# Patient Record
Sex: FEMALE | Race: WHITE | URBAN - NONMETROPOLITAN AREA
[De-identification: names, ages, dates, MRNs, and addresses within clinical notes are randomized per-mention and may not be internally consistent; named-entity substitution may affect disease eponyms.]

---

## 2018-02-10 ENCOUNTER — OFFICE VISIT (OUTPATIENT)
Dept: URGENT CARE | Age: 63
End: 2018-02-10
Payer: COMMERCIAL

## 2018-02-10 VITALS
WEIGHT: 266 LBS | DIASTOLIC BLOOD PRESSURE: 83 MMHG | RESPIRATION RATE: 18 BRPM | SYSTOLIC BLOOD PRESSURE: 136 MMHG | HEIGHT: 68 IN | TEMPERATURE: 98.6 F | BODY MASS INDEX: 40.32 KG/M2 | OXYGEN SATURATION: 95 % | HEART RATE: 106 BPM

## 2018-02-10 DIAGNOSIS — R50.9 FEVER AND CHILLS: ICD-10-CM

## 2018-02-10 DIAGNOSIS — J11.1 INFLUENZA: Primary | ICD-10-CM

## 2018-02-10 LAB
INFLUENZA A ANTIBODY: NEGATIVE
INFLUENZA B ANTIBODY: NEGATIVE

## 2018-02-10 PROCEDURE — 99202 OFFICE O/P NEW SF 15 MIN: CPT | Performed by: FAMILY MEDICINE

## 2018-02-10 PROCEDURE — 87804 INFLUENZA ASSAY W/OPTIC: CPT | Performed by: FAMILY MEDICINE

## 2018-02-10 RX ORDER — MONTELUKAST SODIUM 10 MG/1
TABLET ORAL
COMMUNITY
Start: 2016-10-27

## 2018-02-10 RX ORDER — LOVASTATIN 20 MG/1
TABLET ORAL
COMMUNITY
Start: 2017-12-28

## 2018-02-10 RX ORDER — OSELTAMIVIR PHOSPHATE 75 MG/1
75 CAPSULE ORAL 2 TIMES DAILY
Qty: 10 CAPSULE | Refills: 0 | Status: SHIPPED | OUTPATIENT
Start: 2018-02-10 | End: 2018-02-15

## 2018-02-10 RX ORDER — LISINOPRIL 20 MG/1
TABLET ORAL
COMMUNITY
Start: 2018-01-30

## 2018-02-10 RX ORDER — HYDROCODONE BITARTRATE AND ACETAMINOPHEN 7.5; 325 MG/1; MG/1
TABLET ORAL
COMMUNITY
Start: 2018-01-19

## 2018-02-10 RX ORDER — METOPROLOL SUCCINATE 100 MG/1
TABLET, EXTENDED RELEASE ORAL
COMMUNITY
Start: 2018-01-30

## 2018-02-10 ASSESSMENT — ENCOUNTER SYMPTOMS
RHINORRHEA: 1
NAUSEA: 0
COUGH: 1
SORE THROAT: 1

## 2018-02-10 NOTE — PROGRESS NOTES
No current facility-administered medications for this visit. Allergies   Allergen Reactions    Amoxicillin        Health Maintenance   Topic Date Due    Hepatitis C screen  1955    HIV screen  07/09/1972    DTaP/Tdap/Td vaccine (1 - Tdap) 07/09/1976    Cervical cancer screen  07/09/1978    Lipid screen  07/09/1997    Diabetes screen  07/09/1997    Breast cancer screen  07/09/2007    Colon cancer screen colonoscopy  07/09/2007    Zostavax vaccine  07/09/2017    Flu vaccine (1) 09/01/2017       Subjective:      Review of Systems   Constitutional: Positive for chills and fever. HENT: Positive for congestion, rhinorrhea and sore throat. Respiratory: Positive for cough. Gastrointestinal: Positive for anorexia. Negative for nausea. Musculoskeletal: Positive for arthralgias (she also has psoriatic arthritis and this flares up when she is sick) and myalgias. Neurological: Positive for headaches. Objective:     Physical Exam   Constitutional: She is oriented to person, place, and time. She appears well-developed and well-nourished. No distress. HENT:   Head: Normocephalic and atraumatic. Right Ear: External ear normal.   Left Ear: External ear normal.   Mouth/Throat: No oropharyngeal exudate. She has a red throat w/o exudate and her nose has a mucoid drainage. Cardiovascular: Normal rate. Pulmonary/Chest: Effort normal and breath sounds normal. No respiratory distress. She has no wheezes. She has no rales. Lymphadenopathy:     She has no cervical adenopathy. Neurological: She is alert and oriented to person, place, and time. Skin: She is not diaphoretic. Nursing note and vitals reviewed. /83   Pulse 106   Temp 98.6 °F (37 °C) (Oral)   Resp 18   Ht 5' 8\" (1.727 m)   Wt 266 lb (120.7 kg)   SpO2 95%   BMI 40.45 kg/m²     Assessment:      1. Influenza  oseltamivir (TAMIFLU) 75 MG capsule   2.  Fever and chills  POCT Influenza A/B       Plan: you care for yourself at home? · Get plenty of rest.  · Drink plenty of fluids, enough so that your urine is light yellow or clear like water. If you have kidney, heart, or liver disease and have to limit fluids, talk with your doctor before you increase the amount of fluids you drink. · Take an over-the-counter pain medicine if needed, such as acetaminophen (Tylenol), ibuprofen (Advil, Motrin), or naproxen (Aleve), to relieve fever, headache, and muscle aches. Read and follow all instructions on the label. No one younger than 20 should take aspirin. It has been linked to Reye syndrome, a serious illness. · Do not smoke. Smoking can make the flu worse. If you need help quitting, talk to your doctor about stop-smoking programs and medicines. These can increase your chances of quitting for good. · Breathe moist air from a hot shower or from a sink filled with hot water to help clear a stuffy nose. · Before you use cough and cold medicines, check the label. These medicines may not be safe for young children or for people with certain health problems. · If the skin around your nose and lips becomes sore, put some petroleum jelly on the area. · To ease coughing:  ¨ Drink fluids to soothe a scratchy throat. ¨ Suck on cough drops or plain hard candy. ¨ Take an over-the-counter cough medicine that contains dextromethorphan to help you get some sleep. Read and follow all instructions on the label. ¨ Raise your head at night with an extra pillow. This may help you rest if coughing keeps you awake. · Take any prescribed medicine exactly as directed. Call your doctor if you think you are having a problem with your medicine. To avoid spreading the flu  · Wash your hands regularly, and keep your hands away from your face. · Stay home from school, work, and other public places until you are feeling better and your fever has been gone for at least 24 hours.  The fever needs to have gone away on its own without the help

## 2018-09-06 ENCOUNTER — OFFICE VISIT (OUTPATIENT)
Dept: GASTROENTEROLOGY | Facility: CLINIC | Age: 63
End: 2018-09-06

## 2018-09-06 VITALS
OXYGEN SATURATION: 98 % | HEART RATE: 88 BPM | DIASTOLIC BLOOD PRESSURE: 80 MMHG | WEIGHT: 268 LBS | HEIGHT: 68 IN | BODY MASS INDEX: 40.62 KG/M2 | SYSTOLIC BLOOD PRESSURE: 130 MMHG

## 2018-09-06 DIAGNOSIS — Z78.9 NONSMOKER: ICD-10-CM

## 2018-09-06 DIAGNOSIS — E66.9 OBESITY, UNSPECIFIED OBESITY SEVERITY, UNSPECIFIED OBESITY TYPE: ICD-10-CM

## 2018-09-06 DIAGNOSIS — I10 HTN (HYPERTENSION), BENIGN: ICD-10-CM

## 2018-09-06 DIAGNOSIS — Z86.010 HX OF COLONIC POLYPS: Primary | ICD-10-CM

## 2018-09-06 PROBLEM — Z86.0100 HX OF COLONIC POLYPS: Status: ACTIVE | Noted: 2018-09-06

## 2018-09-06 PROCEDURE — S0285 CNSLT BEFORE SCREEN COLONOSC: HCPCS | Performed by: CLINICAL NURSE SPECIALIST

## 2018-09-06 RX ORDER — SODIUM, POTASSIUM,MAG SULFATES 17.5-3.13G
SOLUTION, RECONSTITUTED, ORAL ORAL
Qty: 2 BOTTLE | Refills: 0 | Status: SHIPPED | OUTPATIENT
Start: 2018-09-06 | End: 2019-06-25

## 2018-09-06 RX ORDER — METOPROLOL TARTRATE 100 MG/1
100 TABLET ORAL 2 TIMES DAILY
COMMUNITY
End: 2019-06-25

## 2018-09-06 RX ORDER — LISINOPRIL 20 MG/1
20 TABLET ORAL DAILY
COMMUNITY

## 2018-09-06 NOTE — PROGRESS NOTES
Christel Leung  1955 9/6/2018  Chief Complaint   Patient presents with   • Colonoscopy     Subjective   HPI  Christel Leung is a 63 y.o. female who presents as a referral for preventative maintenance. She has no complaints of nausea or vomiting. No change in bowels. No wt loss. No BRBPR. No melena. There is NO family hx for colon cancer. No abdominal pain.  Past Medical History:   Diagnosis Date   • Arthritis    • Hx of colonic polyp    • Hyperlipemia    • Hypertension    • Psoriatic arthritis (CMS/HCC)      Past Surgical History:   Procedure Laterality Date   • COLONOSCOPY  08/14/2013    Diverticulosis repeat exam in 5 years   • COLONOSCOPY W/ POLYPECTOMY  06/15/2008    Hyperplastic polyp at 30 cm repeat exam in 5 years   • TOTAL KNEE ARTHROPLASTY Right      Outpatient Prescriptions Marked as Taking for the 9/6/18 encounter (Office Visit) with Deborah Becker APRN   Medication Sig Dispense Refill   • Adalimumab (HUMIRA PEN SC) Inject 50 mg under the skin Every 7 (Seven) Days.     • AFLURIA injection      • diclofenac (VOLTAREN) 1 % gel gel      • HYDROcodone-acetaminophen (NORCO) 7.5-325 MG per tablet      • lisinopril (PRINIVIL,ZESTRIL) 20 MG tablet Take 20 mg by mouth Daily.     • lovastatin (MEVACOR) 20 MG tablet      • metoprolol tartrate (LOPRESSOR) 100 MG tablet Take 100 mg by mouth 2 (Two) Times a Day.     • montelukast (SINGULAIR) 10 MG tablet        Allergies   Allergen Reactions   • Amoxicillin      Social History     Social History   • Marital status:      Spouse name: N/A   • Number of children: N/A   • Years of education: N/A     Occupational History   • Not on file.     Social History Main Topics   • Smoking status: Current Every Day Smoker     Packs/day: 0.50     Types: Cigarettes   • Smokeless tobacco: Never Used   • Alcohol use No   • Drug use: No   • Sexual activity: Not on file     Other Topics Concern   • Not on file     Social History Narrative   • No narrative on file  "    Family History   Problem Relation Age of Onset   • Heart disease Mother    • Colon polyps Mother    • No Known Problems Father    • No Known Problems Sister    • No Known Problems Brother    • No Known Problems Maternal Grandmother    • No Known Problems Maternal Grandfather    • No Known Problems Paternal Grandmother    • No Known Problems Paternal Grandfather    • Colon cancer Neg Hx      Health Maintenance   Topic Date Due   • ANNUAL PHYSICAL  07/09/1960   • PNEUMOCOCCAL VACCINE (19-64 MEDIUM RISK) (1 of 1 - PPSV23) 07/09/1976   • TDAP/TD VACCINES (1 - Tdap) 07/09/1976   • ZOSTER VACCINE (1 of 2) 07/09/2007   • HEPATITIS C SCREENING  11/23/2016   • LIPID PANEL  05/21/2018   • INFLUENZA VACCINE  08/01/2018   • MAMMOGRAM  04/18/2019   • PAP SMEAR  11/23/2019   • COLONOSCOPY  08/14/2023       REVIEW OF SYSTEMS  General: well appearing, no fever chills or sweats, no unexplained wt loss  HEENT: no acute visual or hearing disturbances  Cardiovascular: No chest pain or palpitations  Pulmonary: No shortness of breath, coughing, wheezing or hemoptysis  : No burning, urgency, hematuria, or dysuria  Musculoskeletal: No joint pain or stiffness  Peripheral: no edema  Skin: No lesions or rashes  Neuro: No dizziness, headaches, stroke, syncope  Endocrine: No hot or cold intolerances  Hematological: No blood dyscrasias    Objective   Vitals:    09/06/18 0849   BP: 130/80   Pulse: 88   SpO2: 98%   Weight: 122 kg (268 lb)   Height: 172.7 cm (68\")     Body mass index is 40.75 kg/m².  Patient's Body mass index is 40.75 kg/m². BMI is above normal parameters. Recommendations include: nutrition counseling.      PHYSICAL EXAM  General: age appropriate well nourished well appearing, no acute distress  Head: normocephalic and atraumatic  Global assessment-supple  Neck-No JVD noted, no lymphadenopathy  Pulmonary-clear to auscultation bilaterally, normal respiratory effort  Cardiovascular-normal rate and rhythm, normal heart sounds, " S1 and S2 noted  Abdomen-soft, non tender, non distended, normal bowel sounds all 4 quadrants, no hepatosplenomegaly noted  Extremities-No clubbing cyanosis or edema  Neuro-Non focal, converses appropriately, awake, alert, oriented    Assessment/Plan     Christel was seen today for colonoscopy.    Diagnoses and all orders for this visit:    Hx of colonic polyps  -     SUPREP BOWEL PREP KIT 17.5-3.13-1.6 GM/180ML solution oral solution; Take as directed by office instructions provided  -     Case Request; Standing  -     Follow Anesthesia Guidelines / Standing Orders; Future  -     Implement Anesthesia Orders Day of Procedure; Standing  -     Obtain Informed Consent; Standing  -     Verify bowel prep was successful; Standing  -     Case Request    HTN (hypertension), benign    Nonsmoker    Obesity, unspecified obesity severity, unspecified obesity type      Patient is to continue all blood pressure and cardiac medications prior to procedure.     COLONOSCOPY WITH ANESTHESIA (N/A)  Body mass index is 40.75 kg/m².    Patient instructions on prep prior to procedure provided to the patient.    All risks, benefits, alternatives, and indications of colonoscopy procedure have been discussed with the patient. Risks to include perforation of the colon requiring possible surgery or colostomy, risk of bleeding from biopsies or removal of colon tissue, possibility of missing a colon polyp or cancer, or adverse drug reaction.  Benefits to include the diagnosis and management of disease of the colon and rectum. Alternatives to include barium enema, radiographic evaluation, lab testing or no intervention. Pt verbalizes understanding and agrees.     Deborah Becker, REUBEN  2018  9:05 AM      IF YOU SMOKE OR USE TOBACCO PLEASE READ THE FOLLOWIN minutes reading provided    Why is smoking bad for me?  Smoking increases the risk of heart disease, lung disease, vascular disease, stroke, and cancer.     If you smoke, STOP!    If  you would like more information on quitting smoking, please visit the [employer] website: [email or web address]   This link will provide additional resources including the QUIT line and the Beat the Pack support groups.     For more information:    Quit Now [place]  1-800-QUIT-NOW  https://[place].[employer].org/en-[place]/    Obesity, Adult  Obesity is the condition of having too much total body fat. Being overweight or obese means that your weight is greater than what is considered healthy for your body size. Obesity is determined by a measurement called BMI. BMI is an estimate of body fat and is calculated from height and weight. For adults, a BMI of 30 or higher is considered obese.  Obesity can eventually lead to other health concerns and major illnesses, including:  · Stroke.  · Coronary artery disease (CAD).  · Type 2 diabetes.  · Some types of cancer, including cancers of the colon, breast, uterus, and gallbladder.  · Osteoarthritis.  · High blood pressure (hypertension).  · High cholesterol.  · Sleep apnea.  · Gallbladder stones.  · Infertility problems.  What are the causes?  The main cause of obesity is taking in (consuming) more calories than your body uses for energy. Other factors that contribute to this condition may include:  · Being born with genes that make you more likely to become obese.  · Having a medical condition that causes obesity. These conditions include:  ¨ Hypothyroidism.  ¨ Polycystic ovarian syndrome (PCOS).  ¨ Binge-eating disorder.  ¨ Cushing syndrome.  · Taking certain medicines, such as steroids, antidepressants, and seizure medicines.  · Not being physically active (sedentary lifestyle).  · Living where there are limited places to exercise safely or buy healthy foods.  · Not getting enough sleep.  What increases the risk?  The following factors may increase your risk of this condition:  · Having a family history of obesity.  · Being a  woman of -American descent.  · Being a man of  descent.  What are the signs or symptoms?  Having excessive body fat is the main symptom of this condition.  How is this diagnosed?  This condition may be diagnosed based on:  · Your symptoms.  · Your medical history.  · A physical exam. Your health care provider may measure:  ¨ Your BMI. If you are an adult with a BMI between 25 and less than 30, you are considered overweight. If you are an adult with a BMI of 30 or higher, you are considered obese.  ¨ The distances around your hips and your waist (circumferences). These may be compared to each other to help diagnose your condition.  ¨ Your skinfold thickness. Your health care provider may gently pinch a fold of your skin and measure it.  How is this treated?  Treatment for this condition often includes changing your lifestyle. Treatment may include some or all of the following:  · Dietary changes. Work with your health care provider and a dietitian to set a weight-loss goal that is healthy and reasonable for you. Dietary changes may include eating:  ¨ Smaller portions. A portion size is the amount of a particular food that is healthy for you to eat at one time. This varies from person to person.  ¨ Low-calorie or low-fat options.  ¨ More whole grains, fruits, and vegetables.  · Regular physical activity. This may include aerobic activity (cardio) and strength training.  · Medicine to help you lose weight. Your health care provider may prescribe medicine if you are unable to lose 1 pound a week after 6 weeks of eating more healthily and doing more physical activity.  · Surgery. Surgical options may include gastric banding and gastric bypass. Surgery may be done if:  ¨ Other treatments have not helped to improve your condition.  ¨ You have a BMI of 40 or higher.  ¨ You have life-threatening health problems related to obesity.  Follow these instructions at home:     Eating and drinking     · Follow  recommendations from your health care provider about what you eat and drink. Your health care provider may advise you to:  ¨ Limit fast foods, sweets, and processed snack foods.  ¨ Choose low-fat options, such as low-fat milk instead of whole milk.  ¨ Eat 5 or more servings of fruits or vegetables every day.  ¨ Eat at home more often. This gives you more control over what you eat.  ¨ Choose healthy foods when you eat out.  ¨ Learn what a healthy portion size is.  ¨ Keep low-fat snacks on hand.  ¨ Avoid sugary drinks, such as soda, fruit juice, iced tea sweetened with sugar, and flavored milk.  ¨ Eat a healthy breakfast.  · Drink enough water to keep your urine clear or pale yellow.  · Do not go without eating for long periods of time (do not fast) or follow a fad diet. Fasting and fad diets can be unhealthy and even dangerous.  Physical Activity   · Exercise regularly, as told by your health care provider. Ask your health care provider what types of exercise are safe for you and how often you should exercise.  · Warm up and stretch before being active.  · Cool down and stretch after being active.  · Rest between periods of activity.  Lifestyle   · Limit the time that you spend in front of your TV, computer, or video game system.  · Find ways to reward yourself that do not involve food.  · Limit alcohol intake to no more than 1 drink a day for nonpregnant women and 2 drinks a day for men. One drink equals 12 oz of beer, 5 oz of wine, or 1½ oz of hard liquor.  General instructions   · Keep a weight loss journal to keep track of the food you eat and how much you exercise you get.  · Take over-the-counter and prescription medicines only as told by your health care provider.  · Take vitamins and supplements only as told by your health care provider.  · Consider joining a support group. Your health care provider may be able to recommend a support group.  · Keep all follow-up visits as told by your health care provider.  This is important.  Contact a health care provider if:  · You are unable to meet your weight loss goal after 6 weeks of dietary and lifestyle changes.  This information is not intended to replace advice given to you by your health care provider. Make sure you discuss any questions you have with your health care provider.  Document Released: 01/25/2006 Document Revised: 05/22/2017 Document Reviewed: 10/05/2016  Elsevier Interactive Patient Education © 2017 [employer]

## 2018-12-10 ENCOUNTER — ANESTHESIA (OUTPATIENT)
Dept: GASTROENTEROLOGY | Facility: HOSPITAL | Age: 63
End: 2018-12-10

## 2018-12-10 ENCOUNTER — HOSPITAL ENCOUNTER (OUTPATIENT)
Facility: HOSPITAL | Age: 63
Setting detail: HOSPITAL OUTPATIENT SURGERY
Discharge: HOME | End: 2018-12-10
Attending: INTERNAL MEDICINE | Admitting: INTERNAL MEDICINE

## 2018-12-10 ENCOUNTER — ANESTHESIA EVENT (OUTPATIENT)
Dept: GASTROENTEROLOGY | Facility: HOSPITAL | Age: 63
End: 2018-12-10

## 2018-12-10 VITALS
DIASTOLIC BLOOD PRESSURE: 79 MMHG | BODY MASS INDEX: 42.75 KG/M2 | OXYGEN SATURATION: 98 % | RESPIRATION RATE: 17 BRPM | SYSTOLIC BLOOD PRESSURE: 124 MMHG | HEART RATE: 76 BPM | WEIGHT: 266 LBS | HEIGHT: 66 IN

## 2018-12-10 DIAGNOSIS — Z86.010 HX OF COLONIC POLYPS: ICD-10-CM

## 2018-12-10 PROCEDURE — 88305 TISSUE EXAM BY PATHOLOGIST: CPT | Performed by: INTERNAL MEDICINE

## 2018-12-10 PROCEDURE — 45385 COLONOSCOPY W/LESION REMOVAL: CPT | Performed by: INTERNAL MEDICINE

## 2018-12-10 PROCEDURE — 25010000002 PROPOFOL 10 MG/ML EMULSION: Performed by: NURSE ANESTHETIST, CERTIFIED REGISTERED

## 2018-12-10 RX ORDER — SODIUM CHLORIDE 9 MG/ML
500 INJECTION, SOLUTION INTRAVENOUS CONTINUOUS PRN
Status: DISCONTINUED | OUTPATIENT
Start: 2018-12-10 | End: 2018-12-10 | Stop reason: HOSPADM

## 2018-12-10 RX ORDER — PROPOFOL 10 MG/ML
VIAL (ML) INTRAVENOUS AS NEEDED
Status: DISCONTINUED | OUTPATIENT
Start: 2018-12-10 | End: 2018-12-10 | Stop reason: SURG

## 2018-12-10 RX ORDER — LIDOCAINE HYDROCHLORIDE 20 MG/ML
INJECTION, SOLUTION INFILTRATION; PERINEURAL AS NEEDED
Status: DISCONTINUED | OUTPATIENT
Start: 2018-12-10 | End: 2018-12-10 | Stop reason: SURG

## 2018-12-10 RX ORDER — SODIUM CHLORIDE 0.9 % (FLUSH) 0.9 %
3 SYRINGE (ML) INJECTION AS NEEDED
Status: DISCONTINUED | OUTPATIENT
Start: 2018-12-10 | End: 2018-12-10 | Stop reason: HOSPADM

## 2018-12-10 RX ADMIN — LIDOCAINE HYDROCHLORIDE 0.5 ML: 10 INJECTION, SOLUTION EPIDURAL; INFILTRATION; INTRACAUDAL; PERINEURAL at 08:47

## 2018-12-10 RX ADMIN — LIDOCAINE HYDROCHLORIDE 50 MG: 20 INJECTION, SOLUTION INFILTRATION; PERINEURAL at 09:05

## 2018-12-10 RX ADMIN — PROPOFOL 350 MG: 10 INJECTION, EMULSION INTRAVENOUS at 09:08

## 2018-12-10 RX ADMIN — SODIUM CHLORIDE 500 ML: 9 INJECTION, SOLUTION INTRAVENOUS at 08:47

## 2018-12-10 NOTE — ANESTHESIA POSTPROCEDURE EVALUATION
Patient: Christel Leung    Procedure Summary     Date:  12/10/18 Room / Location:  [facility] ENDOSCOPY 2 / [facility]    Anesthesia Start:  0906 Anesthesia Stop:  0929    Procedure:  COLONOSCOPY WITH ANESTHESIA (N/A ) Diagnosis:       Hx of colonic polyps      (Hx of colonic polyps [Z86.010])    Surgeon:  Emile Clifton MD Provider:  Ankit Purcell CRNA    Anesthesia Type:  general ASA Status:  3          Anesthesia Type: general  Last vitals  BP   (!) 169/112 (12/10/18 0818)   Temp       Pulse   96 (12/10/18 0818)   Resp   20 (12/10/18 0818)     SpO2   97 % (12/10/18 0818)     Post Anesthesia Care and Evaluation    Patient location during evaluation: PACU  Patient participation: complete - patient participated  Level of consciousness: awake and awake and alert  Pain score: 0  Pain management: adequate  Airway patency: patent  Anesthetic complications: No anesthetic complications    Cardiovascular status: acceptable and stable  Respiratory status: acceptable and unassisted  Hydration status: acceptable

## 2018-12-10 NOTE — H&P
[facility]  Pre Procedure History & Physical    Chief Complaint:   History of polyps    Subjective     HPI:   Here for colonoscopy.  History of polyps    Past Medical History:   Past Medical History:   Diagnosis Date   • Arthritis    • Hx of colonic polyp    • Hyperlipemia    • Hypertension    • Psoriatic arthritis (CMS/HCC)        Past Surgical History:  Past Surgical History:   Procedure Laterality Date   • COLONOSCOPY  08/14/2013    Diverticulosis repeat exam in 5 years   • COLONOSCOPY W/ POLYPECTOMY  06/15/2008    Hyperplastic polyp at 30 cm repeat exam in 5 years   • TOTAL KNEE ARTHROPLASTY Right        Family History:  Family History   Problem Relation Age of Onset   • Heart disease Mother    • Colon polyps Mother    • No Known Problems Father    • No Known Problems Sister    • No Known Problems Brother    • No Known Problems Maternal Grandmother    • No Known Problems Maternal Grandfather    • No Known Problems Paternal Grandmother    • No Known Problems Paternal Grandfather    • Colon cancer Neg Hx        Social History:   reports that she has been smoking cigarettes.  She has been smoking about 0.50 packs per day. she has never used smokeless tobacco. She reports that she does not drink alcohol or use drugs.    Medications:   Prior to Admission medications    Medication Sig Start Date End Date Taking? Authorizing Provider   Adalimumab (HUMIRA PEN SC) Inject 50 mg under the skin Every 7 (Seven) Days.   Yes ProviderAdalgisa MD   AFLURIA injection  10/28/16  Yes Provider, MD Adalgisa   diclofenac (VOLTAREN) 1 % gel gel  10/31/16  Yes Provider, MD Adalgisa   lisinopril (PRINIVIL,ZESTRIL) 20 MG tablet Take 20 mg by mouth Daily.   Yes ProviderAdalgisa MD   lovastatin (MEVACOR) 20 MG tablet  10/31/16  Yes ProviderAdalgisa MD   metoprolol tartrate (LOPRESSOR) 100 MG tablet Take 100 mg by mouth 2 (Two) Times a Day.   Yes Provider, MD Adalgisa   montelukast (SINGULAIR) 10  "MG tablet  10/27/16  Yes Provider, MD Adalgisa   HYDROcodone-acetaminophen (NORCO) 7.5-325 MG per tablet  9/27/16   Provider, MD Adalgisa   SUPREP BOWEL PREP KIT 17.5-3.13-1.6 GM/180ML solution oral solution Take as directed by office instructions provided 9/6/18   Deborah Becker APRN       Allergies:  Amoxicillin    Objective     Blood pressure (!) 169/112, pulse 96, resp. rate 20, height 167.6 cm (66\"), weight 121 kg (266 lb), SpO2 97 %.    Physical Exam   Constitutional: Pt is oriented to person, place, and in no distress.   HENT: Mouth/Throat: Oropharynx is clear.   Cardiovascular: Normal rate, regular rhythm.    Pulmonary/Chest: Effort normal. No respiratory distress. No  wheezes.   Abdominal: Soft. Non-distended.  Skin: Skin is warm and dry.   Psychiatric: Mood, memory, affect and judgment appear normal.     Assessment/Plan     Diagnosis:  History of polyps    Anticipated Surgical Procedure:    Proceed with colonoscopy as scheduled    The following major R/B/A were discussed with the patient, however the list is not all inclusive . Risk:  Bleeding (immediate and delayed), perforation (rupture or tear), reaction to medication, missed lesion/cancer, pain during the procedure, infection, need for surgery, need for ostomy, need for mechanical ventilation (breathing machine), death.  Benefits: removal of polyp/tissue, burn/clip/or inject to stop bleeding, removal of foreign body, dilate any stricture.  Alternatives: Xray or CT, surgery, do nothing with associated risk   The patient was given time to ask question and received explanation, and agrees to proceed as per History and Physical.   No guarantee given or expressed.    EMR Dragon/transcription disclaimer: Much of this encounter note is an electronic transcription/translation of spoken language to printed text.  The electronic translation of spoken language may permit erroneous, or at times, nonsensical words or phrases to be inadvertently " transcribed.  Although I have reviewed the note for such errors, some may still exist.    Emile Clifton MD  9:08 AM  12/10/2018

## 2018-12-10 NOTE — ANESTHESIA PREPROCEDURE EVALUATION
Anesthesia Evaluation     Patient summary reviewed   no history of anesthetic complications:  NPO Solid Status: > 8 hours             Airway   Mallampati: II  TM distance: >3 FB  Neck ROM: full  Dental      Pulmonary    (+) a smoker,   Cardiovascular   Exercise tolerance: good (4-7 METS)    Patient on routine beta blocker and Beta blocker given within 24 hours of surgery    (+) hypertension, hyperlipidemia,       Neuro/Psych- negative ROS  GI/Hepatic/Renal/Endo    (+) morbid obesity,      Musculoskeletal     Abdominal    Substance History      OB/GYN          Other                        Anesthesia Plan    ASA 3     general     intravenous induction   Anesthetic plan, all risks, benefits, and alternatives have been provided, discussed and informed consent has been obtained with: patient.

## 2018-12-13 LAB
LAB AP CASE REPORT: NORMAL
LAB AP CLINICAL INFORMATION: NORMAL
PATH REPORT.FINAL DX SPEC: NORMAL
PATH REPORT.GROSS SPEC: NORMAL

## 2019-03-18 PROCEDURE — 88304 TISSUE EXAM BY PATHOLOGIST: CPT | Performed by: SPECIALIST

## 2019-03-19 ENCOUNTER — LAB REQUISITION (OUTPATIENT)
Dept: LAB | Facility: HOSPITAL | Age: 64
End: 2019-03-19

## 2019-03-19 DIAGNOSIS — Z00.00 ENCOUNTER FOR GENERAL ADULT MEDICAL EXAMINATION WITHOUT ABNORMAL FINDINGS: ICD-10-CM

## 2019-03-20 LAB
CYTO UR: NORMAL
LAB AP CASE REPORT: NORMAL
LAB AP CLINICAL INFORMATION: NORMAL
LAB AP DIAGNOSIS COMMENT: NORMAL
LAB AP INTRADEPARTMENTAL CONSULT: NORMAL
PATH REPORT.FINAL DX SPEC: NORMAL
PATH REPORT.GROSS SPEC: NORMAL

## 2019-06-24 ENCOUNTER — TRANSCRIBE ORDERS (OUTPATIENT)
Dept: ADMINISTRATIVE | Facility: HOSPITAL | Age: 64
End: 2019-06-24

## 2019-06-24 ENCOUNTER — APPOINTMENT (OUTPATIENT)
Dept: LAB | Facility: HOSPITAL | Age: 64
End: 2019-06-24

## 2019-06-24 DIAGNOSIS — M54.5 LOW BACK PAIN, UNSPECIFIED BACK PAIN LATERALITY, UNSPECIFIED CHRONICITY, WITH SCIATICA PRESENCE UNSPECIFIED: ICD-10-CM

## 2019-06-24 DIAGNOSIS — R11.2 NAUSEA AND VOMITING, INTRACTABILITY OF VOMITING NOT SPECIFIED, UNSPECIFIED VOMITING TYPE: Primary | ICD-10-CM

## 2019-06-24 LAB
ALBUMIN SERPL-MCNC: 4.4 G/DL (ref 3.5–5)
ALBUMIN/GLOB SERPL: 1.1 G/DL (ref 1.1–2.5)
ALP SERPL-CCNC: 93 U/L (ref 24–120)
ALT SERPL W P-5'-P-CCNC: 28 U/L (ref 0–54)
AMYLASE SERPL-CCNC: 58 U/L (ref 30–110)
ANION GAP SERPL CALCULATED.3IONS-SCNC: 13 MMOL/L (ref 4–13)
AST SERPL-CCNC: 34 U/L (ref 7–45)
BILIRUB SERPL-MCNC: 0.8 MG/DL (ref 0.1–1)
BUN BLD-MCNC: 10 MG/DL (ref 5–21)
BUN/CREAT SERPL: 12 (ref 7–25)
CALCIUM SPEC-SCNC: 9.6 MG/DL (ref 8.4–10.4)
CHLORIDE SERPL-SCNC: 104 MMOL/L (ref 98–110)
CO2 SERPL-SCNC: 23 MMOL/L (ref 24–31)
CREAT BLD-MCNC: 0.83 MG/DL (ref 0.5–1.4)
CRP SERPL-MCNC: 2.32 MG/DL (ref 0–0.99)
DEPRECATED RDW RBC AUTO: 43.1 FL (ref 40–54)
ERYTHROCYTE [DISTWIDTH] IN BLOOD BY AUTOMATED COUNT: 13.2 % (ref 12–15)
ERYTHROCYTE [SEDIMENTATION RATE] IN BLOOD: 46 MM/HR (ref 0–20)
GFR SERPL CREATININE-BSD FRML MDRD: 70 ML/MIN/1.73
GLOBULIN UR ELPH-MCNC: 4 GM/DL
GLUCOSE BLD-MCNC: 114 MG/DL (ref 70–100)
HCT VFR BLD AUTO: 45.5 % (ref 37–47)
HGB BLD-MCNC: 15 G/DL (ref 12–16)
LIPASE SERPL-CCNC: 60 U/L (ref 23–203)
MCH RBC QN AUTO: 29.4 PG (ref 28–32)
MCHC RBC AUTO-ENTMCNC: 33 G/DL (ref 33–36)
MCV RBC AUTO: 89 FL (ref 82–98)
PLATELET # BLD AUTO: 302 10*3/MM3 (ref 130–400)
PMV BLD AUTO: 11.4 FL (ref 6–12)
POTASSIUM BLD-SCNC: 4.1 MMOL/L (ref 3.5–5.3)
PROT SERPL-MCNC: 8.4 G/DL (ref 6.3–8.7)
RBC # BLD AUTO: 5.11 10*6/MM3 (ref 4.2–5.4)
SODIUM BLD-SCNC: 140 MMOL/L (ref 135–145)
WBC NRBC COR # BLD: 14 10*3/MM3 (ref 4.8–10.8)

## 2019-06-24 PROCEDURE — 85027 COMPLETE CBC AUTOMATED: CPT | Performed by: INTERNAL MEDICINE

## 2019-06-24 PROCEDURE — 85651 RBC SED RATE NONAUTOMATED: CPT | Performed by: INTERNAL MEDICINE

## 2019-06-24 PROCEDURE — 86140 C-REACTIVE PROTEIN: CPT | Performed by: INTERNAL MEDICINE

## 2019-06-24 PROCEDURE — 80053 COMPREHEN METABOLIC PANEL: CPT | Performed by: INTERNAL MEDICINE

## 2019-06-24 PROCEDURE — 83690 ASSAY OF LIPASE: CPT | Performed by: INTERNAL MEDICINE

## 2019-06-24 PROCEDURE — 82150 ASSAY OF AMYLASE: CPT | Performed by: INTERNAL MEDICINE

## 2019-06-24 PROCEDURE — 36415 COLL VENOUS BLD VENIPUNCTURE: CPT | Performed by: INTERNAL MEDICINE

## 2019-06-25 ENCOUNTER — HOSPITAL ENCOUNTER (INPATIENT)
Facility: HOSPITAL | Age: 64
LOS: 3 days | Discharge: HOME | End: 2019-06-28
Attending: EMERGENCY MEDICINE | Admitting: INTERNAL MEDICINE

## 2019-06-25 ENCOUNTER — APPOINTMENT (OUTPATIENT)
Dept: CT IMAGING | Facility: HOSPITAL | Age: 64
End: 2019-06-25

## 2019-06-25 DIAGNOSIS — K22.9 ESOPHAGEAL ABNORMALITY: ICD-10-CM

## 2019-06-25 DIAGNOSIS — N39.0 ACUTE UTI: ICD-10-CM

## 2019-06-25 DIAGNOSIS — K52.9 COLITIS: Primary | ICD-10-CM

## 2019-06-25 PROBLEM — M54.2 ACUTE NECK PAIN: Status: ACTIVE | Noted: 2019-06-25

## 2019-06-25 PROBLEM — D72.829 LEUKOCYTOSIS: Status: ACTIVE | Noted: 2019-06-25

## 2019-06-25 PROBLEM — K92.2 GI BLEED: Status: ACTIVE | Noted: 2019-06-25

## 2019-06-25 PROBLEM — M54.50 ACUTE BILATERAL LOW BACK PAIN: Status: ACTIVE | Noted: 2019-06-25

## 2019-06-25 LAB
ALBUMIN SERPL-MCNC: 4.2 G/DL (ref 3.5–5)
ALBUMIN/GLOB SERPL: 1.1 G/DL (ref 1.1–2.5)
ALP SERPL-CCNC: 92 U/L (ref 24–120)
ALT SERPL W P-5'-P-CCNC: 31 U/L (ref 0–54)
ANION GAP SERPL CALCULATED.3IONS-SCNC: 14 MMOL/L (ref 4–13)
AST SERPL-CCNC: 36 U/L (ref 7–45)
BACTERIA UR QL AUTO: ABNORMAL /HPF
BASOPHILS # BLD AUTO: 0.05 10*3/MM3 (ref 0–0.2)
BASOPHILS NFR BLD AUTO: 0.3 % (ref 0–2)
BILIRUB SERPL-MCNC: 1 MG/DL (ref 0.1–1)
BILIRUB UR QL STRIP: NEGATIVE
BILIRUB UR QL STRIP: NEGATIVE
BUN BLD-MCNC: 12 MG/DL (ref 5–21)
BUN/CREAT SERPL: 16.2 (ref 7–25)
CALCIUM SPEC-SCNC: 9.3 MG/DL (ref 8.4–10.4)
CHLORIDE SERPL-SCNC: 104 MMOL/L (ref 98–110)
CLARITY UR: ABNORMAL
CLARITY UR: CLEAR
CO2 SERPL-SCNC: 20 MMOL/L (ref 24–31)
COLOR UR: YELLOW
COLOR UR: YELLOW
CREAT BLD-MCNC: 0.74 MG/DL (ref 0.5–1.4)
DEPRECATED RDW RBC AUTO: 41.3 FL (ref 40–54)
EOSINOPHIL # BLD AUTO: 0.14 10*3/MM3 (ref 0–0.7)
EOSINOPHIL NFR BLD AUTO: 0.7 % (ref 0–4)
ERYTHROCYTE [DISTWIDTH] IN BLOOD BY AUTOMATED COUNT: 13.2 % (ref 12–15)
GFR SERPL CREATININE-BSD FRML MDRD: 80 ML/MIN/1.73
GLOBULIN UR ELPH-MCNC: 3.9 GM/DL
GLUCOSE BLD-MCNC: 118 MG/DL (ref 70–100)
GLUCOSE UR STRIP-MCNC: NEGATIVE MG/DL
GLUCOSE UR STRIP-MCNC: NEGATIVE MG/DL
HCT VFR BLD AUTO: 43.6 % (ref 37–47)
HGB BLD-MCNC: 15.1 G/DL (ref 12–16)
HGB UR QL STRIP.AUTO: NEGATIVE
HGB UR QL STRIP.AUTO: NEGATIVE
HYALINE CASTS UR QL AUTO: ABNORMAL /LPF
IMM GRANULOCYTES # BLD AUTO: 0.08 10*3/MM3 (ref 0–0.05)
IMM GRANULOCYTES NFR BLD AUTO: 0.4 % (ref 0–5)
KETONES UR QL STRIP: ABNORMAL
KETONES UR QL STRIP: ABNORMAL
LEUKOCYTE ESTERASE UR QL STRIP.AUTO: ABNORMAL
LEUKOCYTE ESTERASE UR QL STRIP.AUTO: NEGATIVE
LIPASE SERPL-CCNC: 46 U/L (ref 23–203)
LYMPHOCYTES # BLD AUTO: 3.08 10*3/MM3 (ref 0.72–4.86)
LYMPHOCYTES NFR BLD AUTO: 15.7 % (ref 15–45)
MCH RBC QN AUTO: 29.8 PG (ref 28–32)
MCHC RBC AUTO-ENTMCNC: 34.6 G/DL (ref 33–36)
MCV RBC AUTO: 86.2 FL (ref 82–98)
MONOCYTES # BLD AUTO: 1.27 10*3/MM3 (ref 0.19–1.3)
MONOCYTES NFR BLD AUTO: 6.5 % (ref 4–12)
NEUTROPHILS # BLD AUTO: 15.04 10*3/MM3 (ref 1.87–8.4)
NEUTROPHILS NFR BLD AUTO: 76.4 % (ref 39–78)
NITRITE UR QL STRIP: NEGATIVE
NITRITE UR QL STRIP: NEGATIVE
NRBC BLD AUTO-RTO: 0 /100 WBC (ref 0–0.2)
PH UR STRIP.AUTO: 7 [PH] (ref 5–8)
PH UR STRIP.AUTO: 7 [PH] (ref 5–8)
PLATELET # BLD AUTO: 325 10*3/MM3 (ref 130–400)
PMV BLD AUTO: 11.5 FL (ref 6–12)
POTASSIUM BLD-SCNC: 4.1 MMOL/L (ref 3.5–5.3)
PROT SERPL-MCNC: 8.1 G/DL (ref 6.3–8.7)
PROT UR QL STRIP: NEGATIVE
PROT UR QL STRIP: NEGATIVE
RBC # BLD AUTO: 5.06 10*6/MM3 (ref 4.2–5.4)
RBC # UR: ABNORMAL /HPF
REF LAB TEST METHOD: ABNORMAL
SODIUM BLD-SCNC: 138 MMOL/L (ref 135–145)
SP GR UR STRIP: 1.01 (ref 1–1.03)
SP GR UR STRIP: >1.03 (ref 1–1.03)
SQUAMOUS #/AREA URNS HPF: ABNORMAL /HPF
UROBILINOGEN UR QL STRIP: ABNORMAL
UROBILINOGEN UR QL STRIP: ABNORMAL
WBC NRBC COR # BLD: 19.66 10*3/MM3 (ref 4.8–10.8)
WBC UR QL AUTO: ABNORMAL /HPF

## 2019-06-25 PROCEDURE — 83690 ASSAY OF LIPASE: CPT | Performed by: EMERGENCY MEDICINE

## 2019-06-25 PROCEDURE — 81001 URINALYSIS AUTO W/SCOPE: CPT | Performed by: EMERGENCY MEDICINE

## 2019-06-25 PROCEDURE — 81003 URINALYSIS AUTO W/O SCOPE: CPT | Performed by: INTERNAL MEDICINE

## 2019-06-25 PROCEDURE — 25010000002 LEVOFLOXACIN PER 250 MG: Performed by: EMERGENCY MEDICINE

## 2019-06-25 PROCEDURE — 25010000002 IOPAMIDOL 61 % SOLUTION: Performed by: EMERGENCY MEDICINE

## 2019-06-25 PROCEDURE — P9612 CATHETERIZE FOR URINE SPEC: HCPCS

## 2019-06-25 PROCEDURE — 80053 COMPREHEN METABOLIC PANEL: CPT | Performed by: EMERGENCY MEDICINE

## 2019-06-25 PROCEDURE — 87086 URINE CULTURE/COLONY COUNT: CPT | Performed by: EMERGENCY MEDICINE

## 2019-06-25 PROCEDURE — 25010000002 ONDANSETRON PER 1 MG: Performed by: EMERGENCY MEDICINE

## 2019-06-25 PROCEDURE — 74177 CT ABD & PELVIS W/CONTRAST: CPT

## 2019-06-25 PROCEDURE — 99284 EMERGENCY DEPT VISIT MOD MDM: CPT

## 2019-06-25 PROCEDURE — 85025 COMPLETE CBC W/AUTO DIFF WBC: CPT | Performed by: EMERGENCY MEDICINE

## 2019-06-25 RX ORDER — LIDOCAINE 50 MG/G
1 PATCH TOPICAL
Status: DISCONTINUED | OUTPATIENT
Start: 2019-06-26 | End: 2019-06-28 | Stop reason: HOSPADM

## 2019-06-25 RX ORDER — SODIUM CHLORIDE 0.9 % (FLUSH) 0.9 %
3-10 SYRINGE (ML) INJECTION AS NEEDED
Status: DISCONTINUED | OUTPATIENT
Start: 2019-06-25 | End: 2019-06-28 | Stop reason: HOSPADM

## 2019-06-25 RX ORDER — LABETALOL HYDROCHLORIDE 5 MG/ML
20 INJECTION, SOLUTION INTRAVENOUS EVERY 6 HOURS PRN
Status: DISCONTINUED | OUTPATIENT
Start: 2019-06-25 | End: 2019-06-28 | Stop reason: HOSPADM

## 2019-06-25 RX ORDER — SODIUM CHLORIDE 9 MG/ML
50 INJECTION, SOLUTION INTRAVENOUS CONTINUOUS
Status: DISCONTINUED | OUTPATIENT
Start: 2019-06-25 | End: 2019-06-27 | Stop reason: SDUPTHER

## 2019-06-25 RX ORDER — LEVOFLOXACIN 5 MG/ML
500 INJECTION, SOLUTION INTRAVENOUS EVERY 24 HOURS
Status: DISCONTINUED | OUTPATIENT
Start: 2019-06-26 | End: 2019-06-27

## 2019-06-25 RX ORDER — SODIUM CHLORIDE 0.9 % (FLUSH) 0.9 %
10 SYRINGE (ML) INJECTION AS NEEDED
Status: DISCONTINUED | OUTPATIENT
Start: 2019-06-25 | End: 2019-06-28 | Stop reason: HOSPADM

## 2019-06-25 RX ORDER — LEVOFLOXACIN 5 MG/ML
500 INJECTION, SOLUTION INTRAVENOUS ONCE
Status: COMPLETED | OUTPATIENT
Start: 2019-06-25 | End: 2019-06-25

## 2019-06-25 RX ORDER — NALOXONE HCL 0.4 MG/ML
0.4 VIAL (ML) INJECTION
Status: DISCONTINUED | OUTPATIENT
Start: 2019-06-25 | End: 2019-06-27

## 2019-06-25 RX ORDER — METOPROLOL SUCCINATE 100 MG
1 TABLET, EXTENDED RELEASE 24 HR ORAL DAILY
Status: ON HOLD | COMMUNITY
Start: 2019-06-13 | End: 2019-06-26

## 2019-06-25 RX ORDER — SODIUM CHLORIDE 0.9 % (FLUSH) 0.9 %
3 SYRINGE (ML) INJECTION EVERY 12 HOURS SCHEDULED
Status: DISCONTINUED | OUTPATIENT
Start: 2019-06-25 | End: 2019-06-28 | Stop reason: HOSPADM

## 2019-06-25 RX ORDER — ONDANSETRON 2 MG/ML
4 INJECTION INTRAMUSCULAR; INTRAVENOUS ONCE
Status: COMPLETED | OUTPATIENT
Start: 2019-06-25 | End: 2019-06-25

## 2019-06-25 RX ORDER — ONDANSETRON 4 MG/1
4 TABLET, FILM COATED ORAL EVERY 6 HOURS PRN
Status: DISCONTINUED | OUTPATIENT
Start: 2019-06-25 | End: 2019-06-26

## 2019-06-25 RX ORDER — ONDANSETRON 2 MG/ML
4 INJECTION INTRAMUSCULAR; INTRAVENOUS EVERY 6 HOURS PRN
Status: DISCONTINUED | OUTPATIENT
Start: 2019-06-25 | End: 2019-06-26

## 2019-06-25 RX ADMIN — SODIUM CHLORIDE, PRESERVATIVE FREE 3 ML: 5 INJECTION INTRAVENOUS at 20:35

## 2019-06-25 RX ADMIN — SODIUM CHLORIDE 100 ML/HR: 9 INJECTION, SOLUTION INTRAVENOUS at 20:35

## 2019-06-25 RX ADMIN — LEVOFLOXACIN 500 MG: 500 INJECTION, SOLUTION INTRAVENOUS at 17:36

## 2019-06-25 RX ADMIN — SODIUM CHLORIDE 1000 ML: 9 INJECTION, SOLUTION INTRAVENOUS at 15:42

## 2019-06-25 RX ADMIN — METRONIDAZOLE 500 MG: 500 INJECTION, SOLUTION INTRAVENOUS at 18:29

## 2019-06-25 RX ADMIN — HYDROMORPHONE HYDROCHLORIDE 1 MG: 1 INJECTION, SOLUTION INTRAMUSCULAR; INTRAVENOUS; SUBCUTANEOUS at 17:43

## 2019-06-25 RX ADMIN — ONDANSETRON HYDROCHLORIDE 4 MG: 2 SOLUTION INTRAMUSCULAR; INTRAVENOUS at 17:43

## 2019-06-25 RX ADMIN — IOPAMIDOL 150 ML: 612 INJECTION, SOLUTION INTRAVENOUS at 16:12

## 2019-06-26 PROBLEM — E86.9 VOLUME DEPLETION: Status: ACTIVE | Noted: 2019-06-26

## 2019-06-26 LAB
ANION GAP SERPL CALCULATED.3IONS-SCNC: 11 MMOL/L (ref 4–13)
ARTICHOKE IGE QN: 86 MG/DL (ref 0–99)
BUN BLD-MCNC: 10 MG/DL (ref 5–21)
BUN/CREAT SERPL: 15.9 (ref 7–25)
CALCIUM SPEC-SCNC: 8.9 MG/DL (ref 8.4–10.4)
CHLORIDE SERPL-SCNC: 106 MMOL/L (ref 98–110)
CHOLEST SERPL-MCNC: 144 MG/DL (ref 130–200)
CO2 SERPL-SCNC: 22 MMOL/L (ref 24–31)
CREAT BLD-MCNC: 0.63 MG/DL (ref 0.5–1.4)
DEPRECATED RDW RBC AUTO: 43.5 FL (ref 40–54)
ERYTHROCYTE [DISTWIDTH] IN BLOOD BY AUTOMATED COUNT: 13.3 % (ref 12–15)
GFR SERPL CREATININE-BSD FRML MDRD: 96 ML/MIN/1.73
GLUCOSE BLD-MCNC: 100 MG/DL (ref 70–100)
HBA1C MFR BLD: 5.7 %
HCT VFR BLD AUTO: 38.7 % (ref 37–47)
HCT VFR BLD AUTO: 39.5 % (ref 37–47)
HCT VFR BLD AUTO: 40.2 % (ref 37–47)
HDLC SERPL-MCNC: 37 MG/DL
HGB BLD-MCNC: 12.9 G/DL (ref 12–16)
HGB BLD-MCNC: 13.1 G/DL (ref 12–16)
HGB BLD-MCNC: 13.2 G/DL (ref 12–16)
LDLC/HDLC SERPL: 2.42 {RATIO}
MCH RBC QN AUTO: 29.4 PG (ref 28–32)
MCHC RBC AUTO-ENTMCNC: 33.2 G/DL (ref 33–36)
MCV RBC AUTO: 88.6 FL (ref 82–98)
PLATELET # BLD AUTO: 258 10*3/MM3 (ref 130–400)
PMV BLD AUTO: 11.1 FL (ref 6–12)
POTASSIUM BLD-SCNC: 4 MMOL/L (ref 3.5–5.3)
RBC # BLD AUTO: 4.46 10*6/MM3 (ref 4.2–5.4)
SODIUM BLD-SCNC: 139 MMOL/L (ref 135–145)
TRIGL SERPL-MCNC: 87 MG/DL (ref 0–149)
TSH SERPL DL<=0.05 MIU/L-ACNC: 3.09 MIU/ML (ref 0.47–4.68)
WBC NRBC COR # BLD: 16.4 10*3/MM3 (ref 4.8–10.8)

## 2019-06-26 PROCEDURE — 80061 LIPID PANEL: CPT | Performed by: NURSE PRACTITIONER

## 2019-06-26 PROCEDURE — 99222 1ST HOSP IP/OBS MODERATE 55: CPT | Performed by: INTERNAL MEDICINE

## 2019-06-26 PROCEDURE — 84443 ASSAY THYROID STIM HORMONE: CPT | Performed by: NURSE PRACTITIONER

## 2019-06-26 PROCEDURE — 25010000002 LEVOFLOXACIN PER 250 MG: Performed by: NURSE PRACTITIONER

## 2019-06-26 PROCEDURE — 94799 UNLISTED PULMONARY SVC/PX: CPT

## 2019-06-26 PROCEDURE — 85014 HEMATOCRIT: CPT | Performed by: NURSE PRACTITIONER

## 2019-06-26 PROCEDURE — 85018 HEMOGLOBIN: CPT | Performed by: NURSE PRACTITIONER

## 2019-06-26 PROCEDURE — 83036 HEMOGLOBIN GLYCOSYLATED A1C: CPT | Performed by: NURSE PRACTITIONER

## 2019-06-26 PROCEDURE — 25010000002 ONDANSETRON PER 1 MG: Performed by: NURSE PRACTITIONER

## 2019-06-26 PROCEDURE — 94760 N-INVAS EAR/PLS OXIMETRY 1: CPT

## 2019-06-26 PROCEDURE — 25010000002 ONDANSETRON PER 1 MG: Performed by: INTERNAL MEDICINE

## 2019-06-26 PROCEDURE — 85027 COMPLETE CBC AUTOMATED: CPT | Performed by: NURSE PRACTITIONER

## 2019-06-26 PROCEDURE — 80048 BASIC METABOLIC PNL TOTAL CA: CPT | Performed by: NURSE PRACTITIONER

## 2019-06-26 RX ORDER — ONDANSETRON 2 MG/ML
4 INJECTION INTRAMUSCULAR; INTRAVENOUS EVERY 4 HOURS PRN
Status: DISCONTINUED | OUTPATIENT
Start: 2019-06-26 | End: 2019-06-28 | Stop reason: HOSPADM

## 2019-06-26 RX ORDER — METOPROLOL SUCCINATE 100 MG/1
100 TABLET, EXTENDED RELEASE ORAL DAILY
COMMUNITY

## 2019-06-26 RX ORDER — HYDROCODONE BITARTRATE AND ACETAMINOPHEN 5; 325 MG/1; MG/1
1 TABLET ORAL 2 TIMES DAILY PRN
COMMUNITY

## 2019-06-26 RX ORDER — ONDANSETRON 4 MG/1
4 TABLET, FILM COATED ORAL EVERY 4 HOURS PRN
Status: DISCONTINUED | OUTPATIENT
Start: 2019-06-26 | End: 2019-06-28 | Stop reason: HOSPADM

## 2019-06-26 RX ADMIN — ONDANSETRON HYDROCHLORIDE 4 MG: 2 SOLUTION INTRAMUSCULAR; INTRAVENOUS at 23:23

## 2019-06-26 RX ADMIN — METRONIDAZOLE 500 MG: 500 INJECTION, SOLUTION INTRAVENOUS at 09:38

## 2019-06-26 RX ADMIN — LIDOCAINE 1 PATCH: 50 PATCH CUTANEOUS at 08:07

## 2019-06-26 RX ADMIN — METRONIDAZOLE 500 MG: 500 INJECTION, SOLUTION INTRAVENOUS at 03:51

## 2019-06-26 RX ADMIN — ONDANSETRON HYDROCHLORIDE 4 MG: 2 SOLUTION INTRAMUSCULAR; INTRAVENOUS at 11:14

## 2019-06-26 RX ADMIN — HYDROMORPHONE HYDROCHLORIDE 1 MG: 1 INJECTION, SOLUTION INTRAMUSCULAR; INTRAVENOUS; SUBCUTANEOUS at 17:19

## 2019-06-26 RX ADMIN — METRONIDAZOLE 500 MG: 500 INJECTION, SOLUTION INTRAVENOUS at 18:37

## 2019-06-26 RX ADMIN — ONDANSETRON HYDROCHLORIDE 4 MG: 2 SOLUTION INTRAMUSCULAR; INTRAVENOUS at 17:19

## 2019-06-26 RX ADMIN — LEVOFLOXACIN 500 MG: 500 INJECTION, SOLUTION INTRAVENOUS at 17:20

## 2019-06-26 RX ADMIN — SODIUM CHLORIDE 100 ML/HR: 9 INJECTION, SOLUTION INTRAVENOUS at 07:47

## 2019-06-26 RX ADMIN — SODIUM CHLORIDE 100 ML/HR: 9 INJECTION, SOLUTION INTRAVENOUS at 23:21

## 2019-06-26 RX ADMIN — HYDROMORPHONE HYDROCHLORIDE 1 MG: 1 INJECTION, SOLUTION INTRAMUSCULAR; INTRAVENOUS; SUBCUTANEOUS at 08:08

## 2019-06-27 ENCOUNTER — ANESTHESIA EVENT (OUTPATIENT)
Dept: GASTROENTEROLOGY | Facility: HOSPITAL | Age: 64
End: 2019-06-27

## 2019-06-27 ENCOUNTER — TELEPHONE (OUTPATIENT)
Dept: GASTROENTEROLOGY | Facility: CLINIC | Age: 64
End: 2019-06-27

## 2019-06-27 ENCOUNTER — ANESTHESIA (OUTPATIENT)
Dept: GASTROENTEROLOGY | Facility: HOSPITAL | Age: 64
End: 2019-06-27

## 2019-06-27 LAB
BACTERIA SPEC AEROBE CULT: ABNORMAL
HCT VFR BLD AUTO: 36.6 % (ref 37–47)
HCT VFR BLD AUTO: 38.9 % (ref 37–47)
HGB BLD-MCNC: 12.4 G/DL (ref 12–16)
HGB BLD-MCNC: 12.8 G/DL (ref 12–16)

## 2019-06-27 PROCEDURE — 25010000002 PROPOFOL 10 MG/ML EMULSION: Performed by: NURSE ANESTHETIST, CERTIFIED REGISTERED

## 2019-06-27 PROCEDURE — 43235 EGD DIAGNOSTIC BRUSH WASH: CPT | Performed by: INTERNAL MEDICINE

## 2019-06-27 PROCEDURE — 0DJ08ZZ INSPECTION OF UPPER INTESTINAL TRACT, VIA NATURAL OR ARTIFICIAL OPENING ENDOSCOPIC: ICD-10-PCS | Performed by: INTERNAL MEDICINE

## 2019-06-27 PROCEDURE — 85018 HEMOGLOBIN: CPT | Performed by: NURSE PRACTITIONER

## 2019-06-27 PROCEDURE — 94760 N-INVAS EAR/PLS OXIMETRY 1: CPT

## 2019-06-27 PROCEDURE — 85014 HEMATOCRIT: CPT | Performed by: NURSE PRACTITIONER

## 2019-06-27 PROCEDURE — 94799 UNLISTED PULMONARY SVC/PX: CPT

## 2019-06-27 PROCEDURE — 25010000002 ONDANSETRON PER 1 MG: Performed by: INTERNAL MEDICINE

## 2019-06-27 RX ORDER — LISINOPRIL 20 MG/1
20 TABLET ORAL DAILY
Status: DISCONTINUED | OUTPATIENT
Start: 2019-06-27 | End: 2019-06-28 | Stop reason: HOSPADM

## 2019-06-27 RX ORDER — LIDOCAINE HYDROCHLORIDE 20 MG/ML
INJECTION, SOLUTION INFILTRATION; PERINEURAL AS NEEDED
Status: DISCONTINUED | OUTPATIENT
Start: 2019-06-27 | End: 2019-06-27 | Stop reason: SURG

## 2019-06-27 RX ORDER — HYDROCODONE BITARTRATE AND ACETAMINOPHEN 5; 325 MG/1; MG/1
1 TABLET ORAL 2 TIMES DAILY PRN
Status: DISCONTINUED | OUTPATIENT
Start: 2019-06-27 | End: 2019-06-28 | Stop reason: HOSPADM

## 2019-06-27 RX ORDER — METOPROLOL SUCCINATE 100 MG/1
100 TABLET, EXTENDED RELEASE ORAL DAILY
Status: DISCONTINUED | OUTPATIENT
Start: 2019-06-27 | End: 2019-06-28 | Stop reason: HOSPADM

## 2019-06-27 RX ORDER — SODIUM CHLORIDE 0.9 % (FLUSH) 0.9 %
3 SYRINGE (ML) INJECTION EVERY 12 HOURS SCHEDULED
Status: DISCONTINUED | OUTPATIENT
Start: 2019-06-27 | End: 2019-06-27 | Stop reason: HOSPADM

## 2019-06-27 RX ORDER — SODIUM CHLORIDE 9 MG/ML
100 INJECTION, SOLUTION INTRAVENOUS CONTINUOUS
Status: DISCONTINUED | OUTPATIENT
Start: 2019-06-27 | End: 2019-06-28 | Stop reason: HOSPADM

## 2019-06-27 RX ORDER — PANTOPRAZOLE SODIUM 40 MG/1
40 TABLET, DELAYED RELEASE ORAL
Status: DISCONTINUED | OUTPATIENT
Start: 2019-06-28 | End: 2019-06-28 | Stop reason: HOSPADM

## 2019-06-27 RX ORDER — SUCRALFATE ORAL 1 G/10ML
1 SUSPENSION ORAL
Status: DISCONTINUED | OUTPATIENT
Start: 2019-06-27 | End: 2019-06-28 | Stop reason: HOSPADM

## 2019-06-27 RX ORDER — SODIUM CHLORIDE 0.9 % (FLUSH) 0.9 %
3-10 SYRINGE (ML) INJECTION AS NEEDED
Status: DISCONTINUED | OUTPATIENT
Start: 2019-06-27 | End: 2019-06-27 | Stop reason: HOSPADM

## 2019-06-27 RX ORDER — PROPOFOL 10 MG/ML
VIAL (ML) INTRAVENOUS AS NEEDED
Status: DISCONTINUED | OUTPATIENT
Start: 2019-06-27 | End: 2019-06-27 | Stop reason: SURG

## 2019-06-27 RX ADMIN — METRONIDAZOLE 500 MG: 500 INJECTION, SOLUTION INTRAVENOUS at 03:24

## 2019-06-27 RX ADMIN — PROPOFOL 150 MG: 10 INJECTION, EMULSION INTRAVENOUS at 13:02

## 2019-06-27 RX ADMIN — METRONIDAZOLE 500 MG: 500 INJECTION, SOLUTION INTRAVENOUS at 10:32

## 2019-06-27 RX ADMIN — SODIUM CHLORIDE 100 ML/HR: 9 INJECTION, SOLUTION INTRAVENOUS at 12:15

## 2019-06-27 RX ADMIN — LISINOPRIL 20 MG: 20 TABLET ORAL at 16:00

## 2019-06-27 RX ADMIN — HYDROCODONE BITARTRATE AND ACETAMINOPHEN 1 TABLET: 5; 325 TABLET ORAL at 16:00

## 2019-06-27 RX ADMIN — LIDOCAINE HYDROCHLORIDE 100 MG: 20 INJECTION, SOLUTION INFILTRATION; PERINEURAL at 13:02

## 2019-06-27 RX ADMIN — PROPOFOL 20 MG: 10 INJECTION, EMULSION INTRAVENOUS at 13:06

## 2019-06-27 RX ADMIN — METOPROLOL SUCCINATE 100 MG: 100 TABLET, FILM COATED, EXTENDED RELEASE ORAL at 16:00

## 2019-06-27 RX ADMIN — LIDOCAINE 1 PATCH: 50 PATCH CUTANEOUS at 08:57

## 2019-06-27 RX ADMIN — SODIUM CHLORIDE 100 ML/HR: 9 INJECTION, SOLUTION INTRAVENOUS at 15:55

## 2019-06-27 RX ADMIN — HYDROMORPHONE HYDROCHLORIDE 1 MG: 1 INJECTION, SOLUTION INTRAMUSCULAR; INTRAVENOUS; SUBCUTANEOUS at 05:39

## 2019-06-27 RX ADMIN — SUCRALFATE 1 G: 1 SUSPENSION ORAL at 21:25

## 2019-06-27 RX ADMIN — METRONIDAZOLE 500 MG: 500 INJECTION, SOLUTION INTRAVENOUS at 19:26

## 2019-06-27 RX ADMIN — ONDANSETRON HYDROCHLORIDE 4 MG: 2 SOLUTION INTRAMUSCULAR; INTRAVENOUS at 05:39

## 2019-06-27 RX ADMIN — LABETALOL 20 MG/4 ML (5 MG/ML) INTRAVENOUS SYRINGE 20 MG: at 08:57

## 2019-06-27 RX ADMIN — HYDROMORPHONE HYDROCHLORIDE 1 MG: 1 INJECTION, SOLUTION INTRAMUSCULAR; INTRAVENOUS; SUBCUTANEOUS at 00:02

## 2019-06-27 NOTE — TELEPHONE ENCOUNTER
She was seen in the hospital with complaints of dysphagia and abnormal CT of the chest showing esophageal thickening.  Endoscopy showed LA grade D esophagitis with Schatzki's ring.  I did not dilate due to inflammation.  She was not on a PPI.  I have started this as well as Carafate for 1 month.  She will need a repeat endoscopy with Dr. Clifton in 2 months.    Katerina Montalvo MD

## 2019-06-27 NOTE — ANESTHESIA POSTPROCEDURE EVALUATION
Patient: Christel Leung    Procedure Summary     Date:  06/27/19 Room / Location:  [facility] ENDOSCOPY 4 / [facility]    Anesthesia Start:  1258 Anesthesia Stop:  1314    Procedure:  ESOPHAGOGASTRODUODENOSCOPY WITH ANESTHESIA (N/A ) Diagnosis:       Esophageal abnormality      (Esophageal abnormality [K22.9])    Surgeon:  Katerina Montalvo MD Provider:  Dee Hernandez CRNA    Anesthesia Type:  MAC ASA Status:  3          Anesthesia Type: MAC  Last vitals  BP   151/92(nurse notified) (06/27/19 0932)   Temp   97.6 °F (36.4 °C) (06/27/19 0932)   Pulse   70 (06/27/19 0932)   Resp   16 (06/27/19 0932)     SpO2   97 % (06/27/19 0932)     Post Anesthesia Care and Evaluation    Patient location during evaluation: PHASE II  Patient participation: complete - patient participated  Level of consciousness: awake and alert  Pain score: 0  Pain management: adequate  Airway patency: patent  Anesthetic complications: No anesthetic complications  PONV Status: none  Cardiovascular status: acceptable and stable  Respiratory status: acceptable and unassisted  Hydration status: stable

## 2019-06-27 NOTE — ANESTHESIA PREPROCEDURE EVALUATION
Anesthesia Evaluation     Patient summary reviewed   no history of anesthetic complications:  NPO Solid Status: > 8 hours  NPO Liquid Status: > 8 hours           Airway   Mallampati: III  TM distance: >3 FB  Neck ROM: full  Dental - normal exam     Pulmonary - normal exam    breath sounds clear to auscultation  (+) a smoker (0.5 ppd) Current Abstained day of surgery,   (-) asthma, recent URI, sleep apnea  Cardiovascular   Exercise tolerance: good (4-7 METS)    ECG reviewed  Rhythm: regular  Rate: normal    (+) hypertension, murmur, hyperlipidemia,   (-) pacemaker, past MI, angina, cardiac stents, CABG      Neuro/Psych  (-) seizures, TIA, CVA  GI/Hepatic/Renal/Endo    (+) morbid obesity, GI bleeding,   (-) GERD, liver disease, no renal disease, diabetes, hypothyroidism, hyperthyroidism    Musculoskeletal     Abdominal    Substance History      OB/GYN          Other                        Anesthesia Plan    ASA 3     MAC   (Admitted two days ago with N/V)  intravenous induction   Anesthetic plan, all risks, benefits, and alternatives have been provided, discussed and informed consent has been obtained with: patient.

## 2019-06-28 VITALS
DIASTOLIC BLOOD PRESSURE: 72 MMHG | RESPIRATION RATE: 18 BRPM | TEMPERATURE: 98.4 F | HEART RATE: 69 BPM | BODY MASS INDEX: 38.42 KG/M2 | HEIGHT: 69 IN | WEIGHT: 259.4 LBS | OXYGEN SATURATION: 99 % | SYSTOLIC BLOOD PRESSURE: 137 MMHG

## 2019-06-28 PROBLEM — K44.9 HIATAL HERNIA: Status: ACTIVE | Noted: 2019-06-28

## 2019-06-28 PROBLEM — K20.90 ESOPHAGITIS: Status: ACTIVE | Noted: 2019-06-25

## 2019-06-28 LAB
ANION GAP SERPL CALCULATED.3IONS-SCNC: 12 MMOL/L (ref 4–13)
BASOPHILS # BLD AUTO: 0.04 10*3/MM3 (ref 0–0.2)
BASOPHILS NFR BLD AUTO: 0.3 % (ref 0–2)
BUN BLD-MCNC: 5 MG/DL (ref 5–21)
BUN/CREAT SERPL: 10 (ref 7–25)
CALCIUM SPEC-SCNC: 8.6 MG/DL (ref 8.4–10.4)
CHLORIDE SERPL-SCNC: 105 MMOL/L (ref 98–110)
CO2 SERPL-SCNC: 21 MMOL/L (ref 24–31)
CREAT BLD-MCNC: 0.5 MG/DL (ref 0.5–1.4)
DEPRECATED RDW RBC AUTO: 41.7 FL (ref 40–54)
EOSINOPHIL # BLD AUTO: 0.17 10*3/MM3 (ref 0–0.7)
EOSINOPHIL NFR BLD AUTO: 1.5 % (ref 0–4)
ERYTHROCYTE [DISTWIDTH] IN BLOOD BY AUTOMATED COUNT: 13.2 % (ref 12–15)
GFR SERPL CREATININE-BSD FRML MDRD: 125 ML/MIN/1.73
GLUCOSE BLD-MCNC: 95 MG/DL (ref 70–100)
HCT VFR BLD AUTO: 36.7 % (ref 37–47)
HGB BLD-MCNC: 12.4 G/DL (ref 12–16)
IMM GRANULOCYTES # BLD AUTO: 0.05 10*3/MM3 (ref 0–0.05)
IMM GRANULOCYTES NFR BLD AUTO: 0.4 % (ref 0–5)
LYMPHOCYTES # BLD AUTO: 2.44 10*3/MM3 (ref 0.72–4.86)
LYMPHOCYTES NFR BLD AUTO: 21 % (ref 15–45)
MCH RBC QN AUTO: 29.4 PG (ref 28–32)
MCHC RBC AUTO-ENTMCNC: 33.8 G/DL (ref 33–36)
MCV RBC AUTO: 87 FL (ref 82–98)
MONOCYTES # BLD AUTO: 0.66 10*3/MM3 (ref 0.19–1.3)
MONOCYTES NFR BLD AUTO: 5.7 % (ref 4–12)
NEUTROPHILS # BLD AUTO: 8.28 10*3/MM3 (ref 1.87–8.4)
NEUTROPHILS NFR BLD AUTO: 71.1 % (ref 39–78)
NRBC BLD AUTO-RTO: 0 /100 WBC (ref 0–0.2)
PLATELET # BLD AUTO: 239 10*3/MM3 (ref 130–400)
PMV BLD AUTO: 11.4 FL (ref 6–12)
POTASSIUM BLD-SCNC: 3.7 MMOL/L (ref 3.5–5.3)
RBC # BLD AUTO: 4.22 10*6/MM3 (ref 4.2–5.4)
SODIUM BLD-SCNC: 138 MMOL/L (ref 135–145)
WBC NRBC COR # BLD: 11.64 10*3/MM3 (ref 4.8–10.8)

## 2019-06-28 PROCEDURE — 85025 COMPLETE CBC W/AUTO DIFF WBC: CPT | Performed by: INTERNAL MEDICINE

## 2019-06-28 PROCEDURE — 94799 UNLISTED PULMONARY SVC/PX: CPT

## 2019-06-28 PROCEDURE — 80048 BASIC METABOLIC PNL TOTAL CA: CPT | Performed by: INTERNAL MEDICINE

## 2019-06-28 RX ORDER — METRONIDAZOLE 500 MG/1
500 TABLET ORAL 3 TIMES DAILY
Qty: 12 TABLET | Refills: 0 | Status: SHIPPED | OUTPATIENT
Start: 2019-06-28 | End: 2019-07-02

## 2019-06-28 RX ORDER — ONDANSETRON 4 MG/1
4 TABLET, FILM COATED ORAL EVERY 4 HOURS PRN
Qty: 15 TABLET | Refills: 0 | Status: SHIPPED | OUTPATIENT
Start: 2019-06-28

## 2019-06-28 RX ORDER — SUCRALFATE ORAL 1 G/10ML
1 SUSPENSION ORAL
Qty: 1200 ML | Refills: 0 | Status: SHIPPED | OUTPATIENT
Start: 2019-06-28 | End: 2019-07-28

## 2019-06-28 RX ORDER — PANTOPRAZOLE SODIUM 40 MG/1
40 TABLET, DELAYED RELEASE ORAL DAILY
Qty: 30 TABLET | Refills: 0 | Status: SHIPPED | OUTPATIENT
Start: 2019-06-28 | End: 2019-08-28 | Stop reason: SDUPTHER

## 2019-06-28 RX ADMIN — METOPROLOL SUCCINATE 100 MG: 100 TABLET, FILM COATED, EXTENDED RELEASE ORAL at 08:52

## 2019-06-28 RX ADMIN — LIDOCAINE 1 PATCH: 50 PATCH CUTANEOUS at 08:52

## 2019-06-28 RX ADMIN — METRONIDAZOLE 500 MG: 500 INJECTION, SOLUTION INTRAVENOUS at 11:14

## 2019-06-28 RX ADMIN — METRONIDAZOLE 500 MG: 500 INJECTION, SOLUTION INTRAVENOUS at 01:54

## 2019-06-28 RX ADMIN — SUCRALFATE 1 G: 1 SUSPENSION ORAL at 11:14

## 2019-06-28 RX ADMIN — SODIUM CHLORIDE 100 ML/HR: 9 INJECTION, SOLUTION INTRAVENOUS at 05:34

## 2019-06-28 RX ADMIN — SUCRALFATE 1 G: 1 SUSPENSION ORAL at 08:52

## 2019-06-28 RX ADMIN — PANTOPRAZOLE SODIUM 40 MG: 40 TABLET, DELAYED RELEASE ORAL at 05:35

## 2019-06-28 RX ADMIN — LISINOPRIL 20 MG: 20 TABLET ORAL at 08:52

## 2019-08-28 ENCOUNTER — OFFICE VISIT (OUTPATIENT)
Dept: GASTROENTEROLOGY | Facility: CLINIC | Age: 64
End: 2019-08-28

## 2019-08-28 VITALS
DIASTOLIC BLOOD PRESSURE: 62 MMHG | HEART RATE: 70 BPM | HEIGHT: 68 IN | BODY MASS INDEX: 40.32 KG/M2 | SYSTOLIC BLOOD PRESSURE: 128 MMHG | WEIGHT: 266 LBS | OXYGEN SATURATION: 98 %

## 2019-08-28 DIAGNOSIS — K21.00 GASTROESOPHAGEAL REFLUX DISEASE WITH ESOPHAGITIS: Primary | ICD-10-CM

## 2019-08-28 DIAGNOSIS — Z78.9 NONSMOKER: ICD-10-CM

## 2019-08-28 DIAGNOSIS — K52.9 COLITIS: ICD-10-CM

## 2019-08-28 DIAGNOSIS — E66.9 OBESITY, UNSPECIFIED OBESITY SEVERITY, UNSPECIFIED OBESITY TYPE: ICD-10-CM

## 2019-08-28 DIAGNOSIS — K22.2 SCHATZKI'S RING: ICD-10-CM

## 2019-08-28 PROCEDURE — 99214 OFFICE O/P EST MOD 30 MIN: CPT | Performed by: CLINICAL NURSE SPECIALIST

## 2019-08-28 RX ORDER — PANTOPRAZOLE SODIUM 40 MG/1
40 TABLET, DELAYED RELEASE ORAL DAILY
Qty: 30 TABLET | Refills: 10 | Status: ON HOLD | OUTPATIENT
Start: 2019-08-28 | End: 2019-09-19 | Stop reason: SDUPTHER

## 2019-08-28 RX ORDER — SUCRALFATE 1 G/1
1 TABLET ORAL 4 TIMES DAILY
Qty: 120 TABLET | Refills: 5 | Status: SHIPPED | OUTPATIENT
Start: 2019-08-28

## 2019-08-28 RX ORDER — SUCRALFATE 1 G/1
1 TABLET ORAL 4 TIMES DAILY
COMMUNITY
End: 2019-08-28 | Stop reason: SDUPTHER

## 2019-08-28 NOTE — PROGRESS NOTES
Christel Leung  1955 8/30/2019  Chief Complaint   Patient presents with   • GI Problem     Here to discuss problems swallowing      Subjective   HPI  Christel Leung is a 64 y.o. female who presents with a complaint of recent hospitalization dysphasia with recent hospitalization.  Date of admission 6/25/2019.  She had a complaint of intermittent dysphagia with breads and rice over the last couple of years.  No unintentional weight loss no fever chills or sweats no melena.  She denied any chronic use of NSAIDs no recent antibiotics.  Her last colonoscopy was performed by us on 12/10/2018 with diverticulosis found.      Workup inpatient: She had endoscopy while inpatient by Dr. Katerina Montalvo 6/27/2019 showing LA grade D esophagitis with no bleeding at the GE junction this was associated with what appeared to be a Schatzki's ring this was not dilated due to inflammation.  She was placed on PPI therapy as well as Carafate and was to follow-up with us for repeat endoscopy.  CT scan of the abdomen and pelvis with contrast 6/25/2019 showed eccentric thickening within the distal esophageal segment just above the GE junction she was also noted to have segmental colitis involving the mid and distal ascending colon transverse colon and splenic flexure.  Pseudomembranous colitis should be considered.  She has had a fairly recent colonoscopy evaluation 12/10/2018 as reviewed    Today she tells me that she has been doing better in regards to her reflux indigestion and upper symptoms after being placed on PPI as well as Carafate.  No further associated symptoms at this time  Past Medical History:   Diagnosis Date   • Arthritis    • Hx of colonic polyp    • Hyperlipemia    • Hypertension    • Psoriatic arthritis (CMS/HCC)      Past Surgical History:   Procedure Laterality Date   • COLONOSCOPY  08/14/2013    Diverticulosis repeat exam in 5 years   • COLONOSCOPY N/A 12/10/2018    7mm hyperplastic polyp 60cm proximal to anus,  5mm hyperplastic in rectum, diverticulosis sigmoid colon   • COLONOSCOPY W/ POLYPECTOMY  06/15/2008    Hyperplastic polyp at 30 cm repeat exam in 5 years   • ENDOSCOPY N/A 6/27/2019    Small HH, LA grade D reflux   • TOTAL KNEE ARTHROPLASTY Right        Outpatient Medications Marked as Taking for the 8/28/19 encounter (Office Visit) with Deborah Becker APRN   Medication Sig Dispense Refill   • Adalimumab (HUMIRA PEN SC) Inject 50 mg under the skin into the appropriate area as directed Every 14 (Fourteen) Days. Every other Saturday.     • diclofenac (VOLTAREN) 1 % gel gel Apply 2 g topically to the appropriate area as directed 2 (Two) Times a Day. Elbow     • HYDROcodone-acetaminophen (NORCO) 5-325 MG per tablet Take 1 tablet by mouth 2 (Two) Times a Day As Needed for Moderate Pain .     • lisinopril (PRINIVIL,ZESTRIL) 20 MG tablet Take 20 mg by mouth Daily.     • lovastatin (MEVACOR) 20 MG tablet Take 20 mg by mouth Every Night.     • metoprolol succinate XL (TOPROL-XL) 100 MG 24 hr tablet Take 100 mg by mouth Daily.     • montelukast (SINGULAIR) 10 MG tablet Take 10 mg by mouth Every Night.     • ondansetron (ZOFRAN) 4 MG tablet Take 1 tablet by mouth Every 4 (Four) Hours As Needed for Nausea or Vomiting. 15 tablet 0   • pantoprazole (PROTONIX) 40 MG EC tablet Take 1 tablet by mouth Daily. 30 tablet 10   • sucralfate (CARAFATE) 1 g tablet Take 1 tablet by mouth 4 (Four) Times a Day. 120 tablet 5   • [DISCONTINUED] pantoprazole (PROTONIX) 40 MG EC tablet Take 1 tablet by mouth Daily. 30 tablet 0   • [DISCONTINUED] sucralfate (CARAFATE) 1 g tablet Take 1 g by mouth 4 (Four) Times a Day.       Allergies   Allergen Reactions   • Amoxicillin Hives     Social History     Socioeconomic History   • Marital status:      Spouse name: Not on file   • Number of children: Not on file   • Years of education: Not on file   • Highest education level: Not on file   Tobacco Use   • Smoking status: Former Smoker      Types: Cigarettes   • Smokeless tobacco: Never Used   • Tobacco comment: Quit 2 months ago   Substance and Sexual Activity   • Alcohol use: No   • Drug use: No     Family History   Problem Relation Age of Onset   • Heart disease Mother    • Colon polyps Mother    • No Known Problems Father    • No Known Problems Sister    • No Known Problems Brother    • No Known Problems Maternal Grandmother    • No Known Problems Maternal Grandfather    • No Known Problems Paternal Grandmother    • No Known Problems Paternal Grandfather    • Colon cancer Neg Hx      Health Maintenance   Topic Date Due   • ANNUAL PHYSICAL  07/09/1960   • TDAP/TD VACCINES (1 - Tdap) 07/09/1976   • ZOSTER VACCINE (1 of 2) 07/09/2007   • HEPATITIS C SCREENING  11/23/2016   • MAMMOGRAM  04/18/2019   • INFLUENZA VACCINE  08/01/2019   • LIPID PANEL  06/26/2020   • PAP SMEAR  08/02/2021   • COLONOSCOPY  12/10/2023     Review of Systems   Constitutional: Negative for activity change, appetite change, chills, diaphoresis, fatigue, fever and unexpected weight change.   HENT: Negative for ear pain, hearing loss, mouth sores, sore throat, trouble swallowing and voice change.    Eyes: Negative.    Respiratory: Negative for cough, choking, shortness of breath and wheezing.    Cardiovascular: Negative for chest pain and palpitations.   Gastrointestinal: Negative for abdominal pain, blood in stool, constipation, diarrhea, nausea and vomiting.   Endocrine: Negative for cold intolerance and heat intolerance.   Genitourinary: Negative for decreased urine volume, dysuria, frequency, hematuria and urgency.   Musculoskeletal: Negative for back pain, gait problem and myalgias.   Skin: Negative for color change, pallor and rash.   Allergic/Immunologic: Negative for food allergies and immunocompromised state.   Neurological: Negative for dizziness, tremors, seizures, syncope, weakness, light-headedness, numbness and headaches.   Hematological: Negative for adenopathy. Does  "not bruise/bleed easily.   Psychiatric/Behavioral: Negative for agitation and confusion. The patient is not nervous/anxious.    All other systems reviewed and are negative.    Objective   Vitals:    08/28/19 0846   BP: 128/62   Pulse: 70   SpO2: 98%   Weight: 121 kg (266 lb)   Height: 172.7 cm (68\")     Body mass index is 40.45 kg/m².  Physical Exam   Constitutional: She is oriented to person, place, and time. She appears well-developed and well-nourished.   HENT:   Head: Normocephalic and atraumatic.   Eyes: Pupils are equal, round, and reactive to light.   Neck: Normal range of motion. Neck supple. No tracheal deviation present.   Cardiovascular: Normal rate, regular rhythm and normal heart sounds. Exam reveals no gallop and no friction rub.   No murmur heard.  Pulmonary/Chest: Effort normal and breath sounds normal. No respiratory distress. She has no wheezes. She has no rales. She exhibits no tenderness.   Abdominal: Soft. Bowel sounds are normal. She exhibits no distension. There is no hepatosplenomegaly. There is no tenderness. There is no rigidity, no rebound and no guarding.   Musculoskeletal: Normal range of motion. She exhibits no edema, tenderness or deformity.   Neurological: She is alert and oriented to person, place, and time. She has normal reflexes.   Skin: Skin is warm and dry. No rash noted. No pallor.   Psychiatric: She has a normal mood and affect. Her behavior is normal. Judgment and thought content normal.     Assessment/Plan   Christel was seen today for gi problem.    Diagnoses and all orders for this visit:    Gastroesophageal reflux disease with esophagitis  -     Case Request; Standing  -     Follow Anesthesia Guidelines / Standing Orders; Future  -     Obtain Informed Consent; Future  -     Implement Anesthesia Orders Day of Procedure; Standing  -     Obtain Informed Consent; Standing  -     Case Request  -     pantoprazole (PROTONIX) 40 MG EC tablet; Take 1 tablet by mouth Daily.  -     " sucralfate (CARAFATE) 1 g tablet; Take 1 tablet by mouth 4 (Four) Times a Day.    Schatzki's ring    Colitis    Nonsmoker    Obesity, unspecified obesity severity, unspecified obesity type      I discussed non pharmaceutical treatment of gerd.  This includes gradually losing weight to achieve ideal body wt., elevation of the head of bed by 4-6 inches, nothing to eat or drink 3 hours prior to lying down, avoiding tight clothing, stress reduction, tobacco cessation, reduction of alcohol intake, and dietary restrictions (avoiding caffeine, coffee, fatty foods, mints, chocolate, spicy foods and tomato based sauces as much as possible).    Cont PPI and Carafate. Will repeat endoscopy to verify healing and dilatation if needed.     ESOPHAGOGASTRODUODENOSCOPY WITH ANESTHESIA WITH DILATION (N/A)  EMR Dragon/transcription disclaimer: Much of this encounter note is electronic transcription/translation of spoken language to printed text. The electronic translation of spoken language may be erroneous, or at times, nonsensical words or phrases may be inadvertently transcribed. Although I have reviewed the note for such errors, some may still exist.  Body mass index is 40.45 kg/m².  Return in about 6 months (around 2/28/2020).    Patient's Body mass index is 40.45 kg/m². BMI is above normal parameters. Recommendations include: nutrition counseling.      All risks, benefits, alternatives, and indications of colonoscopy and/or Endoscopy procedure have been discussed with the patient. Risks to include perforation of the colon requiring possible surgery or colostomy, risk of bleeding from biopsies or removal of colon tissue, possibility of missing a colon polyp or cancer, or adverse drug reaction.  Benefits to include the diagnosis and management of disease of the colon and rectum. Alternatives to include barium enema, radiographic evaluation, lab testing or no intervention. Pt verbalizes understanding and agrees.     Deborah Carver  REUBEN Becker  8/30/2019  1:31 PM      Obesity, Adult  Obesity is the condition of having too much total body fat. Being overweight or obese means that your weight is greater than what is considered healthy for your body size. Obesity is determined by a measurement called BMI. BMI is an estimate of body fat and is calculated from height and weight. For adults, a BMI of 30 or higher is considered obese.  Obesity can eventually lead to other health concerns and major illnesses, including:  · Stroke.  · Coronary artery disease (CAD).  · Type 2 diabetes.  · Some types of cancer, including cancers of the colon, breast, uterus, and gallbladder.  · Osteoarthritis.  · High blood pressure (hypertension).  · High cholesterol.  · Sleep apnea.  · Gallbladder stones.  · Infertility problems.  What are the causes?  The main cause of obesity is taking in (consuming) more calories than your body uses for energy. Other factors that contribute to this condition may include:  · Being born with genes that make you more likely to become obese.  · Having a medical condition that causes obesity. These conditions include:  ¨ Hypothyroidism.  ¨ Polycystic ovarian syndrome (PCOS).  ¨ Binge-eating disorder.  ¨ Cushing syndrome.  · Taking certain medicines, such as steroids, antidepressants, and seizure medicines.  · Not being physically active (sedentary lifestyle).  · Living where there are limited places to exercise safely or buy healthy foods.  · Not getting enough sleep.  What increases the risk?  The following factors may increase your risk of this condition:  · Having a family history of obesity.  · Being a woman of -American descent.  · Being a man of  descent.  What are the signs or symptoms?  Having excessive body fat is the main symptom of this condition.  How is this diagnosed?  This condition may be diagnosed based on:  · Your symptoms.  · Your medical history.  · A physical exam. Your health care provider may  measure:  ¨ Your BMI. If you are an adult with a BMI between 25 and less than 30, you are considered overweight. If you are an adult with a BMI of 30 or higher, you are considered obese.  ¨ The distances around your hips and your waist (circumferences). These may be compared to each other to help diagnose your condition.  ¨ Your skinfold thickness. Your health care provider may gently pinch a fold of your skin and measure it.  How is this treated?  Treatment for this condition often includes changing your lifestyle. Treatment may include some or all of the following:  · Dietary changes. Work with your health care provider and a dietitian to set a weight-loss goal that is healthy and reasonable for you. Dietary changes may include eating:  ¨ Smaller portions. A portion size is the amount of a particular food that is healthy for you to eat at one time. This varies from person to person.  ¨ Low-calorie or low-fat options.  ¨ More whole grains, fruits, and vegetables.  · Regular physical activity. This may include aerobic activity (cardio) and strength training.  · Medicine to help you lose weight. Your health care provider may prescribe medicine if you are unable to lose 1 pound a week after 6 weeks of eating more healthily and doing more physical activity.  · Surgery. Surgical options may include gastric banding and gastric bypass. Surgery may be done if:  ¨ Other treatments have not helped to improve your condition.  ¨ You have a BMI of 40 or higher.  ¨ You have life-threatening health problems related to obesity.  Follow these instructions at home:     Eating and drinking     · Follow recommendations from your health care provider about what you eat and drink. Your health care provider may advise you to:  ¨ Limit fast foods, sweets, and processed snack foods.  ¨ Choose low-fat options, such as low-fat milk instead of whole milk.  ¨ Eat 5 or more servings of fruits or vegetables every day.  ¨ Eat at home more often.  This gives you more control over what you eat.  ¨ Choose healthy foods when you eat out.  ¨ Learn what a healthy portion size is.  ¨ Keep low-fat snacks on hand.  ¨ Avoid sugary drinks, such as soda, fruit juice, iced tea sweetened with sugar, and flavored milk.  ¨ Eat a healthy breakfast.  · Drink enough water to keep your urine clear or pale yellow.  · Do not go without eating for long periods of time (do not fast) or follow a fad diet. Fasting and fad diets can be unhealthy and even dangerous.  Physical Activity   · Exercise regularly, as told by your health care provider. Ask your health care provider what types of exercise are safe for you and how often you should exercise.  · Warm up and stretch before being active.  · Cool down and stretch after being active.  · Rest between periods of activity.  Lifestyle   · Limit the time that you spend in front of your TV, computer, or video game system.  · Find ways to reward yourself that do not involve food.  · Limit alcohol intake to no more than 1 drink a day for nonpregnant women and 2 drinks a day for men. One drink equals 12 oz of beer, 5 oz of wine, or 1½ oz of hard liquor.  General instructions   · Keep a weight loss journal to keep track of the food you eat and how much you exercise you get.  · Take over-the-counter and prescription medicines only as told by your health care provider.  · Take vitamins and supplements only as told by your health care provider.  · Consider joining a support group. Your health care provider may be able to recommend a support group.  · Keep all follow-up visits as told by your health care provider. This is important.  Contact a health care provider if:  · You are unable to meet your weight loss goal after 6 weeks of dietary and lifestyle changes.  This information is not intended to replace advice given to you by your health care provider. Make sure you discuss any questions you have with your health care provider.  Document  Released: 01/25/2006 Document Revised: 05/22/2017 Document Reviewed: 10/05/2016  [employer] Interactive Patient Education © 2017 [employer]      If you smoke or use tobacco, 4 minutes reading provided  Steps to Quit Smoking  Smoking tobacco can be harmful to your health and can affect almost every organ in your body. Smoking puts you, and those around you, at risk for developing many serious chronic diseases. Quitting smoking is difficult, but it is one of the best things that you can do for your health. It is never too late to quit.  What are the benefits of quitting smoking?  When you quit smoking, you lower your risk of developing serious diseases and conditions, such as:  · Lung cancer or lung disease, such as COPD.  · Heart disease.  · Stroke.  · Heart attack.  · Infertility.  · Osteoporosis and bone fractures.  Additionally, symptoms such as coughing, wheezing, and shortness of breath may get better when you quit. You may also find that you get sick less often because your body is stronger at fighting off colds and infections. If you are pregnant, quitting smoking can help to reduce your chances of having a baby of low birth weight.  How do I get ready to quit?  When you decide to quit smoking, create a plan to make sure that you are successful. Before you quit:  · Pick a date to quit. Set a date within the next two weeks to give you time to prepare.  · Write down the reasons why you are quitting. Keep this list in places where you will see it often, such as on your bathroom mirror or in your car or wallet.  · Identify the people, places, things, and activities that make you want to smoke (triggers) and avoid them. Make sure to take these actions:  ¨ Throw away all cigarettes at home, at work, and in your car.  ¨ Throw away smoking accessories, such as ashtrays and lighters.  ¨ Clean your car and make sure to empty the ashtray.  ¨ Clean your home, including curtains and carpets.  · Tell your family, friends,  and coworkers that you are quitting. Support from your loved ones can make quitting easier.  · Talk with your health care provider about your options for quitting smoking.  · Find out what treatment options are covered by your health insurance.  What strategies can I use to quit smoking?  Talk with your healthcare provider about different strategies to quit smoking. Some strategies include:  · Quitting smoking altogether instead of gradually lessening how much you smoke over a period of time. Research shows that quitting “cold turkey” is more successful than gradually quitting.  · Attending in-person counseling to help you build problem-solving skills. You are more likely to have success in quitting if you attend several counseling sessions. Even short sessions of 10 minutes can be effective.  · Finding resources and support systems that can help you to quit smoking and remain smoke-free after you quit. These resources are most helpful when you use them often. They can include:  ¨ Online chats with a counselor.  ¨ Telephone quitlines.  ¨ Printed self-help materials.  ¨ Support groups or group counseling.  ¨ Text messaging programs.  ¨ Mobile phone applications.  · Taking medicines to help you quit smoking. (If you are pregnant or breastfeeding, talk with your health care provider first.) Some medicines contain nicotine and some do not. Both types of medicines help with cravings, but the medicines that include nicotine help to relieve withdrawal symptoms. Your health care provider may recommend:  ¨ Nicotine patches, gum, or lozenges.  ¨ Nicotine inhalers or sprays.  ¨ Non-nicotine medicine that is taken by mouth.  Talk with your health care provider about combining strategies, such as taking medicines while you are also receiving in-person counseling. Using these two strategies together makes you more likely to succeed in quitting than if you used either strategy on its own.  If you are pregnant or breastfeeding,  talk with your health care provider about finding counseling or other support strategies to quit smoking. Do not take medicine to help you quit smoking unless told to do so by your health care provider.  What things can I do to make it easier to quit?  Quitting smoking might feel overwhelming at first, but there is a lot that you can do to make it easier. Take these important actions:  · Reach out to your family and friends and ask that they support and encourage you during this time. Call telephone quitlines, reach out to support groups, or work with a counselor for support.  · Ask people who smoke to avoid smoking around you.  · Avoid places that trigger you to smoke, such as bars, parties, or smoke-break areas at work.  · Spend time around people who do not smoke.  · Lessen stress in your life, because stress can be a smoking trigger for some people. To lessen stress, try:  ¨ Exercising regularly.  ¨ Deep-breathing exercises.  ¨ Yoga.  ¨ Meditating.  ¨ Performing a body scan. This involves closing your eyes, scanning your body from head to toe, and noticing which parts of your body are particularly tense. Purposefully relax the muscles in those areas.  · Download or purchase mobile phone or tablet apps (applications) that can help you stick to your quit plan by providing reminders, tips, and encouragement. There are many free apps, such as QuitGuide from the CDC (Centers for Disease Control and Prevention). You can find other support for quitting smoking (smoking cessation) through smokefree.gov and other websites.  How will I feel when I quit smoking?  Within the first 24 hours of quitting smoking, you may start to feel some withdrawal symptoms. These symptoms are usually most noticeable 2-3 days after quitting, but they usually do not last beyond 2-3 weeks. Changes or symptoms that you might experience include:  · Mood swings.  · Restlessness, anxiety, or irritation.  · Difficulty  concentrating.  · Dizziness.  · Strong cravings for sugary foods in addition to nicotine.  · Mild weight gain.  · Constipation.  · Nausea.  · Coughing or a sore throat.  · Changes in how your medicines work in your body.  · A depressed mood.  · Difficulty sleeping (insomnia).  After the first 2-3 weeks of quitting, you may start to notice more positive results, such as:  · Improved sense of smell and taste.  · Decreased coughing and sore throat.  · Slower heart rate.  · Lower blood pressure.  · Clearer skin.  · The ability to breathe more easily.  · Fewer sick days.  Quitting smoking is very challenging for most people. Do not get discouraged if you are not successful the first time. Some people need to make many attempts to quit before they achieve long-term success. Do your best to stick to your quit plan, and talk with your health care provider if you have any questions or concerns.  This information is not intended to replace advice given to you by your health care provider. Make sure you discuss any questions you have with your health care provider.  Document Released: 12/12/2002 Document Revised: 08/15/2017 Document Reviewed: 05/03/2016  [employer] Interactive Patient Education © 2017 [employer]

## 2019-09-19 ENCOUNTER — ANESTHESIA (OUTPATIENT)
Dept: GASTROENTEROLOGY | Facility: HOSPITAL | Age: 64
End: 2019-09-19

## 2019-09-19 ENCOUNTER — ANESTHESIA EVENT (OUTPATIENT)
Dept: GASTROENTEROLOGY | Facility: HOSPITAL | Age: 64
End: 2019-09-19

## 2019-09-19 ENCOUNTER — HOSPITAL ENCOUNTER (OUTPATIENT)
Facility: HOSPITAL | Age: 64
Setting detail: HOSPITAL OUTPATIENT SURGERY
Discharge: HOME | End: 2019-09-19
Attending: INTERNAL MEDICINE | Admitting: INTERNAL MEDICINE

## 2019-09-19 VITALS
BODY MASS INDEX: 39.4 KG/M2 | DIASTOLIC BLOOD PRESSURE: 80 MMHG | RESPIRATION RATE: 18 BRPM | HEART RATE: 74 BPM | WEIGHT: 266 LBS | OXYGEN SATURATION: 99 % | SYSTOLIC BLOOD PRESSURE: 152 MMHG | HEIGHT: 69 IN | TEMPERATURE: 98.2 F

## 2019-09-19 DIAGNOSIS — K21.00 GASTROESOPHAGEAL REFLUX DISEASE WITH ESOPHAGITIS: ICD-10-CM

## 2019-09-19 PROCEDURE — 25010000002 PROPOFOL 10 MG/ML EMULSION: Performed by: NURSE ANESTHETIST, CERTIFIED REGISTERED

## 2019-09-19 PROCEDURE — 43235 EGD DIAGNOSTIC BRUSH WASH: CPT | Performed by: INTERNAL MEDICINE

## 2019-09-19 RX ORDER — SODIUM CHLORIDE 9 MG/ML
500 INJECTION, SOLUTION INTRAVENOUS CONTINUOUS PRN
Status: DISCONTINUED | OUTPATIENT
Start: 2019-09-19 | End: 2019-09-19 | Stop reason: HOSPADM

## 2019-09-19 RX ORDER — SODIUM CHLORIDE 9 MG/ML
500 INJECTION, SOLUTION INTRAVENOUS CONTINUOUS PRN
Status: CANCELLED | OUTPATIENT
Start: 2019-09-19

## 2019-09-19 RX ORDER — SODIUM CHLORIDE 0.9 % (FLUSH) 0.9 %
10 SYRINGE (ML) INJECTION AS NEEDED
Status: CANCELLED | OUTPATIENT
Start: 2019-09-19

## 2019-09-19 RX ORDER — PROPOFOL 10 MG/ML
VIAL (ML) INTRAVENOUS AS NEEDED
Status: DISCONTINUED | OUTPATIENT
Start: 2019-09-19 | End: 2019-09-19 | Stop reason: SURG

## 2019-09-19 RX ORDER — SODIUM CHLORIDE 0.9 % (FLUSH) 0.9 %
10 SYRINGE (ML) INJECTION AS NEEDED
Status: DISCONTINUED | OUTPATIENT
Start: 2019-09-19 | End: 2019-09-19 | Stop reason: HOSPADM

## 2019-09-19 RX ORDER — PANTOPRAZOLE SODIUM 40 MG/1
40 TABLET, DELAYED RELEASE ORAL DAILY
Qty: 90 TABLET | Refills: 0 | Status: SHIPPED | OUTPATIENT
Start: 2019-09-19 | End: 2019-12-18

## 2019-09-19 RX ADMIN — LIDOCAINE HYDROCHLORIDE 40 MG: 20 INJECTION, SOLUTION INTRAVENOUS at 12:52

## 2019-09-19 RX ADMIN — SODIUM CHLORIDE 500 ML: 9 INJECTION, SOLUTION INTRAVENOUS at 10:51

## 2019-09-19 RX ADMIN — PROPOFOL 50 MG: 10 INJECTION, EMULSION INTRAVENOUS at 12:52

## 2019-09-19 RX ADMIN — PROPOFOL 50 MG: 10 INJECTION, EMULSION INTRAVENOUS at 12:53

## 2019-09-19 NOTE — INTERVAL H&P NOTE
H&P reviewed. The patient was examined and there are no changes to the H&P.       Patient states that her symptoms have improved.  She is not having any dysphasia at this time.    The following major R/B/A were discussed with the patient, however the list is not all inclusive . Risk:  Bleeding (immediate and delayed), perforation (rupture or tear), reaction to medication, missed lesion/cancer, pain during the procedure, infection, need for surgery, need for ostomy, need for mechanical ventilation (breathing machine), death.  Benefits: removal of polyp/tissue, burn/clip/or inject to stop bleeding, removal of foreign body, dilate any stricture.  Alternatives: Xray or CT, surgery, do nothing with associated risk   The patient was given time to ask question and received explanation, and agrees to proceed as per History and Physical.   No guarantee given or expressed.

## 2019-09-19 NOTE — ANESTHESIA PREPROCEDURE EVALUATION
Anesthesia Evaluation     Patient summary reviewed   no history of anesthetic complications:  NPO Solid Status: > 8 hours  NPO Liquid Status: > 8 hours           Airway   Mallampati: III  TM distance: >3 FB  Neck ROM: full  Dental      Pulmonary    (+) a smoker,   (-) asthma, recent URI, sleep apnea  Cardiovascular   Exercise tolerance: good (4-7 METS)    ECG reviewed    (+) hypertension, hyperlipidemia,   (-) pacemaker, past MI, angina, cardiac stents, CABG      Neuro/Psych  (-) seizures, TIA, CVA  GI/Hepatic/Renal/Endo    (+) obesity,  GERD, GI bleeding,   (-) liver disease, no renal disease, diabetes, hypothyroidism, hyperthyroidism    Musculoskeletal     Abdominal    Substance History      OB/GYN          Other                          Anesthesia Plan    ASA 2     MAC     Anesthetic plan, all risks, benefits, and alternatives have been provided, discussed and informed consent has been obtained with: patient.

## 2019-09-19 NOTE — ANESTHESIA POSTPROCEDURE EVALUATION
Patient: Christel Leung    Procedure Summary     Date:  09/19/19 Room / Location:  [facility] ENDOSCOPY 4 / [facility] END[facility]    Anesthesia Start:  1249 Anesthesia Stop:  1258    Procedure:  ESOPHAGOGASTRODUODENOSCOPY WITH ANESTHESIA WITH DILATION (N/A ) Diagnosis:       Gastroesophageal reflux disease with esophagitis      (Gastroesophageal reflux disease with esophagitis [K21.0])    Surgeon:  Emile Clifton MD Provider:  Rene Mendes CRNA    Anesthesia Type:  MAC ASA Status:  2          Anesthesia Type: MAC  Last vitals  BP   (!) 161/105 (09/19/19 1019)   Temp   98.2 °F (36.8 °C) (09/19/19 1019)   Pulse   89 (09/19/19 1019)   Resp   20 (09/19/19 1019)     SpO2   96 % (09/19/19 1019)     Post Anesthesia Care and Evaluation    Patient location during evaluation: PHASE II  Patient participation: complete - patient participated  Level of consciousness: awake  Pain score: 0  Pain management: adequate  Airway patency: patent  Anesthetic complications: No anesthetic complications  PONV Status: none  Cardiovascular status: acceptable  Respiratory status: acceptable  Hydration status: acceptable  No anesthesia care post op

## 2020-09-15 DIAGNOSIS — K21.00 GASTROESOPHAGEAL REFLUX DISEASE WITH ESOPHAGITIS: ICD-10-CM

## 2020-09-17 RX ORDER — PANTOPRAZOLE SODIUM 40 MG/1
40 TABLET, DELAYED RELEASE ORAL DAILY
Qty: 30 TABLET | Refills: 0 | Status: SHIPPED | OUTPATIENT
Start: 2020-09-17

## 2023-02-08 ENCOUNTER — HOSPITAL ENCOUNTER (OUTPATIENT)
Dept: ULTRASOUND IMAGING | Facility: HOSPITAL | Age: 68
Discharge: HOME | End: 2023-02-08
Payer: MEDICARE

## 2023-02-08 ENCOUNTER — TRANSCRIBE ORDERS (OUTPATIENT)
Dept: ADMINISTRATIVE | Facility: HOSPITAL | Age: 68
End: 2023-02-08
Payer: MEDICARE

## 2023-02-08 ENCOUNTER — HOSPITAL ENCOUNTER (OUTPATIENT)
Dept: GENERAL RADIOLOGY | Facility: HOSPITAL | Age: 68
Discharge: HOME | End: 2023-02-08
Payer: MEDICARE

## 2023-02-08 DIAGNOSIS — I77.9: ICD-10-CM

## 2023-02-08 DIAGNOSIS — I77.9 DISEASE OF ARTERY: Primary | ICD-10-CM

## 2023-02-08 DIAGNOSIS — R20.0 NUMBNESS OF RIGHT HAND: ICD-10-CM

## 2023-02-08 PROCEDURE — 93931 UPPER EXTREMITY STUDY: CPT

## 2023-02-08 PROCEDURE — 73110 X-RAY EXAM OF WRIST: CPT

## 2023-02-10 ENCOUNTER — HOSPITAL ENCOUNTER (OUTPATIENT)
Dept: NEUROLOGY | Age: 68
Discharge: HOME | End: 2023-02-10
Payer: MEDICARE

## 2023-02-10 DIAGNOSIS — R20.0 NUMBNESS OF RIGHT HAND: ICD-10-CM

## 2023-02-10 PROCEDURE — 95886 MUSC TEST DONE W/N TEST COMP: CPT

## 2023-02-10 PROCEDURE — 95909 NRV CNDJ TST 5-6 STUDIES: CPT

## 2023-02-10 PROCEDURE — 95886 MUSC TEST DONE W/N TEST COMP: CPT | Performed by: PSYCHIATRY & NEUROLOGY

## 2023-02-10 PROCEDURE — 95909 NRV CNDJ TST 5-6 STUDIES: CPT | Performed by: PSYCHIATRY & NEUROLOGY

## 2023-02-10 NOTE — PROCEDURES
[facility]  Neurophysiology Department  [address] [place] [address]  Phone [phone]  Fax [phone] REPORT  Patient Data  Patient Name Kim Tracy Referring Provider Moon Hernandez MD   Account Number [de-identified] Interpreting physician KEN Meléndez 1955 Technologist Shadiamame Juliolissethviviane   Age 74 Test Nerve conduction studies/electromyogram   Indications for the test numbness and tingling Date of test 2/10/2023       HISTORY:     Kim Tracy is a 74 year old man who complains of numbness and tingling in the right hand. SUMMARY:     Nerve conduction studies of the right upper extremity showed low amplitude coon and ulnar motor responses, a slow ulnar conduction velocity, an absent ulnar sensory response, and a prolonged median sensory distal latency. Electromyogram of the right upper extremity showed large motor unit potentials with reduced recruitment in ulnar innervated muscles and to a lesser extent the abductor pollicis brevis muscle. INTERPRETATION:     The findings are those of a severe right ulnar neuropathy proximal to the takeoff for the flexor digitorum profundus 4&5, likely at the elbow (cubital tunnel syndrome). A moderate median neuropathy at the right wrist (carpal tunnel syndrome) is also present. Ismael Morales M.D. Sensory NCS      Nerve / Sites Rec. Site Onset Lat Peak Lat NP Amp PP Amp Segments Distance Peak Diff Velocity     ms ms µV µV  cm ms m/s   R Median, Ulnar - Transcarpal comparison      Median Palm Wrist 1.9 2.6 7.2 11.6 Median Palm - Wrist 8  43      Ref. ?2.2 ? 50.0  Ref. Ulnar Palm Wrist NR NR NR NR Ulnar Palm - Wrist 8  NR      Ref.   ?2.2 ? 15.0  Ref. Median Palm - Ulnar Palm  NR          Ref. ?0.3    R Radial - Anatomical snuff box (Forearm)      Forearm Wrist 1.8 2.4 15.5 8.8 Forearm - Wrist 10  56      Ref. ? 2.9 ?20.0  Ref. Motor NCS      Nerve / Sites Muscle Latency Ref. Amplitude Ref. Amp % Duration Segments Distance Lat Diff Velocity Ref. ms ms mV mV % ms  cm ms m/s m/s   R Median - APB      Wrist APB 4.1 ?4.4 2.2 ?4.0 100 8.0 Wrist - APB 7         Elbow APB 7.8  2.0  90.1 9.5 Elbow - Wrist 18 3.7 49 ?49   R Ulnar - ADM      Wrist ADM 3.5 ?3.5 1.4 ?6.0 100 7.1 Wrist - ADM 6.5         A. Elbow ADM 7.5  1.3  89.7 7.5 A. Elbow - Wrist 18 4.1 44 ?51                 EMG Summary Table     Spontaneous MUAP Recruitment   Muscle Nerve Roots IA Fib PSW Fasc H.F. Amp Dur. PPP Pattern   R. First dorsal interosseous Ulnar C8-T1 N None None None None 2+ 2+ 1+ Reduced   R. Abductor pollicis brevis Median L0-C5 N None None None None 1+ 1+ 1+ Reduced   R. Extensor indicis proprius Radial C7-C8 N None None None None N N N N   R. Flexor digitorum profundus, dig 4 & 5 Ulnar C8-T1 N None None None None 1+ 1+ N Reduced   R. Triceps brachii Radial C6-C8 N None None None None N N N N   R.  Deltoid Axillary C5-C6 N None None None None N N N N

## 2023-05-01 ENCOUNTER — HOSPITAL ENCOUNTER (OUTPATIENT)
Dept: WOMENS IMAGING | Age: 68
Discharge: HOME | End: 2023-05-01
Payer: MEDICARE

## 2023-05-01 ENCOUNTER — PROCEDURE VISIT (OUTPATIENT)
Dept: SURGERY | Age: 68
End: 2023-05-01

## 2023-05-01 DIAGNOSIS — R92.8 ABNORMAL MAMMOGRAM: ICD-10-CM

## 2023-05-01 DIAGNOSIS — R22.31 AXILLARY MASS, RIGHT: ICD-10-CM

## 2023-05-01 DIAGNOSIS — C50.919 MALIGNANT NEOPLASM OF FEMALE BREAST, UNSPECIFIED ESTROGEN RECEPTOR STATUS, UNSPECIFIED LATERALITY, UNSPECIFIED SITE OF BREAST (HCC): Primary | ICD-10-CM

## 2023-05-01 DIAGNOSIS — L53.9 ERYTHEMA OF BREAST: Primary | ICD-10-CM

## 2023-05-01 PROCEDURE — 77065 DX MAMMO INCL CAD UNI: CPT

## 2023-05-01 PROCEDURE — 88305 TISSUE EXAM BY PATHOLOGIST: CPT

## 2023-05-01 PROCEDURE — 10005 FNA BX W/US GDN 1ST LES: CPT

## 2023-05-01 PROCEDURE — 19083 BX BREAST 1ST LESION US IMAG: CPT

## 2023-05-01 PROCEDURE — 88173 CYTOPATH EVAL FNA REPORT: CPT

## 2023-05-01 PROCEDURE — 88172 CYTP DX EVAL FNA 1ST EA SITE: CPT

## 2023-05-01 PROCEDURE — 2720000010 US BREAST BIOPSY W LOC DEVICE EACH ADDL LESION RIGHT

## 2023-05-03 ENCOUNTER — TELEPHONE (OUTPATIENT)
Dept: SURGERY | Age: 68
End: 2023-05-03

## 2023-05-03 ENCOUNTER — TELEPHONE (OUTPATIENT)
Dept: OTHER | Age: 68
End: 2023-05-03

## 2023-05-03 DIAGNOSIS — C50.211 MALIGNANT NEOPLASM OF UPPER-INNER QUADRANT OF RIGHT FEMALE BREAST, UNSPECIFIED ESTROGEN RECEPTOR STATUS (HCC): ICD-10-CM

## 2023-05-03 DIAGNOSIS — C50.811 MALIGNANT NEOPLASM OF OVERLAPPING SITES OF RIGHT BREAST (HCC): Primary | ICD-10-CM

## 2023-05-03 DIAGNOSIS — C77.3 SECONDARY MALIGNANT NEOPLASM OF AXILLARY LYMPH NODES (HCC): ICD-10-CM

## 2023-05-03 RX ORDER — DIAZEPAM 5 MG/1
TABLET ORAL
Qty: 10 TABLET | Refills: 0 | Status: SHIPPED | OUTPATIENT
Start: 2023-05-03 | End: 2023-05-13

## 2023-05-03 NOTE — TELEPHONE ENCOUNTER
Reached out to patient via telephone call to offer Nurse Navigator services. We spoke at length about navigator services and I offered to take a Breast Cancer Treatment Handbook to MRI center for her and she agreed that would be good. I included a business card with contact information, [employer] flyer, [employer] information and a welcome letter. I encouraged her to reach out at anytime with questions or concerns. Appointment times, dates and location reviewed. Including metastatic workup appointments. Pt will  contrast tomorrow. Pt asking for Valium prior to MRI due to claustrophobia. Verified pharmacy. Will f/iu after breast talk. Pt states she was completely surprised by results.

## 2023-05-05 ENCOUNTER — HOSPITAL ENCOUNTER (OUTPATIENT)
Dept: NUCLEAR MEDICINE | Age: 68
Discharge: HOME | End: 2023-05-07
Payer: MEDICARE

## 2023-05-05 ENCOUNTER — HOSPITAL ENCOUNTER (OUTPATIENT)
Dept: MRI IMAGING | Age: 68
Discharge: HOME | End: 2023-05-05
Payer: MEDICARE

## 2023-05-05 ENCOUNTER — HOSPITAL ENCOUNTER (OUTPATIENT)
Dept: CT IMAGING | Age: 68
Discharge: HOME | End: 2023-05-05
Payer: MEDICARE

## 2023-05-05 DIAGNOSIS — C50.211 MALIGNANT NEOPLASM OF UPPER-INNER QUADRANT OF RIGHT FEMALE BREAST, UNSPECIFIED ESTROGEN RECEPTOR STATUS (HCC): ICD-10-CM

## 2023-05-05 DIAGNOSIS — C77.3 SECONDARY MALIGNANT NEOPLASM OF AXILLARY LYMPH NODES (HCC): ICD-10-CM

## 2023-05-05 DIAGNOSIS — C50.811 MALIGNANT NEOPLASM OF OVERLAPPING SITES OF RIGHT BREAST (HCC): ICD-10-CM

## 2023-05-05 LAB — CREAT SERPL-MCNC: 0.7 MG/DL (ref 0.3–1.3)

## 2023-05-05 PROCEDURE — 6360000004 HC RX CONTRAST MEDICATION: Performed by: PHYSICIAN ASSISTANT

## 2023-05-05 PROCEDURE — 3430000000 HC RX DIAGNOSTIC RADIOPHARMACEUTICAL: Performed by: PHYSICIAN ASSISTANT

## 2023-05-05 PROCEDURE — 74177 CT ABD & PELVIS W/CONTRAST: CPT | Performed by: RADIOLOGY

## 2023-05-05 PROCEDURE — A9577 INJ MULTIHANCE: HCPCS | Performed by: PHYSICIAN ASSISTANT

## 2023-05-05 PROCEDURE — A9503 TC99M MEDRONATE: HCPCS | Performed by: PHYSICIAN ASSISTANT

## 2023-05-05 PROCEDURE — 71260 CT THORAX DX C+: CPT | Performed by: RADIOLOGY

## 2023-05-05 PROCEDURE — 78306 BONE IMAGING WHOLE BODY: CPT | Performed by: RADIOLOGY

## 2023-05-05 PROCEDURE — 71260 CT THORAX DX C+: CPT

## 2023-05-05 PROCEDURE — 78306 BONE IMAGING WHOLE BODY: CPT | Performed by: PHYSICIAN ASSISTANT

## 2023-05-05 PROCEDURE — C8908 MRI W/O FOL W/CONT, BREAST,: HCPCS

## 2023-05-05 PROCEDURE — 74177 CT ABD & PELVIS W/CONTRAST: CPT

## 2023-05-05 RX ORDER — TC 99M MEDRONATE 20 MG/10ML
20 INJECTION, POWDER, LYOPHILIZED, FOR SOLUTION INTRAVENOUS
Status: COMPLETED | OUTPATIENT
Start: 2023-05-05 | End: 2023-05-05

## 2023-05-05 RX ADMIN — IOPAMIDOL 75 ML: 755 INJECTION, SOLUTION INTRAVENOUS at 11:19

## 2023-05-05 RX ADMIN — TC 99M MEDRONATE 20 MILLICURIE: 20 INJECTION, POWDER, LYOPHILIZED, FOR SOLUTION INTRAVENOUS at 14:18

## 2023-05-05 RX ADMIN — GADOBENATE DIMEGLUMINE 20 ML: 529 INJECTION, SOLUTION INTRAVENOUS at 12:33

## 2023-05-17 ENCOUNTER — INITIAL CONSULT (OUTPATIENT)
Dept: SURGERY | Age: 68
End: 2023-05-17

## 2023-05-17 ENCOUNTER — HOSPITAL ENCOUNTER (OUTPATIENT)
Dept: ULTRASOUND IMAGING | Age: 68
Discharge: HOME | End: 2023-05-17

## 2023-05-17 DIAGNOSIS — C77.3 SECONDARY MALIGNANT NEOPLASM OF AXILLARY LYMPH NODES (HCC): ICD-10-CM

## 2023-05-17 DIAGNOSIS — C50.211 MALIGNANT NEOPLASM OF UPPER-INNER QUADRANT OF RIGHT FEMALE BREAST, UNSPECIFIED ESTROGEN RECEPTOR STATUS (HCC): ICD-10-CM

## 2023-05-17 DIAGNOSIS — C50.811 MALIGNANT NEOPLASM OF OVERLAPPING SITES OF RIGHT BREAST (HCC): ICD-10-CM

## 2023-05-17 DIAGNOSIS — Z01.818 EXAMINATION PRIOR TO CHEMOTHERAPY: Primary | ICD-10-CM

## 2023-05-18 ENCOUNTER — HOSPITAL ENCOUNTER (OUTPATIENT)
Dept: PREADMISSION TESTING | Age: 68
End: 2023-05-18
Payer: MEDICARE

## 2023-05-18 LAB
ANION GAP SERPL CALCULATED.3IONS-SCNC: 13 MMOL/L (ref 7–19)
BASOPHILS # BLD: 0.1 K/UL (ref 0–0.2)
BASOPHILS NFR BLD: 0.9 % (ref 0–1)
BUN SERPL-MCNC: 10 MG/DL (ref 8–23)
CALCIUM SERPL-MCNC: 9.5 MG/DL (ref 8.8–10.2)
CHLORIDE SERPL-SCNC: 104 MMOL/L (ref 98–111)
CO2 SERPL-SCNC: 25 MMOL/L (ref 22–29)
CREAT SERPL-MCNC: 0.7 MG/DL (ref 0.5–0.9)
EKG P AXIS: -3 DEGREES
EKG P-R INTERVAL: 166 MS
EKG Q-T INTERVAL: 412 MS
EKG QRS DURATION: 90 MS
EKG QTC CALCULATION (BAZETT): 438 MS
EKG T AXIS: 0 DEGREES
EOSINOPHIL # BLD: 0.5 K/UL (ref 0–0.6)
EOSINOPHIL NFR BLD: 5.4 % (ref 0–5)
ERYTHROCYTE [DISTWIDTH] IN BLOOD BY AUTOMATED COUNT: 13.3 % (ref 11.5–14.5)
GLUCOSE SERPL-MCNC: 90 MG/DL (ref 74–109)
HCT VFR BLD AUTO: 43.7 % (ref 37–47)
HGB BLD-MCNC: 14.1 G/DL (ref 12–16)
IMM GRANULOCYTES # BLD: 0 K/UL
LYMPHOCYTES # BLD: 2.6 K/UL (ref 1.1–4.5)
LYMPHOCYTES NFR BLD: 27.8 % (ref 20–40)
MCH RBC QN AUTO: 30.3 PG (ref 27–31)
MCHC RBC AUTO-ENTMCNC: 32.3 G/DL (ref 33–37)
MCV RBC AUTO: 94 FL (ref 81–99)
MONOCYTES # BLD: 0.7 K/UL (ref 0–0.9)
MONOCYTES NFR BLD: 8 % (ref 0–10)
NEUTROPHILS # BLD: 5.4 K/UL (ref 1.5–7.5)
NEUTS SEG NFR BLD: 57.7 % (ref 50–65)
PLATELET # BLD AUTO: 280 K/UL (ref 130–400)
PMV BLD AUTO: 11.8 FL (ref 9.4–12.3)
POTASSIUM SERPL-SCNC: 4 MMOL/L (ref 3.5–5)
RBC # BLD AUTO: 4.65 M/UL (ref 4.2–5.4)
SODIUM SERPL-SCNC: 142 MMOL/L (ref 136–145)
WBC # BLD AUTO: 9.3 K/UL (ref 4.8–10.8)

## 2023-05-18 PROCEDURE — 93005 ELECTROCARDIOGRAM TRACING: CPT | Performed by: SURGERY

## 2023-05-18 PROCEDURE — 85025 COMPLETE CBC W/AUTO DIFF WBC: CPT

## 2023-05-18 PROCEDURE — 80048 BASIC METABOLIC PNL TOTAL CA: CPT

## 2023-05-18 PROCEDURE — 93010 ELECTROCARDIOGRAM REPORT: CPT | Performed by: INTERNAL MEDICINE

## 2023-05-18 RX ORDER — SECUKINUMAB 150 MG/ML
300 INJECTION SUBCUTANEOUS
COMMUNITY

## 2023-05-18 RX ORDER — ACETAMINOPHEN 325 MG/1
975 TABLET ORAL ONCE
Status: CANCELLED | OUTPATIENT
Start: 2023-05-19

## 2023-05-18 RX ORDER — GABAPENTIN 300 MG/1
300 CAPSULE ORAL ONCE
Status: CANCELLED | OUTPATIENT
Start: 2023-05-19

## 2023-05-18 RX ORDER — CELECOXIB 200 MG/1
200 CAPSULE ORAL ONCE
Status: CANCELLED | OUTPATIENT
Start: 2023-05-19

## 2023-05-18 NOTE — DISCHARGE INSTRUCTIONS
PREOPERATIVE GUIDELINES WHEN RECEIVING ANESTHESIA    Do not eat or drink anything after midnight, the night before your surgery. No gum or candy the morning of surgery. This is extremely important for your safety. Take a bath (or shower) the night before your surgery and you may brush your teeth the morning of your surgery. You will be scheduled to arrive at the hospital 2 hours before your surgery, or follow your surgeon's instructions. Dress comfortably. Wear loose clothing that will be easy to remove and comfortable for your trip home. You may wear eyeglasses or contacts but bring your cases with you as they must be remove before your surgery. Hearing aids and dentures will need to be removed before your surgery. Do not wear any jewelry, including body jewelry. All jewelry will need to be removed prior to your surgery. Do not wear fingernail polish or make-up. It is best not to bring any valuables with you. If you are to stay in the hospital overnight, bring your robe, slippers and personal toiletries that you may need. POSTOPERATIVE GUIDELINES AFTER RECEIVING ANESTHESIA    If you are to go home after your surgery, you will need a responsible adult to drive you home. You will not be able to take public transportation after your discharge from the Operative Care Unit unless you are accompanied by a        responsible adult. On returning home, be sure to follow your physician's orders regarding diet, activity and medications. Remember, surgery with general anesthesia or sedation may leave you sleepy, very tired and with a decreased appetite for 12 to 24 hours. If you develop any post-surgical complications or problems, call your surgeon or [facility] Emergency Department ([phone]). The day before surgery you will receive a phone call from the surgery nurse to let you know what time to arrive on the day of surgery.  This call will usually be between 2-4 PM. If

## 2023-05-19 ENCOUNTER — ANESTHESIA EVENT (OUTPATIENT)
Dept: OPERATING ROOM | Age: 68
End: 2023-05-19
Payer: MEDICARE

## 2023-05-19 ENCOUNTER — HOSPITAL ENCOUNTER (OUTPATIENT)
Age: 68
Setting detail: OUTPATIENT SURGERY
Discharge: HOME | End: 2023-05-19
Attending: SURGERY | Admitting: SURGERY
Payer: MEDICARE

## 2023-05-19 ENCOUNTER — APPOINTMENT (OUTPATIENT)
Dept: GENERAL RADIOLOGY | Age: 68
End: 2023-05-19
Attending: SURGERY
Payer: MEDICARE

## 2023-05-19 ENCOUNTER — ANESTHESIA (OUTPATIENT)
Dept: OPERATING ROOM | Age: 68
End: 2023-05-19
Payer: MEDICARE

## 2023-05-19 VITALS
DIASTOLIC BLOOD PRESSURE: 78 MMHG | SYSTOLIC BLOOD PRESSURE: 127 MMHG | HEIGHT: 68 IN | OXYGEN SATURATION: 98 % | HEART RATE: 68 BPM | RESPIRATION RATE: 16 BRPM | BODY MASS INDEX: 40.01 KG/M2 | WEIGHT: 264 LBS | TEMPERATURE: 97.8 F

## 2023-05-19 DIAGNOSIS — C50.211 MALIGNANT NEOPLASM OF UPPER-INNER QUADRANT OF RIGHT FEMALE BREAST, UNSPECIFIED ESTROGEN RECEPTOR STATUS (HCC): Primary | ICD-10-CM

## 2023-05-19 PROBLEM — C50.919 TRIPLE NEGATIVE BREAST CANCER (HCC): Status: ACTIVE | Noted: 2023-05-19

## 2023-05-19 PROBLEM — Z17.421 TRIPLE NEGATIVE BREAST CANCER (HCC): Status: ACTIVE | Noted: 2023-05-19

## 2023-05-19 PROCEDURE — 3600000003 HC SURGERY LEVEL 3 BASE: Performed by: SURGERY

## 2023-05-19 PROCEDURE — 71045 X-RAY EXAM CHEST 1 VIEW: CPT

## 2023-05-19 PROCEDURE — 2500000003 HC RX 250 WO HCPCS

## 2023-05-19 PROCEDURE — 77001 FLUOROGUIDE FOR VEIN DEVICE: CPT | Performed by: SURGERY

## 2023-05-19 PROCEDURE — 2500000003 HC RX 250 WO HCPCS: Performed by: SURGERY

## 2023-05-19 PROCEDURE — 7100000001 HC PACU RECOVERY - ADDTL 15 MIN: Performed by: SURGERY

## 2023-05-19 PROCEDURE — 7100000011 HC PHASE II RECOVERY - ADDTL 15 MIN: Performed by: SURGERY

## 2023-05-19 PROCEDURE — 3209999900 FLUORO FOR SURGICAL PROCEDURES

## 2023-05-19 PROCEDURE — 2580000003 HC RX 258: Performed by: ANESTHESIOLOGY

## 2023-05-19 PROCEDURE — 6360000004 HC RX CONTRAST MEDICATION: Performed by: SURGERY

## 2023-05-19 PROCEDURE — 6360000002 HC RX W HCPCS: Performed by: SURGERY

## 2023-05-19 PROCEDURE — 6360000002 HC RX W HCPCS

## 2023-05-19 PROCEDURE — 6370000000 HC RX 637 (ALT 250 FOR IP): Performed by: SURGERY

## 2023-05-19 PROCEDURE — 7100000010 HC PHASE II RECOVERY - FIRST 15 MIN: Performed by: SURGERY

## 2023-05-19 PROCEDURE — 2709999900 HC NON-CHARGEABLE SUPPLY: Performed by: SURGERY

## 2023-05-19 PROCEDURE — 3700000000 HC ANESTHESIA ATTENDED CARE: Performed by: SURGERY

## 2023-05-19 PROCEDURE — 3700000001 HC ADD 15 MINUTES (ANESTHESIA): Performed by: SURGERY

## 2023-05-19 PROCEDURE — C1788 PORT, INDWELLING, IMP: HCPCS | Performed by: SURGERY

## 2023-05-19 PROCEDURE — 36561 INSERT TUNNELED CV CATH: CPT | Performed by: SURGERY

## 2023-05-19 PROCEDURE — 7100000000 HC PACU RECOVERY - FIRST 15 MIN: Performed by: SURGERY

## 2023-05-19 PROCEDURE — 3600000013 HC SURGERY LEVEL 3 ADDTL 15MIN: Performed by: SURGERY

## 2023-05-19 PROCEDURE — 2580000003 HC RX 258: Performed by: SURGERY

## 2023-05-19 DEVICE — PORT INFUS PLAS SGL LUMN W/ 9.6FR SIL CATH AIRGUARD VLV: Type: IMPLANTABLE DEVICE | Site: CHEST | Status: FUNCTIONAL

## 2023-05-19 RX ORDER — MORPHINE SULFATE 4 MG/ML
4 INJECTION, SOLUTION INTRAMUSCULAR; INTRAVENOUS EVERY 5 MIN PRN
Status: DISCONTINUED | OUTPATIENT
Start: 2023-05-19 | End: 2023-05-19 | Stop reason: HOSPADM

## 2023-05-19 RX ORDER — DROPERIDOL 2.5 MG/ML
0.62 INJECTION, SOLUTION INTRAMUSCULAR; INTRAVENOUS
Status: DISCONTINUED | OUTPATIENT
Start: 2023-05-19 | End: 2023-05-19 | Stop reason: HOSPADM

## 2023-05-19 RX ORDER — POLYMYXIN B 500000 [USP'U]/1
INJECTION, POWDER, LYOPHILIZED, FOR SOLUTION INTRAMUSCULAR; INTRATHECAL; INTRAVENOUS; OPHTHALMIC PRN
Status: DISCONTINUED | OUTPATIENT
Start: 2023-05-19 | End: 2023-05-19 | Stop reason: ALTCHOICE

## 2023-05-19 RX ORDER — MIDAZOLAM HYDROCHLORIDE 2 MG/2ML
2 INJECTION, SOLUTION INTRAMUSCULAR; INTRAVENOUS
Status: DISCONTINUED | OUTPATIENT
Start: 2023-05-19 | End: 2023-05-19 | Stop reason: HOSPADM

## 2023-05-19 RX ORDER — LIDOCAINE HYDROCHLORIDE 10 MG/ML
1 INJECTION, SOLUTION EPIDURAL; INFILTRATION; INTRACAUDAL; PERINEURAL
Status: DISCONTINUED | OUTPATIENT
Start: 2023-05-19 | End: 2023-05-19 | Stop reason: HOSPADM

## 2023-05-19 RX ORDER — DIPHENHYDRAMINE HYDROCHLORIDE 50 MG/ML
12.5 INJECTION INTRAMUSCULAR; INTRAVENOUS
Status: DISCONTINUED | OUTPATIENT
Start: 2023-05-19 | End: 2023-05-19 | Stop reason: HOSPADM

## 2023-05-19 RX ORDER — CELECOXIB 200 MG/1
200 CAPSULE ORAL ONCE
Status: COMPLETED | OUTPATIENT
Start: 2023-05-19 | End: 2023-05-19

## 2023-05-19 RX ORDER — LABETALOL HYDROCHLORIDE 5 MG/ML
10 INJECTION, SOLUTION INTRAVENOUS
Status: DISCONTINUED | OUTPATIENT
Start: 2023-05-19 | End: 2023-05-19 | Stop reason: HOSPADM

## 2023-05-19 RX ORDER — EPHEDRINE SULFATE 50 MG/ML
INJECTION, SOLUTION INTRAVENOUS PRN
Status: DISCONTINUED | OUTPATIENT
Start: 2023-05-19 | End: 2023-05-19 | Stop reason: SDUPTHER

## 2023-05-19 RX ORDER — SODIUM CHLORIDE 0.9 % (FLUSH) 0.9 %
5-40 SYRINGE (ML) INJECTION EVERY 12 HOURS SCHEDULED
Status: DISCONTINUED | OUTPATIENT
Start: 2023-05-19 | End: 2023-05-19 | Stop reason: HOSPADM

## 2023-05-19 RX ORDER — ONDANSETRON 2 MG/ML
INJECTION INTRAMUSCULAR; INTRAVENOUS PRN
Status: DISCONTINUED | OUTPATIENT
Start: 2023-05-19 | End: 2023-05-19 | Stop reason: SDUPTHER

## 2023-05-19 RX ORDER — SCOLOPAMINE TRANSDERMAL SYSTEM 1 MG/1
1 PATCH, EXTENDED RELEASE TRANSDERMAL
Status: DISCONTINUED | OUTPATIENT
Start: 2023-05-19 | End: 2023-05-19 | Stop reason: HOSPADM

## 2023-05-19 RX ORDER — IPRATROPIUM BROMIDE AND ALBUTEROL SULFATE 2.5; .5 MG/3ML; MG/3ML
1 SOLUTION RESPIRATORY (INHALATION)
Status: DISCONTINUED | OUTPATIENT
Start: 2023-05-19 | End: 2023-05-19 | Stop reason: HOSPADM

## 2023-05-19 RX ORDER — ACETAMINOPHEN 325 MG/1
975 TABLET ORAL ONCE
Status: COMPLETED | OUTPATIENT
Start: 2023-05-19 | End: 2023-05-19

## 2023-05-19 RX ORDER — SODIUM CHLORIDE 9 MG/ML
INJECTION, SOLUTION INTRAVENOUS PRN
Status: DISCONTINUED | OUTPATIENT
Start: 2023-05-19 | End: 2023-05-19 | Stop reason: HOSPADM

## 2023-05-19 RX ORDER — MORPHINE SULFATE 2 MG/ML
2 INJECTION, SOLUTION INTRAMUSCULAR; INTRAVENOUS EVERY 5 MIN PRN
Status: DISCONTINUED | OUTPATIENT
Start: 2023-05-19 | End: 2023-05-19 | Stop reason: HOSPADM

## 2023-05-19 RX ORDER — FENTANYL CITRATE 50 UG/ML
INJECTION, SOLUTION INTRAMUSCULAR; INTRAVENOUS PRN
Status: DISCONTINUED | OUTPATIENT
Start: 2023-05-19 | End: 2023-05-19 | Stop reason: SDUPTHER

## 2023-05-19 RX ORDER — PROPOFOL 10 MG/ML
INJECTION, EMULSION INTRAVENOUS PRN
Status: DISCONTINUED | OUTPATIENT
Start: 2023-05-19 | End: 2023-05-19 | Stop reason: SDUPTHER

## 2023-05-19 RX ORDER — GABAPENTIN 300 MG/1
300 CAPSULE ORAL ONCE
Status: COMPLETED | OUTPATIENT
Start: 2023-05-19 | End: 2023-05-19

## 2023-05-19 RX ORDER — MEPERIDINE HYDROCHLORIDE 25 MG/ML
12.5 INJECTION INTRAMUSCULAR; INTRAVENOUS; SUBCUTANEOUS EVERY 5 MIN PRN
Status: DISCONTINUED | OUTPATIENT
Start: 2023-05-19 | End: 2023-05-19 | Stop reason: HOSPADM

## 2023-05-19 RX ORDER — HEPARIN SODIUM,PORCINE 10 UNIT/ML
VIAL (ML) INTRAVENOUS PRN
Status: DISCONTINUED | OUTPATIENT
Start: 2023-05-19 | End: 2023-05-19 | Stop reason: ALTCHOICE

## 2023-05-19 RX ORDER — SODIUM CHLORIDE 0.9 % (FLUSH) 0.9 %
5-40 SYRINGE (ML) INJECTION PRN
Status: DISCONTINUED | OUTPATIENT
Start: 2023-05-19 | End: 2023-05-19 | Stop reason: HOSPADM

## 2023-05-19 RX ORDER — LIDOCAINE HYDROCHLORIDE 10 MG/ML
INJECTION, SOLUTION EPIDURAL; INFILTRATION; INTRACAUDAL; PERINEURAL PRN
Status: DISCONTINUED | OUTPATIENT
Start: 2023-05-19 | End: 2023-05-19 | Stop reason: SDUPTHER

## 2023-05-19 RX ORDER — FAMOTIDINE 20 MG/1
20 TABLET, FILM COATED ORAL ONCE
Status: DISCONTINUED | OUTPATIENT
Start: 2023-05-19 | End: 2023-05-19 | Stop reason: HOSPADM

## 2023-05-19 RX ORDER — SODIUM CHLORIDE, SODIUM LACTATE, POTASSIUM CHLORIDE, CALCIUM CHLORIDE 600; 310; 30; 20 MG/100ML; MG/100ML; MG/100ML; MG/100ML
INJECTION, SOLUTION INTRAVENOUS CONTINUOUS
Status: DISCONTINUED | OUTPATIENT
Start: 2023-05-19 | End: 2023-05-19 | Stop reason: HOSPADM

## 2023-05-19 RX ORDER — CLINDAMYCIN PHOSPHATE 900 MG/50ML
900 INJECTION INTRAVENOUS
Status: COMPLETED | OUTPATIENT
Start: 2023-05-19 | End: 2023-05-19

## 2023-05-19 RX ORDER — METOCLOPRAMIDE HYDROCHLORIDE 5 MG/ML
10 INJECTION INTRAMUSCULAR; INTRAVENOUS
Status: DISCONTINUED | OUTPATIENT
Start: 2023-05-19 | End: 2023-05-19 | Stop reason: HOSPADM

## 2023-05-19 RX ORDER — LORAZEPAM 2 MG/ML
0.5 INJECTION INTRAMUSCULAR
Status: DISCONTINUED | OUTPATIENT
Start: 2023-05-19 | End: 2023-05-19 | Stop reason: HOSPADM

## 2023-05-19 RX ADMIN — PROPOFOL 30 MG: 10 INJECTION, EMULSION INTRAVENOUS at 11:34

## 2023-05-19 RX ADMIN — FENTANYL CITRATE 25 MCG: 0.05 INJECTION, SOLUTION INTRAMUSCULAR; INTRAVENOUS at 11:34

## 2023-05-19 RX ADMIN — LIDOCAINE HYDROCHLORIDE 50 MG: 10 INJECTION, SOLUTION EPIDURAL; INFILTRATION; INTRACAUDAL; PERINEURAL at 11:34

## 2023-05-19 RX ADMIN — ONDANSETRON 4 MG: 2 INJECTION INTRAMUSCULAR; INTRAVENOUS at 11:55

## 2023-05-19 RX ADMIN — ACETAMINOPHEN 975 MG: 325 TABLET ORAL at 09:59

## 2023-05-19 RX ADMIN — FENTANYL CITRATE 25 MCG: 0.05 INJECTION, SOLUTION INTRAMUSCULAR; INTRAVENOUS at 12:06

## 2023-05-19 RX ADMIN — SODIUM CHLORIDE, POTASSIUM CHLORIDE, SODIUM LACTATE AND CALCIUM CHLORIDE: 600; 310; 30; 20 INJECTION, SOLUTION INTRAVENOUS at 09:55

## 2023-05-19 RX ADMIN — CLINDAMYCIN PHOSPHATE 900 MG: 18 INJECTION, SOLUTION INTRAMUSCULAR; INTRAVENOUS at 11:47

## 2023-05-19 RX ADMIN — FENTANYL CITRATE 25 MCG: 0.05 INJECTION, SOLUTION INTRAMUSCULAR; INTRAVENOUS at 11:43

## 2023-05-19 RX ADMIN — FENTANYL CITRATE 25 MCG: 0.05 INJECTION, SOLUTION INTRAMUSCULAR; INTRAVENOUS at 12:10

## 2023-05-19 RX ADMIN — PROPOFOL 120 MCG/KG/MIN: 10 INJECTION, EMULSION INTRAVENOUS at 11:35

## 2023-05-19 RX ADMIN — CELECOXIB 200 MG: 200 CAPSULE ORAL at 09:59

## 2023-05-19 RX ADMIN — GABAPENTIN 300 MG: 300 CAPSULE ORAL at 10:00

## 2023-05-19 RX ADMIN — EPHEDRINE SULFATE 5 MG: 50 INJECTION INTRAMUSCULAR; INTRAVENOUS; SUBCUTANEOUS at 11:59

## 2023-05-19 ASSESSMENT — LIFESTYLE VARIABLES: SMOKING_STATUS: 0

## 2023-05-19 ASSESSMENT — PAIN SCALES - GENERAL
PAINLEVEL_OUTOF10: 0
PAINLEVEL_OUTOF10: 0

## 2023-05-19 ASSESSMENT — PAIN - FUNCTIONAL ASSESSMENT: PAIN_FUNCTIONAL_ASSESSMENT: 0-10

## 2023-05-19 ASSESSMENT — ENCOUNTER SYMPTOMS: SHORTNESS OF BREATH: 0

## 2023-05-19 NOTE — H&P
HISTORY OF PRESENT ILLNESS:     Ms. Anila Ny  is recently status post ultrasound guided breast biopsy  on the right which revealed a 2.5 cm at 9:00 and 1.8 cm at 2:00 high grade invasive ductal carcinoma. ER negative IN negative Her2 negative Ki67 84%     Mammaprint High Risk Basal Type     MRI-5/10/2023  In the right breast laterally at 9:00 corresponding to a biopsy-proven  malignancy, is a very heterogeneous predominantly rim-enhancing mass  measuring approximately 43 x 40 x 50 mm in length x width x height. Margins are obscured and there is internal nonenhancement suggesting  necrosis. There also may be some surrounding hematoma obscuring  margins which may overestimate size. This mass has a biopsy clip. In the right breast medially at 2:00 corresponding to a biopsy-proven  malignancy is an additional very heterogeneously  predominantly rim  enhancing mass measuring approximately 33 x 31 x 49 mm in length x  width x height. This mass has a biopsy clip. There also may be some  surrounding hematoma obscuring margins which may overestimate size. This mass is extremely lobulated and there are at least three  additional 5mm or smaller additional similar-appearing masses  surrounding the anterior aspect of this mass. There is an additional  dominant mass located approximately 16 mm anterior measuring 18 mm. This mass demonstrates MRI findings most suggestive of direct  secondary skin invasion. Additionally the entire medial right breast  skin is thickened with abnormal enhancement. There is marked level 1 right axillary lymphadenopathy, with at least  3 level 1 lymph nodes which demonstrate no residual hilar component  measuring 25mm, 26mm and 19 mm in long axis. The level 2  lymphadenopathy as demonstrated on chest CT is not as well  demonstrated by MRI. No suspicious MRI finding is seen in the left breast. No left axillary  abnormality is seen. No abnormal lymph nodes are seen in the internal  mammary chains.

## 2023-05-19 NOTE — BRIEF OP NOTE
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 SPECIMENS: * No specimens in log *    DRAINS: None    The patient tolerated the procedure well.     Electronically signed by Jessica Landis MD  on 5/19/2023 at 12:36 PM

## 2023-05-19 NOTE — ANESTHESIA PRE PROCEDURE
Department of Anesthesiology  Preprocedure Note       Name:  Donavan Lewis   Age:  74 y.o.  :  1955                                          MRN:  [id number]         Date:  2023      Surgeon: Gertrudis Ngo):  Hilton Marrero MD    Procedure: Procedure(s):  PORT INSERTION WITH FLUOROSCOPY    Medications prior to admission:   Prior to Admission medications    Medication Sig Start Date End Date Taking? Authorizing Provider   Secukinumab, 300 MG Dose, (COSENTYX, 300 MG DOSE,) 150 MG/ML SOSY Inject 300 mg into the skin every 28 days    Historical Provider, MD   diclofenac sodium 1 % GEL Apply 2 g topically 4 times daily as needed 10/31/16   Historical Provider, MD   HYDROcodone-acetaminophen (NORCO) 7.5-325 MG per tablet Take 1 tablet by mouth every 6 hours as needed. 18   Historical Provider, MD   lisinopril (PRINIVIL;ZESTRIL) 20 MG tablet Take 1 tablet by mouth daily 18   Historical Provider, MD   lovastatin (MEVACOR) 20 MG tablet Take 1 tablet by mouth nightly 17   Historical Provider, MD   metoprolol succinate (TOPROL XL) 100 MG extended release tablet Take 1 tablet by mouth daily 18   Historical Provider, MD   montelukast (SINGULAIR) 10 MG tablet Take 1 tablet by mouth nightly 10/27/16   Historical Provider, MD       Current medications:    Current Facility-Administered Medications   Medication Dose Route Frequency Provider Last Rate Last Admin    lactated ringers IV soln infusion   IntraVENous Continuous Geraldine Jaffe  mL/hr at 23 0955 New Bag at 23 0955    clindamycin (CLEOCIN) 900 mg in dextrose 5 % 50 mL IVPB  900 mg IntraVENous On Call to [id number] Beach Castor, MD           Allergies:     Allergies   Allergen Reactions    Amoxicillin Swelling     Facial swelling    Other Other (See Comments)     Patient allergic to absorbable sutures-\"infection from vicryl sutures\"       Problem List:    Patient Active Problem List   Diagnosis Code    Malignant neoplasm of

## 2023-05-19 NOTE — DISCHARGE INSTRUCTIONS
POST-OP INSTRUCTIONS FOR MEDIPORT  1. Surgical glue will dissolve do not soak the site, you may shower in 48 hours, pat the incision dry do not rub. 2. If a small are of nely wound separates or becomes red and inflamed, call the office to make an appointment. 3. Pain medication may cause constipation, you may take the laxative of your choice. 4. Call the office if you have any questions or concerns during nurse's  Hours Monday-Friday 9:00-4:00. [phone]. In case of emergency the answering service will take your call. of the wound separates or becomes red and inflamed, call the office to make an appointment. Implanted Port: What to Expect at [place] [id number]. had a procedure to implant a port. A port is a device placed, in most cases, under the skin of your chest below your collarbone. It is made of plastic, stainless steel, or titanium. It's about the size of a quarter, but thicker. It looks like a small bump under your skin. A thin, flexible tube called a catheter runs under the skin from the port into a large vein. With the port, you will be able to get medicines (such as chemotherapy) with more comfort. You also can get blood, nutrients, or other fluids. Blood can be taken through the port for tests. You will probably have some discomfort and bruising at the port site. This will go away in a few days. The port can be used right away. You may have the port for weeks, months, or longer. Your port will need to be flushed out regularly to keep it open. A nurse or other health professional will do this for you. This care sheet gives you a general idea about how long it will take for you to recover. But each person recovers at a different pace. Follow the steps below to feel better as quickly as possible. How can you care for yourself at home? Activity    Avoid arm and upper body movements that may pull on the catheter for the first few days.  These movements include heavy weight lifting and

## 2023-05-19 NOTE — ANESTHESIA POSTPROCEDURE EVALUATION
Department of Anesthesiology  Postprocedure Note    Patient: Armando Hogan  MRN: [id number]  YOB: 1955  Date of evaluation: 5/19/2023      Procedure Summary     Date: 05/19/23 Room / Location: [address]    Anesthesia Start: 1127 Anesthesia Stop:     Procedure: PORT INSERTION WITH FLUOROSCOPY Diagnosis:       Malignant neoplasm of upper-inner quadrant of right female breast, unspecified estrogen receptor status ([address]      Secondary malignant neoplasm of axillary lymph nodes ([address]      Malignant neoplasm of overlapping sites of right breast ([address]      (Malignant neoplasm of upper-inner quadrant of right female breast, unspecified estrogen receptor status ([address] [C50.211])      (Secondary malignant neoplasm of axillary lymph nodes ([address] [C77.3])      (Malignant neoplasm of overlapping sites of right breast ([address] Siena Sat)    Surgeons: Gregorio James MD Responsible Provider: RYAN Mae CRNA    Anesthesia Type: MAC ASA Status: 2          Anesthesia Type: No value filed.     Anamaria Phase I:      Anamaria Phase II:        Anesthesia Post Evaluation    Patient location during evaluation: PACU  Patient participation: complete - patient participated  Level of consciousness: awake  Airway patency: patent  Nausea & Vomiting: no vomiting  Complications: no  Cardiovascular status: hemodynamically stable  Respiratory status: spontaneous ventilation, nonlabored ventilation and room air  Hydration status: euvolemic  Comments: BP (!) 151/88   Pulse 78   Temp 97.9 °F (36.6 °C) (Temporal)   Resp 16   Ht 5' 8\" (1.727 m)   Wt 264 lb (119.7 kg)   SpO2 98%   BMI 40.14 kg/m²     Multimodal analgesia pain management approach

## 2023-05-20 NOTE — OP NOTE
[facility] [employer] [facility] [address] [facility]                                OPERATIVE REPORT    PATIENT NAME: Elio Dow                   :        1955  MED REC NO:   [id number]                              ROOM:  ACCOUNT NO:   [de-identified]                           ADMIT DATE: 2023  PROVIDER:     Imelda Do MD    DATE OF PROCEDURE:  2023    PREOPERATIVE DIAGNOSIS:  Triple-negative breast cancer for neoadjuvant  chemotherapy. POSTOPERATIVE DIAGNOSIS:  Triple-negative breast cancer for neoadjuvant  chemotherapy. PROCEDURE:  Left subclavian single-lumen LifePort catheter placement. SURGEON:  Imelda Do MD    ASSISTANT:  Maren Oliver PA-C    ANESTHESIA:  Local MAC. INDICATION:  The patient is a 62-year-old lady with newly diagnosed  high-grade triple-negative breast cancer. She is going to be receiving  neoadjuvant chemotherapy. We discussed risks and benefits of port  placement. She understands and is agreeable. OPERATIVE PROCEDURE:  Today, she was brought to the operating room,  adequately sedated, and prepped and draped in the sterile fashion. The  left infraclavicular area was anesthetized with 1/16% Xylocaine. The  subclavian vein cannulated without incident. A wire was passed into  the superior vena cava under fluoroscopic guidance. We then  anesthetized subcutaneous pocket, made a transverse incision and  elevated our pocket. We then threaded the dilator in the superior vena  cava, passed the catheter through the sheath, peeled away the sheath,  and positioned the catheter appropriately again under fluoroscopic  guidance. We did utilize intravenous contrast to facilitate  visualization of the catheter tip.   Once this was accomplished, we  attached the port in the usual fashion, sutured the port in place with  2-0 silk stitches, flushed and aspirated the port with heparinized  saline, closed with

## 2023-05-22 ENCOUNTER — TELEPHONE (OUTPATIENT)
Dept: OTHER | Age: 68
End: 2023-05-22

## 2023-05-22 ENCOUNTER — HOSPITAL ENCOUNTER (OUTPATIENT)
Dept: NON INVASIVE DIAGNOSTICS | Age: 68
Discharge: HOME | End: 2023-05-22
Payer: MEDICARE

## 2023-05-22 ENCOUNTER — OFFICE VISIT (OUTPATIENT)
Dept: SURGERY | Age: 68
End: 2023-05-22
Payer: MEDICARE

## 2023-05-22 VITALS — WEIGHT: 263.4 LBS | BODY MASS INDEX: 39.92 KG/M2 | HEIGHT: 68 IN

## 2023-05-22 DIAGNOSIS — Z17.1 MALIGNANT NEOPLASM OF UPPER-INNER QUADRANT OF RIGHT BREAST IN FEMALE, ESTROGEN RECEPTOR NEGATIVE (HCC): Primary | ICD-10-CM

## 2023-05-22 DIAGNOSIS — C50.211 MALIGNANT NEOPLASM OF UPPER-INNER QUADRANT OF RIGHT BREAST IN FEMALE, ESTROGEN RECEPTOR NEGATIVE (HCC): Primary | ICD-10-CM

## 2023-05-22 DIAGNOSIS — Z01.818 EXAMINATION PRIOR TO CHEMOTHERAPY: ICD-10-CM

## 2023-05-22 DIAGNOSIS — C50.811 MALIGNANT NEOPLASM OF OVERLAPPING SITES OF RIGHT BREAST (HCC): Primary | ICD-10-CM

## 2023-05-22 LAB
LV EF: 60 %
LVEF MODALITY: NORMAL

## 2023-05-22 PROCEDURE — G8400 PT W/DXA NO RESULTS DOC: HCPCS | Performed by: PHYSICIAN ASSISTANT

## 2023-05-22 PROCEDURE — 1123F ACP DISCUSS/DSCN MKR DOCD: CPT | Performed by: PHYSICIAN ASSISTANT

## 2023-05-22 PROCEDURE — 1090F PRES/ABSN URINE INCON ASSESS: CPT | Performed by: PHYSICIAN ASSISTANT

## 2023-05-22 PROCEDURE — 6360000004 HC RX CONTRAST MEDICATION: Performed by: SURGERY

## 2023-05-22 PROCEDURE — G8427 DOCREV CUR MEDS BY ELIG CLIN: HCPCS | Performed by: PHYSICIAN ASSISTANT

## 2023-05-22 PROCEDURE — G8417 CALC BMI ABV UP PARAM F/U: HCPCS | Performed by: PHYSICIAN ASSISTANT

## 2023-05-22 PROCEDURE — 99212 OFFICE O/P EST SF 10 MIN: CPT | Performed by: PHYSICIAN ASSISTANT

## 2023-05-22 PROCEDURE — 1036F TOBACCO NON-USER: CPT | Performed by: PHYSICIAN ASSISTANT

## 2023-05-22 PROCEDURE — 3017F COLORECTAL CA SCREEN DOC REV: CPT | Performed by: PHYSICIAN ASSISTANT

## 2023-05-22 PROCEDURE — C8929 TTE W OR WO FOL WCON,DOPPLER: HCPCS

## 2023-05-22 RX ADMIN — PERFLUTREN 1.5 ML: 6.52 INJECTION, SUSPENSION INTRAVENOUS at 13:24

## 2023-05-22 NOTE — TELEPHONE ENCOUNTER
Pt states she doesn't have any questions after her appointment. Reviewed upcoming appt with Dr. Padilla Maolne. Will f/u after that.

## 2023-05-22 NOTE — PROGRESS NOTES
General Zepeda presents for genetic counseling. She has recently been diagnosed with breast cancer. She meets NCCN and ASBS criteria for testing. We discussed the testing and what it means and how it can potentially impact her care and her family's health. She would like to proceed with testing.

## 2023-05-22 NOTE — PROGRESS NOTES
[facility]    Pt Name: Chelsea Love  MRN: [id number]  YOB: 1955  Date of evaluation: 5/24/2023    REASON FOR CONSULTATION:  Breast cancer  REQUESTING PHYSICIAN: Dr Leandra Hutchinson/[facility]    History Obtained From:  patient and old medical records    HISTORY OF PRESENT ILLNESS:    Diagnosis  Invasive ductal carcinoma, right breast 9:00 & 2:00, May 2023  Grade 3  ER 0.2%. DC 0, HER-2 0/negative, Ki67 84%  MammaPrint: Basal type  Right axillary/retropectoral lymphadenopathy  [id number], stage IIIC    Treatment Summary  Anticipate Carboplatin Taxol weekly followed by DD AC every 2 weeks X4 cycles with Keytruda  Anticipate surgery  Anticipate radiation therapy    Cancer History  Farhan Armenta was first seen by me on 5/24/2023. She was referred by Dr. Richelle Avila for a diagnosis of triple negative breast cancer. She felt a mass in the right breast.  Patient denies any family history of breast cancer or any other cancer. 4/3/23 Bilateral diagnostic mammogram ([facility]): 3 circumscribed masses are identified in the right breast. The palpable lump located at 2 o'clock in the right breast is the smallest mass measuring 1.1cm. The other mass located at 2 o'clock in the right breast measures 2.6cm. Third right breast mass is located at 9 o'clock and measures 2.2cm. 4/3/23 US right breast ([facility]): Demonstrates the palpable lump to represent a 1.1 c 0.9 cm hypoechoic mass at the 2 o'clock position, 8cm from the nipple no definite internal blood flow. A second mass located at the 2 o'clock position in the right breast. 8 cm from the nipple represents a hypoechoic 2.6 x 2.0 cm mass. This mass is lobulated and demonstrates acoustic shadowing. The third mass is located in the 9 o'clock position, 9 cm form the nipple and measures 2.2 x 1.9cm. No acoustic shadowing but internal blood flow is present to this mass.    5/1/23 Mass, lateral right breast at 9:00,

## 2023-05-24 ENCOUNTER — OFFICE VISIT (OUTPATIENT)
Dept: HEMATOLOGY | Age: 68
End: 2023-05-24
Payer: MEDICARE

## 2023-05-24 ENCOUNTER — HOSPITAL ENCOUNTER (OUTPATIENT)
Dept: INFUSION THERAPY | Age: 68
Discharge: HOME | End: 2023-05-24
Payer: MEDICARE

## 2023-05-24 VITALS
BODY MASS INDEX: 39.56 KG/M2 | OXYGEN SATURATION: 98 % | HEIGHT: 68 IN | WEIGHT: 261 LBS | SYSTOLIC BLOOD PRESSURE: 120 MMHG | DIASTOLIC BLOOD PRESSURE: 60 MMHG | HEART RATE: 104 BPM

## 2023-05-24 DIAGNOSIS — R11.0 CHEMOTHERAPY-INDUCED NAUSEA: ICD-10-CM

## 2023-05-24 DIAGNOSIS — Z71.89 COORDINATION OF COMPLEX CARE: ICD-10-CM

## 2023-05-24 DIAGNOSIS — T45.1X5A CHEMOTHERAPY-INDUCED NAUSEA: ICD-10-CM

## 2023-05-24 DIAGNOSIS — C50.919 CARCINOMA OF BREAST, ESTROGEN AND PROGESTERONE RECEPTOR NEGATIVE, HER2 PROTEIN NEGATIVE (HCC): ICD-10-CM

## 2023-05-24 DIAGNOSIS — Z17.1 MALIGNANT NEOPLASM OF UPPER-INNER QUADRANT OF RIGHT BREAST IN FEMALE, ESTROGEN RECEPTOR NEGATIVE (HCC): ICD-10-CM

## 2023-05-24 DIAGNOSIS — C50.911 INVASIVE DUCTAL CARCINOMA OF BREAST, FEMALE, RIGHT (HCC): ICD-10-CM

## 2023-05-24 DIAGNOSIS — Z17.1 CARCINOMA OF BREAST, ESTROGEN AND PROGESTERONE RECEPTOR NEGATIVE, HER2 PROTEIN NEGATIVE (HCC): ICD-10-CM

## 2023-05-24 DIAGNOSIS — Z71.89 CARE PLAN DISCUSSED WITH PATIENT: ICD-10-CM

## 2023-05-24 DIAGNOSIS — C50.211 MALIGNANT NEOPLASM OF UPPER-INNER QUADRANT OF RIGHT BREAST IN FEMALE, ESTROGEN RECEPTOR NEGATIVE (HCC): ICD-10-CM

## 2023-05-24 DIAGNOSIS — C77.3 SECONDARY MALIGNANT NEOPLASM OF AXILLARY LYMPH NODES (HCC): Primary | ICD-10-CM

## 2023-05-24 PROBLEM — Z17.421 TRIPLE NEGATIVE BREAST CANCER (HCC): Status: RESOLVED | Noted: 2023-05-19 | Resolved: 2023-05-24

## 2023-05-24 PROBLEM — C50.811 MALIGNANT NEOPLASM OF OVERLAPPING SITES OF RIGHT BREAST (HCC): Status: RESOLVED | Noted: 2023-05-03 | Resolved: 2023-05-24

## 2023-05-24 LAB
BASOPHILS # BLD: 0.08 K/UL (ref 0.01–0.08)
BASOPHILS NFR BLD: 0.6 % (ref 0.1–1.2)
EOSINOPHIL # BLD: 0.43 K/UL (ref 0.04–0.54)
EOSINOPHIL NFR BLD: 3.3 % (ref 0.7–7)
ERYTHROCYTE [DISTWIDTH] IN BLOOD BY AUTOMATED COUNT: 13.4 % (ref 11.7–14.4)
HCT VFR BLD AUTO: 47.9 % (ref 34.1–44.9)
HGB BLD-MCNC: 14.7 G/DL (ref 11.2–15.7)
LYMPHOCYTES # BLD: 2.26 K/UL (ref 1.18–3.74)
LYMPHOCYTES NFR BLD: 17.1 % (ref 19.3–53.1)
MCH RBC QN AUTO: 30.2 PG (ref 25.6–32.2)
MCHC RBC AUTO-ENTMCNC: 30.7 G/DL (ref 32.3–35.5)
MCV RBC AUTO: 98.6 FL (ref 79.4–94.8)
MONOCYTES # BLD: 0.9 K/UL (ref 0.24–0.82)
MONOCYTES NFR BLD: 6.8 % (ref 4.7–12.5)
NEUTROPHILS # BLD: 9.48 K/UL (ref 1.56–6.13)
NEUTS SEG NFR BLD: 72 % (ref 34–71.1)
PLATELET # BLD AUTO: 168 K/UL (ref 182–369)
PMV BLD AUTO: 11.7 FL (ref 7.4–10.4)
RBC # BLD AUTO: 4.86 M/UL (ref 3.93–5.22)
WBC # BLD AUTO: 13.18 K/UL (ref 3.98–10.04)

## 2023-05-24 PROCEDURE — 1123F ACP DISCUSS/DSCN MKR DOCD: CPT | Performed by: INTERNAL MEDICINE

## 2023-05-24 PROCEDURE — 3017F COLORECTAL CA SCREEN DOC REV: CPT | Performed by: INTERNAL MEDICINE

## 2023-05-24 PROCEDURE — G8400 PT W/DXA NO RESULTS DOC: HCPCS | Performed by: INTERNAL MEDICINE

## 2023-05-24 PROCEDURE — 1036F TOBACCO NON-USER: CPT | Performed by: INTERNAL MEDICINE

## 2023-05-24 PROCEDURE — 85025 COMPLETE CBC W/AUTO DIFF WBC: CPT

## 2023-05-24 PROCEDURE — G8417 CALC BMI ABV UP PARAM F/U: HCPCS | Performed by: INTERNAL MEDICINE

## 2023-05-24 PROCEDURE — 36415 COLL VENOUS BLD VENIPUNCTURE: CPT

## 2023-05-24 PROCEDURE — 99212 OFFICE O/P EST SF 10 MIN: CPT

## 2023-05-24 PROCEDURE — 1090F PRES/ABSN URINE INCON ASSESS: CPT | Performed by: INTERNAL MEDICINE

## 2023-05-24 PROCEDURE — G8427 DOCREV CUR MEDS BY ELIG CLIN: HCPCS | Performed by: INTERNAL MEDICINE

## 2023-05-24 PROCEDURE — 99205 OFFICE O/P NEW HI 60 MIN: CPT | Performed by: INTERNAL MEDICINE

## 2023-05-24 RX ORDER — PROMETHAZINE HYDROCHLORIDE 12.5 MG/1
12.5 TABLET ORAL EVERY 8 HOURS PRN
Qty: 120 TABLET | Refills: 5 | Status: SHIPPED | OUTPATIENT
Start: 2023-05-24 | End: 2023-06-23

## 2023-05-24 RX ORDER — ONDANSETRON 4 MG/1
4 TABLET, FILM COATED ORAL EVERY 8 HOURS PRN
Qty: 30 TABLET | Refills: 5 | Status: SHIPPED | OUTPATIENT
Start: 2023-05-24

## 2023-05-24 ASSESSMENT — PROMIS GLOBAL HEALTH SCALE
IN GENERAL, HOW WOULD YOU RATE YOUR SATISFACTION WITH YOUR SOCIAL ACTIVITIES AND RELATIONSHIPS [ON A SCALE OF 1 (POOR) TO 5 (EXCELLENT)]?: 5
SUM OF RESPONSES TO QUESTIONS 3, 6, 7, & 8: 15
SUM OF RESPONSES TO QUESTIONS 2, 4, 5, & 10: 20
IN GENERAL, WOULD YOU SAY YOUR QUALITY OF LIFE IS...[ON A SCALE OF 1 (POOR) TO 5 (EXCELLENT)]: 5
IN THE PAST 7 DAYS, HOW OFTEN HAVE YOU BEEN BOTHERED BY EMOTIONAL PROBLEMS, SUCH AS FEELING ANXIOUS, DEPRESSED, OR IRRITABLE [ON A SCALE FROM 1 (NEVER) TO 5 (ALWAYS)]?: 5
TO WHAT EXTENT ARE YOU ABLE TO CARRY OUT YOUR EVERYDAY PHYSICAL ACTIVITIES SUCH AS WALKING, CLIMBING STAIRS, CARRYING GROCERIES, OR MOVING A CHAIR [ON A SCALE OF 1 (NOT AT ALL) TO 5 (COMPLETELY)]?: 5
IN GENERAL, HOW WOULD YOU RATE YOUR MENTAL HEALTH, INCLUDING YOUR MOOD AND YOUR ABILITY TO THINK [ON A SCALE OF 1 (POOR) TO 5 (EXCELLENT)]?: 5
IN GENERAL, PLEASE RATE HOW WELL YOU CARRY OUT YOUR USUAL SOCIAL ACTIVITIES (INCLUDES ACTIVITIES AT HOME, AT WORK, AND IN YOUR COMMUNITY, AND RESPONSIBILITIES AS A PARENT, CHILD, SPOUSE, EMPLOYEE, FRIEND, ETC) [ON A SCALE OF 1 (POOR) TO 5 (EXCELLENT)]?: 5
IN GENERAL, HOW WOULD YOU RATE YOUR PHYSICAL HEALTH [ON A SCALE OF 1 (POOR) TO 5 (EXCELLENT)]?: 5
IN GENERAL, WOULD YOU SAY YOUR HEALTH IS...[ON A SCALE OF 1 (POOR) TO 5 (EXCELLENT)]: 5
IN THE PAST 7 DAYS, HOW WOULD YOU RATE YOUR PAIN ON AVERAGE [ON A SCALE FROM 0 (NO PAIN) TO 10 (WORST IMAGINABLE PAIN)]?: 0
IN THE PAST 7 DAYS, HOW WOULD YOU RATE YOUR FATIGUE ON AVERAGE [ON A SCALE FROM 1 (NONE) TO 5 (VERY SEVERE)]?: 5

## 2023-05-25 ENCOUNTER — TELEPHONE (OUTPATIENT)
Dept: OTHER | Age: 68
End: 2023-05-25

## 2023-05-25 NOTE — TELEPHONE ENCOUNTER
Left a detailed message on identified voicemail for patient to call back with any questions or needs.

## 2023-05-26 ENCOUNTER — CLINICAL DOCUMENTATION (OUTPATIENT)
Facility: HOSPITAL | Age: 68
End: 2023-05-26

## 2023-05-31 ENCOUNTER — CLINICAL DOCUMENTATION (OUTPATIENT)
Facility: HOSPITAL | Age: 68
End: 2023-05-31

## 2023-05-31 NOTE — PROGRESS NOTES
Patient Assistance    Met with: Patient               Additional notes: Called and informed Vivienne that we were able to get a helen from [facility] patient assistance for $15,000. She was very appreciative for the extra help.

## 2023-06-03 ENCOUNTER — HOSPITAL ENCOUNTER (EMERGENCY)
Age: 68
Discharge: HOME | End: 2023-06-03
Attending: EMERGENCY MEDICINE
Payer: MEDICARE

## 2023-06-03 ENCOUNTER — APPOINTMENT (OUTPATIENT)
Dept: GENERAL RADIOLOGY | Age: 68
End: 2023-06-03
Payer: MEDICARE

## 2023-06-03 VITALS
RESPIRATION RATE: 22 BRPM | HEART RATE: 95 BPM | DIASTOLIC BLOOD PRESSURE: 65 MMHG | OXYGEN SATURATION: 95 % | SYSTOLIC BLOOD PRESSURE: 125 MMHG | WEIGHT: 260 LBS | TEMPERATURE: 99.3 F | BODY MASS INDEX: 39.53 KG/M2

## 2023-06-03 DIAGNOSIS — M79.602 BILATERAL ARM PAIN: Primary | ICD-10-CM

## 2023-06-03 DIAGNOSIS — L40.50 PSORIATIC ARTHRITIS (HCC): ICD-10-CM

## 2023-06-03 DIAGNOSIS — M79.601 BILATERAL ARM PAIN: Primary | ICD-10-CM

## 2023-06-03 LAB
ALBUMIN SERPL-MCNC: 4.2 G/DL (ref 3.5–5.2)
ALP SERPL-CCNC: 87 U/L (ref 35–104)
ALT SERPL-CCNC: 17 U/L (ref 5–33)
ANION GAP SERPL CALCULATED.3IONS-SCNC: 16 MMOL/L (ref 7–19)
AST SERPL-CCNC: 23 U/L (ref 5–32)
B PARAP IS1001 DNA NPH QL NAA+NON-PROBE: NOT DETECTED
B PERT.PT PRMT NPH QL NAA+NON-PROBE: NOT DETECTED
BACTERIA URNS QL MICRO: NEGATIVE /HPF
BASOPHILS # BLD: 0.1 K/UL (ref 0–0.2)
BASOPHILS NFR BLD: 0.3 % (ref 0–1)
BILIRUB SERPL-MCNC: 0.7 MG/DL (ref 0.2–1.2)
BILIRUB UR QL STRIP: NEGATIVE
BUN SERPL-MCNC: 15 MG/DL (ref 8–23)
C PNEUM DNA NPH QL NAA+NON-PROBE: NOT DETECTED
CALCIUM SERPL-MCNC: 9.3 MG/DL (ref 8.8–10.2)
CHLORIDE SERPL-SCNC: 103 MMOL/L (ref 98–111)
CLARITY UR: CLEAR
CO2 SERPL-SCNC: 19 MMOL/L (ref 22–29)
COLOR UR: YELLOW
CREAT SERPL-MCNC: 0.7 MG/DL (ref 0.5–0.9)
CRP SERPL HS-MCNC: 1.43 MG/DL (ref 0–0.5)
CRYSTALS URNS MICRO: ABNORMAL /HPF
EOSINOPHIL # BLD: 0.1 K/UL (ref 0–0.6)
EOSINOPHIL NFR BLD: 0.4 % (ref 0–5)
EPI CELLS #/AREA URNS AUTO: 2 /HPF (ref 0–5)
ERYTHROCYTE [DISTWIDTH] IN BLOOD BY AUTOMATED COUNT: 13.2 % (ref 11.5–14.5)
ERYTHROCYTE [SEDIMENTATION RATE] IN BLOOD BY WESTERGREN METHOD: 22 MM/HR (ref 0–25)
FLUAV RNA NPH QL NAA+NON-PROBE: NOT DETECTED
FLUBV RNA NPH QL NAA+NON-PROBE: NOT DETECTED
GLUCOSE SERPL-MCNC: 130 MG/DL (ref 74–109)
GLUCOSE UR STRIP.AUTO-MCNC: NEGATIVE MG/DL
HADV DNA NPH QL NAA+NON-PROBE: NOT DETECTED
HCOV 229E RNA NPH QL NAA+NON-PROBE: NOT DETECTED
HCOV HKU1 RNA NPH QL NAA+NON-PROBE: NOT DETECTED
HCOV NL63 RNA NPH QL NAA+NON-PROBE: NOT DETECTED
HCOV OC43 RNA NPH QL NAA+NON-PROBE: NOT DETECTED
HCT VFR BLD AUTO: 42.7 % (ref 37–47)
HGB BLD-MCNC: 14.2 G/DL (ref 12–16)
HGB UR STRIP.AUTO-MCNC: NEGATIVE MG/L
HMPV RNA NPH QL NAA+NON-PROBE: NOT DETECTED
HPIV1 RNA NPH QL NAA+NON-PROBE: NOT DETECTED
HPIV2 RNA NPH QL NAA+NON-PROBE: NOT DETECTED
HPIV3 RNA NPH QL NAA+NON-PROBE: NOT DETECTED
HPIV4 RNA NPH QL NAA+NON-PROBE: NOT DETECTED
HYALINE CASTS #/AREA URNS AUTO: 2 /HPF (ref 0–8)
IMM GRANULOCYTES # BLD: 0.1 K/UL
KETONES UR STRIP.AUTO-MCNC: 15 MG/DL
LACTATE BLDV-SCNC: 1.7 MMOL/L (ref 0.5–1.9)
LEUKOCYTE ESTERASE UR QL STRIP.AUTO: ABNORMAL
LYMPHOCYTES # BLD: 1.7 K/UL (ref 1.1–4.5)
LYMPHOCYTES NFR BLD: 8.4 % (ref 20–40)
M PNEUMO DNA NPH QL NAA+NON-PROBE: NOT DETECTED
MCH RBC QN AUTO: 30.5 PG (ref 27–31)
MCHC RBC AUTO-ENTMCNC: 33.3 G/DL (ref 33–37)
MCV RBC AUTO: 91.8 FL (ref 81–99)
MONOCYTES # BLD: 1.1 K/UL (ref 0–0.9)
MONOCYTES NFR BLD: 5.4 % (ref 0–10)
NEUTROPHILS # BLD: 17 K/UL (ref 1.5–7.5)
NEUTS SEG NFR BLD: 85.1 % (ref 50–65)
NITRITE UR QL STRIP.AUTO: NEGATIVE
PH UR STRIP.AUTO: 8 [PH] (ref 5–8)
PLATELET # BLD AUTO: 290 K/UL (ref 130–400)
PMV BLD AUTO: 11.6 FL (ref 9.4–12.3)
POTASSIUM SERPL-SCNC: 4.2 MMOL/L (ref 3.5–5)
PROCALCITONIN: 0.1 NG/ML (ref 0–0.09)
PROT SERPL-MCNC: 7.2 G/DL (ref 6.6–8.7)
PROT UR STRIP.AUTO-MCNC: NEGATIVE MG/DL
RBC # BLD AUTO: 4.65 M/UL (ref 4.2–5.4)
RBC #/AREA URNS AUTO: 1 /HPF (ref 0–4)
RSV RNA NPH QL NAA+NON-PROBE: NOT DETECTED
RV+EV RNA NPH QL NAA+NON-PROBE: NOT DETECTED
SARS-COV-2 RNA NPH QL NAA+NON-PROBE: NOT DETECTED
SODIUM SERPL-SCNC: 138 MMOL/L (ref 136–145)
SP GR UR STRIP.AUTO: 1.02 (ref 1–1.03)
UROBILINOGEN UR STRIP.AUTO-MCNC: 0.2 E.U./DL
WBC # BLD AUTO: 20 K/UL (ref 4.8–10.8)
WBC #/AREA URNS AUTO: 3 /HPF (ref 0–5)

## 2023-06-03 PROCEDURE — 96374 THER/PROPH/DIAG INJ IV PUSH: CPT

## 2023-06-03 PROCEDURE — 80053 COMPREHEN METABOLIC PANEL: CPT

## 2023-06-03 PROCEDURE — 99284 EMERGENCY DEPT VISIT MOD MDM: CPT

## 2023-06-03 PROCEDURE — 0202U NFCT DS 22 TRGT SARS-COV-2: CPT

## 2023-06-03 PROCEDURE — 87040 BLOOD CULTURE FOR BACTERIA: CPT

## 2023-06-03 PROCEDURE — 85025 COMPLETE CBC W/AUTO DIFF WBC: CPT

## 2023-06-03 PROCEDURE — 85652 RBC SED RATE AUTOMATED: CPT

## 2023-06-03 PROCEDURE — 6360000002 HC RX W HCPCS: Performed by: EMERGENCY MEDICINE

## 2023-06-03 PROCEDURE — 36415 COLL VENOUS BLD VENIPUNCTURE: CPT

## 2023-06-03 PROCEDURE — 83605 ASSAY OF LACTIC ACID: CPT

## 2023-06-03 PROCEDURE — 71045 X-RAY EXAM CHEST 1 VIEW: CPT

## 2023-06-03 PROCEDURE — 84145 PROCALCITONIN (PCT): CPT

## 2023-06-03 PROCEDURE — 6370000000 HC RX 637 (ALT 250 FOR IP): Performed by: EMERGENCY MEDICINE

## 2023-06-03 PROCEDURE — 86140 C-REACTIVE PROTEIN: CPT

## 2023-06-03 PROCEDURE — 81001 URINALYSIS AUTO W/SCOPE: CPT

## 2023-06-03 PROCEDURE — 96375 TX/PRO/DX INJ NEW DRUG ADDON: CPT

## 2023-06-03 RX ORDER — MORPHINE SULFATE 2 MG/ML
2 INJECTION, SOLUTION INTRAMUSCULAR; INTRAVENOUS ONCE
Status: COMPLETED | OUTPATIENT
Start: 2023-06-03 | End: 2023-06-03

## 2023-06-03 RX ORDER — PREDNISONE 20 MG/1
20 TABLET ORAL DAILY
Qty: 5 TABLET | Refills: 0 | Status: SHIPPED | OUTPATIENT
Start: 2023-06-04 | End: 2023-06-09

## 2023-06-03 RX ORDER — ORPHENADRINE CITRATE 30 MG/ML
30 INJECTION INTRAMUSCULAR; INTRAVENOUS ONCE
Status: COMPLETED | OUTPATIENT
Start: 2023-06-03 | End: 2023-06-03

## 2023-06-03 RX ORDER — KETOROLAC TROMETHAMINE 30 MG/ML
15 INJECTION, SOLUTION INTRAMUSCULAR; INTRAVENOUS ONCE
Status: COMPLETED | OUTPATIENT
Start: 2023-06-03 | End: 2023-06-03

## 2023-06-03 RX ORDER — CEPHALEXIN 500 MG/1
500 CAPSULE ORAL 3 TIMES DAILY
COMMUNITY

## 2023-06-03 RX ORDER — TIZANIDINE 2 MG/1
2-4 TABLET ORAL EVERY 8 HOURS PRN
Qty: 30 TABLET | Refills: 0 | Status: SHIPPED | OUTPATIENT
Start: 2023-06-03

## 2023-06-03 RX ADMIN — PREDNISONE 50 MG: 20 TABLET ORAL at 07:53

## 2023-06-03 RX ADMIN — MORPHINE SULFATE 2 MG: 2 INJECTION, SOLUTION INTRAMUSCULAR; INTRAVENOUS at 04:56

## 2023-06-03 RX ADMIN — KETOROLAC TROMETHAMINE 15 MG: 30 INJECTION, SOLUTION INTRAMUSCULAR; INTRAVENOUS at 04:58

## 2023-06-03 RX ADMIN — ORPHENADRINE CITRATE 30 MG: 60 INJECTION INTRAMUSCULAR; INTRAVENOUS at 07:52

## 2023-06-03 ASSESSMENT — ENCOUNTER SYMPTOMS
GASTROINTESTINAL NEGATIVE: 1
EYES NEGATIVE: 1
RESPIRATORY NEGATIVE: 1

## 2023-06-03 ASSESSMENT — PAIN - FUNCTIONAL ASSESSMENT: PAIN_FUNCTIONAL_ASSESSMENT: 0-10

## 2023-06-03 ASSESSMENT — PAIN DESCRIPTION - LOCATION: LOCATION: ELBOW

## 2023-06-03 ASSESSMENT — PAIN SCALES - GENERAL
PAINLEVEL_OUTOF10: 9
PAINLEVEL_OUTOF10: 10
PAINLEVEL_OUTOF10: 9

## 2023-06-03 ASSESSMENT — PAIN DESCRIPTION - DESCRIPTORS: DESCRIPTORS: ACHING

## 2023-06-03 ASSESSMENT — PAIN DESCRIPTION - ORIENTATION: ORIENTATION: RIGHT;LEFT

## 2023-06-03 NOTE — ED PROVIDER NOTES
[facility] EMERGENCY DEPT  EMERGENCY DEPARTMENT ENCOUNTER      Pt Name: Alvin Gonzalez  MRN: [id number]  [place] 1955  Date of evaluation: 6/3/2023  Provider: Natalee Hardwick MD    CHIEF COMPLAINT       Chief Complaint   Patient presents with    Fever     Patient states she has had fever and chills since 2330 last night         HISTORY OF PRESENT ILLNESS   (Location/Symptom, Timing/Onset,Context/Setting, Quality, Duration, Modifying Factors, Severity)  Note limiting factors. Alvin Gonzalez is a 74 y.o. female who presents to the emergency department with complaint of bilateral upper extremity stiffness and pain. Says she was feeling fine before bed and woke up with the symptoms tonight. Has stiffness primarily in both of her elbows. Says she has a history of psoriatic arthritis and has had similar issues in the past that have been felt to be due to flareups of psoriatic arthritis. Has had cubital tunnel release on the right elbow within the last year. Says when she woke up tonight she also had chills but no documented fevers. Has not had any congestion, cough, runny nose, abdominal pain, vomiting, diarrhea, or other infectious symptoms. Patient has breast cancer and is set to start chemotherapy in about 10 days    HPI    NursingNotes were reviewed. REVIEW OF SYSTEMS    (2-9 systems for level 4, 10 or more for level 5)     Review of Systems   Constitutional: Negative. HENT: Negative. Eyes: Negative. Respiratory: Negative. Cardiovascular: Negative. Gastrointestinal: Negative. Genitourinary: Negative. Musculoskeletal:  Positive for arthralgias. Skin: Negative. Neurological: Negative. Hematological: Negative. Psychiatric/Behavioral: Negative. A complete review of systems was performed and is negative except as noted above in the HPI.        PAST MEDICAL HISTORY     Past Medical History:   Diagnosis Date    Hand numbness     right hand s/p arm surgery    Hyperlipidemia

## 2023-06-03 NOTE — ED NOTES
Patient placed on cardiac monitor, continuous pulse oximeter, and NIBP monitor. Monitor alarms on.         Candice Antonio RN  06/03/23 Ariella

## 2023-06-03 NOTE — ED NOTES
Pt able to ambulate to the restroom with SBA. Pt able to provide a urine sample.       Rajiv Green  06/03/23 [id number]

## 2023-06-04 LAB
BACTERIA BLD CULT ORG #2: NORMAL
BACTERIA BLD CULT: NORMAL

## 2023-06-08 LAB
BACTERIA BLD CULT ORG #2: NORMAL
BACTERIA BLD CULT: NORMAL
Lab: ABNORMAL
Lab: POSITIVE

## 2023-06-12 ENCOUNTER — HOSPITAL ENCOUNTER (OUTPATIENT)
Dept: INFUSION THERAPY | Age: 68
Setting detail: INFUSION SERIES
Discharge: HOME | End: 2023-06-12
Payer: MEDICARE

## 2023-06-12 VITALS
TEMPERATURE: 98.4 F | RESPIRATION RATE: 18 BRPM | SYSTOLIC BLOOD PRESSURE: 141 MMHG | HEART RATE: 71 BPM | WEIGHT: 257.7 LBS | OXYGEN SATURATION: 98 % | DIASTOLIC BLOOD PRESSURE: 93 MMHG | BODY MASS INDEX: 39.18 KG/M2

## 2023-06-12 DIAGNOSIS — C50.911 INVASIVE DUCTAL CARCINOMA OF BREAST, FEMALE, RIGHT (HCC): Primary | ICD-10-CM

## 2023-06-12 LAB
ALBUMIN SERPL-MCNC: 4 G/DL (ref 3.5–5.2)
ALP SERPL-CCNC: 76 U/L (ref 35–104)
ALT SERPL-CCNC: 22 U/L (ref 5–33)
ANION GAP SERPL CALCULATED.3IONS-SCNC: 12 MMOL/L (ref 7–19)
AST SERPL-CCNC: 15 U/L (ref 5–32)
BASOPHILS # BLD: 0.1 K/UL (ref 0–0.2)
BASOPHILS NFR BLD: 0.6 % (ref 0–1)
BILIRUB SERPL-MCNC: 0.5 MG/DL (ref 0.2–1.2)
BUN SERPL-MCNC: 9 MG/DL (ref 8–23)
CALCIUM SERPL-MCNC: 9.4 MG/DL (ref 8.8–10.2)
CHLORIDE SERPL-SCNC: 102 MMOL/L (ref 98–111)
CO2 SERPL-SCNC: 24 MMOL/L (ref 22–29)
CREAT SERPL-MCNC: 0.7 MG/DL (ref 0.5–0.9)
EOSINOPHIL # BLD: 0.7 K/UL (ref 0–0.6)
EOSINOPHIL NFR BLD: 5.9 % (ref 0–5)
ERYTHROCYTE [DISTWIDTH] IN BLOOD BY AUTOMATED COUNT: 13.3 % (ref 11.5–14.5)
GLUCOSE SERPL-MCNC: 101 MG/DL (ref 74–109)
HCT VFR BLD AUTO: 44.1 % (ref 37–47)
HGB BLD-MCNC: 14.1 G/DL (ref 12–16)
IMM GRANULOCYTES # BLD: 0.1 K/UL
LYMPHOCYTES # BLD: 2.2 K/UL (ref 1.1–4.5)
LYMPHOCYTES NFR BLD: 19.4 % (ref 20–40)
MCH RBC QN AUTO: 30 PG (ref 27–31)
MCHC RBC AUTO-ENTMCNC: 32 G/DL (ref 33–37)
MCV RBC AUTO: 93.8 FL (ref 81–99)
MONOCYTES # BLD: 0.9 K/UL (ref 0–0.9)
MONOCYTES NFR BLD: 7.9 % (ref 0–10)
NEUTROPHILS # BLD: 7.4 K/UL (ref 1.5–7.5)
NEUTS SEG NFR BLD: 65.8 % (ref 50–65)
PLATELET # BLD AUTO: 308 K/UL (ref 130–400)
PMV BLD AUTO: 10.6 FL (ref 9.4–12.3)
POTASSIUM SERPL-SCNC: 4.2 MMOL/L (ref 3.5–5)
PROT SERPL-MCNC: 7 G/DL (ref 6.6–8.7)
RBC # BLD AUTO: 4.7 M/UL (ref 4.2–5.4)
SODIUM SERPL-SCNC: 138 MMOL/L (ref 136–145)
WBC # BLD AUTO: 11.3 K/UL (ref 4.8–10.8)

## 2023-06-12 PROCEDURE — 96366 THER/PROPH/DIAG IV INF ADDON: CPT

## 2023-06-12 PROCEDURE — 85025 COMPLETE CBC W/AUTO DIFF WBC: CPT

## 2023-06-12 PROCEDURE — 2580000003 HC RX 258: Performed by: INTERNAL MEDICINE

## 2023-06-12 PROCEDURE — 6360000002 HC RX W HCPCS

## 2023-06-12 PROCEDURE — 80053 COMPREHEN METABOLIC PANEL: CPT

## 2023-06-12 PROCEDURE — 6360000002 HC RX W HCPCS: Performed by: INTERNAL MEDICINE

## 2023-06-12 PROCEDURE — 96413 CHEMO IV INFUSION 1 HR: CPT

## 2023-06-12 PROCEDURE — 96375 TX/PRO/DX INJ NEW DRUG ADDON: CPT

## 2023-06-12 PROCEDURE — 36593 DECLOT VASCULAR DEVICE: CPT

## 2023-06-12 PROCEDURE — 2500000003 HC RX 250 WO HCPCS: Performed by: INTERNAL MEDICINE

## 2023-06-12 PROCEDURE — 2580000003 HC RX 258

## 2023-06-12 PROCEDURE — 96417 CHEMO IV INFUS EACH ADDL SEQ: CPT

## 2023-06-12 PROCEDURE — A4216 STERILE WATER/SALINE, 10 ML: HCPCS | Performed by: INTERNAL MEDICINE

## 2023-06-12 RX ORDER — PALONOSETRON 0.05 MG/ML
0.25 INJECTION, SOLUTION INTRAVENOUS ONCE
Status: COMPLETED | OUTPATIENT
Start: 2023-06-12 | End: 2023-06-12

## 2023-06-12 RX ORDER — WATER 1000 ML/1000ML
INJECTION, SOLUTION INTRAVENOUS
Status: COMPLETED
Start: 2023-06-12 | End: 2023-06-12

## 2023-06-12 RX ORDER — DIPHENHYDRAMINE HYDROCHLORIDE 50 MG/ML
25 INJECTION INTRAMUSCULAR; INTRAVENOUS ONCE
Status: COMPLETED | OUTPATIENT
Start: 2023-06-12 | End: 2023-06-12

## 2023-06-12 RX ORDER — DEXAMETHASONE SODIUM PHOSPHATE 10 MG/ML
10 INJECTION, SOLUTION INTRAMUSCULAR; INTRAVENOUS ONCE
Status: COMPLETED | OUTPATIENT
Start: 2023-06-12 | End: 2023-06-12

## 2023-06-12 RX ORDER — HEPARIN SODIUM 100 [USP'U]/ML
500 INJECTION, SOLUTION INTRAVENOUS PRN
Status: DISCONTINUED | OUTPATIENT
Start: 2023-06-12 | End: 2023-06-13 | Stop reason: HOSPADM

## 2023-06-12 RX ORDER — SODIUM CHLORIDE 9 MG/ML
5-250 INJECTION, SOLUTION INTRAVENOUS PRN
Status: DISCONTINUED | OUTPATIENT
Start: 2023-06-12 | End: 2023-06-13 | Stop reason: HOSPADM

## 2023-06-12 RX ORDER — SODIUM CHLORIDE 0.9 % (FLUSH) 0.9 %
5-40 SYRINGE (ML) INJECTION PRN
Status: DISCONTINUED | OUTPATIENT
Start: 2023-06-12 | End: 2023-06-13 | Stop reason: HOSPADM

## 2023-06-12 RX ADMIN — SODIUM CHLORIDE, PRESERVATIVE FREE 20 ML: 5 INJECTION INTRAVENOUS at 14:06

## 2023-06-12 RX ADMIN — ALTEPLASE 2 MG: 2.2 INJECTION, POWDER, LYOPHILIZED, FOR SOLUTION INTRAVENOUS at 08:22

## 2023-06-12 RX ADMIN — DIPHENHYDRAMINE HYDROCHLORIDE 25 MG: 50 INJECTION, SOLUTION INTRAMUSCULAR; INTRAVENOUS at 09:50

## 2023-06-12 RX ADMIN — HEPARIN 500 UNITS: 100 SYRINGE at 14:06

## 2023-06-12 RX ADMIN — WATER 10 ML: 1 INJECTION INTRAMUSCULAR; INTRAVENOUS; SUBCUTANEOUS at 08:22

## 2023-06-12 RX ADMIN — PACLITAXEL 192 MG: 6 INJECTION, SOLUTION, CONCENTRATE INTRAVENOUS at 10:45

## 2023-06-12 RX ADMIN — FAMOTIDINE 20 MG: 10 INJECTION, SOLUTION INTRAVENOUS at 09:50

## 2023-06-12 RX ADMIN — PALONOSETRON 0.25 MG: 0.05 INJECTION, SOLUTION INTRAVENOUS at 08:55

## 2023-06-12 RX ADMIN — SODIUM CHLORIDE 20 ML/HR: 9 INJECTION, SOLUTION INTRAVENOUS at 08:55

## 2023-06-12 RX ADMIN — DEXAMETHASONE SODIUM PHOSPHATE 10 MG: 10 INJECTION, SOLUTION INTRAMUSCULAR; INTRAVENOUS at 08:55

## 2023-06-12 RX ADMIN — CARBOPLATIN 265 MG: 10 INJECTION, SOLUTION INTRAVENOUS at 11:55

## 2023-06-12 RX ADMIN — FOSAPREPITANT 150 MG: 150 INJECTION, POWDER, LYOPHILIZED, FOR SOLUTION INTRAVENOUS at 09:20

## 2023-06-12 RX ADMIN — SODIUM CHLORIDE 400 MG: 9 INJECTION, SOLUTION INTRAVENOUS at 10:00

## 2023-06-12 NOTE — PROGRESS NOTES
Port accessed. No blood return noted. Cathflo instilled. PIV inserted and labs drawn as ordered. Blood return noted after approximately 45 mins. Aloxi, decadron, benadryl, pepcid, and emend given as ordered for premeds. Keytruda given. Cold cap in place for 45 mins pre-taxol infusion. Taxol given. Cold gloves and socks in place during taxol infusion. Carbo given. Cold cap remained in place during infusions and for 90 mins post infusion. Pt tolerated well. Blood return checked and present before, during, and after all infusions. Pt given new chemo education and verbalized understanding.

## 2023-06-12 NOTE — DISCHARGE INSTRUCTIONS
heartbeats;  a light-headed feeling, like you might pass out;  severe headache, blurred vision, pounding in your neck or ears;  a seizure;  easy bruising, unusual bleeding, purple or red spots under your skin;  low red blood cells (anemia) --pale skin, unusual tiredness, feeling light-headed or short of breath, cold hands and feet; or  low white blood cell counts --fever, mouth sores, skin sores, sore throat, cough, trouble breathing. Your cancer treatments may be delayed or permanently discontinued if you have certain side effects. Common side effects may include:  fever, chills, or other signs of infection;  anemia;  feeling weak, tired, or light-headed;  bleeding;  trouble breathing or swallowing;  hair loss, skin rash, hives;  numbness, tingling, or burning;  swelling in your face, hands, or feet;  sores or white patches in or around your mouth;  joint or muscle pain;  nausea, vomiting, diarrhea; or  tenderness or irritation where the medicine was injected. This is not a complete list of side effects and others may occur. Call your doctor for medical advice about side effects. You may report side effects to FDA at 8-600-FDA-3159. What other drugs will affect paclitaxel? Sometimes it is not safe to use certain medications at the same time. Some drugs can affect your blood levels of other drugs you take, which may increase side effects or make the medications less effective. Many drugs can affect paclitaxel. This includes prescription and over-the-counter medicines, vitamins, and herbal products. Not all possible interactions are listed here. Tell your doctor about all your current medicines and any medicine you start or stop using. Where can I get more information? Your doctor or pharmacist can provide more information about paclitaxel.   Remember, keep this and all other medicines out of the reach of children, never share your medicines with others, and use this medication only for the indication

## 2023-06-19 ENCOUNTER — HOSPITAL ENCOUNTER (OUTPATIENT)
Dept: INFUSION THERAPY | Age: 68
Setting detail: INFUSION SERIES
Discharge: HOME | End: 2023-06-19
Payer: MEDICARE

## 2023-06-19 ENCOUNTER — HOSPITAL ENCOUNTER (OUTPATIENT)
Dept: INFUSION THERAPY | Age: 68
Discharge: HOME | End: 2023-06-19
Payer: MEDICARE

## 2023-06-19 ENCOUNTER — OFFICE VISIT (OUTPATIENT)
Dept: HEMATOLOGY | Age: 68
End: 2023-06-19
Payer: MEDICARE

## 2023-06-19 VITALS
HEART RATE: 101 BPM | TEMPERATURE: 98.3 F | DIASTOLIC BLOOD PRESSURE: 88 MMHG | OXYGEN SATURATION: 96 % | SYSTOLIC BLOOD PRESSURE: 122 MMHG | BODY MASS INDEX: 38.68 KG/M2 | WEIGHT: 255.2 LBS | HEIGHT: 68 IN

## 2023-06-19 VITALS
HEART RATE: 79 BPM | DIASTOLIC BLOOD PRESSURE: 87 MMHG | TEMPERATURE: 98.2 F | RESPIRATION RATE: 18 BRPM | OXYGEN SATURATION: 96 % | SYSTOLIC BLOOD PRESSURE: 125 MMHG

## 2023-06-19 DIAGNOSIS — Z17.1 MALIGNANT NEOPLASM OF UPPER-INNER QUADRANT OF RIGHT BREAST IN FEMALE, ESTROGEN RECEPTOR NEGATIVE (HCC): ICD-10-CM

## 2023-06-19 DIAGNOSIS — T45.1X5D ADVERSE EFFECT OF CHEMOTHERAPY, SUBSEQUENT ENCOUNTER: ICD-10-CM

## 2023-06-19 DIAGNOSIS — C50.911 INVASIVE DUCTAL CARCINOMA OF BREAST, FEMALE, RIGHT (HCC): Primary | ICD-10-CM

## 2023-06-19 DIAGNOSIS — C50.211 MALIGNANT NEOPLASM OF UPPER-INNER QUADRANT OF RIGHT BREAST IN FEMALE, ESTROGEN RECEPTOR NEGATIVE (HCC): ICD-10-CM

## 2023-06-19 DIAGNOSIS — Z51.11 CHEMOTHERAPY MANAGEMENT, ENCOUNTER FOR: ICD-10-CM

## 2023-06-19 DIAGNOSIS — Z71.89 CARE PLAN DISCUSSED WITH PATIENT: ICD-10-CM

## 2023-06-19 DIAGNOSIS — Z51.12 ENCOUNTER FOR ANTINEOPLASTIC IMMUNOTHERAPY: ICD-10-CM

## 2023-06-19 DIAGNOSIS — Z15.01 LI-FRAUMENI SYNDROME: ICD-10-CM

## 2023-06-19 LAB
ALBUMIN SERPL-MCNC: 4.1 G/DL (ref 3.5–5.2)
ALP SERPL-CCNC: 66 U/L (ref 35–104)
ALT SERPL-CCNC: 28 U/L (ref 9–52)
ANION GAP SERPL CALCULATED.3IONS-SCNC: 15 MMOL/L (ref 7–19)
AST SERPL-CCNC: 25 U/L (ref 14–36)
BILIRUB SERPL-MCNC: 0.3 MG/DL (ref 0.2–1.3)
BUN SERPL-MCNC: 9 MG/DL (ref 7–17)
CALCIUM SERPL-MCNC: 9.3 MG/DL (ref 8.4–10.2)
CHLORIDE SERPL-SCNC: 103 MMOL/L (ref 98–111)
CO2 SERPL-SCNC: 19 MMOL/L (ref 22–29)
CREAT SERPL-MCNC: 0.6 MG/DL (ref 0.5–1)
ERYTHROCYTE [DISTWIDTH] IN BLOOD BY AUTOMATED COUNT: 12.9 % (ref 11.7–14.4)
GLUCOSE SERPL-MCNC: 122 MG/DL (ref 74–106)
HCT VFR BLD AUTO: 40.9 % (ref 34.1–44.9)
HGB BLD-MCNC: 13.5 G/DL (ref 11.2–15.7)
LYMPHOCYTES # BLD: 1.63 K/UL (ref 1.18–3.74)
LYMPHOCYTES NFR BLD: 19.4 % (ref 19.3–53.1)
MCH RBC QN AUTO: 30.2 PG (ref 25.6–32.2)
MCHC RBC AUTO-ENTMCNC: 33 G/DL (ref 32.3–35.5)
MCV RBC AUTO: 91.5 FL (ref 79.4–94.8)
MONOCYTES # BLD: 0.52 K/UL (ref 0.24–0.82)
MONOCYTES NFR BLD: 6.2 % (ref 4.7–12.5)
NEUTROPHILS # BLD: 5.32 K/UL (ref 1.56–6.13)
NEUTS SEG NFR BLD: 63.2 % (ref 34–71.1)
PLATELET # BLD AUTO: 291 K/UL (ref 182–369)
PMV BLD AUTO: 11.4 FL (ref 7.4–10.4)
POTASSIUM SERPL-SCNC: 4.2 MMOL/L (ref 3.5–5.1)
PROT SERPL-MCNC: 7.3 G/DL (ref 6.3–8.2)
RBC # BLD AUTO: 4.47 M/UL (ref 3.93–5.22)
SODIUM SERPL-SCNC: 137 MMOL/L (ref 137–145)
WBC # BLD AUTO: 8.41 K/UL (ref 3.98–10.04)

## 2023-06-19 PROCEDURE — 96417 CHEMO IV INFUS EACH ADDL SEQ: CPT

## 2023-06-19 PROCEDURE — 1036F TOBACCO NON-USER: CPT | Performed by: INTERNAL MEDICINE

## 2023-06-19 PROCEDURE — 6360000002 HC RX W HCPCS: Performed by: INTERNAL MEDICINE

## 2023-06-19 PROCEDURE — 96366 THER/PROPH/DIAG IV INF ADDON: CPT

## 2023-06-19 PROCEDURE — G8427 DOCREV CUR MEDS BY ELIG CLIN: HCPCS | Performed by: INTERNAL MEDICINE

## 2023-06-19 PROCEDURE — 80053 COMPREHEN METABOLIC PANEL: CPT

## 2023-06-19 PROCEDURE — 1090F PRES/ABSN URINE INCON ASSESS: CPT | Performed by: INTERNAL MEDICINE

## 2023-06-19 PROCEDURE — 99214 OFFICE O/P EST MOD 30 MIN: CPT | Performed by: INTERNAL MEDICINE

## 2023-06-19 PROCEDURE — 96375 TX/PRO/DX INJ NEW DRUG ADDON: CPT

## 2023-06-19 PROCEDURE — 2580000003 HC RX 258: Performed by: INTERNAL MEDICINE

## 2023-06-19 PROCEDURE — 85025 COMPLETE CBC W/AUTO DIFF WBC: CPT

## 2023-06-19 PROCEDURE — 96413 CHEMO IV INFUSION 1 HR: CPT

## 2023-06-19 PROCEDURE — G8400 PT W/DXA NO RESULTS DOC: HCPCS | Performed by: INTERNAL MEDICINE

## 2023-06-19 PROCEDURE — 3017F COLORECTAL CA SCREEN DOC REV: CPT | Performed by: INTERNAL MEDICINE

## 2023-06-19 PROCEDURE — 36415 COLL VENOUS BLD VENIPUNCTURE: CPT

## 2023-06-19 PROCEDURE — G8417 CALC BMI ABV UP PARAM F/U: HCPCS | Performed by: INTERNAL MEDICINE

## 2023-06-19 PROCEDURE — 1123F ACP DISCUSS/DSCN MKR DOCD: CPT | Performed by: INTERNAL MEDICINE

## 2023-06-19 PROCEDURE — 2500000003 HC RX 250 WO HCPCS

## 2023-06-19 RX ORDER — PALONOSETRON 0.05 MG/ML
0.25 INJECTION, SOLUTION INTRAVENOUS ONCE
Status: COMPLETED | OUTPATIENT
Start: 2023-06-19 | End: 2023-06-19

## 2023-06-19 RX ORDER — PALONOSETRON 0.05 MG/ML
0.25 INJECTION, SOLUTION INTRAVENOUS ONCE
Status: DISCONTINUED | OUTPATIENT
Start: 2023-06-19 | End: 2023-06-19

## 2023-06-19 RX ORDER — HEPARIN SODIUM 100 [USP'U]/ML
500 INJECTION, SOLUTION INTRAVENOUS PRN
Status: DISCONTINUED | OUTPATIENT
Start: 2023-06-19 | End: 2023-06-20 | Stop reason: HOSPADM

## 2023-06-19 RX ORDER — FAMOTIDINE 10 MG/ML
INJECTION, SOLUTION INTRAVENOUS
Status: COMPLETED
Start: 2023-06-19 | End: 2023-06-19

## 2023-06-19 RX ORDER — DIPHENHYDRAMINE HYDROCHLORIDE 50 MG/ML
25 INJECTION INTRAMUSCULAR; INTRAVENOUS ONCE
Status: COMPLETED | OUTPATIENT
Start: 2023-06-19 | End: 2023-06-19

## 2023-06-19 RX ORDER — SODIUM CHLORIDE 9 MG/ML
5-250 INJECTION, SOLUTION INTRAVENOUS PRN
Status: DISCONTINUED | OUTPATIENT
Start: 2023-06-19 | End: 2023-06-20 | Stop reason: HOSPADM

## 2023-06-19 RX ORDER — SODIUM CHLORIDE 0.9 % (FLUSH) 0.9 %
5-40 SYRINGE (ML) INJECTION PRN
Status: DISCONTINUED | OUTPATIENT
Start: 2023-06-19 | End: 2023-06-20 | Stop reason: HOSPADM

## 2023-06-19 RX ORDER — ONDANSETRON 2 MG/ML
8 INJECTION INTRAMUSCULAR; INTRAVENOUS
Status: DISCONTINUED | OUTPATIENT
Start: 2023-06-19 | End: 2023-06-20 | Stop reason: HOSPADM

## 2023-06-19 RX ORDER — DEXAMETHASONE SODIUM PHOSPHATE 10 MG/ML
10 INJECTION, SOLUTION INTRAMUSCULAR; INTRAVENOUS ONCE
Status: COMPLETED | OUTPATIENT
Start: 2023-06-19 | End: 2023-06-19

## 2023-06-19 RX ADMIN — SODIUM CHLORIDE 20 ML/HR: 9 INJECTION, SOLUTION INTRAVENOUS at 09:24

## 2023-06-19 RX ADMIN — CARBOPLATIN 264 MG: 10 INJECTION, SOLUTION INTRAVENOUS at 11:36

## 2023-06-19 RX ADMIN — DIPHENHYDRAMINE HYDROCHLORIDE 25 MG: 50 INJECTION, SOLUTION INTRAMUSCULAR; INTRAVENOUS at 09:24

## 2023-06-19 RX ADMIN — HEPARIN 500 UNITS: 100 SYRINGE at 13:42

## 2023-06-19 RX ADMIN — FAMOTIDINE 20 MG: 10 INJECTION INTRAVENOUS at 09:24

## 2023-06-19 RX ADMIN — FOSAPREPITANT 150 MG: 150 INJECTION, POWDER, LYOPHILIZED, FOR SOLUTION INTRAVENOUS at 09:37

## 2023-06-19 RX ADMIN — PALONOSETRON 0.25 MG: 0.05 INJECTION, SOLUTION INTRAVENOUS at 09:24

## 2023-06-19 RX ADMIN — PACLITAXEL 192 MG: 6 INJECTION, SOLUTION, CONCENTRATE INTRAVENOUS at 10:31

## 2023-06-19 RX ADMIN — Medication 20 ML: at 13:43

## 2023-06-19 RX ADMIN — DEXAMETHASONE SODIUM PHOSPHATE 10 MG: 10 INJECTION, SOLUTION INTRAMUSCULAR; INTRAVENOUS at 09:24

## 2023-06-19 NOTE — DISCHARGE INSTRUCTIONS
signs of an allergic reaction: hives; difficult breathing; feeling like you might pass out; swelling of your face, lips, tongue, or throat. Call your doctor at once if you have:  severe stomach pain or diarrhea;  cold symptoms such as stuffy nose, sneezing, sore throat;  flushing (warmth, redness, or tingly feeling);  numbness, tingling, or burning pain in your hands or feet;  severe redness or irritation, swelling or a hard lump, or other skin changes where the injection was given (may occur 7 to 10 days after an injection);  pain or burning when you urinate;  cough with mucus, chest pain, feeling short of breath;  chest pain, shortness of breath, fast or slow heartbeats;  a light-headed feeling, like you might pass out;  severe headache, blurred vision, pounding in your neck or ears;  a seizure;  easy bruising, unusual bleeding, purple or red spots under your skin;  low red blood cells (anemia) --pale skin, unusual tiredness, feeling light-headed or short of breath, cold hands and feet; or  low white blood cell counts --fever, mouth sores, skin sores, sore throat, cough, trouble breathing. Your cancer treatments may be delayed or permanently discontinued if you have certain side effects. Common side effects may include:  fever, chills, or other signs of infection;  anemia;  feeling weak, tired, or light-headed;  bleeding;  trouble breathing or swallowing;  hair loss, skin rash, hives;  numbness, tingling, or burning;  swelling in your face, hands, or feet;  sores or white patches in or around your mouth;  joint or muscle pain;  nausea, vomiting, diarrhea; or  tenderness or irritation where the medicine was injected. This is not a complete list of side effects and others may occur. Call your doctor for medical advice about side effects. You may report side effects to FDA at 7-351-FDA-1640. What other drugs will affect paclitaxel? Sometimes it is not safe to use certain medications at the same time.  Some drugs

## 2023-06-20 ENCOUNTER — TELEPHONE (OUTPATIENT)
Dept: SURGERY | Age: 68
End: 2023-06-20

## 2023-06-20 NOTE — TELEPHONE ENCOUNTER
6/20/2023 Elizabeth, daughter of patient, called and stated that genetic testing on mother came back positive and they were told that if that happened they could get a kit for Mariana Mortensen. She is requesting a test kit.     Contact # [phone]  Or call [facility] Ext # [phone]  dm

## 2023-06-26 ENCOUNTER — HOSPITAL ENCOUNTER (OUTPATIENT)
Dept: INFUSION THERAPY | Age: 68
Setting detail: INFUSION SERIES
Discharge: HOME | End: 2023-06-26
Payer: MEDICARE

## 2023-06-26 VITALS
HEART RATE: 77 BPM | DIASTOLIC BLOOD PRESSURE: 77 MMHG | WEIGHT: 255.3 LBS | RESPIRATION RATE: 18 BRPM | SYSTOLIC BLOOD PRESSURE: 137 MMHG | TEMPERATURE: 97.6 F | BODY MASS INDEX: 38.82 KG/M2 | OXYGEN SATURATION: 95 %

## 2023-06-26 DIAGNOSIS — C50.911 INVASIVE DUCTAL CARCINOMA OF BREAST, FEMALE, RIGHT (HCC): Primary | ICD-10-CM

## 2023-06-26 LAB
ALBUMIN SERPL-MCNC: 3.7 G/DL (ref 3.5–5.2)
ALP SERPL-CCNC: 74 U/L (ref 35–104)
ALT SERPL-CCNC: 15 U/L (ref 5–33)
ANION GAP SERPL CALCULATED.3IONS-SCNC: 13 MMOL/L (ref 7–19)
AST SERPL-CCNC: 18 U/L (ref 5–32)
BASOPHILS # BLD: 0.1 K/UL (ref 0–0.2)
BASOPHILS NFR BLD: 1.9 % (ref 0–1)
BILIRUB SERPL-MCNC: 0.3 MG/DL (ref 0.2–1.2)
BUN SERPL-MCNC: 6 MG/DL (ref 8–23)
CALCIUM SERPL-MCNC: 8.9 MG/DL (ref 8.8–10.2)
CHLORIDE SERPL-SCNC: 103 MMOL/L (ref 98–111)
CO2 SERPL-SCNC: 21 MMOL/L (ref 22–29)
CREAT SERPL-MCNC: 0.6 MG/DL (ref 0.5–0.9)
EOSINOPHIL # BLD: 0.2 K/UL (ref 0–0.6)
EOSINOPHIL NFR BLD: 4.6 % (ref 0–5)
ERYTHROCYTE [DISTWIDTH] IN BLOOD BY AUTOMATED COUNT: 13.1 % (ref 11.5–14.5)
GLUCOSE SERPL-MCNC: 104 MG/DL (ref 74–109)
HCT VFR BLD AUTO: 38.8 % (ref 37–47)
HGB BLD-MCNC: 12 G/DL (ref 12–16)
IMM GRANULOCYTES # BLD: 0 K/UL
LYMPHOCYTES # BLD: 1.5 K/UL (ref 1.1–4.5)
LYMPHOCYTES NFR BLD: 35.3 % (ref 20–40)
MCH RBC QN AUTO: 29.6 PG (ref 27–31)
MCHC RBC AUTO-ENTMCNC: 30.9 G/DL (ref 33–37)
MCV RBC AUTO: 95.8 FL (ref 81–99)
MONOCYTES # BLD: 0.4 K/UL (ref 0–0.9)
MONOCYTES NFR BLD: 9.9 % (ref 0–10)
NEUTROPHILS # BLD: 2 K/UL (ref 1.5–7.5)
NEUTS SEG NFR BLD: 47.8 % (ref 50–65)
PLATELET # BLD AUTO: 280 K/UL (ref 130–400)
PMV BLD AUTO: 10.8 FL (ref 9.4–12.3)
POTASSIUM SERPL-SCNC: 4.3 MMOL/L (ref 3.5–5)
PROT SERPL-MCNC: 6.7 G/DL (ref 6.6–8.7)
RBC # BLD AUTO: 4.05 M/UL (ref 4.2–5.4)
SODIUM SERPL-SCNC: 137 MMOL/L (ref 136–145)
WBC # BLD AUTO: 4.1 K/UL (ref 4.8–10.8)

## 2023-06-26 PROCEDURE — 6360000002 HC RX W HCPCS: Performed by: INTERNAL MEDICINE

## 2023-06-26 PROCEDURE — 2580000003 HC RX 258: Performed by: INTERNAL MEDICINE

## 2023-06-26 PROCEDURE — 96415 CHEMO IV INFUSION ADDL HR: CPT

## 2023-06-26 PROCEDURE — 96375 TX/PRO/DX INJ NEW DRUG ADDON: CPT

## 2023-06-26 PROCEDURE — 80053 COMPREHEN METABOLIC PANEL: CPT

## 2023-06-26 PROCEDURE — 96417 CHEMO IV INFUS EACH ADDL SEQ: CPT

## 2023-06-26 PROCEDURE — 96413 CHEMO IV INFUSION 1 HR: CPT

## 2023-06-26 PROCEDURE — A4216 STERILE WATER/SALINE, 10 ML: HCPCS | Performed by: INTERNAL MEDICINE

## 2023-06-26 PROCEDURE — 2500000003 HC RX 250 WO HCPCS: Performed by: INTERNAL MEDICINE

## 2023-06-26 PROCEDURE — 96366 THER/PROPH/DIAG IV INF ADDON: CPT

## 2023-06-26 PROCEDURE — 6360000002 HC RX W HCPCS

## 2023-06-26 PROCEDURE — 85025 COMPLETE CBC W/AUTO DIFF WBC: CPT

## 2023-06-26 RX ORDER — HEPARIN SODIUM 100 [USP'U]/ML
500 INJECTION, SOLUTION INTRAVENOUS PRN
Status: DISCONTINUED | OUTPATIENT
Start: 2023-06-26 | End: 2023-06-27 | Stop reason: HOSPADM

## 2023-06-26 RX ORDER — PALONOSETRON 0.05 MG/ML
0.25 INJECTION, SOLUTION INTRAVENOUS ONCE
Status: COMPLETED | OUTPATIENT
Start: 2023-06-26 | End: 2023-06-26

## 2023-06-26 RX ORDER — DIPHENHYDRAMINE HYDROCHLORIDE 50 MG/ML
25 INJECTION INTRAMUSCULAR; INTRAVENOUS ONCE
Status: COMPLETED | OUTPATIENT
Start: 2023-06-26 | End: 2023-06-26

## 2023-06-26 RX ORDER — SODIUM CHLORIDE 9 MG/ML
5-250 INJECTION, SOLUTION INTRAVENOUS PRN
Status: DISCONTINUED | OUTPATIENT
Start: 2023-06-26 | End: 2023-06-27 | Stop reason: HOSPADM

## 2023-06-26 RX ORDER — SODIUM CHLORIDE 0.9 % (FLUSH) 0.9 %
5-40 SYRINGE (ML) INJECTION PRN
Status: DISCONTINUED | OUTPATIENT
Start: 2023-06-26 | End: 2023-06-27 | Stop reason: HOSPADM

## 2023-06-26 RX ORDER — ONDANSETRON 2 MG/ML
8 INJECTION INTRAMUSCULAR; INTRAVENOUS
Status: DISCONTINUED | OUTPATIENT
Start: 2023-06-26 | End: 2023-06-27 | Stop reason: HOSPADM

## 2023-06-26 RX ORDER — DIPHENHYDRAMINE HYDROCHLORIDE 50 MG/ML
INJECTION INTRAMUSCULAR; INTRAVENOUS
Status: COMPLETED
Start: 2023-06-26 | End: 2023-06-26

## 2023-06-26 RX ORDER — DEXAMETHASONE SODIUM PHOSPHATE 10 MG/ML
10 INJECTION, SOLUTION INTRAMUSCULAR; INTRAVENOUS ONCE
Status: COMPLETED | OUTPATIENT
Start: 2023-06-26 | End: 2023-06-26

## 2023-06-26 RX ADMIN — PALONOSETRON 0.25 MG: 0.05 INJECTION, SOLUTION INTRAVENOUS at 08:59

## 2023-06-26 RX ADMIN — PACLITAXEL 192 MG: 6 INJECTION, SOLUTION, CONCENTRATE INTRAVENOUS at 10:02

## 2023-06-26 RX ADMIN — DEXAMETHASONE SODIUM PHOSPHATE 10 MG: 10 INJECTION, SOLUTION INTRAMUSCULAR; INTRAVENOUS at 08:59

## 2023-06-26 RX ADMIN — HEPARIN 500 UNITS: 100 SYRINGE at 14:07

## 2023-06-26 RX ADMIN — CARBOPLATIN 264 MG: 10 INJECTION, SOLUTION INTRAVENOUS at 12:00

## 2023-06-26 RX ADMIN — DIPHENHYDRAMINE HYDROCHLORIDE 25 MG: 50 INJECTION, SOLUTION INTRAMUSCULAR; INTRAVENOUS at 09:13

## 2023-06-26 RX ADMIN — HYDROCORTISONE SODIUM SUCCINATE 100 MG: 100 INJECTION, POWDER, FOR SOLUTION INTRAMUSCULAR; INTRAVENOUS at 10:08

## 2023-06-26 RX ADMIN — SODIUM CHLORIDE 20 ML/HR: 9 INJECTION, SOLUTION INTRAVENOUS at 08:59

## 2023-06-26 RX ADMIN — FOSAPREPITANT 150 MG: 150 INJECTION, POWDER, LYOPHILIZED, FOR SOLUTION INTRAVENOUS at 09:31

## 2023-06-26 RX ADMIN — DIPHENHYDRAMINE HYDROCHLORIDE 50 MG: 50 INJECTION, SOLUTION INTRAMUSCULAR; INTRAVENOUS at 10:10

## 2023-06-26 RX ADMIN — FAMOTIDINE 20 MG: 10 INJECTION, SOLUTION INTRAVENOUS at 09:12

## 2023-06-26 RX ADMIN — SODIUM CHLORIDE, PRESERVATIVE FREE 20 ML: 5 INJECTION INTRAVENOUS at 14:07

## 2023-07-03 ENCOUNTER — HOSPITAL ENCOUNTER (OUTPATIENT)
Dept: INFUSION THERAPY | Age: 68
Setting detail: INFUSION SERIES
Discharge: HOME | End: 2023-07-03
Payer: MEDICARE

## 2023-07-03 VITALS
SYSTOLIC BLOOD PRESSURE: 125 MMHG | OXYGEN SATURATION: 97 % | HEART RATE: 97 BPM | TEMPERATURE: 97 F | RESPIRATION RATE: 18 BRPM | DIASTOLIC BLOOD PRESSURE: 63 MMHG

## 2023-07-03 DIAGNOSIS — C50.911 INVASIVE DUCTAL CARCINOMA OF BREAST, FEMALE, RIGHT (HCC): Primary | ICD-10-CM

## 2023-07-03 LAB
ALBUMIN SERPL-MCNC: 3.6 G/DL (ref 3.5–5.2)
ALP SERPL-CCNC: 79 U/L (ref 35–104)
ALT SERPL-CCNC: 16 U/L (ref 5–33)
ANION GAP SERPL CALCULATED.3IONS-SCNC: 16 MMOL/L (ref 7–19)
AST SERPL-CCNC: 15 U/L (ref 5–32)
BASOPHILS # BLD: 0.1 K/UL (ref 0–0.2)
BASOPHILS NFR BLD: 1.5 % (ref 0–1)
BILIRUB SERPL-MCNC: 0.3 MG/DL (ref 0.2–1.2)
BUN SERPL-MCNC: 8 MG/DL (ref 8–23)
CALCIUM SERPL-MCNC: 9.4 MG/DL (ref 8.8–10.2)
CHLORIDE SERPL-SCNC: 101 MMOL/L (ref 98–111)
CO2 SERPL-SCNC: 20 MMOL/L (ref 22–29)
CREAT SERPL-MCNC: 0.7 MG/DL (ref 0.5–0.9)
EOSINOPHIL # BLD: 0 K/UL (ref 0–0.6)
EOSINOPHIL NFR BLD: 0.5 % (ref 0–5)
ERYTHROCYTE [DISTWIDTH] IN BLOOD BY AUTOMATED COUNT: 13.5 % (ref 11.5–14.5)
GLUCOSE SERPL-MCNC: 135 MG/DL (ref 74–109)
HCT VFR BLD AUTO: 38.2 % (ref 37–47)
HGB BLD-MCNC: 12.3 G/DL (ref 12–16)
IMM GRANULOCYTES # BLD: 0 K/UL
LYMPHOCYTES # BLD: 1.6 K/UL (ref 1.1–4.5)
LYMPHOCYTES NFR BLD: 26.5 % (ref 20–40)
MCH RBC QN AUTO: 29.7 PG (ref 27–31)
MCHC RBC AUTO-ENTMCNC: 32.2 G/DL (ref 33–37)
MCV RBC AUTO: 92.3 FL (ref 81–99)
MONOCYTES # BLD: 0.5 K/UL (ref 0–0.9)
MONOCYTES NFR BLD: 8.3 % (ref 0–10)
NEUTROPHILS # BLD: 3.8 K/UL (ref 1.5–7.5)
NEUTS SEG NFR BLD: 62.5 % (ref 50–65)
PLATELET # BLD AUTO: 404 K/UL (ref 130–400)
PMV BLD AUTO: 10.2 FL (ref 9.4–12.3)
POTASSIUM SERPL-SCNC: 4.2 MMOL/L (ref 3.5–5)
PROT SERPL-MCNC: 7.1 G/DL (ref 6.6–8.7)
RBC # BLD AUTO: 4.14 M/UL (ref 4.2–5.4)
SODIUM SERPL-SCNC: 137 MMOL/L (ref 136–145)
WBC # BLD AUTO: 6 K/UL (ref 4.8–10.8)

## 2023-07-03 PROCEDURE — 80053 COMPREHEN METABOLIC PANEL: CPT

## 2023-07-03 PROCEDURE — 36415 COLL VENOUS BLD VENIPUNCTURE: CPT

## 2023-07-03 PROCEDURE — 85025 COMPLETE CBC W/AUTO DIFF WBC: CPT

## 2023-07-03 PROCEDURE — 99211 OFF/OP EST MAY X REQ PHY/QHP: CPT

## 2023-07-03 RX ORDER — DOXYCYCLINE HYCLATE 100 MG
100 TABLET ORAL 2 TIMES DAILY
Qty: 20 TABLET | Refills: 0 | Status: SHIPPED | OUTPATIENT
Start: 2023-07-03 | End: 2023-07-13 | Stop reason: SDUPTHER

## 2023-07-03 NOTE — DISCHARGE INSTRUCTIONS
Doxycycline Hyclate Delayed Release Oral Capsule 100 mg  For treating bacterial infection. Generic Name: Doxycycline Hyclate  Instructions  Swallow the medicine without crushing or chewing it. Take the medicine with 250 mL (1 cup) of water. Take on empty stomach - 1 hour before or 2 hours after eating. Sit or stand upright for 30 minutes after taking the medicine. Do not lie down. Keep the medicine at room temperature. Avoid heat and direct light. Do not take any antacid or vitamins with magnesium, calcium, aluminum, or iron for 2 hours before and 2 hours after taking this medicine. This medicine can make you sensitive to the sun. Use sunscreen or protective clothing when in sun. If you forget to take a dose on time, take it as soon as you remember. If it is almost time for the next dose, do not take the missed dose. Return to your normal schedule. Do not take 2 doses at one time. Drug interactions can change how medicines work or increase risk for side effects. Tell your health care providers about all medicines taken. Include prescription and over-the-counter medicines, vitamins, and herbal medicines. Speak with your doctor or pharmacist before starting or stopping any medicine. Keep using this medicine for the full number of days that it is prescribed. Do not stop the medicine even if you start to feel better. This medicine can cause permanent change in teeth color in children. Cautions  Tell your doctor and pharmacist if you ever had an allergic reaction to a medicine. Do not use the medication any more than instructed. Contact your doctor if you notice a change in the amount or darkening of your urine. Please tell your doctor if you have moderate to severe diarrhea while on this medicine. Do not treat the diarrhea with over-the-counter diarrhea medicine. Tell the doctor or pharmacist if you are pregnant, planning to be pregnant, or breastfeeding.   Do not share this medicine with anyone who

## 2023-07-03 NOTE — PROGRESS NOTES
[facility] Vital signs stable. Looked @ patient's port to access it. Above port area is Red and noted discharge from area call placed to Dr Jac Storey office and called placed to Dr Stuart Pereira. Awaiting further orders.  Labs drawn peripheral.

## 2023-07-05 ENCOUNTER — TELEPHONE (OUTPATIENT)
Dept: INFUSION THERAPY | Age: 68
End: 2023-07-05

## 2023-07-05 NOTE — TELEPHONE ENCOUNTER
Called patient to confirm appointment date/times. Patient to see RICKY Urrutia on 07/10/2023 @ 9a. m. Patient chemotherapy appointment scheduled for 07/10/2023 @ 10a.m. Chemotherapy to resume only if patient is cleared by general surgery to resume. Patient states understanding.  Electronically signed by Rod Sandoval RN on 7/5/2023 at 11:33 AM

## 2023-07-10 ENCOUNTER — OFFICE VISIT (OUTPATIENT)
Dept: SURGERY | Age: 68
End: 2023-07-10

## 2023-07-10 ENCOUNTER — HOSPITAL ENCOUNTER (OUTPATIENT)
Dept: INFUSION THERAPY | Age: 68
Setting detail: INFUSION SERIES
Discharge: HOME | End: 2023-07-10
Payer: MEDICARE

## 2023-07-10 VITALS
HEART RATE: 84 BPM | SYSTOLIC BLOOD PRESSURE: 124 MMHG | TEMPERATURE: 97 F | WEIGHT: 253.7 LBS | DIASTOLIC BLOOD PRESSURE: 79 MMHG | RESPIRATION RATE: 18 BRPM | BODY MASS INDEX: 38.57 KG/M2 | OXYGEN SATURATION: 96 %

## 2023-07-10 VITALS — DIASTOLIC BLOOD PRESSURE: 80 MMHG | SYSTOLIC BLOOD PRESSURE: 118 MMHG | HEART RATE: 80 BPM

## 2023-07-10 DIAGNOSIS — L53.9 ERYTHEMA: ICD-10-CM

## 2023-07-10 DIAGNOSIS — C50.811 MALIGNANT NEOPLASM OF OVERLAPPING SITES OF RIGHT BREAST (HCC): Primary | ICD-10-CM

## 2023-07-10 DIAGNOSIS — C50.911 INVASIVE DUCTAL CARCINOMA OF BREAST, FEMALE, RIGHT (HCC): Primary | ICD-10-CM

## 2023-07-10 LAB
ALBUMIN SERPL-MCNC: 4 G/DL (ref 3.5–5.2)
ALP SERPL-CCNC: 94 U/L (ref 35–104)
ALT SERPL-CCNC: 14 U/L (ref 5–33)
ANION GAP SERPL CALCULATED.3IONS-SCNC: 13 MMOL/L (ref 7–19)
AST SERPL-CCNC: 15 U/L (ref 5–32)
BASOPHILS # BLD: 0.1 K/UL (ref 0–0.2)
BASOPHILS NFR BLD: 1.3 % (ref 0–1)
BILIRUB SERPL-MCNC: 0.3 MG/DL (ref 0.2–1.2)
BUN SERPL-MCNC: 5 MG/DL (ref 8–23)
CALCIUM SERPL-MCNC: 9.5 MG/DL (ref 8.8–10.2)
CHLORIDE SERPL-SCNC: 102 MMOL/L (ref 98–111)
CO2 SERPL-SCNC: 23 MMOL/L (ref 22–29)
CREAT SERPL-MCNC: 0.7 MG/DL (ref 0.5–0.9)
EOSINOPHIL # BLD: 0 K/UL (ref 0–0.6)
EOSINOPHIL NFR BLD: 0.3 % (ref 0–5)
ERYTHROCYTE [DISTWIDTH] IN BLOOD BY AUTOMATED COUNT: 13.9 % (ref 11.5–14.5)
GLUCOSE SERPL-MCNC: 127 MG/DL (ref 74–109)
HCT VFR BLD AUTO: 40.9 % (ref 37–47)
HGB BLD-MCNC: 13.3 G/DL (ref 12–16)
IMM GRANULOCYTES # BLD: 0 K/UL
LYMPHOCYTES # BLD: 1.8 K/UL (ref 1.1–4.5)
LYMPHOCYTES NFR BLD: 23.8 % (ref 20–40)
MCH RBC QN AUTO: 29.6 PG (ref 27–31)
MCHC RBC AUTO-ENTMCNC: 32.5 G/DL (ref 33–37)
MCV RBC AUTO: 91.1 FL (ref 81–99)
MONOCYTES # BLD: 0.8 K/UL (ref 0–0.9)
MONOCYTES NFR BLD: 10.5 % (ref 0–10)
NEUTROPHILS # BLD: 4.7 K/UL (ref 1.5–7.5)
NEUTS SEG NFR BLD: 63.8 % (ref 50–65)
PLATELET # BLD AUTO: 315 K/UL (ref 130–400)
PMV BLD AUTO: 9.6 FL (ref 9.4–12.3)
POTASSIUM SERPL-SCNC: 4.2 MMOL/L (ref 3.5–5)
PROT SERPL-MCNC: 7.1 G/DL (ref 6.6–8.7)
RBC # BLD AUTO: 4.49 M/UL (ref 4.2–5.4)
SODIUM SERPL-SCNC: 138 MMOL/L (ref 136–145)
WBC # BLD AUTO: 7.4 K/UL (ref 4.8–10.8)

## 2023-07-10 PROCEDURE — 96417 CHEMO IV INFUS EACH ADDL SEQ: CPT

## 2023-07-10 PROCEDURE — 36415 COLL VENOUS BLD VENIPUNCTURE: CPT

## 2023-07-10 PROCEDURE — 80053 COMPREHEN METABOLIC PANEL: CPT

## 2023-07-10 PROCEDURE — 2500000003 HC RX 250 WO HCPCS

## 2023-07-10 PROCEDURE — 96413 CHEMO IV INFUSION 1 HR: CPT

## 2023-07-10 PROCEDURE — 96366 THER/PROPH/DIAG IV INF ADDON: CPT

## 2023-07-10 PROCEDURE — 99024 POSTOP FOLLOW-UP VISIT: CPT | Performed by: PHYSICIAN ASSISTANT

## 2023-07-10 PROCEDURE — 2580000003 HC RX 258: Performed by: INTERNAL MEDICINE

## 2023-07-10 PROCEDURE — 96411 CHEMO IV PUSH ADDL DRUG: CPT

## 2023-07-10 PROCEDURE — 85025 COMPLETE CBC W/AUTO DIFF WBC: CPT

## 2023-07-10 PROCEDURE — 6360000002 HC RX W HCPCS: Performed by: INTERNAL MEDICINE

## 2023-07-10 PROCEDURE — 2500000003 HC RX 250 WO HCPCS: Performed by: INTERNAL MEDICINE

## 2023-07-10 PROCEDURE — A4216 STERILE WATER/SALINE, 10 ML: HCPCS | Performed by: INTERNAL MEDICINE

## 2023-07-10 PROCEDURE — 96375 TX/PRO/DX INJ NEW DRUG ADDON: CPT

## 2023-07-10 PROCEDURE — 96367 TX/PROPH/DG ADDL SEQ IV INF: CPT

## 2023-07-10 PROCEDURE — 6360000002 HC RX W HCPCS

## 2023-07-10 RX ORDER — FAMOTIDINE 10 MG/ML
INJECTION, SOLUTION INTRAVENOUS
Status: COMPLETED
Start: 2023-07-10 | End: 2023-07-10

## 2023-07-10 RX ORDER — SODIUM CHLORIDE 9 MG/ML
5-250 INJECTION, SOLUTION INTRAVENOUS PRN
OUTPATIENT
Start: 2023-07-24

## 2023-07-10 RX ORDER — SODIUM CHLORIDE 9 MG/ML
5-250 INJECTION, SOLUTION INTRAVENOUS PRN
OUTPATIENT
Start: 2023-07-17

## 2023-07-10 RX ORDER — ONDANSETRON 2 MG/ML
8 INJECTION INTRAMUSCULAR; INTRAVENOUS
OUTPATIENT
Start: 2023-07-10

## 2023-07-10 RX ORDER — ALBUTEROL SULFATE 90 UG/1
4 AEROSOL, METERED RESPIRATORY (INHALATION) PRN
OUTPATIENT
Start: 2023-07-24

## 2023-07-10 RX ORDER — ONDANSETRON 2 MG/ML
8 INJECTION INTRAMUSCULAR; INTRAVENOUS
OUTPATIENT
Start: 2023-07-17

## 2023-07-10 RX ORDER — EPINEPHRINE 1 MG/ML
0.3 INJECTION, SOLUTION, CONCENTRATE INTRAVENOUS PRN
OUTPATIENT
Start: 2023-07-17

## 2023-07-10 RX ORDER — FAMOTIDINE 10 MG/ML
20 INJECTION, SOLUTION INTRAVENOUS
Status: CANCELLED | OUTPATIENT
Start: 2023-07-10

## 2023-07-10 RX ORDER — MEPERIDINE HYDROCHLORIDE 50 MG/ML
12.5 INJECTION INTRAMUSCULAR; INTRAVENOUS; SUBCUTANEOUS PRN
OUTPATIENT
Start: 2023-07-17

## 2023-07-10 RX ORDER — SODIUM CHLORIDE 9 MG/ML
5-250 INJECTION, SOLUTION INTRAVENOUS PRN
OUTPATIENT
Start: 2023-07-10

## 2023-07-10 RX ORDER — DIPHENHYDRAMINE HYDROCHLORIDE 50 MG/ML
25 INJECTION INTRAMUSCULAR; INTRAVENOUS ONCE
Status: COMPLETED | OUTPATIENT
Start: 2023-07-10 | End: 2023-07-10

## 2023-07-10 RX ORDER — SODIUM CHLORIDE 0.9 % (FLUSH) 0.9 %
5-40 SYRINGE (ML) INJECTION PRN
Status: DISCONTINUED | OUTPATIENT
Start: 2023-07-10 | End: 2023-07-11 | Stop reason: HOSPADM

## 2023-07-10 RX ORDER — FAMOTIDINE 10 MG/ML
20 INJECTION, SOLUTION INTRAVENOUS
OUTPATIENT
Start: 2023-07-24

## 2023-07-10 RX ORDER — SODIUM CHLORIDE 9 MG/ML
INJECTION, SOLUTION INTRAVENOUS CONTINUOUS
OUTPATIENT
Start: 2023-07-24

## 2023-07-10 RX ORDER — DIPHENHYDRAMINE HYDROCHLORIDE 50 MG/ML
INJECTION INTRAMUSCULAR; INTRAVENOUS
Status: COMPLETED
Start: 2023-07-10 | End: 2023-07-10

## 2023-07-10 RX ORDER — DIPHENHYDRAMINE HYDROCHLORIDE 50 MG/ML
25 INJECTION INTRAMUSCULAR; INTRAVENOUS ONCE
OUTPATIENT
Start: 2023-07-24 | End: 2023-07-24

## 2023-07-10 RX ORDER — HEPARIN SODIUM (PORCINE) LOCK FLUSH IV SOLN 100 UNIT/ML 100 UNIT/ML
500 SOLUTION INTRAVENOUS PRN
OUTPATIENT
Start: 2023-07-17

## 2023-07-10 RX ORDER — ACETAMINOPHEN 325 MG/1
650 TABLET ORAL
OUTPATIENT
Start: 2023-07-24

## 2023-07-10 RX ORDER — ALBUTEROL SULFATE 90 UG/1
4 AEROSOL, METERED RESPIRATORY (INHALATION) PRN
OUTPATIENT
Start: 2023-07-10

## 2023-07-10 RX ORDER — HEPARIN SODIUM (PORCINE) LOCK FLUSH IV SOLN 100 UNIT/ML 100 UNIT/ML
500 SOLUTION INTRAVENOUS PRN
Status: CANCELLED | OUTPATIENT
Start: 2023-07-10

## 2023-07-10 RX ORDER — HEPARIN SODIUM (PORCINE) LOCK FLUSH IV SOLN 100 UNIT/ML 100 UNIT/ML
500 SOLUTION INTRAVENOUS PRN
OUTPATIENT
Start: 2023-07-24

## 2023-07-10 RX ORDER — DIPHENHYDRAMINE HYDROCHLORIDE 50 MG/ML
50 INJECTION INTRAMUSCULAR; INTRAVENOUS
OUTPATIENT
Start: 2023-07-24

## 2023-07-10 RX ORDER — ONDANSETRON 2 MG/ML
8 INJECTION INTRAMUSCULAR; INTRAVENOUS
OUTPATIENT
Start: 2023-07-24

## 2023-07-10 RX ORDER — SODIUM CHLORIDE 9 MG/ML
INJECTION, SOLUTION INTRAVENOUS CONTINUOUS
OUTPATIENT
Start: 2023-07-10

## 2023-07-10 RX ORDER — ALBUTEROL SULFATE 90 UG/1
4 AEROSOL, METERED RESPIRATORY (INHALATION) PRN
OUTPATIENT
Start: 2023-07-17

## 2023-07-10 RX ORDER — FAMOTIDINE 10 MG/ML
20 INJECTION, SOLUTION INTRAVENOUS ONCE
OUTPATIENT
Start: 2023-07-17 | End: 2023-07-17

## 2023-07-10 RX ORDER — SODIUM CHLORIDE 0.9 % (FLUSH) 0.9 %
5-40 SYRINGE (ML) INJECTION PRN
Status: CANCELLED | OUTPATIENT
Start: 2023-07-10

## 2023-07-10 RX ORDER — PALONOSETRON 0.05 MG/ML
0.25 INJECTION, SOLUTION INTRAVENOUS ONCE
Start: 2023-07-17 | End: 2023-07-17

## 2023-07-10 RX ORDER — PALONOSETRON 0.05 MG/ML
0.25 INJECTION, SOLUTION INTRAVENOUS ONCE
Status: CANCELLED | OUTPATIENT
Start: 2023-07-10 | End: 2023-07-10

## 2023-07-10 RX ORDER — SODIUM CHLORIDE 0.9 % (FLUSH) 0.9 %
5-40 SYRINGE (ML) INJECTION PRN
OUTPATIENT
Start: 2023-07-17

## 2023-07-10 RX ORDER — SODIUM CHLORIDE 9 MG/ML
5-250 INJECTION, SOLUTION INTRAVENOUS PRN
Status: DISCONTINUED | OUTPATIENT
Start: 2023-07-10 | End: 2023-07-11 | Stop reason: HOSPADM

## 2023-07-10 RX ORDER — FAMOTIDINE 10 MG/ML
20 INJECTION, SOLUTION INTRAVENOUS ONCE
OUTPATIENT
Start: 2023-07-24 | End: 2023-07-24

## 2023-07-10 RX ORDER — SODIUM CHLORIDE 9 MG/ML
INJECTION, SOLUTION INTRAVENOUS CONTINUOUS
OUTPATIENT
Start: 2023-07-17

## 2023-07-10 RX ORDER — DIPHENHYDRAMINE HYDROCHLORIDE 50 MG/ML
50 INJECTION INTRAMUSCULAR; INTRAVENOUS
Status: DISCONTINUED | OUTPATIENT
Start: 2023-07-10 | End: 2023-07-11 | Stop reason: HOSPADM

## 2023-07-10 RX ORDER — ACETAMINOPHEN 325 MG/1
650 TABLET ORAL
OUTPATIENT
Start: 2023-07-17

## 2023-07-10 RX ORDER — DIPHENHYDRAMINE HYDROCHLORIDE 50 MG/ML
25 INJECTION INTRAMUSCULAR; INTRAVENOUS ONCE
Status: CANCELLED | OUTPATIENT
Start: 2023-07-10 | End: 2023-07-10

## 2023-07-10 RX ORDER — PALONOSETRON 0.05 MG/ML
0.25 INJECTION, SOLUTION INTRAVENOUS ONCE
Status: COMPLETED | OUTPATIENT
Start: 2023-07-10 | End: 2023-07-10

## 2023-07-10 RX ORDER — EPINEPHRINE 1 MG/ML
0.3 INJECTION, SOLUTION, CONCENTRATE INTRAVENOUS PRN
OUTPATIENT
Start: 2023-07-24

## 2023-07-10 RX ORDER — EPINEPHRINE 1 MG/ML
0.3 INJECTION, SOLUTION, CONCENTRATE INTRAVENOUS PRN
OUTPATIENT
Start: 2023-07-10

## 2023-07-10 RX ORDER — HEPARIN SODIUM 100 [USP'U]/ML
500 INJECTION, SOLUTION INTRAVENOUS PRN
Status: DISCONTINUED | OUTPATIENT
Start: 2023-07-10 | End: 2023-07-11 | Stop reason: HOSPADM

## 2023-07-10 RX ORDER — MEPERIDINE HYDROCHLORIDE 50 MG/ML
12.5 INJECTION INTRAMUSCULAR; INTRAVENOUS; SUBCUTANEOUS PRN
OUTPATIENT
Start: 2023-07-10

## 2023-07-10 RX ORDER — DIPHENHYDRAMINE HYDROCHLORIDE 50 MG/ML
50 INJECTION INTRAMUSCULAR; INTRAVENOUS
OUTPATIENT
Start: 2023-07-17

## 2023-07-10 RX ORDER — DEXAMETHASONE SODIUM PHOSPHATE 10 MG/ML
10 INJECTION, SOLUTION INTRAMUSCULAR; INTRAVENOUS ONCE
Status: COMPLETED | OUTPATIENT
Start: 2023-07-10 | End: 2023-07-10

## 2023-07-10 RX ORDER — DIPHENHYDRAMINE HYDROCHLORIDE 50 MG/ML
50 INJECTION INTRAMUSCULAR; INTRAVENOUS
Status: CANCELLED | OUTPATIENT
Start: 2023-07-10

## 2023-07-10 RX ORDER — FAMOTIDINE 10 MG/ML
20 INJECTION, SOLUTION INTRAVENOUS ONCE
Status: CANCELLED | OUTPATIENT
Start: 2023-07-10 | End: 2023-07-10

## 2023-07-10 RX ORDER — PALONOSETRON 0.05 MG/ML
0.25 INJECTION, SOLUTION INTRAVENOUS ONCE
Start: 2023-07-24 | End: 2023-07-24

## 2023-07-10 RX ORDER — MEPERIDINE HYDROCHLORIDE 50 MG/ML
12.5 INJECTION INTRAMUSCULAR; INTRAVENOUS; SUBCUTANEOUS PRN
OUTPATIENT
Start: 2023-07-24

## 2023-07-10 RX ORDER — FAMOTIDINE 10 MG/ML
20 INJECTION, SOLUTION INTRAVENOUS
OUTPATIENT
Start: 2023-07-17

## 2023-07-10 RX ORDER — SODIUM CHLORIDE 0.9 % (FLUSH) 0.9 %
5-40 SYRINGE (ML) INJECTION PRN
OUTPATIENT
Start: 2023-07-24

## 2023-07-10 RX ORDER — SODIUM CHLORIDE 9 MG/ML
5-250 INJECTION, SOLUTION INTRAVENOUS PRN
Status: CANCELLED | OUTPATIENT
Start: 2023-07-10

## 2023-07-10 RX ORDER — DIPHENHYDRAMINE HYDROCHLORIDE 50 MG/ML
25 INJECTION INTRAMUSCULAR; INTRAVENOUS ONCE
OUTPATIENT
Start: 2023-07-17 | End: 2023-07-17

## 2023-07-10 RX ORDER — ACETAMINOPHEN 325 MG/1
650 TABLET ORAL
OUTPATIENT
Start: 2023-07-10

## 2023-07-10 RX ADMIN — FAMOTIDINE 20 MG: 10 INJECTION INTRAVENOUS at 11:06

## 2023-07-10 RX ADMIN — SODIUM CHLORIDE, PRESERVATIVE FREE 20 ML: 5 INJECTION INTRAVENOUS at 16:13

## 2023-07-10 RX ADMIN — FAMOTIDINE: 10 INJECTION, SOLUTION INTRAVENOUS at 12:20

## 2023-07-10 RX ADMIN — FOSAPREPITANT 150 MG: 150 INJECTION, POWDER, LYOPHILIZED, FOR SOLUTION INTRAVENOUS at 11:26

## 2023-07-10 RX ADMIN — CARBOPLATIN 261 MG: 10 INJECTION, SOLUTION INTRAVENOUS at 14:04

## 2023-07-10 RX ADMIN — HEPARIN 500 UNITS: 100 SYRINGE at 16:12

## 2023-07-10 RX ADMIN — DIPHENHYDRAMINE HYDROCHLORIDE 25 MG: 50 INJECTION, SOLUTION INTRAMUSCULAR; INTRAVENOUS at 11:06

## 2023-07-10 RX ADMIN — DEXAMETHASONE SODIUM PHOSPHATE 10 MG: 10 INJECTION, SOLUTION INTRAMUSCULAR; INTRAVENOUS at 11:06

## 2023-07-10 RX ADMIN — PALONOSETRON 0.25 MG: 0.05 INJECTION, SOLUTION INTRAVENOUS at 11:06

## 2023-07-10 RX ADMIN — DIPHENHYDRAMINE HYDROCHLORIDE 50 MG: 50 INJECTION, SOLUTION INTRAMUSCULAR; INTRAVENOUS at 12:17

## 2023-07-10 RX ADMIN — SODIUM CHLORIDE 20 ML/HR: 9 INJECTION, SOLUTION INTRAVENOUS at 11:06

## 2023-07-10 RX ADMIN — HYDROCORTISONE SODIUM SUCCINATE 100 MG: 100 INJECTION, POWDER, FOR SOLUTION INTRAMUSCULAR; INTRAVENOUS at 12:16

## 2023-07-10 RX ADMIN — PACLITAXEL 192 MG: 6 INJECTION, SOLUTION, CONCENTRATE INTRAVENOUS at 12:05

## 2023-07-10 NOTE — PROGRESS NOTES
A few minutes into taxol infusion pt had red face and felt like her face and lips were swelling. Infusion immediately stopped. Fluids increased. Benadryl, pepcid, and solucortef given as ordered for emergency meds. Pt stated she felt better. Waited 30 mins to restart taxol. Restarted taxol at half the original rate. Pt monitored closely. Currently tolerating well.

## 2023-07-10 NOTE — DISCHARGE INSTRUCTIONS
carboplatin  Pronunciation:  LAMONT irving BISHNU tin  What is the most important information I should know about carboplatin? You may have a severe allergic reaction within minutes of receiving a carboplatin injection. Your caregivers will quickly treat you if you if this occurs. You may be given medicine to help prevent an allergic reaction. Carboplatin can increase your risk of bleeding or infection. Call your doctor if you have a fever, chills, cough, mouth sores, or unusual bleeding or bruising. What is carboplatin? Carboplatin is used together with other cancer drugs to treat ovarian cancer. Carboplatin may also be used for purposes not listed in this medication guide. What should I discuss with my healthcare provider before receiving carboplatin? You should not be treated with carboplatin if you are allergic to it, or if you have:  severe bleeding;  severe bone marrow suppression; or  if you have had a severe allergic reaction to similar cancer drugs such as oxaliplatin or cisplatin. Tell your doctor if you have ever had:  liver disease;  kidney disease;  a weak immune system; or  if you have received carboplatin in the past.  Carboplatin may harm an unborn baby. Use effective birth control to prevent pregnancy, and tell your doctor if you become pregnant. You should not breastfeed while using carboplatin. How is carboplatin given? Carboplatin is given as an infusion into a vein, usually given once every 4 weeks. A healthcare provider will give you this injection. Tell your caregivers if you feel any burning, pain, or swelling around the IV needle when carboplatin is injected. You may be given other medications to help prevent serious side effects or an allergic reaction. Keep using these medicines for as long as your doctor has prescribed. Carboplatin can increase your risk of bleeding or infection. You will need frequent medical tests.  Your cancer treatments may be delayed based on the

## 2023-07-10 NOTE — PROGRESS NOTES
Pt returned from general surgery office. Per MD Nga Benedict ok to use port. Port accessed and aloxi, decadron, benadryl, pepcid, and emend given as ordered for premeds. Taxol initiated. Pt had reaction. See note written at time of reaction. Taxol restarted at half original rate. After 30 mins pt still tolerating taxol, so taxol returned to original rate. Pt tolerated rest of infusion well. Cold gloves and socks worn during taxol infusion. Carbo given. Pt tolerated well. Blood return checked and present before, during, and after all infusions. Cold cap in place for 45 mins pre taxol infusion, during infusions, and for 90 mins post infusion. After heparin locking and deaccessing port, puss still coming from port incision. Educated pt if puss is still coming from port site when she finishes abx on Wednesday to notify MD Nga Benedict. Also educated pt if fever/chills/ other signs of infection start, to notify MD Nga Benedict. Pt verbalized understanding.

## 2023-07-10 NOTE — PROGRESS NOTES
Pt arrived from general surgery office and stated provider said ok to use port. Upon assessment of port, RN noticed puss coming from port. PIV inserted and labs drawn as ordered. MD Joyner notified. Stated to contact general surgery. Joel Fair with General Surgery notified. Stated to send pt back up to general surgery office.

## 2023-07-10 NOTE — PROGRESS NOTES
Lab Results   Component Value Date    WBC 7.4 07/10/2023    HGB 13.3 07/10/2023    HCT 40.9 07/10/2023    MCV 91.1 07/10/2023     07/10/2023     Lab Results   Component Value Date    NEUTROABS 4.7 07/10/2023     Lab Results   Component Value Date     07/10/2023    K 4.2 07/10/2023     07/10/2023    CO2 23 07/10/2023    BUN 5 (L) 07/10/2023    CREATININE 0.7 07/10/2023    GLUCOSE 127 (H) 07/10/2023    CALCIUM 9.5 07/10/2023    PROT 7.1 07/10/2023    LABALBU 4.0 07/10/2023    BILITOT 0.3 07/10/2023    ALKPHOS 94 07/10/2023    AST 15 07/10/2023    ALT 14 07/10/2023    LABGLOM >60 07/10/2023

## 2023-07-13 ENCOUNTER — TELEPHONE (OUTPATIENT)
Dept: SURGERY | Age: 68
End: 2023-07-13

## 2023-07-13 DIAGNOSIS — C50.911 INVASIVE DUCTAL CARCINOMA OF BREAST, FEMALE, RIGHT (HCC): ICD-10-CM

## 2023-07-13 RX ORDER — DOXYCYCLINE HYCLATE 100 MG
100 TABLET ORAL 2 TIMES DAILY
Qty: 14 TABLET | Refills: 0 | Status: SHIPPED | OUTPATIENT
Start: 2023-07-13 | End: 2023-07-20

## 2023-07-13 NOTE — TELEPHONE ENCOUNTER
RX sent for an additional 7 days of Vibramycin. This has been electronically signed by Gloria Bruno.  Candice Dooley PA-C.

## 2023-07-13 NOTE — TELEPHONE ENCOUNTER
Pt has taken 10 days of doxycycline but still has a little puss drainage    Cc:  Jessica Gamboa PA-C

## 2023-07-15 NOTE — PROGRESS NOTES
Subjective  Sukumar Lance is a 45-year-old female who comes today for port check. She has had some purulent material that has been expressed at the incision site. She is run no fevers. She has had a history of problems with Vicryl sutures. We placed Monocryl at the time of surgery. She was recently seen by Dr. Mariana Cardozo in the infusion suite and placed on Vibramycin. Objective  Patient Active Problem List    Diagnosis Date Noted    Invasive ductal carcinoma of breast, female, right ([address]) 05/24/2023       Current Outpatient Medications   Medication Sig Dispense Refill    cephALEXin (KEFLEX) 500 MG capsule Take 1 capsule by mouth 3 times daily      tiZANidine (ZANAFLEX) 2 MG tablet Take 1-2 tablets by mouth every 8 hours as needed (muscle stiffness/spams) 30 tablet 0    ondansetron (ZOFRAN) 4 MG tablet Take 1 tablet by mouth every 8 hours as needed for Nausea or Vomiting 30 tablet 5    Secukinumab, 300 MG Dose, (COSENTYX, 300 MG DOSE,) 150 MG/ML SOSY Inject 300 mg into the skin every 28 days      diclofenac sodium 1 % GEL Apply 2 g topically 4 times daily as needed      HYDROcodone-acetaminophen (NORCO) 7.5-325 MG per tablet Take 1 tablet by mouth every 6 hours as needed. lisinopril (PRINIVIL;ZESTRIL) 20 MG tablet Take 1 tablet by mouth daily      lovastatin (MEVACOR) 20 MG tablet Take 1 tablet by mouth nightly      metoprolol succinate (TOPROL XL) 100 MG extended release tablet Take 1 tablet by mouth daily      montelukast (SINGULAIR) 10 MG tablet Take 1 tablet by mouth nightly      doxycycline hyclate (VIBRA-TABS) 100 MG tablet Take 1 tablet by mouth 2 times daily for 7 days 14 tablet 0     No current facility-administered medications for this visit.        Allergies Amoxicillin and Other    Past Medical History:   Diagnosis Date    Hand numbness     right hand s/p arm surgery    Hyperlipidemia     Hypertension     Malignant neoplasm of overlapping sites of right breast ([address]) 05/03/2023

## 2023-07-17 ENCOUNTER — HOSPITAL ENCOUNTER (OUTPATIENT)
Dept: INFUSION THERAPY | Age: 68
Setting detail: INFUSION SERIES
Discharge: HOME | End: 2023-07-17
Payer: MEDICARE

## 2023-07-17 VITALS
RESPIRATION RATE: 18 BRPM | OXYGEN SATURATION: 98 % | BODY MASS INDEX: 38.47 KG/M2 | HEART RATE: 85 BPM | WEIGHT: 253 LBS | SYSTOLIC BLOOD PRESSURE: 142 MMHG | TEMPERATURE: 97.4 F | DIASTOLIC BLOOD PRESSURE: 85 MMHG

## 2023-07-17 DIAGNOSIS — C50.911 INVASIVE DUCTAL CARCINOMA OF BREAST, FEMALE, RIGHT (HCC): Primary | ICD-10-CM

## 2023-07-17 LAB
ALBUMIN SERPL-MCNC: 3.8 G/DL (ref 3.5–5.2)
ALP SERPL-CCNC: 91 U/L (ref 35–104)
ALT SERPL-CCNC: 14 U/L (ref 5–33)
ANION GAP SERPL CALCULATED.3IONS-SCNC: 13 MMOL/L (ref 7–19)
AST SERPL-CCNC: 16 U/L (ref 5–32)
BASOPHILS # BLD: 0.1 K/UL (ref 0–0.2)
BASOPHILS NFR BLD: 0.9 % (ref 0–1)
BILIRUB SERPL-MCNC: 0.4 MG/DL (ref 0.2–1.2)
BUN SERPL-MCNC: 9 MG/DL (ref 8–23)
CALCIUM SERPL-MCNC: 9.2 MG/DL (ref 8.8–10.2)
CHLORIDE SERPL-SCNC: 101 MMOL/L (ref 98–111)
CO2 SERPL-SCNC: 22 MMOL/L (ref 22–29)
CREAT SERPL-MCNC: 0.7 MG/DL (ref 0.5–0.9)
EOSINOPHIL # BLD: 0.1 K/UL (ref 0–0.6)
EOSINOPHIL NFR BLD: 1.3 % (ref 0–5)
ERYTHROCYTE [DISTWIDTH] IN BLOOD BY AUTOMATED COUNT: 13.9 % (ref 11.5–14.5)
GLUCOSE SERPL-MCNC: 126 MG/DL (ref 74–109)
HCT VFR BLD AUTO: 37.9 % (ref 37–47)
HGB BLD-MCNC: 12.1 G/DL (ref 12–16)
IMM GRANULOCYTES # BLD: 0.1 K/UL
LYMPHOCYTES # BLD: 2.1 K/UL (ref 1.1–4.5)
LYMPHOCYTES NFR BLD: 25.5 % (ref 20–40)
MCH RBC QN AUTO: 29.5 PG (ref 27–31)
MCHC RBC AUTO-ENTMCNC: 31.9 G/DL (ref 33–37)
MCV RBC AUTO: 92.4 FL (ref 81–99)
MONOCYTES # BLD: 0.3 K/UL (ref 0–0.9)
MONOCYTES NFR BLD: 3.3 % (ref 0–10)
NEUTROPHILS # BLD: 5.6 K/UL (ref 1.5–7.5)
NEUTS SEG NFR BLD: 68 % (ref 50–65)
PLATELET # BLD AUTO: 212 K/UL (ref 130–400)
PMV BLD AUTO: 10.6 FL (ref 9.4–12.3)
POTASSIUM SERPL-SCNC: 4.1 MMOL/L (ref 3.5–5)
PROT SERPL-MCNC: 6.8 G/DL (ref 6.6–8.7)
RBC # BLD AUTO: 4.1 M/UL (ref 4.2–5.4)
SODIUM SERPL-SCNC: 136 MMOL/L (ref 136–145)
WBC # BLD AUTO: 8.2 K/UL (ref 4.8–10.8)

## 2023-07-17 PROCEDURE — 96415 CHEMO IV INFUSION ADDL HR: CPT

## 2023-07-17 PROCEDURE — 96376 TX/PRO/DX INJ SAME DRUG ADON: CPT

## 2023-07-17 PROCEDURE — 36591 DRAW BLOOD OFF VENOUS DEVICE: CPT

## 2023-07-17 PROCEDURE — 80053 COMPREHEN METABOLIC PANEL: CPT

## 2023-07-17 PROCEDURE — 96366 THER/PROPH/DIAG IV INF ADDON: CPT

## 2023-07-17 PROCEDURE — 6360000002 HC RX W HCPCS: Performed by: INTERNAL MEDICINE

## 2023-07-17 PROCEDURE — 2580000003 HC RX 258: Performed by: INTERNAL MEDICINE

## 2023-07-17 PROCEDURE — 2500000003 HC RX 250 WO HCPCS: Performed by: INTERNAL MEDICINE

## 2023-07-17 PROCEDURE — 96413 CHEMO IV INFUSION 1 HR: CPT

## 2023-07-17 PROCEDURE — 85025 COMPLETE CBC W/AUTO DIFF WBC: CPT

## 2023-07-17 RX ORDER — HEPARIN 100 UNIT/ML
500 SYRINGE INTRAVENOUS PRN
Status: DISCONTINUED | OUTPATIENT
Start: 2023-07-17 | End: 2023-07-18 | Stop reason: HOSPADM

## 2023-07-17 RX ORDER — DIPHENHYDRAMINE HYDROCHLORIDE 50 MG/ML
25 INJECTION INTRAMUSCULAR; INTRAVENOUS ONCE
Status: COMPLETED | OUTPATIENT
Start: 2023-07-17 | End: 2023-07-17

## 2023-07-17 RX ORDER — SODIUM CHLORIDE 0.9 % (FLUSH) 0.9 %
5-40 SYRINGE (ML) INJECTION PRN
Status: DISCONTINUED | OUTPATIENT
Start: 2023-07-17 | End: 2023-07-18 | Stop reason: HOSPADM

## 2023-07-17 RX ORDER — SODIUM CHLORIDE 9 MG/ML
5-250 INJECTION, SOLUTION INTRAVENOUS PRN
Status: DISCONTINUED | OUTPATIENT
Start: 2023-07-17 | End: 2023-07-18 | Stop reason: HOSPADM

## 2023-07-17 RX ORDER — DEXAMETHASONE SODIUM PHOSPHATE 10 MG/ML
10 INJECTION, SOLUTION INTRAMUSCULAR; INTRAVENOUS ONCE
Status: COMPLETED | OUTPATIENT
Start: 2023-07-17 | End: 2023-07-17

## 2023-07-17 RX ORDER — PALONOSETRON 0.05 MG/ML
0.25 INJECTION, SOLUTION INTRAVENOUS ONCE
Status: COMPLETED | OUTPATIENT
Start: 2023-07-17 | End: 2023-07-17

## 2023-07-17 RX ORDER — ONDANSETRON 2 MG/ML
8 INJECTION INTRAMUSCULAR; INTRAVENOUS
Status: DISCONTINUED | OUTPATIENT
Start: 2023-07-17 | End: 2023-07-18 | Stop reason: HOSPADM

## 2023-07-17 RX ADMIN — DEXAMETHASONE SODIUM PHOSPHATE 10 MG: 10 INJECTION, SOLUTION INTRAMUSCULAR; INTRAVENOUS at 08:41

## 2023-07-17 RX ADMIN — SODIUM CHLORIDE, PRESERVATIVE FREE 10 ML: 5 INJECTION INTRAVENOUS at 10:45

## 2023-07-17 RX ADMIN — PALONOSETRON 0.25 MG: 0.05 INJECTION, SOLUTION INTRAVENOUS at 08:40

## 2023-07-17 RX ADMIN — SODIUM CHLORIDE, PRESERVATIVE FREE 20 MG: 5 INJECTION INTRAVENOUS at 08:48

## 2023-07-17 RX ADMIN — HEPARIN 500 UNITS: 100 SYRINGE at 12:16

## 2023-07-17 RX ADMIN — FOSAPREPITANT DIMEGLUMINE 150 MG: 150 INJECTION, POWDER, LYOPHILIZED, FOR SOLUTION INTRAVENOUS at 09:12

## 2023-07-17 RX ADMIN — CARBOPLATIN 260 MG: 10 INJECTION, SOLUTION INTRAVENOUS at 10:48

## 2023-07-17 RX ADMIN — SODIUM CHLORIDE, PRESERVATIVE FREE 20 ML: 5 INJECTION INTRAVENOUS at 12:16

## 2023-07-17 RX ADMIN — PACLITAXEL 192 MG: 6 INJECTION, SOLUTION, CONCENTRATE INTRAVENOUS at 09:41

## 2023-07-17 RX ADMIN — SODIUM CHLORIDE 20 ML/HR: 9 INJECTION, SOLUTION INTRAVENOUS at 08:45

## 2023-07-17 RX ADMIN — DIPHENHYDRAMINE HYDROCHLORIDE 50 MG: 50 INJECTION, SOLUTION INTRAMUSCULAR; INTRAVENOUS at 08:48

## 2023-07-21 DIAGNOSIS — C50.911 INVASIVE DUCTAL CARCINOMA OF BREAST, FEMALE, RIGHT (HCC): Primary | ICD-10-CM

## 2023-07-21 NOTE — PROGRESS NOTES
HISTORY OF PRESENT ILLNESS:    Ms. Nata Asif presents for a 2-month exam following ultrasound response to neoadjuvant chemotherapy for her triple negative right breast cancer    She is recently status post ultrasound guided breast biopsy  on the right which revealed a 2.5 cm at 9:00 and 1.8 cm at 2:00 high grade invasive ductal carcinoma. 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 Margins are obscured and there is internal nonenhancement suggesting  necrosis. There also may be some surrounding hematoma obscuring  margins which may overestimate size. This mass has a biopsy clip. In the right breast medially at 2:00 corresponding to a biopsy-proven  malignancy is an additional very heterogeneously  predominantly rim  enhancing mass measuring approximately 33 x 31 x 49 mm in length x  width x height. This mass has a biopsy clip. There also may be some  surrounding hematoma obscuring margins which may overestimate size. This mass is extremely lobulated and there are at least three  additional 5mm or smaller additional similar-appearing masses  surrounding the anterior aspect of this mass. There is an additional  dominant mass located approximately 16 mm anterior measuring 18 mm. This mass demonstrates MRI findings most suggestive of direct  secondary skin invasion. Additionally the entire medial right breast  skin is thickened with abnormal enhancement.   There is marked level 1 right axillary lymphadenopathy, with at least  3 level 1 lymph nodes which demonstrate no residual hilar component  measuring 25mm, 26mm and 19 mm in long

## 2023-07-24 ENCOUNTER — OFFICE VISIT (OUTPATIENT)
Dept: HEMATOLOGY | Age: 68
End: 2023-07-24
Payer: MEDICARE

## 2023-07-24 ENCOUNTER — HOSPITAL ENCOUNTER (OUTPATIENT)
Dept: INFUSION THERAPY | Age: 68
Setting detail: INFUSION SERIES
Discharge: HOME | End: 2023-07-24
Payer: MEDICARE

## 2023-07-24 ENCOUNTER — HOSPITAL ENCOUNTER (OUTPATIENT)
Dept: INFUSION THERAPY | Age: 68
Discharge: HOME | End: 2023-07-24
Payer: MEDICARE

## 2023-07-24 VITALS
HEART RATE: 97 BPM | SYSTOLIC BLOOD PRESSURE: 122 MMHG | DIASTOLIC BLOOD PRESSURE: 68 MMHG | OXYGEN SATURATION: 97 % | WEIGHT: 252.3 LBS | HEIGHT: 68 IN | TEMPERATURE: 97.9 F | BODY MASS INDEX: 38.24 KG/M2

## 2023-07-24 VITALS
OXYGEN SATURATION: 99 % | HEART RATE: 82 BPM | RESPIRATION RATE: 17 BRPM | SYSTOLIC BLOOD PRESSURE: 129 MMHG | DIASTOLIC BLOOD PRESSURE: 82 MMHG | TEMPERATURE: 96.6 F

## 2023-07-24 DIAGNOSIS — C50.911 INVASIVE DUCTAL CARCINOMA OF BREAST, FEMALE, RIGHT (HCC): Primary | ICD-10-CM

## 2023-07-24 DIAGNOSIS — T80.90XD INFUSION REACTION, SUBSEQUENT ENCOUNTER: ICD-10-CM

## 2023-07-24 DIAGNOSIS — C50.911 INVASIVE DUCTAL CARCINOMA OF BREAST, FEMALE, RIGHT (HCC): ICD-10-CM

## 2023-07-24 DIAGNOSIS — T45.1X5D ADVERSE EFFECT OF CHEMOTHERAPY, SUBSEQUENT ENCOUNTER: ICD-10-CM

## 2023-07-24 DIAGNOSIS — Z71.89 CARE PLAN DISCUSSED WITH PATIENT: ICD-10-CM

## 2023-07-24 DIAGNOSIS — Z17.1 MALIGNANT NEOPLASM OF UPPER-INNER QUADRANT OF RIGHT BREAST IN FEMALE, ESTROGEN RECEPTOR NEGATIVE (HCC): ICD-10-CM

## 2023-07-24 DIAGNOSIS — Z51.12 ENCOUNTER FOR ANTINEOPLASTIC IMMUNOTHERAPY: ICD-10-CM

## 2023-07-24 DIAGNOSIS — Z51.11 CHEMOTHERAPY MANAGEMENT, ENCOUNTER FOR: ICD-10-CM

## 2023-07-24 DIAGNOSIS — C50.211 MALIGNANT NEOPLASM OF UPPER-INNER QUADRANT OF RIGHT BREAST IN FEMALE, ESTROGEN RECEPTOR NEGATIVE (HCC): ICD-10-CM

## 2023-07-24 PROBLEM — K92.2 GI BLEED: Status: ACTIVE | Noted: 2019-06-25

## 2023-07-24 PROBLEM — M54.2 ACUTE NECK PAIN: Status: ACTIVE | Noted: 2019-06-25

## 2023-07-24 PROBLEM — E66.9 OBESITY, UNSPECIFIED OBESITY SEVERITY, UNSPECIFIED OBESITY TYPE: Status: ACTIVE | Noted: 2018-09-06

## 2023-07-24 PROBLEM — K52.9 COLITIS: Status: ACTIVE | Noted: 2019-06-25

## 2023-07-24 PROBLEM — I10 HTN (HYPERTENSION), BENIGN: Status: ACTIVE | Noted: 2018-09-06

## 2023-07-24 PROBLEM — K22.2 SCHATZKI'S RING: Status: ACTIVE | Noted: 2019-08-28

## 2023-07-24 PROBLEM — M54.50 ACUTE BILATERAL LOW BACK PAIN: Status: ACTIVE | Noted: 2019-06-25

## 2023-07-24 PROBLEM — D72.829 LEUKOCYTOSIS: Status: ACTIVE | Noted: 2019-06-25

## 2023-07-24 PROBLEM — E86.9 VOLUME DEPLETION: Status: ACTIVE | Noted: 2019-06-26

## 2023-07-24 PROBLEM — K44.9 HIATAL HERNIA: Status: ACTIVE | Noted: 2019-06-28

## 2023-07-24 PROBLEM — K21.00 GASTROESOPHAGEAL REFLUX DISEASE WITH ESOPHAGITIS: Status: ACTIVE | Noted: 2019-06-25

## 2023-07-24 LAB
ALBUMIN SERPL-MCNC: 3.7 G/DL (ref 3.5–5.2)
ALP SERPL-CCNC: 80 U/L (ref 35–104)
ALT SERPL-CCNC: 22 U/L (ref 9–52)
ANION GAP SERPL CALCULATED.3IONS-SCNC: 12 MMOL/L (ref 7–19)
AST SERPL-CCNC: 24 U/L (ref 14–36)
BILIRUB SERPL-MCNC: 0.4 MG/DL (ref 0.2–1.3)
BUN SERPL-MCNC: 4 MG/DL (ref 7–17)
CALCIUM SERPL-MCNC: 9.2 MG/DL (ref 8.4–10.2)
CHLORIDE SERPL-SCNC: 106 MMOL/L (ref 98–111)
CO2 SERPL-SCNC: 23 MMOL/L (ref 22–29)
CORTIS AM PEAK SERPL-MCNC: 13 UG/DL (ref 6.2–19.4)
CREAT SERPL-MCNC: 0.5 MG/DL (ref 0.5–1)
ERYTHROCYTE [DISTWIDTH] IN BLOOD BY AUTOMATED COUNT: 14.4 % (ref 11.7–14.4)
GLOBULIN: 3 G/DL
GLUCOSE SERPL-MCNC: 110 MG/DL (ref 74–106)
HCT VFR BLD AUTO: 34.2 % (ref 34.1–44.9)
HGB BLD-MCNC: 10.9 G/DL (ref 11.2–15.7)
LYMPHOCYTES # BLD: 1.55 K/UL (ref 1.18–3.74)
LYMPHOCYTES NFR BLD: 37.2 % (ref 19.3–53.1)
MCH RBC QN AUTO: 29.5 PG (ref 25.6–32.2)
MCHC RBC AUTO-ENTMCNC: 31.9 G/DL (ref 32.3–35.5)
MCV RBC AUTO: 92.4 FL (ref 79.4–94.8)
MONOCYTES # BLD: 0.33 K/UL (ref 0.24–0.82)
MONOCYTES NFR BLD: 7.9 % (ref 4.7–12.5)
NEUTROPHILS # BLD: 2.15 K/UL (ref 1.56–6.13)
NEUTS SEG NFR BLD: 51.5 % (ref 34–71.1)
PLATELET # BLD AUTO: 233 K/UL (ref 182–369)
PMV BLD AUTO: 10.6 FL (ref 7.4–10.4)
POTASSIUM SERPL-SCNC: 4.3 MMOL/L (ref 3.5–5.1)
PROT SERPL-MCNC: 6.7 G/DL (ref 6.3–8.2)
RBC # BLD AUTO: 3.7 M/UL (ref 3.93–5.22)
SODIUM SERPL-SCNC: 141 MMOL/L (ref 137–145)
TSH SERPL DL<=0.005 MIU/L-ACNC: 2.23 UIU/ML (ref 0.27–4.2)
WBC # BLD AUTO: 4.17 K/UL (ref 3.98–10.04)

## 2023-07-24 PROCEDURE — A4216 STERILE WATER/SALINE, 10 ML: HCPCS | Performed by: INTERNAL MEDICINE

## 2023-07-24 PROCEDURE — 1090F PRES/ABSN URINE INCON ASSESS: CPT | Performed by: INTERNAL MEDICINE

## 2023-07-24 PROCEDURE — 99214 OFFICE O/P EST MOD 30 MIN: CPT | Performed by: INTERNAL MEDICINE

## 2023-07-24 PROCEDURE — G8427 DOCREV CUR MEDS BY ELIG CLIN: HCPCS | Performed by: INTERNAL MEDICINE

## 2023-07-24 PROCEDURE — 96413 CHEMO IV INFUSION 1 HR: CPT

## 2023-07-24 PROCEDURE — 6360000002 HC RX W HCPCS: Performed by: INTERNAL MEDICINE

## 2023-07-24 PROCEDURE — 2580000003 HC RX 258: Performed by: INTERNAL MEDICINE

## 2023-07-24 PROCEDURE — 96417 CHEMO IV INFUS EACH ADDL SEQ: CPT

## 2023-07-24 PROCEDURE — 36415 COLL VENOUS BLD VENIPUNCTURE: CPT

## 2023-07-24 PROCEDURE — 96366 THER/PROPH/DIAG IV INF ADDON: CPT

## 2023-07-24 PROCEDURE — 85025 COMPLETE CBC W/AUTO DIFF WBC: CPT

## 2023-07-24 PROCEDURE — 1123F ACP DISCUSS/DSCN MKR DOCD: CPT | Performed by: INTERNAL MEDICINE

## 2023-07-24 PROCEDURE — 80053 COMPREHEN METABOLIC PANEL: CPT

## 2023-07-24 PROCEDURE — 84443 ASSAY THYROID STIM HORMONE: CPT

## 2023-07-24 PROCEDURE — 1036F TOBACCO NON-USER: CPT | Performed by: INTERNAL MEDICINE

## 2023-07-24 PROCEDURE — 36591 DRAW BLOOD OFF VENOUS DEVICE: CPT

## 2023-07-24 PROCEDURE — 3017F COLORECTAL CA SCREEN DOC REV: CPT | Performed by: INTERNAL MEDICINE

## 2023-07-24 PROCEDURE — G8417 CALC BMI ABV UP PARAM F/U: HCPCS | Performed by: INTERNAL MEDICINE

## 2023-07-24 PROCEDURE — 82533 TOTAL CORTISOL: CPT

## 2023-07-24 PROCEDURE — 2500000003 HC RX 250 WO HCPCS: Performed by: INTERNAL MEDICINE

## 2023-07-24 PROCEDURE — G8400 PT W/DXA NO RESULTS DOC: HCPCS | Performed by: INTERNAL MEDICINE

## 2023-07-24 PROCEDURE — 96375 TX/PRO/DX INJ NEW DRUG ADDON: CPT

## 2023-07-24 RX ORDER — DEXAMETHASONE SODIUM PHOSPHATE 10 MG/ML
10 INJECTION, SOLUTION INTRAMUSCULAR; INTRAVENOUS ONCE
Status: COMPLETED | OUTPATIENT
Start: 2023-07-24 | End: 2023-07-24

## 2023-07-24 RX ORDER — SODIUM CHLORIDE 0.9 % (FLUSH) 0.9 %
5-40 SYRINGE (ML) INJECTION PRN
Status: DISCONTINUED | OUTPATIENT
Start: 2023-07-24 | End: 2023-07-25 | Stop reason: HOSPADM

## 2023-07-24 RX ORDER — DIPHENHYDRAMINE HYDROCHLORIDE 50 MG/ML
50 INJECTION INTRAMUSCULAR; INTRAVENOUS ONCE
Status: COMPLETED | OUTPATIENT
Start: 2023-07-24 | End: 2023-07-24

## 2023-07-24 RX ORDER — HEPARIN 100 UNIT/ML
500 SYRINGE INTRAVENOUS PRN
Status: DISCONTINUED | OUTPATIENT
Start: 2023-07-24 | End: 2023-07-25 | Stop reason: HOSPADM

## 2023-07-24 RX ORDER — PALONOSETRON 0.05 MG/ML
0.25 INJECTION, SOLUTION INTRAVENOUS ONCE
Status: COMPLETED | OUTPATIENT
Start: 2023-07-24 | End: 2023-07-24

## 2023-07-24 RX ORDER — SODIUM CHLORIDE 9 MG/ML
5-250 INJECTION, SOLUTION INTRAVENOUS PRN
Status: DISCONTINUED | OUTPATIENT
Start: 2023-07-24 | End: 2023-07-25 | Stop reason: HOSPADM

## 2023-07-24 RX ADMIN — CARBOPLATIN 260 MG: 10 INJECTION, SOLUTION INTRAVENOUS at 13:10

## 2023-07-24 RX ADMIN — DIPHENHYDRAMINE HYDROCHLORIDE 50 MG: 50 INJECTION, SOLUTION INTRAMUSCULAR; INTRAVENOUS at 11:20

## 2023-07-24 RX ADMIN — FOSAPREPITANT DIMEGLUMINE 150 MG: 150 INJECTION, POWDER, LYOPHILIZED, FOR SOLUTION INTRAVENOUS at 10:58

## 2023-07-24 RX ADMIN — PACLITAXEL 192 MG: 6 INJECTION, SOLUTION, CONCENTRATE INTRAVENOUS at 12:06

## 2023-07-24 RX ADMIN — HEPARIN 500 UNITS: 100 SYRINGE at 15:16

## 2023-07-24 RX ADMIN — SODIUM CHLORIDE, PRESERVATIVE FREE 20 ML: 5 INJECTION INTRAVENOUS at 15:17

## 2023-07-24 RX ADMIN — SODIUM CHLORIDE 20 ML/HR: 9 INJECTION, SOLUTION INTRAVENOUS at 10:20

## 2023-07-24 RX ADMIN — PALONOSETRON 0.25 MG: 0.05 INJECTION, SOLUTION INTRAVENOUS at 10:21

## 2023-07-24 RX ADMIN — FAMOTIDINE 20 MG: 10 INJECTION INTRAVENOUS at 11:20

## 2023-07-24 RX ADMIN — DEXAMETHASONE SODIUM PHOSPHATE 10 MG: 10 INJECTION, SOLUTION INTRAMUSCULAR; INTRAVENOUS at 10:21

## 2023-07-24 RX ADMIN — SODIUM CHLORIDE 400 MG: 9 INJECTION, SOLUTION INTRAVENOUS at 11:33

## 2023-07-24 NOTE — PROGRESS NOTES
Patient pembrolizumab [facility]) 400mg last given on 06/12/2023. Patient due every 6 weeks. Treatment plan updated to maintain pembrolizumab schedule, per Dr. Radha Mondragon. Patient to receive pembrolizumab (KEYTRUDA)  400mg this Gwynne Bernheim 400mg will be due on 09/04/2023.   Electronically signed by Brandy Lombardo RN on 7/24/2023 at 10:09 AM

## 2023-07-24 NOTE — PROGRESS NOTES
Pt arrived with CBC and CMP completed at MD office. Port accessed and tsh and cortisol drawn. Aloxi, decadron, benadryl, pepcid, and emend given as ordered for premeds. Keytruda 400 mg given. Taxol 192 mg given. Cold gloves and socks worn during taxol infusion. Carbo 260 mg given. Pt tolerated all infusions well. Cold cap in place for 45 mins pre taxol infusion, through taxol and carbo infusions, and for 90 mins post infusion. Blood return checked and present before, during, and after all infusions.

## 2023-07-24 NOTE — DISCHARGE INSTRUCTIONS
carboplatin  Pronunciation:  LAMONT irving BISHNU tin  What is the most important information I should know about carboplatin? You may have a severe allergic reaction within minutes of receiving a carboplatin injection. Your caregivers will quickly treat you if you if this occurs. You may be given medicine to help prevent an allergic reaction. Carboplatin can increase your risk of bleeding or infection. Call your doctor if you have a fever, chills, cough, mouth sores, or unusual bleeding or bruising. What is carboplatin? Carboplatin is used together with other cancer drugs to treat ovarian cancer. Carboplatin may also be used for purposes not listed in this medication guide. What should I discuss with my healthcare provider before receiving carboplatin? You should not be treated with carboplatin if you are allergic to it, or if you have:  severe bleeding;  severe bone marrow suppression; or  if you have had a severe allergic reaction to similar cancer drugs such as oxaliplatin or cisplatin. Tell your doctor if you have ever had:  liver disease;  kidney disease;  a weak immune system; or  if you have received carboplatin in the past.  Carboplatin may harm an unborn baby. Use effective birth control to prevent pregnancy, and tell your doctor if you become pregnant. You should not breastfeed while using carboplatin. How is carboplatin given? Carboplatin is given as an infusion into a vein, usually given once every 4 weeks. A healthcare provider will give you this injection. Tell your caregivers if you feel any burning, pain, or swelling around the IV needle when carboplatin is injected. You may be given other medications to help prevent serious side effects or an allergic reaction. Keep using these medicines for as long as your doctor has prescribed. Carboplatin can increase your risk of bleeding or infection. You will need frequent medical tests.  Your cancer treatments may be delayed based on the uses, directions, precautions, warnings, drug interactions, allergic reactions, or adverse effects. If you have questions about the drugs you are taking, check with your doctor, nurse or pharmacist.  Copyright 2314-0012 [address]: 6.01. Revision date: 12/1/2020. Care instructions adapted under license by [facility] [place] ([facility]). If you have questions about a medical condition or this instruction, always ask your healthcare professional. [address] any warranty or liability for your use of this information. pembrolizumab  Pronunciation:  SHUANA vazquez NOAM ue mab  Brand:  Elmer Dee  What is the most important information I should know about pembrolizumab? Pembrolizumab can cause serious or life-threatening side effects. Some side effects may need to be treated with other medicine, and your cancer treatments may be delayed. You will need frequent medical tests to help your doctor determine if it is safe for you to keep receiving pembrolizumab. Call your doctor at once if you have: skin problems, vision problems, fever, swollen glands, neck stiffness, chest pain, cough, shortness of breath, muscle or joint pain, pale skin, weakness, diarrhea, severe stomach pain, blood in your stools, bruising or bleeding, dark urine, yellowing of the skin or eyes, a hormonal disorder (frequent headaches, feeling light-headed, rapid heartbeats, a deeper voice, increased thirst or urination, feeling cold, weight gain or loss), or a change in the amount or color of your urine. What is pembrolizumab?   Pembrolizumab is a cancer medicine that is used alone or in combination with other medicines to treat certain types of cancer such as:  skin cancer (melanoma, Merkel cell carcinoma, squamous cell carcinoma);  lung cancer;  head and neck cancer;  classical Hodgkin lymphoma;  primary mediastinal large B-cell lymphoma;  cancer of the kidney, bladder, and urinary tract;  colorectal cancer;  liver

## 2023-07-25 ENCOUNTER — TELEPHONE (OUTPATIENT)
Dept: OTHER | Age: 68
End: 2023-07-25

## 2023-07-25 ENCOUNTER — TELEPHONE (OUTPATIENT)
Dept: HEMATOLOGY | Age: 68
End: 2023-07-25

## 2023-07-25 NOTE — TELEPHONE ENCOUNTER
Pt states she is feeling dehydrated after her last treatment and Tuesday's are usually her good day. She denies nausea or vomiting but just no appetite. She has eaten some cheese and crackers and staying hydrated with water and Pedialyte pops. Pt has appointment with Dr. Arturo Navarro and [facility] for response tomorrow, reviewed appts. Pt c/o fatigue and encouraged her to rest but get up and move around every hour to prevent blood clots. Pt verbalized understanding. Discussed genetic testing results and patient unsure if she wants to proceed with a mastectomy due to her healing process being prolonged. She c/o problems since she had port placed and healing. She will discuss with Dr. Arturo Navarro. She is to call with questions or concerns.

## 2023-07-25 NOTE — TELEPHONE ENCOUNTER
Called to get an email address so that [address] [place] [address] can email her to activate an account for telehealth. The patient does not have an email address so she will call back with a family member's email address. Christian stated that she will need to drive to [place] for this visit or she cannot have an appointment. He also said that it will be extensive with technology for this visit. We are waiting on the patient to call us back in order to move forward with this appointment.

## 2023-07-26 ENCOUNTER — HOSPITAL ENCOUNTER (OUTPATIENT)
Dept: WOMENS IMAGING | Age: 68
Discharge: HOME | End: 2023-07-26
Payer: MEDICARE

## 2023-07-26 ENCOUNTER — OFFICE VISIT (OUTPATIENT)
Dept: SURGERY | Age: 68
End: 2023-07-26
Payer: MEDICARE

## 2023-07-26 DIAGNOSIS — C50.211 MALIGNANT NEOPLASM OF UPPER-INNER QUADRANT OF RIGHT FEMALE BREAST, UNSPECIFIED ESTROGEN RECEPTOR STATUS (HCC): ICD-10-CM

## 2023-07-26 DIAGNOSIS — Z15.01 LI-FRAUMENI SYNDROME: ICD-10-CM

## 2023-07-26 DIAGNOSIS — C50.811 MALIGNANT NEOPLASM OF OVERLAPPING SITES OF RIGHT BREAST (HCC): Primary | ICD-10-CM

## 2023-07-26 PROCEDURE — 3017F COLORECTAL CA SCREEN DOC REV: CPT | Performed by: SURGERY

## 2023-07-26 PROCEDURE — G8417 CALC BMI ABV UP PARAM F/U: HCPCS | Performed by: SURGERY

## 2023-07-26 PROCEDURE — G8428 CUR MEDS NOT DOCUMENT: HCPCS | Performed by: SURGERY

## 2023-07-26 PROCEDURE — 1036F TOBACCO NON-USER: CPT | Performed by: SURGERY

## 2023-07-26 PROCEDURE — 1090F PRES/ABSN URINE INCON ASSESS: CPT | Performed by: SURGERY

## 2023-07-26 PROCEDURE — 1123F ACP DISCUSS/DSCN MKR DOCD: CPT | Performed by: SURGERY

## 2023-07-26 PROCEDURE — 99213 OFFICE O/P EST LOW 20 MIN: CPT | Performed by: SURGERY

## 2023-07-26 PROCEDURE — G8400 PT W/DXA NO RESULTS DOC: HCPCS | Performed by: SURGERY

## 2023-07-26 PROCEDURE — 76642 ULTRASOUND BREAST LIMITED: CPT

## 2023-07-27 PROBLEM — Z15.01 LI-FRAUMENI SYNDROME: Status: ACTIVE | Noted: 2023-07-27

## 2023-07-31 ENCOUNTER — HOSPITAL ENCOUNTER (OUTPATIENT)
Dept: INFUSION THERAPY | Age: 68
Setting detail: INFUSION SERIES
Discharge: HOME | End: 2023-07-31
Payer: MEDICARE

## 2023-07-31 VITALS
OXYGEN SATURATION: 99 % | DIASTOLIC BLOOD PRESSURE: 93 MMHG | HEIGHT: 68 IN | TEMPERATURE: 97.1 F | HEART RATE: 92 BPM | BODY MASS INDEX: 38.1 KG/M2 | SYSTOLIC BLOOD PRESSURE: 148 MMHG | RESPIRATION RATE: 20 BRPM | WEIGHT: 251.38 LBS

## 2023-07-31 DIAGNOSIS — C50.911 INVASIVE DUCTAL CARCINOMA OF BREAST, FEMALE, RIGHT (HCC): Primary | ICD-10-CM

## 2023-07-31 LAB
ALBUMIN SERPL-MCNC: 3.8 G/DL (ref 3.5–5.2)
ALP SERPL-CCNC: 80 U/L (ref 35–104)
ALT SERPL-CCNC: 22 U/L (ref 5–33)
ANION GAP SERPL CALCULATED.3IONS-SCNC: 12 MMOL/L (ref 7–19)
AST SERPL-CCNC: 22 U/L (ref 5–32)
BASOPHILS # BLD: 0 K/UL (ref 0–0.2)
BASOPHILS NFR BLD: 0.6 % (ref 0–1)
BILIRUB SERPL-MCNC: 0.3 MG/DL (ref 0.2–1.2)
BUN SERPL-MCNC: 5 MG/DL (ref 8–23)
CALCIUM SERPL-MCNC: 9.4 MG/DL (ref 8.8–10.2)
CHLORIDE SERPL-SCNC: 106 MMOL/L (ref 98–111)
CO2 SERPL-SCNC: 22 MMOL/L (ref 22–29)
CREAT SERPL-MCNC: 0.6 MG/DL (ref 0.5–0.9)
EOSINOPHIL # BLD: 0 K/UL (ref 0–0.6)
EOSINOPHIL NFR BLD: 0.6 % (ref 0–5)
ERYTHROCYTE [DISTWIDTH] IN BLOOD BY AUTOMATED COUNT: 15.2 % (ref 11.5–14.5)
GLUCOSE SERPL-MCNC: 121 MG/DL (ref 74–109)
HCT VFR BLD AUTO: 34.9 % (ref 37–47)
HGB BLD-MCNC: 11.2 G/DL (ref 12–16)
IMM GRANULOCYTES # BLD: 0.1 K/UL
LYMPHOCYTES # BLD: 2 K/UL (ref 1.1–4.5)
LYMPHOCYTES NFR BLD: 37.2 % (ref 20–40)
MCH RBC QN AUTO: 29.9 PG (ref 27–31)
MCHC RBC AUTO-ENTMCNC: 32.1 G/DL (ref 33–37)
MCV RBC AUTO: 93.3 FL (ref 81–99)
MONOCYTES # BLD: 0.3 K/UL (ref 0–0.9)
MONOCYTES NFR BLD: 6.5 % (ref 0–10)
NEUTROPHILS # BLD: 2.9 K/UL (ref 1.5–7.5)
NEUTS SEG NFR BLD: 54.2 % (ref 50–65)
PLATELET # BLD AUTO: 375 K/UL (ref 130–400)
PMV BLD AUTO: 9.8 FL (ref 9.4–12.3)
POTASSIUM SERPL-SCNC: 3.8 MMOL/L (ref 3.5–5)
PROT SERPL-MCNC: 6.7 G/DL (ref 6.6–8.7)
RBC # BLD AUTO: 3.74 M/UL (ref 4.2–5.4)
SODIUM SERPL-SCNC: 140 MMOL/L (ref 136–145)
WBC # BLD AUTO: 5.3 K/UL (ref 4.8–10.8)

## 2023-07-31 PROCEDURE — 96413 CHEMO IV INFUSION 1 HR: CPT

## 2023-07-31 PROCEDURE — 2580000003 HC RX 258: Performed by: INTERNAL MEDICINE

## 2023-07-31 PROCEDURE — 96366 THER/PROPH/DIAG IV INF ADDON: CPT

## 2023-07-31 PROCEDURE — 96375 TX/PRO/DX INJ NEW DRUG ADDON: CPT

## 2023-07-31 PROCEDURE — 6360000002 HC RX W HCPCS: Performed by: INTERNAL MEDICINE

## 2023-07-31 PROCEDURE — 96417 CHEMO IV INFUS EACH ADDL SEQ: CPT

## 2023-07-31 PROCEDURE — 2500000003 HC RX 250 WO HCPCS: Performed by: INTERNAL MEDICINE

## 2023-07-31 PROCEDURE — 85025 COMPLETE CBC W/AUTO DIFF WBC: CPT

## 2023-07-31 PROCEDURE — 80053 COMPREHEN METABOLIC PANEL: CPT

## 2023-07-31 PROCEDURE — 36591 DRAW BLOOD OFF VENOUS DEVICE: CPT

## 2023-07-31 RX ORDER — SODIUM CHLORIDE 9 MG/ML
5-250 INJECTION, SOLUTION INTRAVENOUS PRN
Status: CANCELLED | OUTPATIENT
Start: 2023-07-31

## 2023-07-31 RX ORDER — HEPARIN SODIUM (PORCINE) LOCK FLUSH IV SOLN 100 UNIT/ML 100 UNIT/ML
500 SOLUTION INTRAVENOUS PRN
Status: CANCELLED | OUTPATIENT
Start: 2023-07-31

## 2023-07-31 RX ORDER — FAMOTIDINE 10 MG/ML
20 INJECTION, SOLUTION INTRAVENOUS
OUTPATIENT
Start: 2023-08-14

## 2023-07-31 RX ORDER — SODIUM CHLORIDE 9 MG/ML
5-250 INJECTION, SOLUTION INTRAVENOUS PRN
OUTPATIENT
Start: 2023-08-07

## 2023-07-31 RX ORDER — MEPERIDINE HYDROCHLORIDE 50 MG/ML
12.5 INJECTION INTRAMUSCULAR; INTRAVENOUS; SUBCUTANEOUS PRN
OUTPATIENT
Start: 2023-08-14

## 2023-07-31 RX ORDER — ACETAMINOPHEN 325 MG/1
650 TABLET ORAL
OUTPATIENT
Start: 2023-08-14

## 2023-07-31 RX ORDER — EPINEPHRINE 1 MG/ML
0.3 INJECTION, SOLUTION, CONCENTRATE INTRAVENOUS PRN
OUTPATIENT
Start: 2023-08-07

## 2023-07-31 RX ORDER — SODIUM CHLORIDE 0.9 % (FLUSH) 0.9 %
5-40 SYRINGE (ML) INJECTION PRN
OUTPATIENT
Start: 2023-08-07

## 2023-07-31 RX ORDER — HEPARIN SODIUM (PORCINE) LOCK FLUSH IV SOLN 100 UNIT/ML 100 UNIT/ML
500 SOLUTION INTRAVENOUS PRN
OUTPATIENT
Start: 2023-08-14

## 2023-07-31 RX ORDER — HEPARIN 100 UNIT/ML
500 SYRINGE INTRAVENOUS PRN
Status: DISCONTINUED | OUTPATIENT
Start: 2023-07-31 | End: 2023-08-01 | Stop reason: HOSPADM

## 2023-07-31 RX ORDER — FAMOTIDINE 10 MG/ML
20 INJECTION, SOLUTION INTRAVENOUS ONCE
OUTPATIENT
Start: 2023-08-14 | End: 2023-08-14

## 2023-07-31 RX ORDER — FAMOTIDINE 10 MG/ML
20 INJECTION, SOLUTION INTRAVENOUS ONCE
Status: CANCELLED | OUTPATIENT
Start: 2023-07-31 | End: 2023-07-31

## 2023-07-31 RX ORDER — HEPARIN SODIUM (PORCINE) LOCK FLUSH IV SOLN 100 UNIT/ML 100 UNIT/ML
500 SOLUTION INTRAVENOUS PRN
OUTPATIENT
Start: 2023-08-07

## 2023-07-31 RX ORDER — FAMOTIDINE 10 MG/ML
20 INJECTION, SOLUTION INTRAVENOUS
OUTPATIENT
Start: 2023-07-31

## 2023-07-31 RX ORDER — DIPHENHYDRAMINE HYDROCHLORIDE 50 MG/ML
50 INJECTION INTRAMUSCULAR; INTRAVENOUS ONCE
Status: CANCELLED | OUTPATIENT
Start: 2023-07-31 | End: 2023-07-31

## 2023-07-31 RX ORDER — SODIUM CHLORIDE 9 MG/ML
INJECTION, SOLUTION INTRAVENOUS CONTINUOUS
OUTPATIENT
Start: 2023-07-31

## 2023-07-31 RX ORDER — PALONOSETRON 0.05 MG/ML
0.25 INJECTION, SOLUTION INTRAVENOUS ONCE
Start: 2023-07-31 | End: 2023-07-31

## 2023-07-31 RX ORDER — FAMOTIDINE 10 MG/ML
20 INJECTION, SOLUTION INTRAVENOUS
OUTPATIENT
Start: 2023-08-07

## 2023-07-31 RX ORDER — ALBUTEROL SULFATE 90 UG/1
4 AEROSOL, METERED RESPIRATORY (INHALATION) PRN
OUTPATIENT
Start: 2023-08-07

## 2023-07-31 RX ORDER — ONDANSETRON 2 MG/ML
8 INJECTION INTRAMUSCULAR; INTRAVENOUS
OUTPATIENT
Start: 2023-08-07

## 2023-07-31 RX ORDER — PALONOSETRON 0.05 MG/ML
0.25 INJECTION, SOLUTION INTRAVENOUS ONCE
Start: 2023-08-07 | End: 2023-08-07

## 2023-07-31 RX ORDER — ACETAMINOPHEN 325 MG/1
650 TABLET ORAL
OUTPATIENT
Start: 2023-08-07

## 2023-07-31 RX ORDER — DIPHENHYDRAMINE HYDROCHLORIDE 50 MG/ML
50 INJECTION INTRAMUSCULAR; INTRAVENOUS ONCE
OUTPATIENT
Start: 2023-08-07 | End: 2023-08-07

## 2023-07-31 RX ORDER — SODIUM CHLORIDE 9 MG/ML
5-250 INJECTION, SOLUTION INTRAVENOUS PRN
OUTPATIENT
Start: 2023-08-14

## 2023-07-31 RX ORDER — SODIUM CHLORIDE 0.9 % (FLUSH) 0.9 %
5-40 SYRINGE (ML) INJECTION PRN
Status: DISCONTINUED | OUTPATIENT
Start: 2023-07-31 | End: 2023-08-01 | Stop reason: HOSPADM

## 2023-07-31 RX ORDER — MEPERIDINE HYDROCHLORIDE 50 MG/ML
12.5 INJECTION INTRAMUSCULAR; INTRAVENOUS; SUBCUTANEOUS PRN
OUTPATIENT
Start: 2023-07-31

## 2023-07-31 RX ORDER — DEXAMETHASONE SODIUM PHOSPHATE 10 MG/ML
10 INJECTION, SOLUTION INTRAMUSCULAR; INTRAVENOUS ONCE
Status: COMPLETED | OUTPATIENT
Start: 2023-07-31 | End: 2023-07-31

## 2023-07-31 RX ORDER — SODIUM CHLORIDE 9 MG/ML
INJECTION, SOLUTION INTRAVENOUS CONTINUOUS
OUTPATIENT
Start: 2023-08-14

## 2023-07-31 RX ORDER — SODIUM CHLORIDE 0.9 % (FLUSH) 0.9 %
5-40 SYRINGE (ML) INJECTION PRN
OUTPATIENT
Start: 2023-08-14

## 2023-07-31 RX ORDER — ONDANSETRON 2 MG/ML
8 INJECTION INTRAMUSCULAR; INTRAVENOUS
OUTPATIENT
Start: 2023-08-14

## 2023-07-31 RX ORDER — MEPERIDINE HYDROCHLORIDE 50 MG/ML
12.5 INJECTION INTRAMUSCULAR; INTRAVENOUS; SUBCUTANEOUS PRN
OUTPATIENT
Start: 2023-08-07

## 2023-07-31 RX ORDER — EPINEPHRINE 1 MG/ML
0.3 INJECTION, SOLUTION, CONCENTRATE INTRAVENOUS PRN
OUTPATIENT
Start: 2023-08-14

## 2023-07-31 RX ORDER — SODIUM CHLORIDE 9 MG/ML
5-250 INJECTION, SOLUTION INTRAVENOUS PRN
Status: DISCONTINUED | OUTPATIENT
Start: 2023-07-31 | End: 2023-08-01 | Stop reason: HOSPADM

## 2023-07-31 RX ORDER — DIPHENHYDRAMINE HYDROCHLORIDE 50 MG/ML
50 INJECTION INTRAMUSCULAR; INTRAVENOUS ONCE
Status: COMPLETED | OUTPATIENT
Start: 2023-07-31 | End: 2023-07-31

## 2023-07-31 RX ORDER — ONDANSETRON 2 MG/ML
8 INJECTION INTRAMUSCULAR; INTRAVENOUS
OUTPATIENT
Start: 2023-07-31

## 2023-07-31 RX ORDER — SODIUM CHLORIDE 9 MG/ML
INJECTION, SOLUTION INTRAVENOUS CONTINUOUS
OUTPATIENT
Start: 2023-08-07

## 2023-07-31 RX ORDER — ACETAMINOPHEN 325 MG/1
650 TABLET ORAL
OUTPATIENT
Start: 2023-07-31

## 2023-07-31 RX ORDER — ALBUTEROL SULFATE 90 UG/1
4 AEROSOL, METERED RESPIRATORY (INHALATION) PRN
OUTPATIENT
Start: 2023-08-14

## 2023-07-31 RX ORDER — SODIUM CHLORIDE 0.9 % (FLUSH) 0.9 %
5-40 SYRINGE (ML) INJECTION PRN
Status: CANCELLED | OUTPATIENT
Start: 2023-07-31

## 2023-07-31 RX ORDER — FAMOTIDINE 10 MG/ML
20 INJECTION, SOLUTION INTRAVENOUS ONCE
OUTPATIENT
Start: 2023-08-07 | End: 2023-08-07

## 2023-07-31 RX ORDER — DIPHENHYDRAMINE HYDROCHLORIDE 50 MG/ML
50 INJECTION INTRAMUSCULAR; INTRAVENOUS
OUTPATIENT
Start: 2023-08-07

## 2023-07-31 RX ORDER — PALONOSETRON 0.05 MG/ML
0.25 INJECTION, SOLUTION INTRAVENOUS ONCE
Status: COMPLETED | OUTPATIENT
Start: 2023-07-31 | End: 2023-07-31

## 2023-07-31 RX ORDER — DIPHENHYDRAMINE HYDROCHLORIDE 50 MG/ML
50 INJECTION INTRAMUSCULAR; INTRAVENOUS
OUTPATIENT
Start: 2023-08-14

## 2023-07-31 RX ORDER — PALONOSETRON 0.05 MG/ML
0.25 INJECTION, SOLUTION INTRAVENOUS ONCE
Start: 2023-08-14 | End: 2023-08-14

## 2023-07-31 RX ORDER — SODIUM CHLORIDE 9 MG/ML
5-250 INJECTION, SOLUTION INTRAVENOUS PRN
OUTPATIENT
Start: 2023-07-31

## 2023-07-31 RX ORDER — EPINEPHRINE 1 MG/ML
0.3 INJECTION, SOLUTION, CONCENTRATE INTRAVENOUS PRN
OUTPATIENT
Start: 2023-07-31

## 2023-07-31 RX ORDER — ALBUTEROL SULFATE 90 UG/1
4 AEROSOL, METERED RESPIRATORY (INHALATION) PRN
OUTPATIENT
Start: 2023-07-31

## 2023-07-31 RX ORDER — DIPHENHYDRAMINE HYDROCHLORIDE 50 MG/ML
50 INJECTION INTRAMUSCULAR; INTRAVENOUS ONCE
OUTPATIENT
Start: 2023-08-14 | End: 2023-08-14

## 2023-07-31 RX ORDER — PALONOSETRON 0.05 MG/ML
0.25 INJECTION, SOLUTION INTRAVENOUS ONCE
Status: CANCELLED | OUTPATIENT
Start: 2023-07-31 | End: 2023-07-31

## 2023-07-31 RX ORDER — DIPHENHYDRAMINE HYDROCHLORIDE 50 MG/ML
50 INJECTION INTRAMUSCULAR; INTRAVENOUS
OUTPATIENT
Start: 2023-07-31

## 2023-07-31 RX ADMIN — CARBOPLATIN 260 MG: 10 INJECTION, SOLUTION INTRAVENOUS at 12:34

## 2023-07-31 RX ADMIN — Medication 500 UNITS: at 14:59

## 2023-07-31 RX ADMIN — FOSAPREPITANT 150 MG: 150 INJECTION, POWDER, LYOPHILIZED, FOR SOLUTION INTRAVENOUS at 10:32

## 2023-07-31 RX ADMIN — DIPHENHYDRAMINE HYDROCHLORIDE 50 MG: 50 INJECTION, SOLUTION INTRAMUSCULAR; INTRAVENOUS at 10:13

## 2023-07-31 RX ADMIN — DEXAMETHASONE SODIUM PHOSPHATE 10 MG: 10 INJECTION, SOLUTION INTRAMUSCULAR; INTRAVENOUS at 09:56

## 2023-07-31 RX ADMIN — SODIUM CHLORIDE, PRESERVATIVE FREE 10 ML: 5 INJECTION INTRAVENOUS at 11:09

## 2023-07-31 RX ADMIN — PALONOSETRON 0.25 MG: 0.05 INJECTION, SOLUTION INTRAVENOUS at 09:55

## 2023-07-31 RX ADMIN — SODIUM CHLORIDE, PRESERVATIVE FREE 20 MG: 5 INJECTION INTRAVENOUS at 10:13

## 2023-07-31 RX ADMIN — PACLITAXEL 192 MG: 6 INJECTION, SOLUTION, CONCENTRATE INTRAVENOUS at 11:13

## 2023-07-31 RX ADMIN — SODIUM CHLORIDE, PRESERVATIVE FREE 20 ML: 5 INJECTION INTRAVENOUS at 14:58

## 2023-07-31 NOTE — DISCHARGE INSTRUCTIONS
carboplatin  Pronunciation:  LAMONT irving BISHNU tin  What is the most important information I should know about carboplatin? You may have a severe allergic reaction within minutes of receiving a carboplatin injection. Your caregivers will quickly treat you if you if this occurs. You may be given medicine to help prevent an allergic reaction. Carboplatin can increase your risk of bleeding or infection. Call your doctor if you have a fever, chills, cough, mouth sores, or unusual bleeding or bruising. What is carboplatin? Carboplatin is used together with other cancer drugs to treat ovarian cancer. Carboplatin may also be used for purposes not listed in this medication guide. What should I discuss with my healthcare provider before receiving carboplatin? You should not be treated with carboplatin if you are allergic to it, or if you have:  severe bleeding;  severe bone marrow suppression; or  if you have had a severe allergic reaction to similar cancer drugs such as oxaliplatin or cisplatin. Tell your doctor if you have ever had:  liver disease;  kidney disease;  a weak immune system; or  if you have received carboplatin in the past.  Carboplatin may harm an unborn baby. Use effective birth control to prevent pregnancy, and tell your doctor if you become pregnant. You should not breastfeed while using carboplatin. How is carboplatin given? Carboplatin is given as an infusion into a vein, usually given once every 4 weeks. A healthcare provider will give you this injection. Tell your caregivers if you feel any burning, pain, or swelling around the IV needle when carboplatin is injected. You may be given other medications to help prevent serious side effects or an allergic reaction. Keep using these medicines for as long as your doctor has prescribed. Carboplatin can increase your risk of bleeding or infection. You will need frequent medical tests.  Your cancer treatments may be delayed based on the uses, directions, precautions, warnings, drug interactions, allergic reactions, or adverse effects. If you have questions about the drugs you are taking, check with your doctor, nurse or pharmacist.  Copyright 4283-5094 [address]: 6.01. Revision date: 12/1/2020. Care instructions adapted under license by [facility] ([facility]). If you have questions about a medical condition or this instruction, always ask your healthcare professional. [address] any warranty or liability for your use of this information.

## 2023-07-31 NOTE — PROGRESS NOTES
Lab Results   Component Value Date    WBC 5.3 07/31/2023    HGB 11.2 (L) 07/31/2023    HCT 34.9 (L) 07/31/2023    MCV 93.3 07/31/2023     07/31/2023     Lab Results   Component Value Date    NEUTROABS 2.9 07/31/2023

## 2023-08-07 ENCOUNTER — HOSPITAL ENCOUNTER (OUTPATIENT)
Dept: INFUSION THERAPY | Age: 68
Setting detail: INFUSION SERIES
Discharge: HOME | End: 2023-08-07
Payer: MEDICARE

## 2023-08-07 VITALS
TEMPERATURE: 97.7 F | SYSTOLIC BLOOD PRESSURE: 144 MMHG | BODY MASS INDEX: 38.36 KG/M2 | OXYGEN SATURATION: 97 % | HEART RATE: 88 BPM | WEIGHT: 252.3 LBS | RESPIRATION RATE: 18 BRPM | DIASTOLIC BLOOD PRESSURE: 86 MMHG

## 2023-08-07 DIAGNOSIS — C50.911 INVASIVE DUCTAL CARCINOMA OF BREAST, FEMALE, RIGHT (HCC): Primary | ICD-10-CM

## 2023-08-07 LAB
ALBUMIN SERPL-MCNC: 3.5 G/DL (ref 3.5–5.2)
ALP SERPL-CCNC: 80 U/L (ref 35–104)
ALT SERPL-CCNC: 29 U/L (ref 5–33)
ANION GAP SERPL CALCULATED.3IONS-SCNC: 12 MMOL/L (ref 7–19)
AST SERPL-CCNC: 28 U/L (ref 5–32)
BASOPHILS # BLD: 0.1 K/UL (ref 0–0.2)
BASOPHILS NFR BLD: 1 % (ref 0–1)
BILIRUB SERPL-MCNC: 0.3 MG/DL (ref 0.2–1.2)
BUN SERPL-MCNC: 6 MG/DL (ref 8–23)
CALCIUM SERPL-MCNC: 8.7 MG/DL (ref 8.8–10.2)
CHLORIDE SERPL-SCNC: 103 MMOL/L (ref 98–111)
CO2 SERPL-SCNC: 22 MMOL/L (ref 22–29)
CREAT SERPL-MCNC: 0.5 MG/DL (ref 0.5–0.9)
EOSINOPHIL # BLD: 0 K/UL (ref 0–0.6)
EOSINOPHIL NFR BLD: 0.5 % (ref 0–5)
ERYTHROCYTE [DISTWIDTH] IN BLOOD BY AUTOMATED COUNT: 15.9 % (ref 11.5–14.5)
GLUCOSE SERPL-MCNC: 121 MG/DL (ref 74–109)
HCT VFR BLD AUTO: 31.9 % (ref 37–47)
HGB BLD-MCNC: 10.4 G/DL (ref 12–16)
IMM GRANULOCYTES # BLD: 0.1 K/UL
LYMPHOCYTES # BLD: 2.2 K/UL (ref 1.1–4.5)
LYMPHOCYTES NFR BLD: 37.6 % (ref 20–40)
MCH RBC QN AUTO: 29.9 PG (ref 27–31)
MCHC RBC AUTO-ENTMCNC: 32.6 G/DL (ref 33–37)
MCV RBC AUTO: 91.7 FL (ref 81–99)
MONOCYTES # BLD: 0.5 K/UL (ref 0–0.9)
MONOCYTES NFR BLD: 9.3 % (ref 0–10)
NEUTROPHILS # BLD: 2.9 K/UL (ref 1.5–7.5)
NEUTS SEG NFR BLD: 50.2 % (ref 50–65)
PLATELET # BLD AUTO: 334 K/UL (ref 130–400)
PMV BLD AUTO: 10 FL (ref 9.4–12.3)
POTASSIUM SERPL-SCNC: 3.7 MMOL/L (ref 3.5–5)
PROT SERPL-MCNC: 6.4 G/DL (ref 6.6–8.7)
RBC # BLD AUTO: 3.48 M/UL (ref 4.2–5.4)
SODIUM SERPL-SCNC: 137 MMOL/L (ref 136–145)
WBC # BLD AUTO: 5.8 K/UL (ref 4.8–10.8)

## 2023-08-07 PROCEDURE — 80053 COMPREHEN METABOLIC PANEL: CPT

## 2023-08-07 PROCEDURE — 2500000003 HC RX 250 WO HCPCS: Performed by: INTERNAL MEDICINE

## 2023-08-07 PROCEDURE — 96375 TX/PRO/DX INJ NEW DRUG ADDON: CPT

## 2023-08-07 PROCEDURE — 96417 CHEMO IV INFUS EACH ADDL SEQ: CPT

## 2023-08-07 PROCEDURE — 96366 THER/PROPH/DIAG IV INF ADDON: CPT

## 2023-08-07 PROCEDURE — 2580000003 HC RX 258: Performed by: INTERNAL MEDICINE

## 2023-08-07 PROCEDURE — 96413 CHEMO IV INFUSION 1 HR: CPT

## 2023-08-07 PROCEDURE — 85025 COMPLETE CBC W/AUTO DIFF WBC: CPT

## 2023-08-07 PROCEDURE — 6360000002 HC RX W HCPCS: Performed by: INTERNAL MEDICINE

## 2023-08-07 PROCEDURE — 36593 DECLOT VASCULAR DEVICE: CPT

## 2023-08-07 RX ORDER — SODIUM CHLORIDE 9 MG/ML
5-250 INJECTION, SOLUTION INTRAVENOUS PRN
Status: DISCONTINUED | OUTPATIENT
Start: 2023-08-07 | End: 2023-08-08 | Stop reason: HOSPADM

## 2023-08-07 RX ORDER — DIPHENHYDRAMINE HYDROCHLORIDE 50 MG/ML
50 INJECTION INTRAMUSCULAR; INTRAVENOUS ONCE
Status: COMPLETED | OUTPATIENT
Start: 2023-08-07 | End: 2023-08-07

## 2023-08-07 RX ORDER — SODIUM CHLORIDE 0.9 % (FLUSH) 0.9 %
5-40 SYRINGE (ML) INJECTION PRN
Status: DISCONTINUED | OUTPATIENT
Start: 2023-08-07 | End: 2023-08-08 | Stop reason: HOSPADM

## 2023-08-07 RX ORDER — PALONOSETRON 0.05 MG/ML
0.25 INJECTION, SOLUTION INTRAVENOUS ONCE
Status: COMPLETED | OUTPATIENT
Start: 2023-08-07 | End: 2023-08-07

## 2023-08-07 RX ORDER — DEXAMETHASONE SODIUM PHOSPHATE 10 MG/ML
10 INJECTION, SOLUTION INTRAMUSCULAR; INTRAVENOUS ONCE
Status: COMPLETED | OUTPATIENT
Start: 2023-08-07 | End: 2023-08-07

## 2023-08-07 RX ORDER — HEPARIN 100 UNIT/ML
500 SYRINGE INTRAVENOUS PRN
Status: DISCONTINUED | OUTPATIENT
Start: 2023-08-07 | End: 2023-08-08 | Stop reason: HOSPADM

## 2023-08-07 RX ADMIN — PACLITAXEL 192 MG: 6 INJECTION, SOLUTION, CONCENTRATE INTRAVENOUS at 10:39

## 2023-08-07 RX ADMIN — DEXAMETHASONE SODIUM PHOSPHATE 10 MG: 10 INJECTION INTRAMUSCULAR; INTRAVENOUS at 09:08

## 2023-08-07 RX ADMIN — SODIUM CHLORIDE, PRESERVATIVE FREE 20 MG: 5 INJECTION INTRAVENOUS at 09:07

## 2023-08-07 RX ADMIN — ALTEPLASE 2 MG: 2.2 INJECTION, POWDER, LYOPHILIZED, FOR SOLUTION INTRAVENOUS at 08:30

## 2023-08-07 RX ADMIN — CARBOPLATIN 260 MG: 10 INJECTION, SOLUTION INTRAVENOUS at 11:42

## 2023-08-07 RX ADMIN — HEPARIN 500 UNITS: 100 SYRINGE at 13:51

## 2023-08-07 RX ADMIN — DIPHENHYDRAMINE HYDROCHLORIDE 50 MG: 50 INJECTION, SOLUTION INTRAMUSCULAR; INTRAVENOUS at 09:08

## 2023-08-07 RX ADMIN — PALONOSETRON 0.25 MG: 0.05 INJECTION, SOLUTION INTRAVENOUS at 09:08

## 2023-08-07 RX ADMIN — FOSAPREPITANT DIMEGLUMINE 150 MG: 150 INJECTION, POWDER, LYOPHILIZED, FOR SOLUTION INTRAVENOUS at 09:50

## 2023-08-07 RX ADMIN — SODIUM CHLORIDE 200 ML/HR: 9 INJECTION, SOLUTION INTRAVENOUS at 09:05

## 2023-08-07 NOTE — FLOWSHEET NOTE
Pt arrived for chemo tx. Port accessed and no blood return. Activase instilled. after 30 min brisk blood returned. .Aloxi, decadron, benadryl, pepcid, and emend given as ordered for premeds. . Taxol 192 mg given. Cold gloves and socks worn during taxol infusion. Carbo 260 mg given. Pt tolerated all infusions well. Cold cap in place for 45 mins pre taxol infusion, through taxol and carbo infusions, and for 90 mins post infusion. Blood return checked and present before, during, and after all infusions.

## 2023-08-08 ENCOUNTER — CLINICAL DOCUMENTATION (OUTPATIENT)
Dept: INFUSION THERAPY | Age: 68
End: 2023-08-08

## 2023-08-08 ENCOUNTER — TELEPHONE (OUTPATIENT)
Dept: OTHER | Age: 68
End: 2023-08-08

## 2023-08-08 NOTE — TELEPHONE ENCOUNTER
Daughter called because she is concerned patient is unable to make her appointment with Dr. Jp Forrest on the Tuesday after her next treatment on Monday 8/21. Pt states they get patient in car after treatment and get her home and she is done. She has no energy and they have to help her out of chair to go to the bathroom and then help her back. She is worried her and  could not get her in the car to come to appointment. She said she is like this for 24-36 hours. The patient starts shivering and has chills and it is hard to get her warmed up. Checking with Dr. Juaquin Chen office to see if patient can get in to see him prior to treatment on Monday.

## 2023-08-08 NOTE — PROGRESS NOTES
Patient's daughter, Titus Ramos, contacted clinic reporting patient being \"out of it\", weakness after treatment, and episodic diarrhea. Titus Ramos tells me that after getting home from treatment on Mondays that patient is out of it until usually Wednesday. Qureshi Richard states patient has not tried anything OTC for diarrhea, instructed to try imodium and to return call to clinic if not helping. Titus Richard does not think that patient will be able to make appointment to see Dr. Radha Mondragon on 8/22 after treatment on 8/21 because she will be too weak and \"out of it\". When asked to describe what she means by patient being \"out of it\" Titus Ramos tells me that the patient will respond with very short answers but does answer appropriately. Appointment changed to 8/21 at 8:15 am per Charlee Gaston RN to discuss concerns with Dr. Radha Mondragon prior to treatment.

## 2023-08-08 NOTE — TELEPHONE ENCOUNTER
Spoke to Sanjuanita Perez ( on [employer]). Daughter is concerned and has questions about symptoms/side effects mother is having. Pt states that patient would not be happy with her giving the information but she and father are concerned about patient. She states that her mother is \"out of it\" for the first two days after treatment. She has to keep trying to get her to eat and drink. Patient just stares off, will randomly talk and has problems sleeping at night. She is also incontinent of bladder and bowel at night. Daughter says it is diarrhea. Encouraged daughter to keep mother hydrated and that she can offer her foods to see if she will eat. She will try a Lomotil prior to bed and hold liquids before bed. Patient was wearing an adult brief but still leaked through that. Daughter says patient is fine and back to normal on Wednesday after treatment. Will make Dr. Coco Tavares aware. Encouraged daughter to contact with any other questions or concerns.

## 2023-08-14 ENCOUNTER — HOSPITAL ENCOUNTER (OUTPATIENT)
Dept: INFUSION THERAPY | Age: 68
Setting detail: INFUSION SERIES
Discharge: HOME | End: 2023-08-14
Payer: MEDICARE

## 2023-08-14 VITALS
RESPIRATION RATE: 20 BRPM | DIASTOLIC BLOOD PRESSURE: 76 MMHG | TEMPERATURE: 97.2 F | OXYGEN SATURATION: 100 % | WEIGHT: 248.2 LBS | BODY MASS INDEX: 37.74 KG/M2 | HEART RATE: 102 BPM | SYSTOLIC BLOOD PRESSURE: 113 MMHG

## 2023-08-14 DIAGNOSIS — C50.911 INVASIVE DUCTAL CARCINOMA OF BREAST, FEMALE, RIGHT (HCC): Primary | ICD-10-CM

## 2023-08-14 LAB
ALBUMIN SERPL-MCNC: 3.5 G/DL (ref 3.5–5.2)
ALP SERPL-CCNC: 91 U/L (ref 35–104)
ALT SERPL-CCNC: 37 U/L (ref 5–33)
ANION GAP SERPL CALCULATED.3IONS-SCNC: 14 MMOL/L (ref 7–19)
AST SERPL-CCNC: 35 U/L (ref 5–32)
BASOPHILS # BLD: 0.1 K/UL (ref 0–0.2)
BASOPHILS NFR BLD: 0.9 % (ref 0–1)
BILIRUB SERPL-MCNC: 0.4 MG/DL (ref 0.2–1.2)
BUN SERPL-MCNC: 5 MG/DL (ref 8–23)
CALCIUM SERPL-MCNC: 8.5 MG/DL (ref 8.8–10.2)
CHLORIDE SERPL-SCNC: 104 MMOL/L (ref 98–111)
CO2 SERPL-SCNC: 21 MMOL/L (ref 22–29)
CORTIS AM PEAK SERPL-MCNC: 17.8 UG/DL (ref 6.2–19.4)
CREAT SERPL-MCNC: 0.7 MG/DL (ref 0.5–0.9)
EOSINOPHIL # BLD: 0 K/UL (ref 0–0.6)
EOSINOPHIL NFR BLD: 0.4 % (ref 0–5)
ERYTHROCYTE [DISTWIDTH] IN BLOOD BY AUTOMATED COUNT: 16.9 % (ref 11.5–14.5)
GLUCOSE SERPL-MCNC: 114 MG/DL (ref 74–109)
HCT VFR BLD AUTO: 31.9 % (ref 37–47)
HGB BLD-MCNC: 10.5 G/DL (ref 12–16)
IMM GRANULOCYTES # BLD: 0.1 K/UL
LYMPHOCYTES # BLD: 1.7 K/UL (ref 1.1–4.5)
LYMPHOCYTES NFR BLD: 30.5 % (ref 20–40)
MAGNESIUM SERPL-MCNC: 1.4 MG/DL (ref 1.6–2.4)
MCH RBC QN AUTO: 30.3 PG (ref 27–31)
MCHC RBC AUTO-ENTMCNC: 32.9 G/DL (ref 33–37)
MCV RBC AUTO: 92.2 FL (ref 81–99)
MONOCYTES # BLD: 0.9 K/UL (ref 0–0.9)
MONOCYTES NFR BLD: 15.8 % (ref 0–10)
NEUTROPHILS # BLD: 2.8 K/UL (ref 1.5–7.5)
NEUTS SEG NFR BLD: 50.8 % (ref 50–65)
PLATELET # BLD AUTO: 223 K/UL (ref 130–400)
PMV BLD AUTO: 11.4 FL (ref 9.4–12.3)
POTASSIUM SERPL-SCNC: 3.5 MMOL/L (ref 3.5–5)
PROT SERPL-MCNC: 6.4 G/DL (ref 6.6–8.7)
RBC # BLD AUTO: 3.46 M/UL (ref 4.2–5.4)
SODIUM SERPL-SCNC: 139 MMOL/L (ref 136–145)
TSH SERPL DL<=0.005 MIU/L-ACNC: 2.53 UIU/ML (ref 0.27–4.2)
WBC # BLD AUTO: 5.6 K/UL (ref 4.8–10.8)

## 2023-08-14 PROCEDURE — 83735 ASSAY OF MAGNESIUM: CPT

## 2023-08-14 PROCEDURE — 80053 COMPREHEN METABOLIC PANEL: CPT

## 2023-08-14 PROCEDURE — 84443 ASSAY THYROID STIM HORMONE: CPT

## 2023-08-14 PROCEDURE — 36415 COLL VENOUS BLD VENIPUNCTURE: CPT

## 2023-08-14 PROCEDURE — 36593 DECLOT VASCULAR DEVICE: CPT

## 2023-08-14 PROCEDURE — 82533 TOTAL CORTISOL: CPT

## 2023-08-14 PROCEDURE — 85025 COMPLETE CBC W/AUTO DIFF WBC: CPT

## 2023-08-14 PROCEDURE — 2580000003 HC RX 258: Performed by: INTERNAL MEDICINE

## 2023-08-14 PROCEDURE — 96360 HYDRATION IV INFUSION INIT: CPT

## 2023-08-14 PROCEDURE — 6360000002 HC RX W HCPCS: Performed by: INTERNAL MEDICINE

## 2023-08-14 RX ORDER — 0.9 % SODIUM CHLORIDE 0.9 %
1000 INTRAVENOUS SOLUTION INTRAVENOUS ONCE
Status: COMPLETED | OUTPATIENT
Start: 2023-08-14 | End: 2023-08-14

## 2023-08-14 RX ORDER — SODIUM CHLORIDE 9 MG/ML
5-250 INJECTION, SOLUTION INTRAVENOUS PRN
Status: DISCONTINUED | OUTPATIENT
Start: 2023-08-14 | End: 2023-08-14

## 2023-08-14 RX ORDER — DRONABINOL 2.5 MG/1
5 CAPSULE ORAL 2 TIMES DAILY
Status: DISCONTINUED | OUTPATIENT
Start: 2023-08-14 | End: 2023-08-14

## 2023-08-14 RX ORDER — ONDANSETRON 2 MG/ML
8 INJECTION INTRAMUSCULAR; INTRAVENOUS
Status: DISCONTINUED | OUTPATIENT
Start: 2023-08-14 | End: 2023-08-14

## 2023-08-14 RX ORDER — DIPHENHYDRAMINE HYDROCHLORIDE 50 MG/ML
25 INJECTION INTRAMUSCULAR; INTRAVENOUS ONCE
Status: DISCONTINUED | OUTPATIENT
Start: 2023-08-14 | End: 2023-08-14

## 2023-08-14 RX ORDER — SODIUM CHLORIDE 0.9 % (FLUSH) 0.9 %
5-40 SYRINGE (ML) INJECTION PRN
Status: DISCONTINUED | OUTPATIENT
Start: 2023-08-14 | End: 2023-08-14

## 2023-08-14 RX ORDER — PALONOSETRON 0.05 MG/ML
0.25 INJECTION, SOLUTION INTRAVENOUS ONCE
Status: DISCONTINUED | OUTPATIENT
Start: 2023-08-14 | End: 2023-08-14

## 2023-08-14 RX ORDER — DEXAMETHASONE SODIUM PHOSPHATE 10 MG/ML
10 INJECTION, SOLUTION INTRAMUSCULAR; INTRAVENOUS ONCE
Status: DISCONTINUED | OUTPATIENT
Start: 2023-08-14 | End: 2023-08-14

## 2023-08-14 RX ORDER — DRONABINOL 5 MG/1
5 CAPSULE ORAL
Qty: 60 CAPSULE | Refills: 0 | Status: SHIPPED | OUTPATIENT
Start: 2023-08-14 | End: 2023-08-15 | Stop reason: SDUPTHER

## 2023-08-14 RX ORDER — HEPARIN 100 UNIT/ML
500 SYRINGE INTRAVENOUS PRN
Status: DISCONTINUED | OUTPATIENT
Start: 2023-08-14 | End: 2023-08-14

## 2023-08-14 RX ADMIN — WATER 2 MG: 1 INJECTION INTRAMUSCULAR; INTRAVENOUS; SUBCUTANEOUS at 11:19

## 2023-08-14 RX ADMIN — HEPARIN 500 UNITS: 100 SYRINGE at 12:05

## 2023-08-14 RX ADMIN — WATER 2 MG: 1 INJECTION INTRAMUSCULAR; INTRAVENOUS; SUBCUTANEOUS at 09:40

## 2023-08-14 RX ADMIN — SODIUM CHLORIDE 1000 ML: 9 INJECTION, SOLUTION INTRAVENOUS at 10:33

## 2023-08-14 NOTE — PROGRESS NOTES
Patient present today for Cycle 3 - Day 15. Patient is alert and oriented to person, place, time and situation. Patient/spouse state concerns of post chemotherapy lethargy and weakness specifically following treatment on 07/31 & 08/07. Patient reports weakness and mental status changes that inhibit ability to be left alone or abiltiy to perform ADL's. Patient reports symptoms lasting for at least 36 hours post chemotherapy. Patient would like concerns d/w MD prior to treatment this visit. Labs this visit:     8/14/2023  9:41 AM - Jimmy Maynard Incoming Lab Results From [facility]    Component Value Flag      WBC 5.6        RBC 3.46   Low        Hemoglobin 10.5   Low        Hematocrit 31.9   Low        MCV 92.2        MCH 30.3        MCHC 32.9   Low        RDW 16.9   High        Platelets 135        MPV 11.4        Neutrophils % 50.8        Lymphocytes % 30.5        Monocytes % 15.8   High        Eosinophils % 0.4        Basophils % 0.9        Neutrophils Absolute 2.8        Immature Granulocytes # 0.1        Lymphocytes Absolute 1.7        Monocytes Absolute 0.90        Eosinophils Absolute 0.00        Basophils Absolute 0.10            8/14/2023  9:41 AM - Jimmy Maynard Incoming Lab Results From [facility]    Component Value Flag      WBC 5.6        RBC 3.46   Low        Hemoglobin 10.5   Low        Hematocrit 31.9   Low        MCV 92.2        MCH 30.3        MCHC 32.9   Low        RDW 16.9   High        Platelets 871        MPV 11.4        Neutrophils % 50.8        Lymphocytes % 30.5        Monocytes % 15.8   High        Eosinophils % 0.4        Basophils % 0.9        Neutrophils Absolute 2.8        Immature Granulocytes # 0.1        Lymphocytes Absolute 1.7        Monocytes Absolute 0.90        Eosinophils Absolute 0.00        Basophils Absolute 0.10            Discussed pt concerns with Dr. Nazanin Cabrera. Orders received to hold treatment this visit.       Patient will receive next treatment on 08/21 after scheduled

## 2023-08-14 NOTE — PROGRESS NOTES
Pt arrived to [facility]. Port accessed using sterile technique. No blood return noted, cathflo administered at 0940. Labs obtained via peripheral stick. Still no blood return at 30 min & hour clint, additional dose of cathflo administered at 1119. See eMAR. Blood return noted at 1200. Pt also reports feeling extremely fatigued and almost bedbound post chemo, starting the day after tx. Pt reports feeling \"out of her mind\" and is concerned her \"mind won't come back. \" Pt also reports no appetite. Dr. Donte Guzman notified of pt's symptoms, Dr. Donte Guzman advised to hold tx today, administered 1L IVF, and send in RX for Marinol 5mg bid to pt's pharmacy, [facility]. Pt and pt's  voice understanding.      Electronically signed by Jd Campo RN on 8/14/2023 at 12:30 PM

## 2023-08-14 NOTE — TELEPHONE ENCOUNTER
Marinol 5mg, BID before meals, sent to patient pharmacy of choice per Dr. Sukumar Hopper. Medication routed to MD for approval. Medication education provided to patient and spouse. Pt/spouse verbalize understanding.  Electronically signed by Danica Hale RN on 8/14/2023 at 11:24 AM

## 2023-08-15 DIAGNOSIS — R79.0 LOW BLOOD MAGNESIUM LEVEL: Primary | ICD-10-CM

## 2023-08-15 DIAGNOSIS — C50.911 INVASIVE DUCTAL CARCINOMA OF BREAST, FEMALE, RIGHT (HCC): ICD-10-CM

## 2023-08-15 RX ORDER — DRONABINOL 5 MG/1
5 CAPSULE ORAL
Qty: 60 CAPSULE | Refills: 0 | Status: SHIPPED | OUTPATIENT
Start: 2023-08-15 | End: 2023-09-14

## 2023-08-15 NOTE — TELEPHONE ENCOUNTER
I have called and spoke with patient regarding magnesium level low. Per  recommendation Magnesium 400mg daily. I have verified pharmacy with patient.

## 2023-08-17 NOTE — PROGRESS NOTES
MEDICAL ONCOLOGY PROGRESS NOTE    Pt Name: Alcira New  MRN: [id number]  YOB: 1955  Date of evaluation: 8/21/2023    HISTORY OF PRESENT ILLNESS:    Reason for MD visit: Toxicity assessment and disease management. She is currently undergoing neoadjuvant chemotherapy and immunotherapy with pembrolizumab. She has tolerated treatment quite poorly with complaints of significant fatigue after completion of weekly carboplatin and Taxol. The patient is also receiving pembrolizumab. She also has significant brain fogginess. She denies any significant neuropathy. She denies any significant nausea vomiting. She has some diarrhea. Diagnosis  Invasive ductal carcinoma, right breast 9:00 & 2:00, May 2023  Grade 3  ER 0.2%. 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 Treatment Summary  6/12/23 Initiated  Pembrolizumab, Carboplatin Taxol weekly followed by [facility] every 2 weeks X4 cycles with Keytruda  8/21/23 Discontinued Carboplatin and dose reduction to [facility]   Anticipate surgery  Anticipate radiation therapy    Cancer History  Keegan Dalal was first seen by me on 5/24/2023. She was referred by Dr. Janny Cabrera for a diagnosis of triple negative breast cancer. She felt a mass in the right breast.  Patient denies any family history of breast cancer or any other cancer. 4/3/23 Bilateral diagnostic mammogram ([facility]): 3 circumscribed masses are identified in the right breast. The palpable lump located at 2 o'clock in the right breast is the smallest mass measuring 1.1cm. The other mass located at 2 o'clock in the right breast measures 2.6cm. Third right breast mass is located at 9 o'clock and measures 2.2cm.    4/3/23 US right breast ([facility]): Demonstrates the palpable lump to represent a 1.1 c 0.9 cm hypoechoic mass at the 2

## 2023-08-18 DIAGNOSIS — C50.911 INVASIVE DUCTAL CARCINOMA OF BREAST, FEMALE, RIGHT (HCC): Primary | ICD-10-CM

## 2023-08-21 ENCOUNTER — OFFICE VISIT (OUTPATIENT)
Dept: HEMATOLOGY | Age: 68
End: 2023-08-21
Payer: MEDICARE

## 2023-08-21 ENCOUNTER — HOSPITAL ENCOUNTER (OUTPATIENT)
Dept: INFUSION THERAPY | Age: 68
Discharge: HOME | End: 2023-08-21
Payer: MEDICARE

## 2023-08-21 VITALS
HEART RATE: 94 BPM | HEIGHT: 68 IN | DIASTOLIC BLOOD PRESSURE: 80 MMHG | SYSTOLIC BLOOD PRESSURE: 138 MMHG | OXYGEN SATURATION: 98 % | BODY MASS INDEX: 37.74 KG/M2

## 2023-08-21 DIAGNOSIS — Z71.89 CARE PLAN DISCUSSED WITH PATIENT: ICD-10-CM

## 2023-08-21 DIAGNOSIS — C50.911 INVASIVE DUCTAL CARCINOMA OF BREAST, FEMALE, RIGHT (HCC): Primary | ICD-10-CM

## 2023-08-21 DIAGNOSIS — T80.90XD INFUSION REACTION, SUBSEQUENT ENCOUNTER: ICD-10-CM

## 2023-08-21 DIAGNOSIS — T45.1X5D ADVERSE EFFECT OF CHEMOTHERAPY, SUBSEQUENT ENCOUNTER: ICD-10-CM

## 2023-08-21 DIAGNOSIS — C50.911 INVASIVE DUCTAL CARCINOMA OF BREAST, FEMALE, RIGHT (HCC): ICD-10-CM

## 2023-08-21 DIAGNOSIS — Z17.1 MALIGNANT NEOPLASM OF UPPER-INNER QUADRANT OF RIGHT BREAST IN FEMALE, ESTROGEN RECEPTOR NEGATIVE (HCC): ICD-10-CM

## 2023-08-21 DIAGNOSIS — C50.211 MALIGNANT NEOPLASM OF UPPER-INNER QUADRANT OF RIGHT BREAST IN FEMALE, ESTROGEN RECEPTOR NEGATIVE (HCC): ICD-10-CM

## 2023-08-21 DIAGNOSIS — Z51.12 ENCOUNTER FOR ANTINEOPLASTIC IMMUNOTHERAPY: ICD-10-CM

## 2023-08-21 DIAGNOSIS — Z15.01 LI-FRAUMENI SYNDROME: ICD-10-CM

## 2023-08-21 DIAGNOSIS — Z51.11 CHEMOTHERAPY MANAGEMENT, ENCOUNTER FOR: ICD-10-CM

## 2023-08-21 LAB
ALBUMIN SERPL-MCNC: 3.5 G/DL (ref 3.5–5.2)
ALP SERPL-CCNC: 99 U/L (ref 35–104)
ALT SERPL-CCNC: 42 U/L (ref 9–52)
ANION GAP SERPL CALCULATED.3IONS-SCNC: 12 MMOL/L (ref 7–19)
AST SERPL-CCNC: 55 U/L (ref 14–36)
BILIRUB SERPL-MCNC: 0.6 MG/DL (ref 0.2–1.3)
BUN SERPL-MCNC: 5 MG/DL (ref 7–17)
CALCIUM SERPL-MCNC: 8.7 MG/DL (ref 8.4–10.2)
CHLORIDE SERPL-SCNC: 110 MMOL/L (ref 98–111)
CO2 SERPL-SCNC: 20 MMOL/L (ref 22–29)
CREAT SERPL-MCNC: <0.5 MG/DL (ref 0.5–1)
ERYTHROCYTE [DISTWIDTH] IN BLOOD BY AUTOMATED COUNT: 19.1 % (ref 11.7–14.4)
GLOBULIN: 2.9 G/DL
GLUCOSE SERPL-MCNC: 125 MG/DL (ref 74–106)
HCT VFR BLD AUTO: 35.5 % (ref 34.1–44.9)
HGB BLD-MCNC: 11.5 G/DL (ref 11.2–15.7)
LYMPHOCYTES # BLD: 2.3 K/UL (ref 1.18–3.74)
LYMPHOCYTES NFR BLD: 36.5 % (ref 19.3–53.1)
MCH RBC QN AUTO: 29.8 PG (ref 25.6–32.2)
MCHC RBC AUTO-ENTMCNC: 32.4 G/DL (ref 32.3–35.5)
MCV RBC AUTO: 92 FL (ref 79.4–94.8)
MONOCYTES # BLD: 0.79 K/UL (ref 0.24–0.82)
MONOCYTES NFR BLD: 12.5 % (ref 4.7–12.5)
NEUTROPHILS # BLD: 3.05 K/UL (ref 1.56–6.13)
NEUTS SEG NFR BLD: 48.3 % (ref 34–71.1)
PLATELET # BLD AUTO: 185 K/UL (ref 182–369)
PMV BLD AUTO: 10.7 FL (ref 7.4–10.4)
POTASSIUM SERPL-SCNC: 4.1 MMOL/L (ref 3.5–5.1)
PROT SERPL-MCNC: 6.5 G/DL (ref 6.3–8.2)
RBC # BLD AUTO: 3.86 M/UL (ref 3.93–5.22)
SODIUM SERPL-SCNC: 142 MMOL/L (ref 137–145)
WBC # BLD AUTO: 6.31 K/UL (ref 3.98–10.04)

## 2023-08-21 PROCEDURE — 36415 COLL VENOUS BLD VENIPUNCTURE: CPT

## 2023-08-21 PROCEDURE — 99214 OFFICE O/P EST MOD 30 MIN: CPT | Performed by: INTERNAL MEDICINE

## 2023-08-21 PROCEDURE — 1123F ACP DISCUSS/DSCN MKR DOCD: CPT | Performed by: INTERNAL MEDICINE

## 2023-08-21 PROCEDURE — 85025 COMPLETE CBC W/AUTO DIFF WBC: CPT

## 2023-08-21 PROCEDURE — 3075F SYST BP GE 130 - 139MM HG: CPT | Performed by: INTERNAL MEDICINE

## 2023-08-21 PROCEDURE — 99212 OFFICE O/P EST SF 10 MIN: CPT

## 2023-08-21 PROCEDURE — G8400 PT W/DXA NO RESULTS DOC: HCPCS | Performed by: INTERNAL MEDICINE

## 2023-08-21 PROCEDURE — 3079F DIAST BP 80-89 MM HG: CPT | Performed by: INTERNAL MEDICINE

## 2023-08-21 PROCEDURE — 80053 COMPREHEN METABOLIC PANEL: CPT

## 2023-08-21 PROCEDURE — G8417 CALC BMI ABV UP PARAM F/U: HCPCS | Performed by: INTERNAL MEDICINE

## 2023-08-21 PROCEDURE — G8427 DOCREV CUR MEDS BY ELIG CLIN: HCPCS | Performed by: INTERNAL MEDICINE

## 2023-08-21 PROCEDURE — 3017F COLORECTAL CA SCREEN DOC REV: CPT | Performed by: INTERNAL MEDICINE

## 2023-08-21 PROCEDURE — 1090F PRES/ABSN URINE INCON ASSESS: CPT | Performed by: INTERNAL MEDICINE

## 2023-08-21 PROCEDURE — 1036F TOBACCO NON-USER: CPT | Performed by: INTERNAL MEDICINE

## 2023-08-21 RX ORDER — SODIUM CHLORIDE 9 MG/ML
INJECTION, SOLUTION INTRAVENOUS CONTINUOUS
OUTPATIENT
Start: 2023-08-21

## 2023-08-21 RX ORDER — MEPERIDINE HYDROCHLORIDE 50 MG/ML
12.5 INJECTION INTRAMUSCULAR; INTRAVENOUS; SUBCUTANEOUS PRN
OUTPATIENT
Start: 2023-08-21

## 2023-08-21 RX ORDER — FAMOTIDINE 10 MG/ML
20 INJECTION, SOLUTION INTRAVENOUS
OUTPATIENT
Start: 2023-08-21

## 2023-08-21 RX ORDER — ONDANSETRON 2 MG/ML
8 INJECTION INTRAMUSCULAR; INTRAVENOUS
OUTPATIENT
Start: 2023-08-21

## 2023-08-21 RX ORDER — ACETAMINOPHEN 325 MG/1
650 TABLET ORAL
OUTPATIENT
Start: 2023-08-21

## 2023-08-21 RX ORDER — ALBUTEROL SULFATE 90 UG/1
4 AEROSOL, METERED RESPIRATORY (INHALATION) PRN
OUTPATIENT
Start: 2023-08-21

## 2023-08-21 RX ORDER — SODIUM CHLORIDE 9 MG/ML
5-250 INJECTION, SOLUTION INTRAVENOUS PRN
OUTPATIENT
Start: 2023-08-21

## 2023-08-21 RX ORDER — FAMOTIDINE 10 MG/ML
20 INJECTION, SOLUTION INTRAVENOUS ONCE
OUTPATIENT
Start: 2023-08-21 | End: 2023-08-21

## 2023-08-21 RX ORDER — DIPHENHYDRAMINE HYDROCHLORIDE 50 MG/ML
25 INJECTION INTRAMUSCULAR; INTRAVENOUS ONCE
Status: CANCELLED | OUTPATIENT
Start: 2023-08-21 | End: 2023-08-21

## 2023-08-21 RX ORDER — HEPARIN SODIUM (PORCINE) LOCK FLUSH IV SOLN 100 UNIT/ML 100 UNIT/ML
500 SOLUTION INTRAVENOUS PRN
OUTPATIENT
Start: 2023-08-21

## 2023-08-21 RX ORDER — PALONOSETRON 0.05 MG/ML
0.25 INJECTION, SOLUTION INTRAVENOUS ONCE
OUTPATIENT
Start: 2023-08-21 | End: 2023-08-21

## 2023-08-21 RX ORDER — DIPHENHYDRAMINE HYDROCHLORIDE 50 MG/ML
50 INJECTION INTRAMUSCULAR; INTRAVENOUS
OUTPATIENT
Start: 2023-08-21

## 2023-08-21 RX ORDER — EPINEPHRINE 1 MG/ML
0.3 INJECTION, SOLUTION, CONCENTRATE INTRAVENOUS PRN
OUTPATIENT
Start: 2023-08-21

## 2023-08-21 RX ORDER — SODIUM CHLORIDE 0.9 % (FLUSH) 0.9 %
5-40 SYRINGE (ML) INJECTION PRN
OUTPATIENT
Start: 2023-08-21

## 2023-08-22 ENCOUNTER — TELEPHONE (OUTPATIENT)
Dept: OTHER | Age: 68
End: 2023-08-22

## 2023-08-22 NOTE — TELEPHONE ENCOUNTER
Pt in good spirits and states Dr. Gillespie Aw held last treatment due to side effects. Pt denies needs or questions at this time. Encouragement given and she is to call with needs.

## 2023-08-28 ENCOUNTER — HOSPITAL ENCOUNTER (OUTPATIENT)
Dept: INFUSION THERAPY | Age: 68
Setting detail: INFUSION SERIES
Discharge: HOME | End: 2023-08-28
Payer: MEDICARE

## 2023-08-28 VITALS
TEMPERATURE: 97.6 F | WEIGHT: 240.5 LBS | HEART RATE: 90 BPM | SYSTOLIC BLOOD PRESSURE: 157 MMHG | OXYGEN SATURATION: 99 % | RESPIRATION RATE: 18 BRPM | BODY MASS INDEX: 36.57 KG/M2 | DIASTOLIC BLOOD PRESSURE: 93 MMHG

## 2023-08-28 DIAGNOSIS — C50.911 INVASIVE DUCTAL CARCINOMA OF BREAST, FEMALE, RIGHT (HCC): Primary | ICD-10-CM

## 2023-08-28 LAB
ALBUMIN SERPL-MCNC: 3.5 G/DL (ref 3.5–5.2)
ALP SERPL-CCNC: 111 U/L (ref 35–104)
ALT SERPL-CCNC: 18 U/L (ref 5–33)
ANION GAP SERPL CALCULATED.3IONS-SCNC: 12 MMOL/L (ref 7–19)
AST SERPL-CCNC: 24 U/L (ref 5–32)
BASOPHILS # BLD: 0.1 K/UL (ref 0–0.2)
BASOPHILS NFR BLD: 1.2 % (ref 0–1)
BILIRUB SERPL-MCNC: 0.6 MG/DL (ref 0.2–1.2)
BUN SERPL-MCNC: 7 MG/DL (ref 8–23)
CALCIUM SERPL-MCNC: 8.8 MG/DL (ref 8.8–10.2)
CHLORIDE SERPL-SCNC: 104 MMOL/L (ref 98–111)
CO2 SERPL-SCNC: 22 MMOL/L (ref 22–29)
CREAT SERPL-MCNC: 0.6 MG/DL (ref 0.5–0.9)
EOSINOPHIL # BLD: 0.1 K/UL (ref 0–0.6)
EOSINOPHIL NFR BLD: 0.7 % (ref 0–5)
ERYTHROCYTE [DISTWIDTH] IN BLOOD BY AUTOMATED COUNT: 18.8 % (ref 11.5–14.5)
GLUCOSE SERPL-MCNC: 132 MG/DL (ref 74–109)
HCT VFR BLD AUTO: 36.5 % (ref 37–47)
HGB BLD-MCNC: 11.6 G/DL (ref 12–16)
IMM GRANULOCYTES # BLD: 0 K/UL
LYMPHOCYTES # BLD: 2 K/UL (ref 1.1–4.5)
LYMPHOCYTES NFR BLD: 24.2 % (ref 20–40)
MCH RBC QN AUTO: 30.6 PG (ref 27–31)
MCHC RBC AUTO-ENTMCNC: 31.8 G/DL (ref 33–37)
MCV RBC AUTO: 96.3 FL (ref 81–99)
MONOCYTES # BLD: 0.9 K/UL (ref 0–0.9)
MONOCYTES NFR BLD: 11.4 % (ref 0–10)
NEUTROPHILS # BLD: 5.1 K/UL (ref 1.5–7.5)
NEUTS SEG NFR BLD: 62.1 % (ref 50–65)
PLATELET # BLD AUTO: 180 K/UL (ref 130–400)
PMV BLD AUTO: 10.7 FL (ref 9.4–12.3)
POTASSIUM SERPL-SCNC: 4 MMOL/L (ref 3.5–5)
PROT SERPL-MCNC: 6.7 G/DL (ref 6.6–8.7)
RBC # BLD AUTO: 3.79 M/UL (ref 4.2–5.4)
SODIUM SERPL-SCNC: 138 MMOL/L (ref 136–145)
WBC # BLD AUTO: 8.2 K/UL (ref 4.8–10.8)

## 2023-08-28 PROCEDURE — 96367 TX/PROPH/DG ADDL SEQ IV INF: CPT

## 2023-08-28 PROCEDURE — 2500000003 HC RX 250 WO HCPCS: Performed by: INTERNAL MEDICINE

## 2023-08-28 PROCEDURE — 96413 CHEMO IV INFUSION 1 HR: CPT

## 2023-08-28 PROCEDURE — 80053 COMPREHEN METABOLIC PANEL: CPT

## 2023-08-28 PROCEDURE — 2580000003 HC RX 258: Performed by: INTERNAL MEDICINE

## 2023-08-28 PROCEDURE — 85025 COMPLETE CBC W/AUTO DIFF WBC: CPT

## 2023-08-28 PROCEDURE — 6360000002 HC RX W HCPCS: Performed by: INTERNAL MEDICINE

## 2023-08-28 PROCEDURE — 36591 DRAW BLOOD OFF VENOUS DEVICE: CPT

## 2023-08-28 PROCEDURE — 96375 TX/PRO/DX INJ NEW DRUG ADDON: CPT

## 2023-08-28 RX ORDER — SODIUM CHLORIDE 0.9 % (FLUSH) 0.9 %
5-40 SYRINGE (ML) INJECTION PRN
Status: DISCONTINUED | OUTPATIENT
Start: 2023-08-28 | End: 2023-08-29 | Stop reason: HOSPADM

## 2023-08-28 RX ORDER — PALONOSETRON 0.05 MG/ML
0.25 INJECTION, SOLUTION INTRAVENOUS ONCE
Status: COMPLETED | OUTPATIENT
Start: 2023-08-28 | End: 2023-08-28

## 2023-08-28 RX ORDER — ONDANSETRON 2 MG/ML
8 INJECTION INTRAMUSCULAR; INTRAVENOUS
Status: DISCONTINUED | OUTPATIENT
Start: 2023-08-28 | End: 2023-08-29 | Stop reason: HOSPADM

## 2023-08-28 RX ORDER — SODIUM CHLORIDE 9 MG/ML
5-250 INJECTION, SOLUTION INTRAVENOUS PRN
Status: DISCONTINUED | OUTPATIENT
Start: 2023-08-28 | End: 2023-08-29 | Stop reason: HOSPADM

## 2023-08-28 RX ORDER — HEPARIN 100 UNIT/ML
500 SYRINGE INTRAVENOUS PRN
Status: DISCONTINUED | OUTPATIENT
Start: 2023-08-28 | End: 2023-08-29 | Stop reason: HOSPADM

## 2023-08-28 RX ORDER — DIPHENHYDRAMINE HYDROCHLORIDE 50 MG/ML
12.5 INJECTION INTRAMUSCULAR; INTRAVENOUS ONCE
Status: COMPLETED | OUTPATIENT
Start: 2023-08-28 | End: 2023-08-28

## 2023-08-28 RX ORDER — DEXAMETHASONE SODIUM PHOSPHATE 10 MG/ML
10 INJECTION, SOLUTION INTRAMUSCULAR; INTRAVENOUS ONCE
Status: COMPLETED | OUTPATIENT
Start: 2023-08-28 | End: 2023-08-28

## 2023-08-28 RX ADMIN — SODIUM CHLORIDE 20 ML/HR: 9 INJECTION, SOLUTION INTRAVENOUS at 10:05

## 2023-08-28 RX ADMIN — PALONOSETRON 0.25 MG: 0.05 INJECTION, SOLUTION INTRAVENOUS at 10:05

## 2023-08-28 RX ADMIN — HEPARIN 500 UNITS: 100 SYRINGE at 14:04

## 2023-08-28 RX ADMIN — SODIUM CHLORIDE, PRESERVATIVE FREE 10 ML: 5 INJECTION INTRAVENOUS at 11:10

## 2023-08-28 RX ADMIN — PACLITAXEL 144 MG: 6 INJECTION, SOLUTION, CONCENTRATE INTRAVENOUS at 11:24

## 2023-08-28 RX ADMIN — SODIUM CHLORIDE, PRESERVATIVE FREE 10 ML: 5 INJECTION INTRAVENOUS at 10:19

## 2023-08-28 RX ADMIN — SODIUM CHLORIDE, PRESERVATIVE FREE 20 ML: 5 INJECTION INTRAVENOUS at 14:02

## 2023-08-28 RX ADMIN — DEXAMETHASONE SODIUM PHOSPHATE 10 MG: 10 INJECTION, SOLUTION INTRAMUSCULAR; INTRAVENOUS at 10:06

## 2023-08-28 RX ADMIN — FOSAPREPITANT 150 MG: 150 INJECTION, POWDER, LYOPHILIZED, FOR SOLUTION INTRAVENOUS at 10:33

## 2023-08-28 RX ADMIN — SODIUM CHLORIDE, PRESERVATIVE FREE 20 MG: 5 INJECTION INTRAVENOUS at 10:20

## 2023-08-28 RX ADMIN — SODIUM CHLORIDE, PRESERVATIVE FREE 10 ML: 5 INJECTION INTRAVENOUS at 10:27

## 2023-08-28 RX ADMIN — DIPHENHYDRAMINE HYDROCHLORIDE 12.5 MG: 50 INJECTION INTRAMUSCULAR; INTRAVENOUS at 10:20

## 2023-08-28 NOTE — PROGRESS NOTES
Stat labs drawn on arrival to [address]. Pre meds given followed by Taxol as ordered. Pt in Cold Cap 45 min prior to and during Taxol infusion,  and remained in cap 90 minutes post infusion. Pt wore cold therapy gloves and socks during Taxol infusion. Tolerated well, without incident.

## 2023-08-28 NOTE — DISCHARGE INSTRUCTIONS
paclitaxel  Pronunciation:  JOHN li MERCEDES asael  Brand:  Onxol  What is the most important information I should know about paclitaxel? Get emergency medical help if you have signs of an allergic reaction: hives; difficult breathing; feeling like you might pass out; swelling of your face, lips, tongue, or throat. Paclitaxel can lower blood cells that help your body fight infections and help your blood to clot. You may get an infection or bleed more easily. Call your doctor if you have unusual bruising or bleeding, or signs of infection (fever, sore throat, mouth sores, cough). What is paclitaxel? Paclitaxel is used to treat breast cancer, ovarian cancer, and lung cancer. It is also used to treat AIDS-related Kaposi's sarcoma. Paclitaxel is sometimes given after other treatments have failed. Paclitaxel may also be used for purposes not listed in this medication guide. What should I discuss with my healthcare provider before receiving paclitaxel? You should not be treated with paclitaxel if you are allergic to it, or if you have:  low white blood cell (WBC) counts; or  an allergy to castor oil (contained in paclitaxel and other medicines such as cyclosporine or teniposide). Tell your doctor if you have ever had:  heart problems; or  liver disease. Paclitaxel may harm an unborn baby. Use effective birth control to prevent pregnancy, and tell your doctor if you become pregnant. You should not breastfeed while you are using paclitaxel. How is paclitaxel given? Paclitaxel is given as an infusion into a vein. A healthcare provider will give you this injection. This medicine must be given slowly, and the infusion can take 3 to 24 hours to complete. Paclitaxel is usually given once every 2 to 3 weeks. Follow your doctor's dosing instructions very carefully. You may be given other medications to prevent an allergic reaction while you are receiving paclitaxel.   Your breathing, blood pressure, oxygen levels, prescribed. Every effort has been made to ensure that the information provided by [address] [place] [address] is accurate, up-to-date, and complete, but no guarantee is made to that effect. Drug information contained herein may be time sensitive. [facility] information has been compiled for use by healthcare practitioners and consumers in the [facility] and therefore [facility] does not warrant that uses outside of the [facility] are appropriate, unless specifically indicated otherwise. [facility]'s drug information does not endorse drugs, diagnose patients or recommend therapy. [facility]'s drug information is an informational resource designed to assist licensed healthcare practitioners in caring for their patients and/or to serve consumers viewing this service as a supplement to, and not a substitute for, the expertise, skill, knowledge and judgment of healthcare practitioners. The absence of a warning for a given drug or drug combination in no way should be construed to indicate that the drug or drug combination is safe, effective or appropriate for any given patient. [facility] does not assume any responsibility for any aspect of healthcare administered with the aid of information [facility] provides. The information contained herein is not intended to cover all possible uses, directions, precautions, warnings, drug interactions, allergic reactions, or adverse effects. If you have questions about the drugs you are taking, check with your doctor, nurse or pharmacist.  Copyright 4507-4007 [address]: 6.01. Revision date: 12/1/2020. Care instructions adapted under license by [facility] ([facility]). If you have questions about a medical condition or this instruction, always ask your healthcare professional. [address] any warranty or liability for your use of this information.

## 2023-09-05 ENCOUNTER — HOSPITAL ENCOUNTER (OUTPATIENT)
Dept: INFUSION THERAPY | Age: 68
Setting detail: INFUSION SERIES
Discharge: HOME | End: 2023-09-05
Payer: MEDICARE

## 2023-09-05 VITALS
WEIGHT: 242.6 LBS | TEMPERATURE: 98.3 F | BODY MASS INDEX: 36.89 KG/M2 | HEART RATE: 92 BPM | DIASTOLIC BLOOD PRESSURE: 89 MMHG | RESPIRATION RATE: 18 BRPM | SYSTOLIC BLOOD PRESSURE: 140 MMHG | OXYGEN SATURATION: 98 %

## 2023-09-05 DIAGNOSIS — C50.911 INVASIVE DUCTAL CARCINOMA OF BREAST, FEMALE, RIGHT (HCC): Primary | ICD-10-CM

## 2023-09-05 LAB
ALBUMIN SERPL-MCNC: 3.3 G/DL (ref 3.5–5.2)
ALP SERPL-CCNC: 92 U/L (ref 35–104)
ALT SERPL-CCNC: 19 U/L (ref 5–33)
ANION GAP SERPL CALCULATED.3IONS-SCNC: 14 MMOL/L (ref 7–19)
AST SERPL-CCNC: 22 U/L (ref 5–32)
BASOPHILS # BLD: 0.1 K/UL (ref 0–0.2)
BASOPHILS NFR BLD: 0.8 % (ref 0–1)
BILIRUB SERPL-MCNC: 0.4 MG/DL (ref 0.2–1.2)
BUN SERPL-MCNC: 6 MG/DL (ref 8–23)
CALCIUM SERPL-MCNC: 8.8 MG/DL (ref 8.8–10.2)
CHLORIDE SERPL-SCNC: 103 MMOL/L (ref 98–111)
CO2 SERPL-SCNC: 20 MMOL/L (ref 22–29)
CORTIS AM PEAK SERPL-MCNC: 13.3 UG/DL (ref 6.2–19.4)
CREAT SERPL-MCNC: 0.6 MG/DL (ref 0.5–0.9)
EOSINOPHIL # BLD: 0.2 K/UL (ref 0–0.6)
EOSINOPHIL NFR BLD: 2.5 % (ref 0–5)
ERYTHROCYTE [DISTWIDTH] IN BLOOD BY AUTOMATED COUNT: 17.5 % (ref 11.5–14.5)
GLUCOSE SERPL-MCNC: 115 MG/DL (ref 74–109)
HCT VFR BLD AUTO: 30.6 % (ref 37–47)
HGB BLD-MCNC: 9.8 G/DL (ref 12–16)
IMM GRANULOCYTES # BLD: 0 K/UL
LYMPHOCYTES # BLD: 2.1 K/UL (ref 1.1–4.5)
LYMPHOCYTES NFR BLD: 23.9 % (ref 20–40)
MCH RBC QN AUTO: 30.9 PG (ref 27–31)
MCHC RBC AUTO-ENTMCNC: 32 G/DL (ref 33–37)
MCV RBC AUTO: 96.5 FL (ref 81–99)
MONOCYTES # BLD: 0.9 K/UL (ref 0–0.9)
MONOCYTES NFR BLD: 9.6 % (ref 0–10)
NEUTROPHILS # BLD: 5.6 K/UL (ref 1.5–7.5)
NEUTS SEG NFR BLD: 62.8 % (ref 50–65)
PLATELET # BLD AUTO: 227 K/UL (ref 130–400)
PMV BLD AUTO: 10.9 FL (ref 9.4–12.3)
POTASSIUM SERPL-SCNC: 3.7 MMOL/L (ref 3.5–5)
PROT SERPL-MCNC: 6.4 G/DL (ref 6.6–8.7)
RBC # BLD AUTO: 3.17 M/UL (ref 4.2–5.4)
SODIUM SERPL-SCNC: 137 MMOL/L (ref 136–145)
T4 SERPL-MCNC: 8.2 UG/DL (ref 4.5–11.7)
TSH SERPL DL<=0.005 MIU/L-ACNC: 2.82 UIU/ML (ref 0.27–4.2)
WBC # BLD AUTO: 8.9 K/UL (ref 4.8–10.8)

## 2023-09-05 PROCEDURE — 96375 TX/PRO/DX INJ NEW DRUG ADDON: CPT

## 2023-09-05 PROCEDURE — 84443 ASSAY THYROID STIM HORMONE: CPT

## 2023-09-05 PROCEDURE — 84436 ASSAY OF TOTAL THYROXINE: CPT

## 2023-09-05 PROCEDURE — 96413 CHEMO IV INFUSION 1 HR: CPT

## 2023-09-05 PROCEDURE — 82533 TOTAL CORTISOL: CPT

## 2023-09-05 PROCEDURE — 36591 DRAW BLOOD OFF VENOUS DEVICE: CPT

## 2023-09-05 PROCEDURE — 2500000003 HC RX 250 WO HCPCS: Performed by: INTERNAL MEDICINE

## 2023-09-05 PROCEDURE — 6360000002 HC RX W HCPCS: Performed by: INTERNAL MEDICINE

## 2023-09-05 PROCEDURE — 96417 CHEMO IV INFUS EACH ADDL SEQ: CPT

## 2023-09-05 PROCEDURE — 80053 COMPREHEN METABOLIC PANEL: CPT

## 2023-09-05 PROCEDURE — 2580000003 HC RX 258: Performed by: INTERNAL MEDICINE

## 2023-09-05 PROCEDURE — 96366 THER/PROPH/DIAG IV INF ADDON: CPT

## 2023-09-05 PROCEDURE — 85025 COMPLETE CBC W/AUTO DIFF WBC: CPT

## 2023-09-05 RX ORDER — FAMOTIDINE 10 MG/ML
20 INJECTION, SOLUTION INTRAVENOUS ONCE
Status: CANCELLED | OUTPATIENT
Start: 2023-09-05 | End: 2023-09-05

## 2023-09-05 RX ORDER — MEPERIDINE HYDROCHLORIDE 50 MG/ML
12.5 INJECTION INTRAMUSCULAR; INTRAVENOUS; SUBCUTANEOUS PRN
OUTPATIENT
Start: 2023-09-20

## 2023-09-05 RX ORDER — HEPARIN SODIUM (PORCINE) LOCK FLUSH IV SOLN 100 UNIT/ML 100 UNIT/ML
500 SOLUTION INTRAVENOUS PRN
OUTPATIENT
Start: 2023-09-13

## 2023-09-05 RX ORDER — DIPHENHYDRAMINE HYDROCHLORIDE 50 MG/ML
50 INJECTION INTRAMUSCULAR; INTRAVENOUS
OUTPATIENT
Start: 2023-09-13

## 2023-09-05 RX ORDER — SODIUM CHLORIDE 9 MG/ML
5-250 INJECTION, SOLUTION INTRAVENOUS PRN
Status: DISCONTINUED | OUTPATIENT
Start: 2023-09-05 | End: 2023-09-06 | Stop reason: HOSPADM

## 2023-09-05 RX ORDER — SODIUM CHLORIDE 9 MG/ML
5-250 INJECTION, SOLUTION INTRAVENOUS PRN
OUTPATIENT
Start: 2023-09-05

## 2023-09-05 RX ORDER — SODIUM CHLORIDE 9 MG/ML
INJECTION, SOLUTION INTRAVENOUS CONTINUOUS
OUTPATIENT
Start: 2023-09-05

## 2023-09-05 RX ORDER — PALONOSETRON 0.05 MG/ML
0.25 INJECTION, SOLUTION INTRAVENOUS ONCE
Start: 2023-09-13 | End: 2023-09-13

## 2023-09-05 RX ORDER — EPINEPHRINE 1 MG/ML
0.3 INJECTION, SOLUTION, CONCENTRATE INTRAVENOUS PRN
OUTPATIENT
Start: 2023-09-05

## 2023-09-05 RX ORDER — MEPERIDINE HYDROCHLORIDE 50 MG/ML
12.5 INJECTION INTRAMUSCULAR; INTRAVENOUS; SUBCUTANEOUS PRN
OUTPATIENT
Start: 2023-09-13

## 2023-09-05 RX ORDER — FAMOTIDINE 10 MG/ML
20 INJECTION, SOLUTION INTRAVENOUS
OUTPATIENT
Start: 2023-09-20

## 2023-09-05 RX ORDER — ACETAMINOPHEN 325 MG/1
650 TABLET ORAL
OUTPATIENT
Start: 2023-09-20

## 2023-09-05 RX ORDER — FAMOTIDINE 10 MG/ML
20 INJECTION, SOLUTION INTRAVENOUS
OUTPATIENT
Start: 2023-09-13

## 2023-09-05 RX ORDER — HEPARIN 100 UNIT/ML
500 SYRINGE INTRAVENOUS PRN
Status: DISCONTINUED | OUTPATIENT
Start: 2023-09-05 | End: 2023-09-06 | Stop reason: HOSPADM

## 2023-09-05 RX ORDER — ALBUTEROL SULFATE 90 UG/1
4 AEROSOL, METERED RESPIRATORY (INHALATION) PRN
OUTPATIENT
Start: 2023-09-20

## 2023-09-05 RX ORDER — DEXAMETHASONE SODIUM PHOSPHATE 10 MG/ML
10 INJECTION, SOLUTION INTRAMUSCULAR; INTRAVENOUS ONCE
Status: COMPLETED | OUTPATIENT
Start: 2023-09-05 | End: 2023-09-05

## 2023-09-05 RX ORDER — ACETAMINOPHEN 325 MG/1
650 TABLET ORAL
OUTPATIENT
Start: 2023-09-13

## 2023-09-05 RX ORDER — DIPHENHYDRAMINE HYDROCHLORIDE 50 MG/ML
12.5 INJECTION INTRAMUSCULAR; INTRAVENOUS ONCE
Status: COMPLETED | OUTPATIENT
Start: 2023-09-05 | End: 2023-09-05

## 2023-09-05 RX ORDER — PALONOSETRON 0.05 MG/ML
0.25 INJECTION, SOLUTION INTRAVENOUS ONCE
Status: CANCELLED | OUTPATIENT
Start: 2023-09-05 | End: 2023-09-05

## 2023-09-05 RX ORDER — SODIUM CHLORIDE 9 MG/ML
5-250 INJECTION, SOLUTION INTRAVENOUS PRN
OUTPATIENT
Start: 2023-09-13

## 2023-09-05 RX ORDER — MEPERIDINE HYDROCHLORIDE 50 MG/ML
12.5 INJECTION INTRAMUSCULAR; INTRAVENOUS; SUBCUTANEOUS PRN
OUTPATIENT
Start: 2023-09-05

## 2023-09-05 RX ORDER — DIPHENHYDRAMINE HYDROCHLORIDE 50 MG/ML
12.5 INJECTION INTRAMUSCULAR; INTRAVENOUS ONCE
OUTPATIENT
Start: 2023-09-20 | End: 2023-09-20

## 2023-09-05 RX ORDER — DIPHENHYDRAMINE HYDROCHLORIDE 50 MG/ML
50 INJECTION INTRAMUSCULAR; INTRAVENOUS
OUTPATIENT
Start: 2023-09-20

## 2023-09-05 RX ORDER — SODIUM CHLORIDE 0.9 % (FLUSH) 0.9 %
5-40 SYRINGE (ML) INJECTION PRN
OUTPATIENT
Start: 2023-09-20

## 2023-09-05 RX ORDER — ACETAMINOPHEN 325 MG/1
650 TABLET ORAL
OUTPATIENT
Start: 2023-09-05

## 2023-09-05 RX ORDER — SODIUM CHLORIDE 0.9 % (FLUSH) 0.9 %
5-40 SYRINGE (ML) INJECTION PRN
Status: CANCELLED | OUTPATIENT
Start: 2023-09-05

## 2023-09-05 RX ORDER — ONDANSETRON 2 MG/ML
8 INJECTION INTRAMUSCULAR; INTRAVENOUS
OUTPATIENT
Start: 2023-09-05

## 2023-09-05 RX ORDER — ONDANSETRON 2 MG/ML
8 INJECTION INTRAMUSCULAR; INTRAVENOUS
OUTPATIENT
Start: 2023-09-13

## 2023-09-05 RX ORDER — DIPHENHYDRAMINE HYDROCHLORIDE 50 MG/ML
50 INJECTION INTRAMUSCULAR; INTRAVENOUS
OUTPATIENT
Start: 2023-09-05

## 2023-09-05 RX ORDER — FAMOTIDINE 10 MG/ML
20 INJECTION, SOLUTION INTRAVENOUS ONCE
OUTPATIENT
Start: 2023-09-13 | End: 2023-09-13

## 2023-09-05 RX ORDER — SODIUM CHLORIDE 9 MG/ML
INJECTION, SOLUTION INTRAVENOUS CONTINUOUS
OUTPATIENT
Start: 2023-09-13

## 2023-09-05 RX ORDER — FAMOTIDINE 10 MG/ML
20 INJECTION, SOLUTION INTRAVENOUS
OUTPATIENT
Start: 2023-09-05

## 2023-09-05 RX ORDER — SODIUM CHLORIDE 0.9 % (FLUSH) 0.9 %
5-40 SYRINGE (ML) INJECTION PRN
OUTPATIENT
Start: 2023-09-13

## 2023-09-05 RX ORDER — ALBUTEROL SULFATE 90 UG/1
4 AEROSOL, METERED RESPIRATORY (INHALATION) PRN
OUTPATIENT
Start: 2023-09-05

## 2023-09-05 RX ORDER — SODIUM CHLORIDE 0.9 % (FLUSH) 0.9 %
5-40 SYRINGE (ML) INJECTION PRN
Status: DISCONTINUED | OUTPATIENT
Start: 2023-09-05 | End: 2023-09-06 | Stop reason: HOSPADM

## 2023-09-05 RX ORDER — HEPARIN SODIUM (PORCINE) LOCK FLUSH IV SOLN 100 UNIT/ML 100 UNIT/ML
500 SOLUTION INTRAVENOUS PRN
OUTPATIENT
Start: 2023-09-20

## 2023-09-05 RX ORDER — SODIUM CHLORIDE 9 MG/ML
5-250 INJECTION, SOLUTION INTRAVENOUS PRN
OUTPATIENT
Start: 2023-09-20

## 2023-09-05 RX ORDER — EPINEPHRINE 1 MG/ML
0.3 INJECTION, SOLUTION, CONCENTRATE INTRAVENOUS PRN
OUTPATIENT
Start: 2023-09-13

## 2023-09-05 RX ORDER — FAMOTIDINE 10 MG/ML
20 INJECTION, SOLUTION INTRAVENOUS ONCE
OUTPATIENT
Start: 2023-09-20 | End: 2023-09-20

## 2023-09-05 RX ORDER — ALBUTEROL SULFATE 90 UG/1
4 AEROSOL, METERED RESPIRATORY (INHALATION) PRN
OUTPATIENT
Start: 2023-09-13

## 2023-09-05 RX ORDER — PALONOSETRON 0.05 MG/ML
0.25 INJECTION, SOLUTION INTRAVENOUS ONCE
Start: 2023-09-20 | End: 2023-09-20

## 2023-09-05 RX ORDER — DIPHENHYDRAMINE HYDROCHLORIDE 50 MG/ML
12.5 INJECTION INTRAMUSCULAR; INTRAVENOUS ONCE
OUTPATIENT
Start: 2023-09-13 | End: 2023-09-13

## 2023-09-05 RX ORDER — EPINEPHRINE 1 MG/ML
0.3 INJECTION, SOLUTION, CONCENTRATE INTRAVENOUS PRN
OUTPATIENT
Start: 2023-09-20

## 2023-09-05 RX ORDER — SODIUM CHLORIDE 9 MG/ML
INJECTION, SOLUTION INTRAVENOUS CONTINUOUS
OUTPATIENT
Start: 2023-09-20

## 2023-09-05 RX ORDER — ONDANSETRON 2 MG/ML
8 INJECTION INTRAMUSCULAR; INTRAVENOUS
OUTPATIENT
Start: 2023-09-20

## 2023-09-05 RX ORDER — PALONOSETRON 0.05 MG/ML
0.25 INJECTION, SOLUTION INTRAVENOUS ONCE
Status: COMPLETED | OUTPATIENT
Start: 2023-09-05 | End: 2023-09-05

## 2023-09-05 RX ORDER — SODIUM CHLORIDE 9 MG/ML
5-250 INJECTION, SOLUTION INTRAVENOUS PRN
Status: CANCELLED | OUTPATIENT
Start: 2023-09-05

## 2023-09-05 RX ORDER — HEPARIN SODIUM (PORCINE) LOCK FLUSH IV SOLN 100 UNIT/ML 100 UNIT/ML
500 SOLUTION INTRAVENOUS PRN
Status: CANCELLED | OUTPATIENT
Start: 2023-09-05

## 2023-09-05 RX ORDER — DIPHENHYDRAMINE HYDROCHLORIDE 50 MG/ML
12.5 INJECTION INTRAMUSCULAR; INTRAVENOUS ONCE
Status: CANCELLED | OUTPATIENT
Start: 2023-09-05 | End: 2023-09-05

## 2023-09-05 RX ADMIN — PALONOSETRON 0.25 MG: 0.05 INJECTION, SOLUTION INTRAVENOUS at 10:29

## 2023-09-05 RX ADMIN — SODIUM CHLORIDE, PRESERVATIVE FREE 20 ML: 5 INJECTION INTRAVENOUS at 14:45

## 2023-09-05 RX ADMIN — HEPARIN 500 UNITS: 100 SYRINGE at 14:45

## 2023-09-05 RX ADMIN — SODIUM CHLORIDE 400 MG: 9 INJECTION, SOLUTION INTRAVENOUS at 11:24

## 2023-09-05 RX ADMIN — DIPHENHYDRAMINE HYDROCHLORIDE 12.5 MG: 50 INJECTION INTRAMUSCULAR; INTRAVENOUS at 11:17

## 2023-09-05 RX ADMIN — DEXAMETHASONE SODIUM PHOSPHATE 10 MG: 10 INJECTION, SOLUTION INTRAMUSCULAR; INTRAVENOUS at 10:29

## 2023-09-05 RX ADMIN — SODIUM CHLORIDE 20 ML/HR: 9 INJECTION, SOLUTION INTRAVENOUS at 10:28

## 2023-09-05 RX ADMIN — SODIUM CHLORIDE, PRESERVATIVE FREE 10 ML: 5 INJECTION INTRAVENOUS at 11:55

## 2023-09-05 RX ADMIN — SODIUM CHLORIDE, PRESERVATIVE FREE 20 MG: 5 INJECTION INTRAVENOUS at 11:17

## 2023-09-05 RX ADMIN — PACLITAXEL 144 MG: 6 INJECTION, SOLUTION, CONCENTRATE INTRAVENOUS at 12:09

## 2023-09-05 RX ADMIN — FOSAPREPITANT 150 MG: 150 INJECTION, POWDER, LYOPHILIZED, FOR SOLUTION INTRAVENOUS at 10:43

## 2023-09-05 NOTE — PROGRESS NOTES
Pt arrived to [facility]. Port accessed using sterile technique. Brisk blood return noted and STAT labs obtained per order. Aloxi, Decadron, Benadryl, Pepcid, & Emend administered for pre-meds. Pt then received 400mg Keytruda, followed by 144mg Taxol. Pt in cold cap 45 minutes prior to and during Taxol infusion, and remained in cold cap 90 minutes post infusion. Pt wore cold therapy gloves and socks during Taxol infusion. Tolerated well.     Electronically signed by Jabari Archuleta RN on 9/5/2023 at 3:25 PM

## 2023-09-05 NOTE — DISCHARGE INSTRUCTIONS
pembrolizumab  Pronunciation:  PEM broroshan NOAM ue mab  Brand:  Sandy Mcleod  What is the most important information I should know about pembrolizumab? Pembrolizumab can cause serious or life-threatening side effects. Some side effects may need to be treated with other medicine, and your cancer treatments may be delayed. You will need frequent medical tests to help your doctor determine if it is safe for you to keep receiving pembrolizumab. Call your doctor at once if you have: skin problems, vision problems, fever, swollen glands, neck stiffness, chest pain, cough, shortness of breath, muscle or joint pain, pale skin, weakness, diarrhea, severe stomach pain, blood in your stools, bruising or bleeding, dark urine, yellowing of the skin or eyes, a hormonal disorder (frequent headaches, feeling light-headed, rapid heartbeats, a deeper voice, increased thirst or urination, feeling cold, weight gain or loss), or a change in the amount or color of your urine. What is pembrolizumab? Pembrolizumab is a cancer medicine that is used alone or in combination with other medicines to treat certain types of cancer such as:  skin cancer (melanoma, Merkel cell carcinoma, squamous cell carcinoma);  lung cancer;  head and neck cancer;  classical Hodgkin lymphoma;  primary mediastinal large B-cell lymphoma;  cancer of the kidney, bladder, and urinary tract;  colorectal cancer;  liver cancer;  triple-negative breast cancer;  cancer of the cervix or uterus;  advanced stomach or esophageal cancer; or  a type of cancer that laboratory testing proves to have certain specific DNA mutations. Pembrolizumab is often given when the cancer has spread to other parts of the body or cannot be treated with surgery or radiation, or when other cancer treatments did not work or have stopped working.   For some types of cancer, pembrolizumab is given only if your tumor tests positive for \"PD-L1\", or if the tumor has been tested for a specific genetic muscle pain;  nausea, vomiting, diarrhea; or  tenderness or irritation where the medicine was injected. This is not a complete list of side effects and others may occur. Call your doctor for medical advice about side effects. You may report side effects to FDA at 0-967-AOS-1887. What other drugs will affect paclitaxel? Sometimes it is not safe to use certain medications at the same time. Some drugs can affect your blood levels of other drugs you take, which may increase side effects or make the medications less effective. Many drugs can affect paclitaxel. This includes prescription and over-the-counter medicines, vitamins, and herbal products. Not all possible interactions are listed here. Tell your doctor about all your current medicines and any medicine you start or stop using. Where can I get more information? Your doctor or pharmacist can provide more information about paclitaxel. Remember, keep this and all other medicines out of the reach of children, never share your medicines with others, and use this medication only for the indication prescribed. Every effort has been made to ensure that the information provided by [address] [place] [address] is accurate, up-to-date, and complete, but no guarantee is made to that effect. Drug information contained herein may be time sensitive. [employer] information has been compiled for use by healthcare practitioners and consumers in the [facility] and therefore [employer] does not warrant that uses outside of the [facility] are appropriate, unless specifically indicated otherwise. [employer] drug information does not endorse drugs, diagnose patients or recommend therapy.  [employer] drug information is an informational resource designed to assist licensed healthcare practitioners in caring for their patients and/or to serve consumers viewing this service as a supplement to, and not a substitute for, the expertise, skill, knowledge and judgment of healthcare

## 2023-09-05 NOTE — FLOWSHEET NOTE
Patient present today for treatment of triple negative breast cancer. Cycle 4, day 1. Taxol 144mg, Pembrolizumab 400mg:  last given on 07/24, due today, treatment plan updated. CBC, CMP, TSH, T4, and Cortisol AM collected and sent to lab. Patient vitals WNL to proceed with treatment today. 09/05/23 0938   Oncology   Cycle Number CYCLE 4 - TAXOL 144mg: KEYTRUDA 400mg DUE THIS VISIT - treatment plan updated (LAST GIVEN 07/24/2023).  TSH, T4, CORITSOL AM collected this visit   Day Number 1     Electronically signed by Gumaro Nieves RN on 9/5/2023 at 9:46 AM

## 2023-09-05 NOTE — PROGRESS NOTES
Lab Results   Component Value Date    WBC 8.9 09/05/2023    HGB 9.8 (L) 09/05/2023    HCT 30.6 (L) 09/05/2023    MCV 96.5 09/05/2023     09/05/2023     Lab Results   Component Value Date    NEUTROABS 5.6 09/05/2023

## 2023-09-12 ENCOUNTER — HOSPITAL ENCOUNTER (OUTPATIENT)
Dept: INFUSION THERAPY | Age: 68
Setting detail: INFUSION SERIES
Discharge: HOME | End: 2023-09-12
Payer: MEDICARE

## 2023-09-12 VITALS
OXYGEN SATURATION: 100 % | HEART RATE: 91 BPM | SYSTOLIC BLOOD PRESSURE: 135 MMHG | TEMPERATURE: 98.2 F | RESPIRATION RATE: 18 BRPM | WEIGHT: 243 LBS | DIASTOLIC BLOOD PRESSURE: 93 MMHG | BODY MASS INDEX: 36.95 KG/M2

## 2023-09-12 DIAGNOSIS — C50.911 INVASIVE DUCTAL CARCINOMA OF BREAST, FEMALE, RIGHT (HCC): Primary | ICD-10-CM

## 2023-09-12 LAB
ALBUMIN SERPL-MCNC: 3.3 G/DL (ref 3.5–5.2)
ALP SERPL-CCNC: 97 U/L (ref 35–104)
ALT SERPL-CCNC: 20 U/L (ref 5–33)
ANION GAP SERPL CALCULATED.3IONS-SCNC: 15 MMOL/L (ref 7–19)
AST SERPL-CCNC: 30 U/L (ref 5–32)
BASOPHILS # BLD: 0.1 K/UL (ref 0–0.2)
BASOPHILS NFR BLD: 0.9 % (ref 0–1)
BILIRUB SERPL-MCNC: 0.3 MG/DL (ref 0.2–1.2)
BUN SERPL-MCNC: 6 MG/DL (ref 8–23)
CALCIUM SERPL-MCNC: 8.7 MG/DL (ref 8.8–10.2)
CHLORIDE SERPL-SCNC: 103 MMOL/L (ref 98–111)
CO2 SERPL-SCNC: 21 MMOL/L (ref 22–29)
CREAT SERPL-MCNC: 0.6 MG/DL (ref 0.5–0.9)
EOSINOPHIL # BLD: 0.2 K/UL (ref 0–0.6)
EOSINOPHIL NFR BLD: 3.4 % (ref 0–5)
ERYTHROCYTE [DISTWIDTH] IN BLOOD BY AUTOMATED COUNT: 17.1 % (ref 11.5–14.5)
GLUCOSE SERPL-MCNC: 111 MG/DL (ref 74–109)
HCT VFR BLD AUTO: 31 % (ref 37–47)
HGB BLD-MCNC: 9.9 G/DL (ref 12–16)
IMM GRANULOCYTES # BLD: 0 K/UL
LYMPHOCYTES # BLD: 2 K/UL (ref 1.1–4.5)
LYMPHOCYTES NFR BLD: 30.4 % (ref 20–40)
MCH RBC QN AUTO: 30.7 PG (ref 27–31)
MCHC RBC AUTO-ENTMCNC: 31.9 G/DL (ref 33–37)
MCV RBC AUTO: 96.3 FL (ref 81–99)
MONOCYTES # BLD: 0.5 K/UL (ref 0–0.9)
MONOCYTES NFR BLD: 8.3 % (ref 0–10)
NEUTROPHILS # BLD: 3.7 K/UL (ref 1.5–7.5)
NEUTS SEG NFR BLD: 56.4 % (ref 50–65)
PLATELET # BLD AUTO: 329 K/UL (ref 130–400)
PMV BLD AUTO: 10.3 FL (ref 9.4–12.3)
POTASSIUM SERPL-SCNC: 3.9 MMOL/L (ref 3.5–5)
PROT SERPL-MCNC: 5.9 G/DL (ref 6.6–8.7)
RBC # BLD AUTO: 3.22 M/UL (ref 4.2–5.4)
SODIUM SERPL-SCNC: 139 MMOL/L (ref 136–145)
WBC # BLD AUTO: 6.5 K/UL (ref 4.8–10.8)

## 2023-09-12 PROCEDURE — 80053 COMPREHEN METABOLIC PANEL: CPT

## 2023-09-12 PROCEDURE — 2500000003 HC RX 250 WO HCPCS: Performed by: INTERNAL MEDICINE

## 2023-09-12 PROCEDURE — 2580000003 HC RX 258: Performed by: INTERNAL MEDICINE

## 2023-09-12 PROCEDURE — 36591 DRAW BLOOD OFF VENOUS DEVICE: CPT

## 2023-09-12 PROCEDURE — 96413 CHEMO IV INFUSION 1 HR: CPT

## 2023-09-12 PROCEDURE — 85025 COMPLETE CBC W/AUTO DIFF WBC: CPT

## 2023-09-12 PROCEDURE — 96366 THER/PROPH/DIAG IV INF ADDON: CPT

## 2023-09-12 PROCEDURE — A4216 STERILE WATER/SALINE, 10 ML: HCPCS | Performed by: INTERNAL MEDICINE

## 2023-09-12 PROCEDURE — 6360000002 HC RX W HCPCS: Performed by: INTERNAL MEDICINE

## 2023-09-12 PROCEDURE — 96375 TX/PRO/DX INJ NEW DRUG ADDON: CPT

## 2023-09-12 RX ORDER — DEXAMETHASONE SODIUM PHOSPHATE 10 MG/ML
10 INJECTION, SOLUTION INTRAMUSCULAR; INTRAVENOUS ONCE
Status: COMPLETED | OUTPATIENT
Start: 2023-09-12 | End: 2023-09-12

## 2023-09-12 RX ORDER — SODIUM CHLORIDE 0.9 % (FLUSH) 0.9 %
5-40 SYRINGE (ML) INJECTION PRN
Status: DISCONTINUED | OUTPATIENT
Start: 2023-09-12 | End: 2023-09-13 | Stop reason: HOSPADM

## 2023-09-12 RX ORDER — HEPARIN 100 UNIT/ML
500 SYRINGE INTRAVENOUS PRN
Status: DISCONTINUED | OUTPATIENT
Start: 2023-09-12 | End: 2023-09-13 | Stop reason: HOSPADM

## 2023-09-12 RX ORDER — PALONOSETRON 0.05 MG/ML
0.25 INJECTION, SOLUTION INTRAVENOUS ONCE
Status: COMPLETED | OUTPATIENT
Start: 2023-09-12 | End: 2023-09-12

## 2023-09-12 RX ORDER — DIPHENHYDRAMINE HYDROCHLORIDE 50 MG/ML
12.5 INJECTION INTRAMUSCULAR; INTRAVENOUS ONCE
Status: COMPLETED | OUTPATIENT
Start: 2023-09-12 | End: 2023-09-12

## 2023-09-12 RX ORDER — SODIUM CHLORIDE 9 MG/ML
5-250 INJECTION, SOLUTION INTRAVENOUS PRN
Status: DISCONTINUED | OUTPATIENT
Start: 2023-09-12 | End: 2023-09-13 | Stop reason: HOSPADM

## 2023-09-12 RX ADMIN — SODIUM CHLORIDE 20 ML/HR: 9 INJECTION, SOLUTION INTRAVENOUS at 10:18

## 2023-09-12 RX ADMIN — FAMOTIDINE 20 MG: 10 INJECTION INTRAVENOUS at 10:29

## 2023-09-12 RX ADMIN — SODIUM CHLORIDE, PRESERVATIVE FREE 20 ML: 5 INJECTION INTRAVENOUS at 13:34

## 2023-09-12 RX ADMIN — FOSAPREPITANT DIMEGLUMINE 150 MG: 150 INJECTION, POWDER, LYOPHILIZED, FOR SOLUTION INTRAVENOUS at 10:52

## 2023-09-12 RX ADMIN — HEPARIN 500 UNITS: 100 SYRINGE at 13:33

## 2023-09-12 RX ADMIN — DEXAMETHASONE SODIUM PHOSPHATE 10 MG: 10 INJECTION, SOLUTION INTRAMUSCULAR; INTRAVENOUS at 10:18

## 2023-09-12 RX ADMIN — PACLITAXEL 144 MG: 6 INJECTION, SOLUTION, CONCENTRATE INTRAVENOUS at 11:28

## 2023-09-12 RX ADMIN — PALONOSETRON 0.25 MG: 0.05 INJECTION, SOLUTION INTRAVENOUS at 10:18

## 2023-09-12 RX ADMIN — DIPHENHYDRAMINE HYDROCHLORIDE 12.5 MG: 50 INJECTION, SOLUTION INTRAMUSCULAR; INTRAVENOUS at 10:28

## 2023-09-12 NOTE — DISCHARGE INSTRUCTIONS
paclitaxel  Pronunciation:  JOHN li MERCEDES asael  Brand:  Onxol  What is the most important information I should know about paclitaxel? Get emergency medical help if you have signs of an allergic reaction: hives; difficult breathing; feeling like you might pass out; swelling of your face, lips, tongue, or throat. Paclitaxel can lower blood cells that help your body fight infections and help your blood to clot. You may get an infection or bleed more easily. Call your doctor if you have unusual bruising or bleeding, or signs of infection (fever, sore throat, mouth sores, cough). What is paclitaxel? Paclitaxel is used to treat breast cancer, ovarian cancer, and lung cancer. It is also used to treat AIDS-related Kaposi's sarcoma. Paclitaxel is sometimes given after other treatments have failed. Paclitaxel may also be used for purposes not listed in this medication guide. What should I discuss with my healthcare provider before receiving paclitaxel? You should not be treated with paclitaxel if you are allergic to it, or if you have:  low white blood cell (WBC) counts; or  an allergy to castor oil (contained in paclitaxel and other medicines such as cyclosporine or teniposide). Tell your doctor if you have ever had:  heart problems; or  liver disease. Paclitaxel may harm an unborn baby. Use effective birth control to prevent pregnancy, and tell your doctor if you become pregnant. You should not breastfeed while you are using paclitaxel. How is paclitaxel given? Paclitaxel is given as an infusion into a vein. A healthcare provider will give you this injection. This medicine must be given slowly, and the infusion can take 3 to 24 hours to complete. Paclitaxel is usually given once every 2 to 3 weeks. Follow your doctor's dosing instructions very carefully. You may be given other medications to prevent an allergic reaction while you are receiving paclitaxel.   Your breathing, blood pressure, oxygen levels, heartbeats;  a light-headed feeling, like you might pass out;  severe headache, blurred vision, pounding in your neck or ears;  a seizure;  easy bruising, unusual bleeding, purple or red spots under your skin;  low red blood cells (anemia) --pale skin, unusual tiredness, feeling light-headed or short of breath, cold hands and feet; or  low white blood cell counts --fever, mouth sores, skin sores, sore throat, cough, trouble breathing. Your cancer treatments may be delayed or permanently discontinued if you have certain side effects. Common side effects may include:  fever, chills, or other signs of infection;  anemia;  feeling weak, tired, or light-headed;  bleeding;  trouble breathing or swallowing;  hair loss, skin rash, hives;  numbness, tingling, or burning;  swelling in your face, hands, or feet;  sores or white patches in or around your mouth;  joint or muscle pain;  nausea, vomiting, diarrhea; or  tenderness or irritation where the medicine was injected. This is not a complete list of side effects and others may occur. Call your doctor for medical advice about side effects. You may report side effects to FDA at 9-127-FDA-5410. What other drugs will affect paclitaxel? Sometimes it is not safe to use certain medications at the same time. Some drugs can affect your blood levels of other drugs you take, which may increase side effects or make the medications less effective. Many drugs can affect paclitaxel. This includes prescription and over-the-counter medicines, vitamins, and herbal products. Not all possible interactions are listed here. Tell your doctor about all your current medicines and any medicine you start or stop using. Where can I get more information? Your doctor or pharmacist can provide more information about paclitaxel.   Remember, keep this and all other medicines out of the reach of children, never share your medicines with others, and use this medication only for the indication

## 2023-09-12 NOTE — PROGRESS NOTES
Pt arrived to [facility] and port accessed using sterile technique. Stat lab drawn. Pt premedicated as ordered. Pt placed in cold cap 45 minutes prior to chemo infusions. Taxol 144mg given. Cold socks and gloves worn during taxol infusion. Cold cap in place for hour post infusion. Pt tolerated well.      Electronically signed by Maria Teresa Lind RN on 9/12/2023 at 4:13 PM

## 2023-09-18 ENCOUNTER — TELEPHONE (OUTPATIENT)
Dept: HEMATOLOGY | Age: 68
End: 2023-09-18

## 2023-09-18 NOTE — PROGRESS NOTES
(ROS) and made changes when appropriated. (Please note that portions of this note were completed with a voice recognition program. Efforts were made to edit the dictations but occasionally words are mis-transcribed. )Electronically signed by Jude Bamberger, MD on 9/19/2023 at 8:39 AM

## 2023-09-19 ENCOUNTER — HOSPITAL ENCOUNTER (OUTPATIENT)
Dept: INFUSION THERAPY | Age: 68
Setting detail: INFUSION SERIES
Discharge: HOME | End: 2023-09-19
Payer: MEDICARE

## 2023-09-19 ENCOUNTER — HOSPITAL ENCOUNTER (OUTPATIENT)
Dept: NON INVASIVE DIAGNOSTICS | Age: 68
Discharge: HOME | End: 2023-09-19
Payer: MEDICARE

## 2023-09-19 ENCOUNTER — HOSPITAL ENCOUNTER (OUTPATIENT)
Dept: INFUSION THERAPY | Age: 68
Discharge: HOME | End: 2023-09-19
Payer: MEDICARE

## 2023-09-19 ENCOUNTER — OFFICE VISIT (OUTPATIENT)
Dept: HEMATOLOGY | Age: 68
End: 2023-09-19
Payer: MEDICARE

## 2023-09-19 VITALS
OXYGEN SATURATION: 96 % | DIASTOLIC BLOOD PRESSURE: 93 MMHG | SYSTOLIC BLOOD PRESSURE: 139 MMHG | HEART RATE: 89 BPM | TEMPERATURE: 97.7 F | RESPIRATION RATE: 18 BRPM

## 2023-09-19 VITALS
BODY MASS INDEX: 37.98 KG/M2 | DIASTOLIC BLOOD PRESSURE: 62 MMHG | HEART RATE: 107 BPM | HEIGHT: 67 IN | WEIGHT: 242 LBS | OXYGEN SATURATION: 97 % | SYSTOLIC BLOOD PRESSURE: 118 MMHG

## 2023-09-19 DIAGNOSIS — T45.1X5D ADVERSE EFFECT OF CHEMOTHERAPY, SUBSEQUENT ENCOUNTER: ICD-10-CM

## 2023-09-19 DIAGNOSIS — Z51.12 ENCOUNTER FOR ANTINEOPLASTIC IMMUNOTHERAPY: ICD-10-CM

## 2023-09-19 DIAGNOSIS — C50.911 INVASIVE DUCTAL CARCINOMA OF BREAST, FEMALE, RIGHT (HCC): ICD-10-CM

## 2023-09-19 DIAGNOSIS — T45.1X5A ANTINEOPLASTIC CHEMOTHERAPY INDUCED ANEMIA: ICD-10-CM

## 2023-09-19 DIAGNOSIS — I82.4Z1 ACUTE DEEP VEIN THROMBOSIS (DVT) OF DISTAL END OF RIGHT LOWER EXTREMITY (HCC): Primary | ICD-10-CM

## 2023-09-19 DIAGNOSIS — C50.211 MALIGNANT NEOPLASM OF UPPER-INNER QUADRANT OF RIGHT BREAST IN FEMALE, ESTROGEN RECEPTOR NEGATIVE (HCC): ICD-10-CM

## 2023-09-19 DIAGNOSIS — Z15.01 LI-FRAUMENI SYNDROME: ICD-10-CM

## 2023-09-19 DIAGNOSIS — M79.89 SWELLING OF RIGHT LOWER EXTREMITY: ICD-10-CM

## 2023-09-19 DIAGNOSIS — C50.911 INVASIVE DUCTAL CARCINOMA OF BREAST, FEMALE, RIGHT (HCC): Primary | ICD-10-CM

## 2023-09-19 DIAGNOSIS — Z71.89 CARE PLAN DISCUSSED WITH PATIENT: ICD-10-CM

## 2023-09-19 DIAGNOSIS — Z17.1 MALIGNANT NEOPLASM OF UPPER-INNER QUADRANT OF RIGHT BREAST IN FEMALE, ESTROGEN RECEPTOR NEGATIVE (HCC): ICD-10-CM

## 2023-09-19 DIAGNOSIS — Z51.11 CHEMOTHERAPY MANAGEMENT, ENCOUNTER FOR: Primary | ICD-10-CM

## 2023-09-19 DIAGNOSIS — D64.81 ANTINEOPLASTIC CHEMOTHERAPY INDUCED ANEMIA: ICD-10-CM

## 2023-09-19 LAB
ALBUMIN SERPL-MCNC: 3.5 G/DL (ref 3.5–5.2)
ALP SERPL-CCNC: 97 U/L (ref 35–104)
ALT SERPL-CCNC: 26 U/L (ref 9–52)
ANION GAP SERPL CALCULATED.3IONS-SCNC: 14 MMOL/L (ref 7–19)
AST SERPL-CCNC: 33 U/L (ref 14–36)
BASOPHILS # BLD: 0.06 K/UL (ref 0.01–0.08)
BASOPHILS NFR BLD: 0.8 % (ref 0.1–1.2)
BILIRUB SERPL-MCNC: 0.5 MG/DL (ref 0.2–1.3)
BUN SERPL-MCNC: 6 MG/DL (ref 7–17)
CALCIUM SERPL-MCNC: 9.1 MG/DL (ref 8.4–10.2)
CHLORIDE SERPL-SCNC: 108 MMOL/L (ref 98–111)
CO2 SERPL-SCNC: 19 MMOL/L (ref 22–29)
CREAT SERPL-MCNC: 0.6 MG/DL (ref 0.5–1)
EOSINOPHIL # BLD: 0.07 K/UL (ref 0.04–0.54)
EOSINOPHIL NFR BLD: 0.9 % (ref 0.7–7)
ERYTHROCYTE [DISTWIDTH] IN BLOOD BY AUTOMATED COUNT: 17.4 % (ref 11.7–14.4)
GLOBULIN: 3.1 G/DL
GLUCOSE SERPL-MCNC: 131 MG/DL (ref 74–106)
HCT VFR BLD AUTO: 30 % (ref 34.1–44.9)
HGB BLD-MCNC: 9.7 G/DL (ref 11.2–15.7)
LYMPHOCYTES # BLD: 1.97 K/UL (ref 1.18–3.74)
LYMPHOCYTES NFR BLD: 25.5 % (ref 19.3–53.1)
MCH RBC QN AUTO: 30.8 PG (ref 25.6–32.2)
MCHC RBC AUTO-ENTMCNC: 32.3 G/DL (ref 32.3–35.5)
MCV RBC AUTO: 95.2 FL (ref 79.4–94.8)
MONOCYTES # BLD: 0.54 K/UL (ref 0.24–0.82)
MONOCYTES NFR BLD: 7 % (ref 4.7–12.5)
NEUTROPHILS # BLD: 5.06 K/UL (ref 1.56–6.13)
NEUTS SEG NFR BLD: 65.3 % (ref 34–71.1)
PLATELET # BLD AUTO: 371 K/UL (ref 182–369)
PMV BLD AUTO: 10.5 FL (ref 7.4–10.4)
POTASSIUM SERPL-SCNC: 3.9 MMOL/L (ref 3.5–5.1)
PROT SERPL-MCNC: 6.6 G/DL (ref 6.3–8.2)
RBC # BLD AUTO: 3.15 M/UL (ref 3.93–5.22)
SODIUM SERPL-SCNC: 141 MMOL/L (ref 137–145)
WBC # BLD AUTO: 7.74 K/UL (ref 3.98–10.04)

## 2023-09-19 PROCEDURE — 6360000002 HC RX W HCPCS: Performed by: INTERNAL MEDICINE

## 2023-09-19 PROCEDURE — 80053 COMPREHEN METABOLIC PANEL: CPT

## 2023-09-19 PROCEDURE — G8417 CALC BMI ABV UP PARAM F/U: HCPCS | Performed by: INTERNAL MEDICINE

## 2023-09-19 PROCEDURE — 1036F TOBACCO NON-USER: CPT | Performed by: INTERNAL MEDICINE

## 2023-09-19 PROCEDURE — G8400 PT W/DXA NO RESULTS DOC: HCPCS | Performed by: INTERNAL MEDICINE

## 2023-09-19 PROCEDURE — 99214 OFFICE O/P EST MOD 30 MIN: CPT | Performed by: INTERNAL MEDICINE

## 2023-09-19 PROCEDURE — 96413 CHEMO IV INFUSION 1 HR: CPT

## 2023-09-19 PROCEDURE — G8427 DOCREV CUR MEDS BY ELIG CLIN: HCPCS | Performed by: INTERNAL MEDICINE

## 2023-09-19 PROCEDURE — 85025 COMPLETE CBC W/AUTO DIFF WBC: CPT

## 2023-09-19 PROCEDURE — 3078F DIAST BP <80 MM HG: CPT | Performed by: INTERNAL MEDICINE

## 2023-09-19 PROCEDURE — 2500000003 HC RX 250 WO HCPCS: Performed by: INTERNAL MEDICINE

## 2023-09-19 PROCEDURE — 2580000003 HC RX 258: Performed by: INTERNAL MEDICINE

## 2023-09-19 PROCEDURE — 93971 EXTREMITY STUDY: CPT

## 2023-09-19 PROCEDURE — 1090F PRES/ABSN URINE INCON ASSESS: CPT | Performed by: INTERNAL MEDICINE

## 2023-09-19 PROCEDURE — 96375 TX/PRO/DX INJ NEW DRUG ADDON: CPT

## 2023-09-19 PROCEDURE — 36415 COLL VENOUS BLD VENIPUNCTURE: CPT

## 2023-09-19 PROCEDURE — 3074F SYST BP LT 130 MM HG: CPT | Performed by: INTERNAL MEDICINE

## 2023-09-19 PROCEDURE — 3017F COLORECTAL CA SCREEN DOC REV: CPT | Performed by: INTERNAL MEDICINE

## 2023-09-19 PROCEDURE — 96366 THER/PROPH/DIAG IV INF ADDON: CPT

## 2023-09-19 PROCEDURE — 1123F ACP DISCUSS/DSCN MKR DOCD: CPT | Performed by: INTERNAL MEDICINE

## 2023-09-19 PROCEDURE — A4216 STERILE WATER/SALINE, 10 ML: HCPCS | Performed by: INTERNAL MEDICINE

## 2023-09-19 RX ORDER — SODIUM CHLORIDE 0.9 % (FLUSH) 0.9 %
5-40 SYRINGE (ML) INJECTION PRN
Status: DISCONTINUED | OUTPATIENT
Start: 2023-09-19 | End: 2023-09-20 | Stop reason: HOSPADM

## 2023-09-19 RX ORDER — DEXAMETHASONE SODIUM PHOSPHATE 10 MG/ML
10 INJECTION, SOLUTION INTRAMUSCULAR; INTRAVENOUS ONCE
Status: COMPLETED | OUTPATIENT
Start: 2023-09-19 | End: 2023-09-19

## 2023-09-19 RX ORDER — SODIUM CHLORIDE 9 MG/ML
5-250 INJECTION, SOLUTION INTRAVENOUS PRN
Status: DISCONTINUED | OUTPATIENT
Start: 2023-09-19 | End: 2023-09-20 | Stop reason: HOSPADM

## 2023-09-19 RX ORDER — HEPARIN 100 UNIT/ML
500 SYRINGE INTRAVENOUS PRN
Status: DISCONTINUED | OUTPATIENT
Start: 2023-09-19 | End: 2023-09-20 | Stop reason: HOSPADM

## 2023-09-19 RX ORDER — PALONOSETRON 0.05 MG/ML
0.25 INJECTION, SOLUTION INTRAVENOUS ONCE
Status: COMPLETED | OUTPATIENT
Start: 2023-09-19 | End: 2023-09-19

## 2023-09-19 RX ORDER — DIPHENHYDRAMINE HYDROCHLORIDE 50 MG/ML
12.5 INJECTION INTRAMUSCULAR; INTRAVENOUS ONCE
Status: COMPLETED | OUTPATIENT
Start: 2023-09-19 | End: 2023-09-19

## 2023-09-19 RX ADMIN — DIPHENHYDRAMINE HYDROCHLORIDE 12.5 MG: 50 INJECTION INTRAMUSCULAR; INTRAVENOUS at 09:31

## 2023-09-19 RX ADMIN — HEPARIN 500 UNITS: 100 SYRINGE at 12:52

## 2023-09-19 RX ADMIN — PACLITAXEL 144 MG: 6 INJECTION, SOLUTION, CONCENTRATE INTRAVENOUS at 10:37

## 2023-09-19 RX ADMIN — FAMOTIDINE 20 MG: 10 INJECTION INTRAVENOUS at 09:31

## 2023-09-19 RX ADMIN — SODIUM CHLORIDE, PRESERVATIVE FREE 10 ML: 5 INJECTION INTRAVENOUS at 09:47

## 2023-09-19 RX ADMIN — SODIUM CHLORIDE, PRESERVATIVE FREE 20 ML: 5 INJECTION INTRAVENOUS at 12:53

## 2023-09-19 RX ADMIN — PALONOSETRON 0.25 MG: 0.05 INJECTION, SOLUTION INTRAVENOUS at 09:31

## 2023-09-19 RX ADMIN — SODIUM CHLORIDE 20 ML/HR: 9 INJECTION, SOLUTION INTRAVENOUS at 09:31

## 2023-09-19 RX ADMIN — FOSAPREPITANT DIMEGLUMINE 150 MG: 150 INJECTION, POWDER, LYOPHILIZED, FOR SOLUTION INTRAVENOUS at 09:49

## 2023-09-19 RX ADMIN — DEXAMETHASONE SODIUM PHOSPHATE 10 MG: 10 INJECTION, SOLUTION INTRAMUSCULAR; INTRAVENOUS at 09:31

## 2023-09-19 NOTE — PROGRESS NOTES
Pt arrived from office with port accessed and labs completed. Aloxi, decadron, benadryl, pepcid, and emend given for premeds. Taxol 144 mg given. Cold gloves and socks in place during infusion. Cold cap in place for 45 mins pre taxol infusion, during infusion, and for 60 mins post infusion. Pt tolerated infusion well. Blood return checked and present before, during, and after infusion. [facility] [address] [facility].

## 2023-09-19 NOTE — TELEPHONE ENCOUNTER
I have left voicemail for patient to verify pharmacy. Kim Baumann has been made aware of results and has recommended Eliquis 10mg BID for 7 days, then Eliquis 5mg BID. I have sent medication to pharmacy on file.

## 2023-09-27 ENCOUNTER — HOSPITAL ENCOUNTER (OUTPATIENT)
Dept: WOMENS IMAGING | Age: 68
Discharge: HOME | End: 2023-09-27
Payer: MEDICARE

## 2023-09-27 ENCOUNTER — OFFICE VISIT (OUTPATIENT)
Dept: SURGERY | Age: 68
End: 2023-09-27

## 2023-09-27 DIAGNOSIS — C50.919 TRIPLE NEGATIVE BREAST CANCER (HCC): ICD-10-CM

## 2023-09-27 DIAGNOSIS — C50.811 MALIGNANT NEOPLASM OF OVERLAPPING SITES OF RIGHT BREAST (HCC): ICD-10-CM

## 2023-09-27 DIAGNOSIS — Z15.01 LI-FRAUMENI SYNDROME: Primary | ICD-10-CM

## 2023-09-27 PROCEDURE — 76642 ULTRASOUND BREAST LIMITED: CPT

## 2023-09-28 ENCOUNTER — TELEPHONE (OUTPATIENT)
Dept: OTHER | Age: 68
End: 2023-09-28

## 2023-09-28 NOTE — TELEPHONE ENCOUNTER
Pt states she is doing okay and better now that she was taken off Carbo. She said her memory was bad and for two days after treatment, she was not herself. Encouragement given. No needs or questions at this time.

## 2023-10-03 ENCOUNTER — HOSPITAL ENCOUNTER (OUTPATIENT)
Dept: INFUSION THERAPY | Age: 68
Setting detail: INFUSION SERIES
Discharge: HOME | End: 2023-10-03
Payer: MEDICARE

## 2023-10-03 VITALS
SYSTOLIC BLOOD PRESSURE: 108 MMHG | DIASTOLIC BLOOD PRESSURE: 70 MMHG | OXYGEN SATURATION: 99 % | RESPIRATION RATE: 18 BRPM | TEMPERATURE: 98.9 F | WEIGHT: 240 LBS | HEART RATE: 80 BPM | BODY MASS INDEX: 37.59 KG/M2

## 2023-10-03 DIAGNOSIS — C50.911 INVASIVE DUCTAL CARCINOMA OF BREAST, FEMALE, RIGHT (HCC): ICD-10-CM

## 2023-10-03 DIAGNOSIS — C50.919 TRIPLE NEGATIVE BREAST CANCER (HCC): Primary | ICD-10-CM

## 2023-10-03 LAB
ALBUMIN SERPL-MCNC: 3.3 G/DL (ref 3.5–5.2)
ALP SERPL-CCNC: 88 U/L (ref 35–104)
ALT SERPL-CCNC: 11 U/L (ref 5–33)
ANION GAP SERPL CALCULATED.3IONS-SCNC: 15 MMOL/L (ref 7–19)
AST SERPL-CCNC: 20 U/L (ref 5–32)
BASOPHILS # BLD: 0.1 K/UL (ref 0–0.2)
BASOPHILS NFR BLD: 0.6 % (ref 0–1)
BILIRUB SERPL-MCNC: 0.3 MG/DL (ref 0.2–1.2)
BUN SERPL-MCNC: 6 MG/DL (ref 8–23)
CALCIUM SERPL-MCNC: 8.8 MG/DL (ref 8.8–10.2)
CHLORIDE SERPL-SCNC: 104 MMOL/L (ref 98–111)
CO2 SERPL-SCNC: 21 MMOL/L (ref 22–29)
CREAT SERPL-MCNC: 0.6 MG/DL (ref 0.5–0.9)
EOSINOPHIL # BLD: 0.1 K/UL (ref 0–0.6)
EOSINOPHIL NFR BLD: 0.9 % (ref 0–5)
ERYTHROCYTE [DISTWIDTH] IN BLOOD BY AUTOMATED COUNT: 16.6 % (ref 11.5–14.5)
GLUCOSE SERPL-MCNC: 134 MG/DL (ref 74–109)
HCT VFR BLD AUTO: 33.4 % (ref 37–47)
HGB BLD-MCNC: 10 G/DL (ref 12–16)
IMM GRANULOCYTES # BLD: 0 K/UL
LYMPHOCYTES # BLD: 2.1 K/UL (ref 1.1–4.5)
LYMPHOCYTES NFR BLD: 20.7 % (ref 20–40)
MCH RBC QN AUTO: 30.4 PG (ref 27–31)
MCHC RBC AUTO-ENTMCNC: 29.9 G/DL (ref 33–37)
MCV RBC AUTO: 101.5 FL (ref 81–99)
MONOCYTES # BLD: 1 K/UL (ref 0–0.9)
MONOCYTES NFR BLD: 9.9 % (ref 0–10)
NEUTROPHILS # BLD: 6.7 K/UL (ref 1.5–7.5)
NEUTS SEG NFR BLD: 67.5 % (ref 50–65)
PLATELET # BLD AUTO: 480 K/UL (ref 130–400)
PMV BLD AUTO: 9.9 FL (ref 9.4–12.3)
POTASSIUM SERPL-SCNC: 3.5 MMOL/L (ref 3.5–5)
PROT SERPL-MCNC: 6.8 G/DL (ref 6.6–8.7)
RBC # BLD AUTO: 3.29 M/UL (ref 4.2–5.4)
SODIUM SERPL-SCNC: 140 MMOL/L (ref 136–145)
WBC # BLD AUTO: 9.9 K/UL (ref 4.8–10.8)

## 2023-10-03 PROCEDURE — 6360000002 HC RX W HCPCS: Performed by: INTERNAL MEDICINE

## 2023-10-03 PROCEDURE — 96366 THER/PROPH/DIAG IV INF ADDON: CPT

## 2023-10-03 PROCEDURE — 96417 CHEMO IV INFUS EACH ADDL SEQ: CPT

## 2023-10-03 PROCEDURE — 96413 CHEMO IV INFUSION 1 HR: CPT

## 2023-10-03 PROCEDURE — 2580000003 HC RX 258: Performed by: INTERNAL MEDICINE

## 2023-10-03 PROCEDURE — 85025 COMPLETE CBC W/AUTO DIFF WBC: CPT

## 2023-10-03 PROCEDURE — 36591 DRAW BLOOD OFF VENOUS DEVICE: CPT

## 2023-10-03 PROCEDURE — 80053 COMPREHEN METABOLIC PANEL: CPT

## 2023-10-03 PROCEDURE — 2500000003 HC RX 250 WO HCPCS: Performed by: INTERNAL MEDICINE

## 2023-10-03 PROCEDURE — 96377 APPLICATON ON-BODY INJECTOR: CPT

## 2023-10-03 PROCEDURE — 96375 TX/PRO/DX INJ NEW DRUG ADDON: CPT

## 2023-10-03 PROCEDURE — 96409 CHEMO IV PUSH SNGL DRUG: CPT

## 2023-10-03 RX ORDER — SODIUM CHLORIDE 0.9 % (FLUSH) 0.9 %
5-40 SYRINGE (ML) INJECTION PRN
Status: CANCELLED | OUTPATIENT
Start: 2023-10-03

## 2023-10-03 RX ORDER — SODIUM CHLORIDE 9 MG/ML
INJECTION, SOLUTION INTRAVENOUS CONTINUOUS
OUTPATIENT
Start: 2023-10-03

## 2023-10-03 RX ORDER — DOXORUBICIN HYDROCHLORIDE 2 MG/ML
50 INJECTION, SOLUTION INTRAVENOUS ONCE
Status: DISCONTINUED | OUTPATIENT
Start: 2023-10-03 | End: 2023-10-03

## 2023-10-03 RX ORDER — SODIUM CHLORIDE 9 MG/ML
5-250 INJECTION, SOLUTION INTRAVENOUS PRN
Status: CANCELLED | OUTPATIENT
Start: 2023-10-03

## 2023-10-03 RX ORDER — SODIUM CHLORIDE 9 MG/ML
5-250 INJECTION, SOLUTION INTRAVENOUS PRN
OUTPATIENT
Start: 2023-10-03

## 2023-10-03 RX ORDER — ALBUTEROL SULFATE 90 UG/1
4 AEROSOL, METERED RESPIRATORY (INHALATION) PRN
OUTPATIENT
Start: 2023-10-03

## 2023-10-03 RX ORDER — DIPHENHYDRAMINE HYDROCHLORIDE 50 MG/ML
12.5 INJECTION INTRAMUSCULAR; INTRAVENOUS ONCE
Status: COMPLETED | OUTPATIENT
Start: 2023-10-03 | End: 2023-10-03

## 2023-10-03 RX ORDER — HEPARIN SODIUM (PORCINE) LOCK FLUSH IV SOLN 100 UNIT/ML 100 UNIT/ML
500 SOLUTION INTRAVENOUS PRN
Status: CANCELLED | OUTPATIENT
Start: 2023-10-03

## 2023-10-03 RX ORDER — PALONOSETRON 0.05 MG/ML
0.25 INJECTION, SOLUTION INTRAVENOUS ONCE
Status: COMPLETED | OUTPATIENT
Start: 2023-10-03 | End: 2023-10-03

## 2023-10-03 RX ORDER — EPINEPHRINE 1 MG/ML
0.3 INJECTION, SOLUTION, CONCENTRATE INTRAVENOUS PRN
OUTPATIENT
Start: 2023-10-03

## 2023-10-03 RX ORDER — SODIUM CHLORIDE 9 MG/ML
5-250 INJECTION, SOLUTION INTRAVENOUS PRN
Status: DISCONTINUED | OUTPATIENT
Start: 2023-10-03 | End: 2023-10-04 | Stop reason: HOSPADM

## 2023-10-03 RX ORDER — DOXORUBICIN HYDROCHLORIDE 2 MG/ML
50 INJECTION, SOLUTION INTRAVENOUS ONCE
Status: CANCELLED | OUTPATIENT
Start: 2023-10-03 | End: 2023-10-03

## 2023-10-03 RX ORDER — ACETAMINOPHEN 325 MG/1
650 TABLET ORAL
OUTPATIENT
Start: 2023-10-03

## 2023-10-03 RX ORDER — SODIUM CHLORIDE 0.9 % (FLUSH) 0.9 %
5-40 SYRINGE (ML) INJECTION PRN
Status: DISCONTINUED | OUTPATIENT
Start: 2023-10-03 | End: 2023-10-04 | Stop reason: HOSPADM

## 2023-10-03 RX ORDER — PALONOSETRON 0.05 MG/ML
0.25 INJECTION, SOLUTION INTRAVENOUS ONCE
Status: CANCELLED | OUTPATIENT
Start: 2023-10-03 | End: 2023-10-03

## 2023-10-03 RX ORDER — DIPHENHYDRAMINE HYDROCHLORIDE 50 MG/ML
12.5 INJECTION INTRAMUSCULAR; INTRAVENOUS ONCE
Status: CANCELLED
Start: 2023-10-03 | End: 2023-10-03

## 2023-10-03 RX ORDER — FAMOTIDINE 10 MG/ML
20 INJECTION, SOLUTION INTRAVENOUS ONCE
Status: CANCELLED
Start: 2023-10-03 | End: 2023-10-03

## 2023-10-03 RX ORDER — FAMOTIDINE 10 MG/ML
20 INJECTION, SOLUTION INTRAVENOUS
OUTPATIENT
Start: 2023-10-03

## 2023-10-03 RX ORDER — HEPARIN 100 UNIT/ML
500 SYRINGE INTRAVENOUS PRN
Status: DISCONTINUED | OUTPATIENT
Start: 2023-10-03 | End: 2023-10-04 | Stop reason: HOSPADM

## 2023-10-03 RX ORDER — MEPERIDINE HYDROCHLORIDE 50 MG/ML
12.5 INJECTION INTRAMUSCULAR; INTRAVENOUS; SUBCUTANEOUS PRN
OUTPATIENT
Start: 2023-10-03

## 2023-10-03 RX ORDER — DIPHENHYDRAMINE HYDROCHLORIDE 50 MG/ML
50 INJECTION INTRAMUSCULAR; INTRAVENOUS
OUTPATIENT
Start: 2023-10-03

## 2023-10-03 RX ORDER — DEXAMETHASONE SODIUM PHOSPHATE 10 MG/ML
10 INJECTION, SOLUTION INTRAMUSCULAR; INTRAVENOUS ONCE
Status: COMPLETED | OUTPATIENT
Start: 2023-10-03 | End: 2023-10-03

## 2023-10-03 RX ORDER — ONDANSETRON 2 MG/ML
8 INJECTION INTRAMUSCULAR; INTRAVENOUS
OUTPATIENT
Start: 2023-10-03

## 2023-10-03 RX ADMIN — DIPHENHYDRAMINE HYDROCHLORIDE 12.5 MG: 50 INJECTION INTRAMUSCULAR; INTRAVENOUS at 10:48

## 2023-10-03 RX ADMIN — SODIUM CHLORIDE 20 ML/HR: 9 INJECTION, SOLUTION INTRAVENOUS at 10:19

## 2023-10-03 RX ADMIN — Medication 20 ML: at 14:24

## 2023-10-03 RX ADMIN — PALONOSETRON 0.25 MG: 0.05 INJECTION, SOLUTION INTRAVENOUS at 10:18

## 2023-10-03 RX ADMIN — HEPARIN 500 UNITS: 100 SYRINGE at 14:24

## 2023-10-03 RX ADMIN — FOSAPREPITANT 150 MG: 150 INJECTION, POWDER, LYOPHILIZED, FOR SOLUTION INTRAVENOUS at 11:04

## 2023-10-03 RX ADMIN — SODIUM CHLORIDE 120 MG: 9 INJECTION, SOLUTION INTRAVENOUS at 11:58

## 2023-10-03 RX ADMIN — PEGFILGRASTIM 6 MG: KIT SUBCUTANEOUS at 14:25

## 2023-10-03 RX ADMIN — Medication 20 MG: at 10:48

## 2023-10-03 RX ADMIN — CYCLOPHOSPHAMIDE 1200 MG: 1 INJECTION, POWDER, FOR SOLUTION INTRAVENOUS; ORAL at 12:21

## 2023-10-03 RX ADMIN — DEXAMETHASONE SODIUM PHOSPHATE 10 MG: 10 INJECTION, SOLUTION INTRAMUSCULAR; INTRAVENOUS at 10:18

## 2023-10-03 NOTE — PROGRESS NOTES
Lab Results   Component Value Date    WBC 9.9 10/03/2023    HGB 10.0 (L) 10/03/2023    HCT 33.4 (L) 10/03/2023    .5 (H) 10/03/2023     (H) 10/03/2023     Lab Results   Component Value Date    NEUTROABS 6.7 10/03/2023     Lab Results   Component Value Date     10/03/2023    K 3.5 10/03/2023     10/03/2023    CO2 21 (L) 10/03/2023    BUN 6 (L) 10/03/2023    CREATININE 0.6 10/03/2023    GLUCOSE 134 (H) 10/03/2023    CALCIUM 8.8 10/03/2023    PROT 6.8 10/03/2023    LABALBU 3.3 (L) 10/03/2023    BILITOT 0.3 10/03/2023    ALKPHOS 88 10/03/2023    AST 20 10/03/2023    ALT 11 10/03/2023    LABGLOM >60 10/03/2023    GLOB 3.1 09/19/2023

## 2023-10-03 NOTE — DISCHARGE INSTRUCTIONS
at a specific time. You will need to wear the device for 27 hours before the dose begins. The timed dose will then be released from the device slowly over a 45-minute period. Keep Neulasta Onpro refrigerated until you are ready to wear it. Do not use an Onpro device that has been left out of a refrigerator for longer than 12 hours. While wearing Neulasta Onpro, you or a caregiver will need to check the device to make sure it is working properly. Each prefilled syringe or Onpro Injector is for one use only. Throw it away after one use, even if there is still medicine left inside. Use a needle and syringe only once and then place them in a puncture-proof \"sharps\" container. Follow state or local laws about how to dispose of this container. Keep it out of the reach of children and pets. What happens if I miss a dose? Call your doctor for instructions if you miss an injection, or if you have a problem with the Neulasta Onpro device. What happens if I overdose? Seek emergency medical attention or call the Poison Help line at 1-546.780.3459. What should I avoid while using pegfilgrastim? When using Neulasta Onpro: Avoid traveling, driving, or operating machinery while wearing the device. What are the possible side effects of pegfilgrastim? Get emergency medical help if you have signs of an allergic reaction:  hives, skin rash, sweating, warmth or tingly feeling; dizziness, fast heartbeats; wheezing, difficulty breathing; swelling of your face, lips, tongue, or throat. Capillary leak syndrome is a rare but serious side effect of pegfilgrastim. Call your doctor right away if you have signs of this condition, which may include: decreased urination, tiredness, dizziness or light-headed feeling, trouble breathing, and sudden swelling, puffiness, or feeling of fullness.   Call your doctor at once if you have:  sudden or severe pain in your left upper stomach spreading up to your shoulder;  sudden and severe pain in for their patients and/or to serve consumers viewing this service as a supplement to, and not a substitute for, the expertise, skill, knowledge and judgment of healthcare practitioners. The absence of a warning for a given drug or drug combination in no way should be construed to indicate that the drug or drug combination is safe, effective or appropriate for any given patient. [facility] does not assume any responsibility for any aspect of healthcare administered with the aid of information [facility] provides. The information contained herein is not intended to cover all possible uses, directions, precautions, warnings, drug interactions, allergic reactions, or adverse effects. If you have questions about the drugs you are taking, check with your doctor, nurse or pharmacist.  Copyright 0204-0453 [address]: 9.01. Revision date: 3/29/2021. Care instructions adapted under license by [facility] ([facility]). If you have questions about a medical condition or this instruction, always ask your healthcare professional. [address] any warranty or liability for your use of this information.

## 2023-10-03 NOTE — PROGRESS NOTES
Pt arrived to [facility], port accessed using sterile technique, brisk blood noted and labs obtained. Pt received Aloxi, Decadron, Benadryl, Pepcid, and Emend for pre-meds. Pt then received 120mg Adriamycin followed by 1200mg Cytoxan. Neulasta 6mg placed on pt's HAYDER. Tolerated well. Brisk blood return noted before, during, and after all infusions. Port flushed, heparin locked, and de-accessed prior to discharge.      Electronically signed by Eligha Barthel, RN on 10/3/2023 at 5:30 PM

## 2023-10-16 ENCOUNTER — TELEPHONE (OUTPATIENT)
Dept: HEMATOLOGY | Age: 68
End: 2023-10-16

## 2023-10-16 NOTE — PROGRESS NOTES
MEDICAL ONCOLOGY PROGRESS NOTE    Pt Name: Eusebio Cabrera  MRN: [id number]  YOB: 1955  Date of evaluation: 10/17/2023    HISTORY OF PRESENT ILLNESS:    Reason for MD visit: Toxicity assessment and disease management. She is currently undergoing neoadjuvant chemotherapy dose dense AC. She received cycle #1 with 20% dose reduction. She did quite well except for mild fatigue. She denies any mucositis. Denies any nausea vomit diarrhea. She is also receiving neoadjuvant pembrolizumab. Denies any skin rash. Diagnosis  Invasive ductal carcinoma, right breast 9:00 & 2:00, May 2023  Grade 3  ER 0.2%. 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 Treatment Summary  6/12/23 Initiated  Pembrolizumab, Carboplatin Taxol weekly followed by [facility] every 2 weeks X4 cycles with Keytruda  8/21/23 Discontinued Carboplatin and dose reduction to [facility]   9/19/23 Initiated Eliquis 10mg BID for 7 days, then 5mg BID daily  Anticipate surgery  Anticipate radiation therapy    Cancer History  Blanca Anglin was first seen by me on 5/24/2023. She was referred by Dr. Heidi Lowry for a diagnosis of triple negative breast cancer. She felt a mass in the right breast.  Patient denies any family history of breast cancer or any other cancer. 4/3/23 Bilateral diagnostic mammogram ([facility]): 3 circumscribed masses are identified in the right breast. The palpable lump located at 2 o'clock in the right breast is the smallest mass measuring 1.1cm. The other mass located at 2 o'clock in the right breast measures 2.6cm. Third right breast mass is located at 9 o'clock and measures 2.2cm.    4/3/23 US right breast ([facility]): Demonstrates the palpable lump to represent a 1.1 c 0.9 cm hypoechoic mass at the 2 o'clock position, 8cm from the nipple no definite

## 2023-10-17 ENCOUNTER — HOSPITAL ENCOUNTER (OUTPATIENT)
Dept: INFUSION THERAPY | Age: 68
Discharge: HOME | End: 2023-10-17
Payer: MEDICARE

## 2023-10-17 ENCOUNTER — HOSPITAL ENCOUNTER (OUTPATIENT)
Dept: INFUSION THERAPY | Age: 68
Setting detail: INFUSION SERIES
Discharge: HOME | End: 2023-10-17
Payer: MEDICARE

## 2023-10-17 ENCOUNTER — OFFICE VISIT (OUTPATIENT)
Dept: HEMATOLOGY | Age: 68
End: 2023-10-17
Payer: MEDICARE

## 2023-10-17 VITALS
SYSTOLIC BLOOD PRESSURE: 130 MMHG | DIASTOLIC BLOOD PRESSURE: 80 MMHG | HEIGHT: 69 IN | BODY MASS INDEX: 35.4 KG/M2 | HEART RATE: 97 BPM | WEIGHT: 239 LBS | OXYGEN SATURATION: 95 %

## 2023-10-17 VITALS
TEMPERATURE: 97.8 F | DIASTOLIC BLOOD PRESSURE: 74 MMHG | HEART RATE: 80 BPM | SYSTOLIC BLOOD PRESSURE: 126 MMHG | OXYGEN SATURATION: 99 % | RESPIRATION RATE: 18 BRPM

## 2023-10-17 DIAGNOSIS — R53.83 FATIGUE DUE TO TREATMENT: ICD-10-CM

## 2023-10-17 DIAGNOSIS — Z17.1 MALIGNANT NEOPLASM OF UPPER-INNER QUADRANT OF RIGHT BREAST IN FEMALE, ESTROGEN RECEPTOR NEGATIVE (HCC): ICD-10-CM

## 2023-10-17 DIAGNOSIS — R53.0 NEOPLASTIC (MALIGNANT) RELATED FATIGUE: ICD-10-CM

## 2023-10-17 DIAGNOSIS — T45.1X5D ADVERSE EFFECT OF CHEMOTHERAPY, SUBSEQUENT ENCOUNTER: ICD-10-CM

## 2023-10-17 DIAGNOSIS — C50.211 MALIGNANT NEOPLASM OF UPPER-INNER QUADRANT OF RIGHT BREAST IN FEMALE, ESTROGEN RECEPTOR NEGATIVE (HCC): ICD-10-CM

## 2023-10-17 DIAGNOSIS — C50.911 INVASIVE DUCTAL CARCINOMA OF BREAST, FEMALE, RIGHT (HCC): Primary | ICD-10-CM

## 2023-10-17 DIAGNOSIS — I82.4Z1 ACUTE DEEP VEIN THROMBOSIS (DVT) OF DISTAL END OF RIGHT LOWER EXTREMITY (HCC): Primary | ICD-10-CM

## 2023-10-17 DIAGNOSIS — Z51.11 CHEMOTHERAPY MANAGEMENT, ENCOUNTER FOR: ICD-10-CM

## 2023-10-17 DIAGNOSIS — C50.911 INVASIVE DUCTAL CARCINOMA OF BREAST, FEMALE, RIGHT (HCC): ICD-10-CM

## 2023-10-17 DIAGNOSIS — Z71.89 CARE PLAN DISCUSSED WITH PATIENT: ICD-10-CM

## 2023-10-17 DIAGNOSIS — Z51.12 ENCOUNTER FOR ANTINEOPLASTIC IMMUNOTHERAPY: ICD-10-CM

## 2023-10-17 LAB
ALBUMIN SERPL-MCNC: 3.8 G/DL (ref 3.5–5.2)
ALP SERPL-CCNC: 97 U/L (ref 35–104)
ALT SERPL-CCNC: 17 U/L (ref 9–52)
ANION GAP SERPL CALCULATED.3IONS-SCNC: 12 MMOL/L (ref 7–19)
AST SERPL-CCNC: 21 U/L (ref 14–36)
BASOPHILS # BLD: 0.1 K/UL (ref 0.01–0.08)
BASOPHILS NFR BLD: 0.7 % (ref 0.1–1.2)
BILIRUB SERPL-MCNC: 0.3 MG/DL (ref 0.2–1.3)
BUN SERPL-MCNC: 4 MG/DL (ref 7–17)
CALCIUM SERPL-MCNC: 9.1 MG/DL (ref 8.4–10.2)
CHLORIDE SERPL-SCNC: 105 MMOL/L (ref 98–111)
CO2 SERPL-SCNC: 23 MMOL/L (ref 22–29)
CORTIS AM PEAK SERPL-MCNC: 13.2 UG/DL (ref 6.2–19.4)
CREAT SERPL-MCNC: 0.6 MG/DL (ref 0.5–1)
EOSINOPHIL # BLD: 0.09 K/UL (ref 0.04–0.54)
EOSINOPHIL NFR BLD: 0.7 % (ref 0.7–7)
ERYTHROCYTE [DISTWIDTH] IN BLOOD BY AUTOMATED COUNT: 17 % (ref 11.7–14.4)
GLOBULIN: 3 G/DL
GLUCOSE SERPL-MCNC: 122 MG/DL (ref 74–106)
HCT VFR BLD AUTO: 35.5 % (ref 34.1–44.9)
HGB BLD-MCNC: 11.3 G/DL (ref 11.2–15.7)
LYMPHOCYTES # BLD: 1.59 K/UL (ref 1.18–3.74)
LYMPHOCYTES NFR BLD: 11.7 % (ref 19.3–53.1)
MCH RBC QN AUTO: 30.5 PG (ref 25.6–32.2)
MCHC RBC AUTO-ENTMCNC: 31.8 G/DL (ref 32.3–35.5)
MCV RBC AUTO: 95.9 FL (ref 79.4–94.8)
MONOCYTES # BLD: 1.15 K/UL (ref 0.24–0.82)
MONOCYTES NFR BLD: 8.5 % (ref 4.7–12.5)
NEUTROPHILS # BLD: 10.35 K/UL (ref 1.56–6.13)
NEUTS SEG NFR BLD: 76.1 % (ref 34–71.1)
PLATELET # BLD AUTO: 347 K/UL (ref 182–369)
PMV BLD AUTO: 11.1 FL (ref 7.4–10.4)
POTASSIUM SERPL-SCNC: 3.5 MMOL/L (ref 3.5–5.1)
PROT SERPL-MCNC: 6.7 G/DL (ref 6.3–8.2)
RBC # BLD AUTO: 3.7 M/UL (ref 3.93–5.22)
SODIUM SERPL-SCNC: 140 MMOL/L (ref 137–145)
T4 FREE SERPL-MCNC: 1.4 NG/DL (ref 0.93–1.7)
TSH SERPL DL<=0.005 MIU/L-ACNC: 2.78 UIU/ML (ref 0.27–4.2)
WBC # BLD AUTO: 13.59 K/UL (ref 3.98–10.04)

## 2023-10-17 PROCEDURE — 2500000003 HC RX 250 WO HCPCS: Performed by: INTERNAL MEDICINE

## 2023-10-17 PROCEDURE — 1036F TOBACCO NON-USER: CPT | Performed by: INTERNAL MEDICINE

## 2023-10-17 PROCEDURE — G8400 PT W/DXA NO RESULTS DOC: HCPCS | Performed by: INTERNAL MEDICINE

## 2023-10-17 PROCEDURE — 96377 APPLICATON ON-BODY INJECTOR: CPT

## 2023-10-17 PROCEDURE — 84443 ASSAY THYROID STIM HORMONE: CPT

## 2023-10-17 PROCEDURE — 1090F PRES/ABSN URINE INCON ASSESS: CPT | Performed by: INTERNAL MEDICINE

## 2023-10-17 PROCEDURE — 36415 COLL VENOUS BLD VENIPUNCTURE: CPT

## 2023-10-17 PROCEDURE — 6360000002 HC RX W HCPCS: Performed by: INTERNAL MEDICINE

## 2023-10-17 PROCEDURE — 96417 CHEMO IV INFUS EACH ADDL SEQ: CPT

## 2023-10-17 PROCEDURE — 85025 COMPLETE CBC W/AUTO DIFF WBC: CPT

## 2023-10-17 PROCEDURE — G8427 DOCREV CUR MEDS BY ELIG CLIN: HCPCS | Performed by: INTERNAL MEDICINE

## 2023-10-17 PROCEDURE — 96366 THER/PROPH/DIAG IV INF ADDON: CPT

## 2023-10-17 PROCEDURE — 96375 TX/PRO/DX INJ NEW DRUG ADDON: CPT

## 2023-10-17 PROCEDURE — 99214 OFFICE O/P EST MOD 30 MIN: CPT | Performed by: INTERNAL MEDICINE

## 2023-10-17 PROCEDURE — 2580000003 HC RX 258: Performed by: INTERNAL MEDICINE

## 2023-10-17 PROCEDURE — 82533 TOTAL CORTISOL: CPT

## 2023-10-17 PROCEDURE — 80053 COMPREHEN METABOLIC PANEL: CPT

## 2023-10-17 PROCEDURE — 96411 CHEMO IV PUSH ADDL DRUG: CPT

## 2023-10-17 PROCEDURE — 96413 CHEMO IV INFUSION 1 HR: CPT

## 2023-10-17 PROCEDURE — 36591 DRAW BLOOD OFF VENOUS DEVICE: CPT

## 2023-10-17 PROCEDURE — 3017F COLORECTAL CA SCREEN DOC REV: CPT | Performed by: INTERNAL MEDICINE

## 2023-10-17 PROCEDURE — G8417 CALC BMI ABV UP PARAM F/U: HCPCS | Performed by: INTERNAL MEDICINE

## 2023-10-17 PROCEDURE — 1123F ACP DISCUSS/DSCN MKR DOCD: CPT | Performed by: INTERNAL MEDICINE

## 2023-10-17 PROCEDURE — 84439 ASSAY OF FREE THYROXINE: CPT

## 2023-10-17 PROCEDURE — A4216 STERILE WATER/SALINE, 10 ML: HCPCS | Performed by: INTERNAL MEDICINE

## 2023-10-17 PROCEDURE — 3079F DIAST BP 80-89 MM HG: CPT | Performed by: INTERNAL MEDICINE

## 2023-10-17 PROCEDURE — 3075F SYST BP GE 130 - 139MM HG: CPT | Performed by: INTERNAL MEDICINE

## 2023-10-17 PROCEDURE — G8484 FLU IMMUNIZE NO ADMIN: HCPCS | Performed by: INTERNAL MEDICINE

## 2023-10-17 RX ORDER — MEPERIDINE HYDROCHLORIDE 50 MG/ML
12.5 INJECTION INTRAMUSCULAR; INTRAVENOUS; SUBCUTANEOUS PRN
OUTPATIENT
Start: 2023-10-17

## 2023-10-17 RX ORDER — ALBUTEROL SULFATE 90 UG/1
4 AEROSOL, METERED RESPIRATORY (INHALATION) PRN
OUTPATIENT
Start: 2023-10-17

## 2023-10-17 RX ORDER — PALONOSETRON 0.05 MG/ML
0.25 INJECTION, SOLUTION INTRAVENOUS ONCE
Status: CANCELLED | OUTPATIENT
Start: 2023-10-17 | End: 2023-10-17

## 2023-10-17 RX ORDER — ACETAMINOPHEN 325 MG/1
650 TABLET ORAL
OUTPATIENT
Start: 2023-10-17

## 2023-10-17 RX ORDER — ONDANSETRON 2 MG/ML
8 INJECTION INTRAMUSCULAR; INTRAVENOUS
OUTPATIENT
Start: 2023-10-17

## 2023-10-17 RX ORDER — SODIUM CHLORIDE 9 MG/ML
5-250 INJECTION, SOLUTION INTRAVENOUS PRN
Status: DISCONTINUED | OUTPATIENT
Start: 2023-10-17 | End: 2023-10-18 | Stop reason: HOSPADM

## 2023-10-17 RX ORDER — DIPHENHYDRAMINE HYDROCHLORIDE 50 MG/ML
12.5 INJECTION INTRAMUSCULAR; INTRAVENOUS ONCE
Status: CANCELLED
Start: 2023-10-17 | End: 2023-10-17

## 2023-10-17 RX ORDER — PALONOSETRON 0.05 MG/ML
0.25 INJECTION, SOLUTION INTRAVENOUS ONCE
Status: COMPLETED | OUTPATIENT
Start: 2023-10-17 | End: 2023-10-17

## 2023-10-17 RX ORDER — SODIUM CHLORIDE 9 MG/ML
5-250 INJECTION, SOLUTION INTRAVENOUS PRN
Status: CANCELLED | OUTPATIENT
Start: 2023-10-17

## 2023-10-17 RX ORDER — SODIUM CHLORIDE 9 MG/ML
5-250 INJECTION, SOLUTION INTRAVENOUS PRN
OUTPATIENT
Start: 2023-10-17

## 2023-10-17 RX ORDER — HEPARIN SODIUM (PORCINE) LOCK FLUSH IV SOLN 100 UNIT/ML 100 UNIT/ML
500 SOLUTION INTRAVENOUS PRN
Status: CANCELLED | OUTPATIENT
Start: 2023-10-17

## 2023-10-17 RX ORDER — SODIUM CHLORIDE 0.9 % (FLUSH) 0.9 %
5-40 SYRINGE (ML) INJECTION PRN
Status: CANCELLED | OUTPATIENT
Start: 2023-10-17

## 2023-10-17 RX ORDER — FAMOTIDINE 10 MG/ML
20 INJECTION, SOLUTION INTRAVENOUS ONCE
Status: CANCELLED
Start: 2023-10-17 | End: 2023-10-17

## 2023-10-17 RX ORDER — FAMOTIDINE 10 MG/ML
20 INJECTION, SOLUTION INTRAVENOUS
OUTPATIENT
Start: 2023-10-17

## 2023-10-17 RX ORDER — DIPHENHYDRAMINE HYDROCHLORIDE 50 MG/ML
12.5 INJECTION INTRAMUSCULAR; INTRAVENOUS ONCE
Status: COMPLETED | OUTPATIENT
Start: 2023-10-17 | End: 2023-10-17

## 2023-10-17 RX ORDER — EPINEPHRINE 1 MG/ML
0.3 INJECTION, SOLUTION, CONCENTRATE INTRAVENOUS PRN
OUTPATIENT
Start: 2023-10-17

## 2023-10-17 RX ORDER — SODIUM CHLORIDE 0.9 % (FLUSH) 0.9 %
5-40 SYRINGE (ML) INJECTION PRN
Status: DISCONTINUED | OUTPATIENT
Start: 2023-10-17 | End: 2023-10-18 | Stop reason: HOSPADM

## 2023-10-17 RX ORDER — DIPHENHYDRAMINE HYDROCHLORIDE 50 MG/ML
50 INJECTION INTRAMUSCULAR; INTRAVENOUS
OUTPATIENT
Start: 2023-10-17

## 2023-10-17 RX ORDER — SODIUM CHLORIDE 9 MG/ML
INJECTION, SOLUTION INTRAVENOUS CONTINUOUS
OUTPATIENT
Start: 2023-10-17

## 2023-10-17 RX ORDER — HEPARIN 100 UNIT/ML
500 SYRINGE INTRAVENOUS PRN
Status: DISCONTINUED | OUTPATIENT
Start: 2023-10-17 | End: 2023-10-18 | Stop reason: HOSPADM

## 2023-10-17 RX ORDER — DEXAMETHASONE SODIUM PHOSPHATE 10 MG/ML
10 INJECTION, SOLUTION INTRAMUSCULAR; INTRAVENOUS ONCE
Status: COMPLETED | OUTPATIENT
Start: 2023-10-17 | End: 2023-10-17

## 2023-10-17 RX ADMIN — DEXAMETHASONE SODIUM PHOSPHATE 10 MG: 10 INJECTION, SOLUTION INTRAMUSCULAR; INTRAVENOUS at 09:28

## 2023-10-17 RX ADMIN — FOSAPREPITANT 150 MG: 150 INJECTION, POWDER, LYOPHILIZED, FOR SOLUTION INTRAVENOUS at 09:35

## 2023-10-17 RX ADMIN — SODIUM CHLORIDE 120 MG: 9 INJECTION, SOLUTION INTRAVENOUS at 10:54

## 2023-10-17 RX ADMIN — PALONOSETRON 0.25 MG: 0.05 INJECTION, SOLUTION INTRAVENOUS at 09:28

## 2023-10-17 RX ADMIN — FAMOTIDINE 20 MG: 10 INJECTION, SOLUTION INTRAVENOUS at 09:28

## 2023-10-17 RX ADMIN — SODIUM CHLORIDE 400 MG: 9 INJECTION, SOLUTION INTRAVENOUS at 10:15

## 2023-10-17 RX ADMIN — DIPHENHYDRAMINE HYDROCHLORIDE 12.5 MG: 50 INJECTION INTRAMUSCULAR; INTRAVENOUS at 09:28

## 2023-10-17 RX ADMIN — PEGFILGRASTIM 6 MG: KIT SUBCUTANEOUS at 13:14

## 2023-10-17 RX ADMIN — SODIUM CHLORIDE, PRESERVATIVE FREE 20 ML: 5 INJECTION INTRAVENOUS at 13:13

## 2023-10-17 RX ADMIN — HEPARIN 500 UNITS: 100 SYRINGE at 13:13

## 2023-10-17 RX ADMIN — CYCLOPHOSPHAMIDE 1200 MG: 1 INJECTION, POWDER, FOR SOLUTION INTRAVENOUS; ORAL at 11:14

## 2023-10-17 RX ADMIN — SODIUM CHLORIDE 20 ML/HR: 9 INJECTION, SOLUTION INTRAVENOUS at 10:14

## 2023-10-17 NOTE — PROGRESS NOTES
Pt arrived to [facility] from [address] [place] [address]. Labs drawn prior to arrival. [place] accessed using sterile technique. Pt premedicated as ordered. Pt placed in cold cap 30 minutes prior to Chemo therapy. Keytruda 400mg given then Adriamycin 120mg given. Cytoxan 1200mg administered last. Pt remianed in cold cap for 1h30m post chemo infusions. Pt tolerated well.      Electronically signed by Kunal Roach RN on 10/17/2023 at 1:31 PM

## 2023-10-30 ENCOUNTER — TELEPHONE (OUTPATIENT)
Dept: SURGERY | Age: 68
End: 2023-10-30

## 2023-10-30 NOTE — TELEPHONE ENCOUNTER
Patient called and said she found a lump in her breast and wanted to know if dr Trevon Maguire wanted to see her now or after she completes her last 2 rounds of chemo. Please return patient's call.

## 2023-10-30 NOTE — TELEPHONE ENCOUNTER
Dr. Adrian Blood said he will see her the Monday he returns.  She is scheduled for [email or web address] and has been notified

## 2023-10-31 ENCOUNTER — HOSPITAL ENCOUNTER (OUTPATIENT)
Dept: INFUSION THERAPY | Age: 68
Setting detail: INFUSION SERIES
Discharge: HOME | End: 2023-10-31
Payer: MEDICARE

## 2023-10-31 VITALS
WEIGHT: 230 LBS | BODY MASS INDEX: 33.97 KG/M2 | TEMPERATURE: 97.2 F | DIASTOLIC BLOOD PRESSURE: 71 MMHG | SYSTOLIC BLOOD PRESSURE: 115 MMHG | RESPIRATION RATE: 18 BRPM | OXYGEN SATURATION: 100 % | HEART RATE: 79 BPM

## 2023-10-31 DIAGNOSIS — C50.911 INVASIVE DUCTAL CARCINOMA OF BREAST, FEMALE, RIGHT (HCC): Primary | ICD-10-CM

## 2023-10-31 DIAGNOSIS — Z17.1 MALIGNANT NEOPLASM OF UPPER-INNER QUADRANT OF RIGHT BREAST IN FEMALE, ESTROGEN RECEPTOR NEGATIVE (HCC): ICD-10-CM

## 2023-10-31 DIAGNOSIS — C50.211 MALIGNANT NEOPLASM OF UPPER-INNER QUADRANT OF RIGHT BREAST IN FEMALE, ESTROGEN RECEPTOR NEGATIVE (HCC): ICD-10-CM

## 2023-10-31 LAB
ALBUMIN SERPL-MCNC: 3.5 G/DL (ref 3.5–5.2)
ALP SERPL-CCNC: 92 U/L (ref 35–104)
ALT SERPL-CCNC: 7 U/L (ref 5–33)
ANION GAP SERPL CALCULATED.3IONS-SCNC: 16 MMOL/L (ref 7–19)
AST SERPL-CCNC: 13 U/L (ref 5–32)
BASOPHILS # BLD: 0.1 K/UL (ref 0–0.2)
BASOPHILS NFR BLD: 0.6 % (ref 0–1)
BILIRUB SERPL-MCNC: 0.3 MG/DL (ref 0.2–1.2)
BUN SERPL-MCNC: 6 MG/DL (ref 8–23)
CALCIUM SERPL-MCNC: 8.9 MG/DL (ref 8.8–10.2)
CHLORIDE SERPL-SCNC: 102 MMOL/L (ref 98–111)
CO2 SERPL-SCNC: 21 MMOL/L (ref 22–29)
CREAT SERPL-MCNC: 0.7 MG/DL (ref 0.5–0.9)
EOSINOPHIL # BLD: 0 K/UL (ref 0–0.6)
EOSINOPHIL NFR BLD: 0.1 % (ref 0–5)
ERYTHROCYTE [DISTWIDTH] IN BLOOD BY AUTOMATED COUNT: 17.3 % (ref 11.5–14.5)
GLUCOSE SERPL-MCNC: 144 MG/DL (ref 74–109)
HCT VFR BLD AUTO: 33.3 % (ref 37–47)
HGB BLD-MCNC: 10.3 G/DL (ref 12–16)
IMM GRANULOCYTES # BLD: 0.3 K/UL
LYMPHOCYTES # BLD: 0.9 K/UL (ref 1.1–4.5)
LYMPHOCYTES NFR BLD: 7.2 % (ref 20–40)
MCH RBC QN AUTO: 31 PG (ref 27–31)
MCHC RBC AUTO-ENTMCNC: 30.9 G/DL (ref 33–37)
MCV RBC AUTO: 100.3 FL (ref 81–99)
MONOCYTES # BLD: 1 K/UL (ref 0–0.9)
MONOCYTES NFR BLD: 8 % (ref 0–10)
NEUTROPHILS # BLD: 10.2 K/UL (ref 1.5–7.5)
NEUTS SEG NFR BLD: 82 % (ref 50–65)
PLATELET # BLD AUTO: 364 K/UL (ref 130–400)
PMV BLD AUTO: 11.1 FL (ref 9.4–12.3)
POTASSIUM SERPL-SCNC: 3.6 MMOL/L (ref 3.5–5)
PROT SERPL-MCNC: 6.5 G/DL (ref 6.6–8.7)
RBC # BLD AUTO: 3.32 M/UL (ref 4.2–5.4)
SODIUM SERPL-SCNC: 139 MMOL/L (ref 136–145)
WBC # BLD AUTO: 12.5 K/UL (ref 4.8–10.8)

## 2023-10-31 PROCEDURE — 96417 CHEMO IV INFUS EACH ADDL SEQ: CPT

## 2023-10-31 PROCEDURE — 96366 THER/PROPH/DIAG IV INF ADDON: CPT

## 2023-10-31 PROCEDURE — 96377 APPLICATON ON-BODY INJECTOR: CPT

## 2023-10-31 PROCEDURE — 6360000002 HC RX W HCPCS: Performed by: INTERNAL MEDICINE

## 2023-10-31 PROCEDURE — 96367 TX/PROPH/DG ADDL SEQ IV INF: CPT

## 2023-10-31 PROCEDURE — 2500000003 HC RX 250 WO HCPCS: Performed by: INTERNAL MEDICINE

## 2023-10-31 PROCEDURE — 96375 TX/PRO/DX INJ NEW DRUG ADDON: CPT

## 2023-10-31 PROCEDURE — 85025 COMPLETE CBC W/AUTO DIFF WBC: CPT

## 2023-10-31 PROCEDURE — 96413 CHEMO IV INFUSION 1 HR: CPT

## 2023-10-31 PROCEDURE — 96409 CHEMO IV PUSH SNGL DRUG: CPT

## 2023-10-31 PROCEDURE — 2580000003 HC RX 258: Performed by: INTERNAL MEDICINE

## 2023-10-31 PROCEDURE — 80053 COMPREHEN METABOLIC PANEL: CPT

## 2023-10-31 RX ORDER — FAMOTIDINE 10 MG/ML
20 INJECTION, SOLUTION INTRAVENOUS ONCE
Status: CANCELLED
Start: 2023-10-31 | End: 2023-10-31

## 2023-10-31 RX ORDER — ONDANSETRON 2 MG/ML
8 INJECTION INTRAMUSCULAR; INTRAVENOUS
OUTPATIENT
Start: 2023-10-31

## 2023-10-31 RX ORDER — DEXAMETHASONE SODIUM PHOSPHATE 10 MG/ML
10 INJECTION, SOLUTION INTRAMUSCULAR; INTRAVENOUS ONCE
Status: COMPLETED | OUTPATIENT
Start: 2023-10-31 | End: 2023-10-31

## 2023-10-31 RX ORDER — FAMOTIDINE 10 MG/ML
20 INJECTION, SOLUTION INTRAVENOUS
OUTPATIENT
Start: 2023-10-31

## 2023-10-31 RX ORDER — PALONOSETRON 0.05 MG/ML
0.25 INJECTION, SOLUTION INTRAVENOUS ONCE
Status: COMPLETED | OUTPATIENT
Start: 2023-10-31 | End: 2023-10-31

## 2023-10-31 RX ORDER — DIPHENHYDRAMINE HYDROCHLORIDE 50 MG/ML
12.5 INJECTION INTRAMUSCULAR; INTRAVENOUS ONCE
Status: CANCELLED
Start: 2023-10-31 | End: 2023-10-31

## 2023-10-31 RX ORDER — SODIUM CHLORIDE 9 MG/ML
5-250 INJECTION, SOLUTION INTRAVENOUS PRN
Status: CANCELLED | OUTPATIENT
Start: 2023-10-31

## 2023-10-31 RX ORDER — SODIUM CHLORIDE 9 MG/ML
5-250 INJECTION, SOLUTION INTRAVENOUS PRN
Status: DISCONTINUED | OUTPATIENT
Start: 2023-10-31 | End: 2023-11-01 | Stop reason: HOSPADM

## 2023-10-31 RX ORDER — DIPHENHYDRAMINE HYDROCHLORIDE 50 MG/ML
50 INJECTION INTRAMUSCULAR; INTRAVENOUS
OUTPATIENT
Start: 2023-10-31

## 2023-10-31 RX ORDER — SODIUM CHLORIDE 0.9 % (FLUSH) 0.9 %
5-40 SYRINGE (ML) INJECTION PRN
Status: CANCELLED | OUTPATIENT
Start: 2023-10-31

## 2023-10-31 RX ORDER — ACETAMINOPHEN 325 MG/1
650 TABLET ORAL
OUTPATIENT
Start: 2023-10-31

## 2023-10-31 RX ORDER — SODIUM CHLORIDE 9 MG/ML
5-250 INJECTION, SOLUTION INTRAVENOUS PRN
OUTPATIENT
Start: 2023-10-31

## 2023-10-31 RX ORDER — EPINEPHRINE 1 MG/ML
0.3 INJECTION, SOLUTION, CONCENTRATE INTRAVENOUS PRN
OUTPATIENT
Start: 2023-10-31

## 2023-10-31 RX ORDER — DIPHENHYDRAMINE HYDROCHLORIDE 50 MG/ML
12.5 INJECTION INTRAMUSCULAR; INTRAVENOUS ONCE
Status: COMPLETED | OUTPATIENT
Start: 2023-10-31 | End: 2023-10-31

## 2023-10-31 RX ORDER — SODIUM CHLORIDE 0.9 % (FLUSH) 0.9 %
5-40 SYRINGE (ML) INJECTION PRN
Status: DISCONTINUED | OUTPATIENT
Start: 2023-10-31 | End: 2023-11-01 | Stop reason: HOSPADM

## 2023-10-31 RX ORDER — PALONOSETRON 0.05 MG/ML
0.25 INJECTION, SOLUTION INTRAVENOUS ONCE
Status: CANCELLED | OUTPATIENT
Start: 2023-10-31 | End: 2023-10-31

## 2023-10-31 RX ORDER — SODIUM CHLORIDE 9 MG/ML
INJECTION, SOLUTION INTRAVENOUS CONTINUOUS
OUTPATIENT
Start: 2023-10-31

## 2023-10-31 RX ORDER — ALBUTEROL SULFATE 90 UG/1
4 AEROSOL, METERED RESPIRATORY (INHALATION) PRN
OUTPATIENT
Start: 2023-10-31

## 2023-10-31 RX ORDER — HEPARIN SODIUM (PORCINE) LOCK FLUSH IV SOLN 100 UNIT/ML 100 UNIT/ML
500 SOLUTION INTRAVENOUS PRN
Status: CANCELLED | OUTPATIENT
Start: 2023-10-31

## 2023-10-31 RX ORDER — MEPERIDINE HYDROCHLORIDE 50 MG/ML
12.5 INJECTION INTRAMUSCULAR; INTRAVENOUS; SUBCUTANEOUS PRN
OUTPATIENT
Start: 2023-10-31

## 2023-10-31 RX ORDER — HEPARIN 100 UNIT/ML
500 SYRINGE INTRAVENOUS PRN
Status: DISCONTINUED | OUTPATIENT
Start: 2023-10-31 | End: 2023-11-01 | Stop reason: HOSPADM

## 2023-10-31 RX ADMIN — DEXAMETHASONE SODIUM PHOSPHATE 10 MG: 10 INJECTION, SOLUTION INTRAMUSCULAR; INTRAVENOUS at 09:42

## 2023-10-31 RX ADMIN — HEPARIN 500 UNITS: 100 SYRINGE at 13:26

## 2023-10-31 RX ADMIN — PEGFILGRASTIM 6 MG: KIT SUBCUTANEOUS at 13:18

## 2023-10-31 RX ADMIN — SODIUM CHLORIDE 20 ML/HR: 9 INJECTION, SOLUTION INTRAVENOUS at 09:41

## 2023-10-31 RX ADMIN — FOSAPREPITANT 150 MG: 150 INJECTION, POWDER, LYOPHILIZED, FOR SOLUTION INTRAVENOUS at 10:10

## 2023-10-31 RX ADMIN — SODIUM CHLORIDE, PRESERVATIVE FREE 10 ML: 5 INJECTION INTRAVENOUS at 10:43

## 2023-10-31 RX ADMIN — PALONOSETRON 0.25 MG: 0.05 INJECTION, SOLUTION INTRAVENOUS at 09:41

## 2023-10-31 RX ADMIN — SODIUM CHLORIDE 120 MG: 9 INJECTION, SOLUTION INTRAVENOUS at 10:45

## 2023-10-31 RX ADMIN — SODIUM CHLORIDE, PRESERVATIVE FREE 20 MG: 5 INJECTION INTRAVENOUS at 09:43

## 2023-10-31 RX ADMIN — SODIUM CHLORIDE, PRESERVATIVE FREE 20 ML: 5 INJECTION INTRAVENOUS at 13:25

## 2023-10-31 RX ADMIN — CYCLOPHOSPHAMIDE 1200 MG: 1 INJECTION, POWDER, FOR SOLUTION INTRAVENOUS; ORAL at 11:30

## 2023-10-31 RX ADMIN — DIPHENHYDRAMINE HYDROCHLORIDE 12.5 MG: 50 INJECTION INTRAMUSCULAR; INTRAVENOUS at 09:43

## 2023-10-31 NOTE — DISCHARGE INSTRUCTIONS
cyclophosphamide (oral/injection)  Pronunciation:  jacqueline wood  What is the most important information I should know about cyclophosphamide? You should not use cyclophosphamide if you have a bladder obstruction or other urination problems. What is cyclophosphamide? Cyclophosphamide is used to treat several types of cancer. Cyclophosphamide is also used to treat minimal change nephrotic syndrome (kidney disease) in children who cannot use other treatments or when other treatments have failed. Cyclophosphamide may also be used for purposes not listed in this medication guide. What should I discuss with my healthcare provider before using cyclophosphamide? You should not use cyclophosphamide if you are allergic to it, or if you have:  a bladder obstruction. Tell your doctor if you have ever had:  a weak immune system (caused by disease or by using certain medicines);  an active or chronic infection (including a bladder infection);  bladder problems;  kidney disease (or if you are on dialysis);  lung disease;  heart disease;  liver disease; or  if you are receiving other cancer treatments or radiation. Using cyclophosphamide may increase your risk of developing other types of cancer such as bladder cancer, thyroid cancer, or leukemia. Talk with your doctor about your specific risk. Cyclophosphamide can harm an unborn baby if the mother or the father is using this medicine. If you are a woman, do not use cyclophosphamide if you are pregnant. Use effective birth control to prevent pregnancy while you are using this medicine and for at least 1 year after your last dose. If you are a man, use effective birth control if your sex partner is able to get pregnant. Keep using birth control for at least 4 months after your last dose. Tell your doctor right away if a pregnancy occurs while either the mother or the father is using cyclophosphamide.   This medicine may affect fertility (ability to have is injected. Doxorubicin is sometimes given together with other cancer medications. You may be given other medications to prevent nausea, vomiting, or infections. If any of this medication accidentally gets on your skin, wash it thoroughly with soap and warm water. Doxorubicin can lower your blood cell counts. Your blood will need to be tested often. Your cancer treatments may be delayed based on the results. What happens if I miss a dose? Call your doctor for instructions if you miss an appointment for your doxorubicin injection. What happens if I overdose? Since this medication is given by a healthcare professional in a medical setting, an overdose is unlikely to occur. What should I avoid while taking doxorubicin? Avoid being near people who are sick or have infections. Tell your doctor at once if you develop signs of infection. Avoid activities that may increase your risk of bleeding or injury. Use extra care to prevent bleeding while shaving or brushing your teeth. What are the possible side effects of doxorubicin? Get emergency medical help if you have signs of an allergic reaction: hives; difficult breathing; swelling of your face, lips, tongue, or throat. Some side effects may occur during the injection. Tell your caregiver right away if you feel dizzy, nauseated, light-headed, sweaty, or have a headache, chest tightness, back pain, trouble breathing, or swelling in your face.   Call your doctor at once if you have:  pain, blisters, or skin sores where the injection was given;  missed menstrual periods;  easy bruising, unusual bleeding (nose, mouth, vagina, or rectum), purple or red pinpoint spots under your skin;  low white blood cell counts --fever, swollen gums, painful mouth sores, pain when swallowing, skin sores, cold or flu symptoms, cough, trouble breathing; or  signs of heart problems --fast heartbeats, shortness of breath (even with mild exertion), feeling very weak or tired, swelling

## 2023-10-31 NOTE — PROGRESS NOTES
Lab Results   Component Value Date    WBC 12.5 (H) 10/31/2023    HGB 10.3 (L) 10/31/2023    HCT 33.3 (L) 10/31/2023    .3 (H) 10/31/2023     10/31/2023     Lab Results   Component Value Date    NEUTROABS 10.2 (H) 10/31/2023     Lab Results   Component Value Date     10/31/2023    K 3.6 10/31/2023     10/31/2023    CO2 21 (L) 10/31/2023    BUN 6 (L) 10/31/2023    CREATININE 0.7 10/31/2023    GLUCOSE 144 (H) 10/31/2023    CALCIUM 8.9 10/31/2023    PROT 6.5 (L) 10/31/2023    LABALBU 3.5 10/31/2023    BILITOT 0.3 10/31/2023    ALKPHOS 92 10/31/2023    AST 13 10/31/2023    ALT 7 10/31/2023    LABGLOM >60 10/31/2023    GLOB 3.0 10/17/2023

## 2023-10-31 NOTE — PROGRESS NOTES
Pt arrived to \[facility]\"". Port accessed using sterile technique and stat labs drawn. Pt premedicated as ordered. Cold cap placed 30 minutes before chemo infusions. Adriamycin and cytoxan administered. Cold cap worn during infusions and for 90 minutes post infusion. Brisk blood return noted from port before, during, and after infusions. Pt tolerated well.      Electronically signed by Geetha España RN on 10/31/2023 at 1:50 PM

## 2023-11-01 NOTE — PROGRESS NOTES
HISTORY OF PRESENT ILLNESS:    Ms. Radha Cervantes presents today with concerns of a new palpable lump of right breast. She has one more chemo treatment left. She is undergoing neoadjuvant chemotherapy for her triple negative right breast cancer    She is recently status post ultrasound guided breast biopsy  on the right which revealed a 2.5 cm at 9:00 and 1.8 cm at 2:00 high grade invasive ductal carcinoma. ER negative ND negative Her2 negative Ki67 84%    Mammaprint High Risk Basal Type    Her genetic testing demonstrated a pathogenic variant in the T p53 gene  This is associated with Li-Fraumeni syndrome    She has a fairly severe inflammatory response to absorbable Vicryl suture and we will want to not use this on her if at all possible    MRI-5/10/2023  In the right breast laterally at 9:00 corresponding to a biopsy-proven  malignancy, is a very heterogeneous predominantly rim-enhancing mass  measuring approximately 43 x 40 x 50 mm in length x width x height. Margins are obscured and there is internal nonenhancement suggesting  necrosis. There also may be some surrounding hematoma obscuring  margins which may overestimate size. This mass has a biopsy clip. In the right breast medially at 2:00 corresponding to a biopsy-proven  malignancy is an additional very heterogeneously  predominantly rim  enhancing mass measuring approximately 33 x 31 x 49 mm in length x  width x height. This mass has a biopsy clip. There also may be some  surrounding hematoma obscuring margins which may overestimate size. This mass is extremely lobulated and there are at least three  additional 5mm or smaller additional similar-appearing masses  surrounding the anterior aspect of this mass. There is an additional  dominant mass located approximately 16 mm anterior measuring 18 mm. This mass demonstrates MRI findings most suggestive of direct  secondary skin invasion.  Additionally the entire medial right breast  skin is thickened

## 2023-11-06 ENCOUNTER — OFFICE VISIT (OUTPATIENT)
Dept: SURGERY | Age: 68
End: 2023-11-06
Payer: MEDICARE

## 2023-11-06 VITALS — HEART RATE: 80 BPM | SYSTOLIC BLOOD PRESSURE: 110 MMHG | DIASTOLIC BLOOD PRESSURE: 68 MMHG

## 2023-11-06 DIAGNOSIS — C50.211 MALIGNANT NEOPLASM OF UPPER-INNER QUADRANT OF RIGHT FEMALE BREAST, UNSPECIFIED ESTROGEN RECEPTOR STATUS (HCC): Primary | ICD-10-CM

## 2023-11-06 PROCEDURE — 3017F COLORECTAL CA SCREEN DOC REV: CPT | Performed by: SURGERY

## 2023-11-06 PROCEDURE — G8427 DOCREV CUR MEDS BY ELIG CLIN: HCPCS | Performed by: SURGERY

## 2023-11-06 PROCEDURE — G8484 FLU IMMUNIZE NO ADMIN: HCPCS | Performed by: SURGERY

## 2023-11-06 PROCEDURE — 3074F SYST BP LT 130 MM HG: CPT | Performed by: SURGERY

## 2023-11-06 PROCEDURE — 1123F ACP DISCUSS/DSCN MKR DOCD: CPT | Performed by: SURGERY

## 2023-11-06 PROCEDURE — 99213 OFFICE O/P EST LOW 20 MIN: CPT | Performed by: SURGERY

## 2023-11-06 PROCEDURE — 3078F DIAST BP <80 MM HG: CPT | Performed by: SURGERY

## 2023-11-06 PROCEDURE — 1090F PRES/ABSN URINE INCON ASSESS: CPT | Performed by: SURGERY

## 2023-11-06 PROCEDURE — G8417 CALC BMI ABV UP PARAM F/U: HCPCS | Performed by: SURGERY

## 2023-11-06 PROCEDURE — 1036F TOBACCO NON-USER: CPT | Performed by: SURGERY

## 2023-11-06 PROCEDURE — G8400 PT W/DXA NO RESULTS DOC: HCPCS | Performed by: SURGERY

## 2023-11-09 NOTE — PROGRESS NOTES
MEDICAL ONCOLOGY PROGRESS NOTE    Pt Name: Moody Campos  MRN: [id number]  YOB: 1955  Date of evaluation: 11/14/2023    HISTORY OF PRESENT ILLNESS:    Reason for MD visit: Toxicity assessment and disease management/treatment compliance. .   She is currently undergoing neoadjuvant chemotherapy dose dense AC. She has received 3 cycles. She is having very poor tolerance with significant fatigue and short of breath on minimal exertion. She denies any chest pain. She complains of right lower extremity swelling occasionally. She had a DVT on that leg. She is currently on Eliquis for anticoagulation. She is compliant with her Eliquis. Diagnosis  Invasive ductal carcinoma, right breast 9:00 & 2:00, May 2023  Grade 3  ER 0.2%. [place] 0, HER-2 0/negative, Ki67 84%  MammaPrint: Basal type  Right axillary/retropectoral lymphadenopathy  [id number], stage IIIC  [facility] [address] Genetic Testing TP53 Positive. Li-Fraumeni syndrome. Treatment Summary  6/12/23 Initiated  Pembrolizumab, Carboplatin Taxol weekly followed by [facility] every 2 weeks X4 cycles with Keytruda  8/21/23 Discontinued Carboplatin and dose reduction to [facility] Adriamycin 50 mg/M2 and cyclophosphamide 500 mg/M2  11/14/2023-discontinue dose dense AC, cycle #4. She will continue Keytruda alone. 9/19/23 Initiated Eliquis 10mg BID for 7 days, then 5mg BID daily  Anticipate surgery  Anticipate radiation therapy    Cancer History  William Hutton was first seen by me on 5/24/2023. She was referred by Dr. Castillo Roldan for a diagnosis of triple negative breast cancer. She felt a mass in the right breast.  Patient denies any family history of breast cancer or any other cancer. 4/3/23 Bilateral diagnostic mammogram ([facility]): 3 circumscribed masses are identified in the right breast. The palpable lump located at 2 o'clock in the right breast is the smallest mass measuring 1.1cm.  The other mass located at 2 o'clock in the right breast measures

## 2023-11-13 ENCOUNTER — TELEPHONE (OUTPATIENT)
Dept: HEMATOLOGY | Age: 68
End: 2023-11-13

## 2023-11-14 ENCOUNTER — HOSPITAL ENCOUNTER (OUTPATIENT)
Dept: INFUSION THERAPY | Age: 68
Discharge: HOME | End: 2023-11-14
Payer: MEDICARE

## 2023-11-14 ENCOUNTER — HOSPITAL ENCOUNTER (OUTPATIENT)
Dept: CT IMAGING | Age: 68
Discharge: HOME | End: 2023-11-14
Payer: MEDICARE

## 2023-11-14 ENCOUNTER — OFFICE VISIT (OUTPATIENT)
Dept: HEMATOLOGY | Age: 68
End: 2023-11-14
Payer: MEDICARE

## 2023-11-14 ENCOUNTER — HOSPITAL ENCOUNTER (OUTPATIENT)
Dept: INFUSION THERAPY | Age: 68
Setting detail: INFUSION SERIES
Discharge: HOME | End: 2023-11-14

## 2023-11-14 ENCOUNTER — HOSPITAL ENCOUNTER (OUTPATIENT)
Dept: NON INVASIVE DIAGNOSTICS | Age: 68
Discharge: HOME | End: 2023-11-14
Payer: MEDICARE

## 2023-11-14 VITALS
DIASTOLIC BLOOD PRESSURE: 62 MMHG | HEART RATE: 100 BPM | WEIGHT: 225 LBS | TEMPERATURE: 97.7 F | BODY MASS INDEX: 34.1 KG/M2 | HEIGHT: 68 IN | OXYGEN SATURATION: 100 % | SYSTOLIC BLOOD PRESSURE: 122 MMHG

## 2023-11-14 DIAGNOSIS — C50.919 TRIPLE NEGATIVE BREAST CANCER (HCC): ICD-10-CM

## 2023-11-14 DIAGNOSIS — R53.83 FATIGUE DUE TO TREATMENT: ICD-10-CM

## 2023-11-14 DIAGNOSIS — R06.00 DYSPNEA, UNSPECIFIED TYPE: ICD-10-CM

## 2023-11-14 DIAGNOSIS — Z51.11 CHEMOTHERAPY MANAGEMENT, ENCOUNTER FOR: ICD-10-CM

## 2023-11-14 DIAGNOSIS — I87.8 POOR VENOUS ACCESS: ICD-10-CM

## 2023-11-14 DIAGNOSIS — Z71.89 CARE PLAN DISCUSSED WITH PATIENT: ICD-10-CM

## 2023-11-14 DIAGNOSIS — C50.911 INVASIVE DUCTAL CARCINOMA OF BREAST, FEMALE, RIGHT (HCC): ICD-10-CM

## 2023-11-14 DIAGNOSIS — D64.81 ANTINEOPLASTIC CHEMOTHERAPY INDUCED ANEMIA: ICD-10-CM

## 2023-11-14 DIAGNOSIS — T45.1X5A ANTINEOPLASTIC CHEMOTHERAPY INDUCED ANEMIA: ICD-10-CM

## 2023-11-14 DIAGNOSIS — C50.211 MALIGNANT NEOPLASM OF UPPER-INNER QUADRANT OF RIGHT BREAST IN FEMALE, ESTROGEN RECEPTOR NEGATIVE (HCC): Primary | ICD-10-CM

## 2023-11-14 DIAGNOSIS — Z17.1 MALIGNANT NEOPLASM OF UPPER-INNER QUADRANT OF RIGHT BREAST IN FEMALE, ESTROGEN RECEPTOR NEGATIVE (HCC): Primary | ICD-10-CM

## 2023-11-14 DIAGNOSIS — C50.911 INVASIVE DUCTAL CARCINOMA OF BREAST, FEMALE, RIGHT (HCC): Primary | ICD-10-CM

## 2023-11-14 LAB
ALBUMIN SERPL-MCNC: 3.5 G/DL (ref 3.5–5.2)
ALP SERPL-CCNC: 102 U/L (ref 35–104)
ALT SERPL-CCNC: 33 U/L (ref 9–52)
ANION GAP SERPL CALCULATED.3IONS-SCNC: 16 MMOL/L (ref 7–19)
AST SERPL-CCNC: 23 U/L (ref 14–36)
BASOPHILS # BLD: 0.12 K/UL (ref 0.01–0.08)
BASOPHILS NFR BLD: 0.8 % (ref 0.1–1.2)
BILIRUB SERPL-MCNC: 0.5 MG/DL (ref 0.2–1.3)
BNP BLD-MCNC: 378 PG/ML (ref 0–124)
BUN SERPL-MCNC: 6 MG/DL (ref 7–17)
CALCIUM SERPL-MCNC: 9.4 MG/DL (ref 8.4–10.2)
CHLORIDE SERPL-SCNC: 104 MMOL/L (ref 98–111)
CO2 SERPL-SCNC: 19 MMOL/L (ref 22–29)
CORTIS AM PEAK SERPL-MCNC: 19.8 UG/DL (ref 6.2–19.4)
CREAT SERPL-MCNC: 0.6 MG/DL (ref 0.5–1)
EOSINOPHIL # BLD: 0.01 K/UL (ref 0.04–0.54)
EOSINOPHIL NFR BLD: 0.1 % (ref 0.7–7)
ERYTHROCYTE [DISTWIDTH] IN BLOOD BY AUTOMATED COUNT: 18.6 % (ref 11.7–14.4)
GLOBULIN: 3 G/DL
GLUCOSE SERPL-MCNC: 124 MG/DL (ref 74–106)
HCT VFR BLD AUTO: 31.4 % (ref 34.1–44.9)
HGB BLD-MCNC: 9.8 G/DL (ref 11.2–15.7)
LYMPHOCYTES # BLD: 0.91 K/UL (ref 1.18–3.74)
LYMPHOCYTES NFR BLD: 5.7 % (ref 19.3–53.1)
MCH RBC QN AUTO: 29.8 PG (ref 25.6–32.2)
MCHC RBC AUTO-ENTMCNC: 31.2 G/DL (ref 32.3–35.5)
MCV RBC AUTO: 95.4 FL (ref 79.4–94.8)
MONOCYTES # BLD: 1.78 K/UL (ref 0.24–0.82)
MONOCYTES NFR BLD: 11.1 % (ref 4.7–12.5)
NEUTROPHILS # BLD: 12.68 K/UL (ref 1.56–6.13)
NEUTS SEG NFR BLD: 79.2 % (ref 34–71.1)
PLATELET # BLD AUTO: 373 K/UL (ref 182–369)
PMV BLD AUTO: 10.5 FL (ref 7.4–10.4)
POTASSIUM SERPL-SCNC: 4 MMOL/L (ref 3.5–5.1)
PROT SERPL-MCNC: 6.5 G/DL (ref 6.3–8.2)
RBC # BLD AUTO: 3.29 M/UL (ref 3.93–5.22)
SODIUM SERPL-SCNC: 139 MMOL/L (ref 137–145)
T4 FREE SERPL-MCNC: 1.35 NG/DL (ref 0.93–1.7)
TROPONIN, HIGH SENSITIVITY: 23 NG/L (ref 0–14)
TSH SERPL DL<=0.005 MIU/L-ACNC: 2.54 UIU/ML (ref 0.27–4.2)
WBC # BLD AUTO: 16 K/UL (ref 3.98–10.04)

## 2023-11-14 PROCEDURE — 3074F SYST BP LT 130 MM HG: CPT | Performed by: INTERNAL MEDICINE

## 2023-11-14 PROCEDURE — G8417 CALC BMI ABV UP PARAM F/U: HCPCS | Performed by: INTERNAL MEDICINE

## 2023-11-14 PROCEDURE — G8484 FLU IMMUNIZE NO ADMIN: HCPCS | Performed by: INTERNAL MEDICINE

## 2023-11-14 PROCEDURE — 96523 IRRIG DRUG DELIVERY DEVICE: CPT

## 2023-11-14 PROCEDURE — G8400 PT W/DXA NO RESULTS DOC: HCPCS | Performed by: INTERNAL MEDICINE

## 2023-11-14 PROCEDURE — 3017F COLORECTAL CA SCREEN DOC REV: CPT | Performed by: INTERNAL MEDICINE

## 2023-11-14 PROCEDURE — 36415 COLL VENOUS BLD VENIPUNCTURE: CPT

## 2023-11-14 PROCEDURE — 1036F TOBACCO NON-USER: CPT | Performed by: INTERNAL MEDICINE

## 2023-11-14 PROCEDURE — 6360000004 HC RX CONTRAST MEDICATION: Performed by: INTERNAL MEDICINE

## 2023-11-14 PROCEDURE — 3078F DIAST BP <80 MM HG: CPT | Performed by: INTERNAL MEDICINE

## 2023-11-14 PROCEDURE — 99212 OFFICE O/P EST SF 10 MIN: CPT

## 2023-11-14 PROCEDURE — G8427 DOCREV CUR MEDS BY ELIG CLIN: HCPCS | Performed by: INTERNAL MEDICINE

## 2023-11-14 PROCEDURE — 85025 COMPLETE CBC W/AUTO DIFF WBC: CPT

## 2023-11-14 PROCEDURE — 80053 COMPREHEN METABOLIC PANEL: CPT

## 2023-11-14 PROCEDURE — 99215 OFFICE O/P EST HI 40 MIN: CPT | Performed by: INTERNAL MEDICINE

## 2023-11-14 PROCEDURE — 71275 CT ANGIOGRAPHY CHEST: CPT

## 2023-11-14 PROCEDURE — 1090F PRES/ABSN URINE INCON ASSESS: CPT | Performed by: INTERNAL MEDICINE

## 2023-11-14 PROCEDURE — 1123F ACP DISCUSS/DSCN MKR DOCD: CPT | Performed by: INTERNAL MEDICINE

## 2023-11-14 PROCEDURE — 6360000002 HC RX W HCPCS

## 2023-11-14 PROCEDURE — 93306 TTE W/DOPPLER COMPLETE: CPT

## 2023-11-14 PROCEDURE — 2580000003 HC RX 258

## 2023-11-14 RX ORDER — SODIUM CHLORIDE 0.9 % (FLUSH) 0.9 %
5-40 SYRINGE (ML) INJECTION PRN
Status: DISCONTINUED | OUTPATIENT
Start: 2023-11-14 | End: 2023-11-15 | Stop reason: HOSPADM

## 2023-11-14 RX ORDER — SODIUM CHLORIDE 9 MG/ML
25 INJECTION, SOLUTION INTRAVENOUS PRN
OUTPATIENT
Start: 2023-11-14

## 2023-11-14 RX ORDER — HEPARIN 100 UNIT/ML
500 SYRINGE INTRAVENOUS PRN
OUTPATIENT
Start: 2023-11-14

## 2023-11-14 RX ORDER — HEPARIN 100 UNIT/ML
500 SYRINGE INTRAVENOUS PRN
Status: DISCONTINUED | OUTPATIENT
Start: 2023-11-14 | End: 2023-11-15 | Stop reason: HOSPADM

## 2023-11-14 RX ORDER — SODIUM CHLORIDE 0.9 % (FLUSH) 0.9 %
5-40 SYRINGE (ML) INJECTION PRN
OUTPATIENT
Start: 2023-11-14

## 2023-11-14 RX ADMIN — HEPARIN 500 UNITS: 100 SYRINGE at 10:00

## 2023-11-14 RX ADMIN — IOPAMIDOL 75 ML: 755 INJECTION, SOLUTION INTRAVENOUS at 09:57

## 2023-11-14 RX ADMIN — SODIUM CHLORIDE, PRESERVATIVE FREE 10 ML: 5 INJECTION INTRAVENOUS at 10:00

## 2023-11-15 ENCOUNTER — TELEPHONE (OUTPATIENT)
Dept: HEMATOLOGY | Age: 68
End: 2023-11-15

## 2023-11-15 DIAGNOSIS — R06.00 DYSPNEA, UNSPECIFIED TYPE: Primary | ICD-10-CM

## 2023-11-15 NOTE — TELEPHONE ENCOUNTER
Called and spoke with patient. Informed patient that her scans are clear but her labs are slightly elevated. MD wants cardiology referral. Patient request cardiology referral at [facility]. Referral will be sent to cardiology at this time.  Electronically signed by Saul Gupta RN on 11/15/2023 at 8:14 AM

## 2023-11-16 LAB — MYOGLOBIN SERPL-MCNC: 35 NG/ML

## 2023-11-21 NOTE — PROGRESS NOTES
HISTORY OF PRESENT ILLNESS:    Ms. Mookie Banks presents today following imaging to discuss surgery. She is undergoing neoadjuvant chemotherapy for her triple negative right breast cancer    She is recently status post ultrasound guided breast biopsy  on the right which revealed a 2.5 cm at 9:00 and 1.8 cm at 2:00 high grade invasive ductal carcinoma. 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 I discussed her with Dr. Rosy Gipson who recommended sending her to the emergency room. We also discussed bilateral mastectomy but the timing depends on her other issues. MRI-11/22/2023  Right breast:  At the site of biopsy-proven malignancy at 9 o'clock 10 cm from the nipple there is interval decrease in size of a faintly peripherally enhancing mass marked centrally with a biopsy marker clip measuring on the current exam 1.3 x 1.6 x 1.4   cm (craniocaudal X AP X transverse, series 7 image 244, series 402 image 65). This measured 1.6 cm on ultrasound from 09/27/2023 and 5.0 CM on prior MRI. Associated post biopsy hematoma has significantly decreased. At the site of biopsy-proven malignancy at the 2 o'clock position marked medially with a biopsy marker clip there is redemonstration of a heterogeneous conglomerate of rim enhancing masses measuring 3.5 x 3.9 x 2.2 cm, extending to the skin surface   medially (AP X transverse X craniocaudal, series 6 image 92, series 7 image 176). This previously measured 3.3 x 3.1 x 4.9 cm on the prior MRI and has overall decreased in size compatible with interval treatment response.   Some of this area may

## 2023-11-22 ENCOUNTER — HOSPITAL ENCOUNTER (OUTPATIENT)
Dept: MRI IMAGING | Age: 68
Discharge: HOME | End: 2023-11-22
Attending: SURGERY
Payer: MEDICARE

## 2023-11-22 DIAGNOSIS — C50.211 MALIGNANT NEOPLASM OF UPPER-INNER QUADRANT OF RIGHT FEMALE BREAST, UNSPECIFIED ESTROGEN RECEPTOR STATUS (HCC): ICD-10-CM

## 2023-11-22 PROCEDURE — C8908 MRI W/O FOL W/CONT, BREAST,: HCPCS

## 2023-11-22 PROCEDURE — A9577 INJ MULTIHANCE: HCPCS | Performed by: SURGERY

## 2023-11-22 PROCEDURE — 6360000004 HC RX CONTRAST MEDICATION: Performed by: SURGERY

## 2023-11-22 RX ADMIN — GADOBENATE DIMEGLUMINE 20 ML: 529 INJECTION, SOLUTION INTRAVENOUS at 14:59

## 2023-11-27 ENCOUNTER — HOSPITAL ENCOUNTER (INPATIENT)
Age: 68
LOS: 1 days | Discharge: HOME | DRG: 204 | End: 2023-11-29
Attending: HOSPITALIST | Admitting: STUDENT IN AN ORGANIZED HEALTH CARE EDUCATION/TRAINING PROGRAM
Payer: MEDICARE

## 2023-11-27 ENCOUNTER — HOSPITAL ENCOUNTER (OUTPATIENT)
Dept: WOMENS IMAGING | Age: 68
Discharge: HOME | DRG: 204 | End: 2023-11-27
Payer: MEDICARE

## 2023-11-27 ENCOUNTER — APPOINTMENT (OUTPATIENT)
Dept: CT IMAGING | Age: 68
DRG: 204 | End: 2023-11-27
Payer: MEDICARE

## 2023-11-27 ENCOUNTER — APPOINTMENT (OUTPATIENT)
Dept: GENERAL RADIOLOGY | Age: 68
DRG: 204 | End: 2023-11-27
Payer: MEDICARE

## 2023-11-27 ENCOUNTER — OFFICE VISIT (OUTPATIENT)
Dept: SURGERY | Age: 68
End: 2023-11-27
Payer: MEDICARE

## 2023-11-27 VITALS — HEIGHT: 68 IN | WEIGHT: 225 LBS | BODY MASS INDEX: 34.1 KG/M2

## 2023-11-27 DIAGNOSIS — C50.919 TRIPLE NEGATIVE BREAST CANCER (HCC): ICD-10-CM

## 2023-11-27 DIAGNOSIS — Z15.01 LI-FRAUMENI SYNDROME: Primary | ICD-10-CM

## 2023-11-27 DIAGNOSIS — R06.02 SHORTNESS OF BREATH: ICD-10-CM

## 2023-11-27 DIAGNOSIS — C50.919 TRIPLE NEGATIVE BREAST CANCER (HCC): Primary | ICD-10-CM

## 2023-11-27 DIAGNOSIS — C50.211 MALIGNANT NEOPLASM OF UPPER-INNER QUADRANT OF RIGHT FEMALE BREAST, UNSPECIFIED ESTROGEN RECEPTOR STATUS (HCC): ICD-10-CM

## 2023-11-27 PROBLEM — R06.09 DOE (DYSPNEA ON EXERTION): Status: ACTIVE | Noted: 2023-11-27

## 2023-11-27 LAB
ALBUMIN SERPL-MCNC: 3.5 G/DL (ref 3.5–5.2)
ALP SERPL-CCNC: 93 U/L (ref 35–104)
ALT SERPL-CCNC: 15 U/L (ref 5–33)
ANION GAP SERPL CALCULATED.3IONS-SCNC: 19 MMOL/L (ref 7–19)
AST SERPL-CCNC: 25 U/L (ref 5–32)
B PARAP IS1001 DNA NPH QL NAA+NON-PROBE: NOT DETECTED
B PERT.PT PRMT NPH QL NAA+NON-PROBE: NOT DETECTED
BASOPHILS # BLD: 0.1 K/UL (ref 0–0.2)
BASOPHILS NFR BLD: 1.1 % (ref 0–1)
BILIRUB SERPL-MCNC: 0.6 MG/DL (ref 0.2–1.2)
BNP BLD-MCNC: 436 PG/ML (ref 0–124)
BUN SERPL-MCNC: 7 MG/DL (ref 8–23)
C PNEUM DNA NPH QL NAA+NON-PROBE: NOT DETECTED
CALCIUM SERPL-MCNC: 9.5 MG/DL (ref 8.8–10.2)
CHLORIDE SERPL-SCNC: 100 MMOL/L (ref 98–111)
CO2 SERPL-SCNC: 19 MMOL/L (ref 22–29)
CREAT SERPL-MCNC: 0.9 MG/DL (ref 0.5–0.9)
D DIMER PPP FEU-MCNC: 2.8 UG/ML FEU (ref 0–0.48)
EOSINOPHIL # BLD: 0.1 K/UL (ref 0–0.6)
EOSINOPHIL NFR BLD: 0.7 % (ref 0–5)
ERYTHROCYTE [DISTWIDTH] IN BLOOD BY AUTOMATED COUNT: 18.5 % (ref 11.5–14.5)
FLUAV RNA NPH QL NAA+NON-PROBE: NOT DETECTED
FLUBV RNA NPH QL NAA+NON-PROBE: NOT DETECTED
GLUCOSE SERPL-MCNC: 116 MG/DL (ref 74–109)
HADV DNA NPH QL NAA+NON-PROBE: NOT DETECTED
HCOV 229E RNA NPH QL NAA+NON-PROBE: NOT DETECTED
HCOV HKU1 RNA NPH QL NAA+NON-PROBE: NOT DETECTED
HCOV NL63 RNA NPH QL NAA+NON-PROBE: NOT DETECTED
HCOV OC43 RNA NPH QL NAA+NON-PROBE: NOT DETECTED
HCT VFR BLD AUTO: 34.7 % (ref 37–47)
HGB BLD-MCNC: 11 G/DL (ref 12–16)
HMPV RNA NPH QL NAA+NON-PROBE: NOT DETECTED
HPIV1 RNA NPH QL NAA+NON-PROBE: NOT DETECTED
HPIV2 RNA NPH QL NAA+NON-PROBE: NOT DETECTED
HPIV3 RNA NPH QL NAA+NON-PROBE: NOT DETECTED
HPIV4 RNA NPH QL NAA+NON-PROBE: NOT DETECTED
IMM GRANULOCYTES # BLD: 0.1 K/UL
IRON SATN MFR SERPL: 14 % (ref 14–50)
IRON SERPL-MCNC: 30 UG/DL (ref 37–145)
LYMPHOCYTES # BLD: 1.8 K/UL (ref 1.1–4.5)
LYMPHOCYTES NFR BLD: 15 % (ref 20–40)
M PNEUMO DNA NPH QL NAA+NON-PROBE: NOT DETECTED
MAGNESIUM SERPL-MCNC: 1.8 MG/DL (ref 1.6–2.4)
MCH RBC QN AUTO: 30.7 PG (ref 27–31)
MCHC RBC AUTO-ENTMCNC: 31.7 G/DL (ref 33–37)
MCV RBC AUTO: 96.9 FL (ref 81–99)
MONOCYTES # BLD: 1.6 K/UL (ref 0–0.9)
MONOCYTES NFR BLD: 13.2 % (ref 0–10)
NEUTROPHILS # BLD: 8.3 K/UL (ref 1.5–7.5)
NEUTS SEG NFR BLD: 69.2 % (ref 50–65)
PLATELET # BLD AUTO: 457 K/UL (ref 130–400)
PMV BLD AUTO: 10.6 FL (ref 9.4–12.3)
POTASSIUM SERPL-SCNC: 4.2 MMOL/L (ref 3.5–5)
PROT SERPL-MCNC: 7 G/DL (ref 6.6–8.7)
RBC # BLD AUTO: 3.58 M/UL (ref 4.2–5.4)
RSV RNA NPH QL NAA+NON-PROBE: NOT DETECTED
RV+EV RNA NPH QL NAA+NON-PROBE: NOT DETECTED
SARS-COV-2 RNA NPH QL NAA+NON-PROBE: NOT DETECTED
SODIUM SERPL-SCNC: 138 MMOL/L (ref 136–145)
TIBC SERPL-MCNC: 208 UG/DL (ref 250–400)
TROPONIN, HIGH SENSITIVITY: 28 NG/L (ref 0–14)
WBC # BLD AUTO: 12 K/UL (ref 4.8–10.8)

## 2023-11-27 PROCEDURE — 99285 EMERGENCY DEPT VISIT HI MDM: CPT

## 2023-11-27 PROCEDURE — 83880 ASSAY OF NATRIURETIC PEPTIDE: CPT

## 2023-11-27 PROCEDURE — 82728 ASSAY OF FERRITIN: CPT

## 2023-11-27 PROCEDURE — 71046 X-RAY EXAM CHEST 2 VIEWS: CPT

## 2023-11-27 PROCEDURE — 36415 COLL VENOUS BLD VENIPUNCTURE: CPT

## 2023-11-27 PROCEDURE — 83540 ASSAY OF IRON: CPT

## 2023-11-27 PROCEDURE — 6360000004 HC RX CONTRAST MEDICATION: Performed by: NURSE PRACTITIONER

## 2023-11-27 PROCEDURE — G8427 DOCREV CUR MEDS BY ELIG CLIN: HCPCS | Performed by: SURGERY

## 2023-11-27 PROCEDURE — G0378 HOSPITAL OBSERVATION PER HR: HCPCS

## 2023-11-27 PROCEDURE — 1036F TOBACCO NON-USER: CPT | Performed by: SURGERY

## 2023-11-27 PROCEDURE — 0202U NFCT DS 22 TRGT SARS-COV-2: CPT

## 2023-11-27 PROCEDURE — G8417 CALC BMI ABV UP PARAM F/U: HCPCS | Performed by: SURGERY

## 2023-11-27 PROCEDURE — 83550 IRON BINDING TEST: CPT

## 2023-11-27 PROCEDURE — 83735 ASSAY OF MAGNESIUM: CPT

## 2023-11-27 PROCEDURE — 2500000003 HC RX 250 WO HCPCS: Performed by: HOSPITALIST

## 2023-11-27 PROCEDURE — G8400 PT W/DXA NO RESULTS DOC: HCPCS | Performed by: SURGERY

## 2023-11-27 PROCEDURE — 84443 ASSAY THYROID STIM HORMONE: CPT

## 2023-11-27 PROCEDURE — 76642 ULTRASOUND BREAST LIMITED: CPT

## 2023-11-27 PROCEDURE — 82746 ASSAY OF FOLIC ACID SERUM: CPT

## 2023-11-27 PROCEDURE — 3017F COLORECTAL CA SCREEN DOC REV: CPT | Performed by: SURGERY

## 2023-11-27 PROCEDURE — 6370000000 HC RX 637 (ALT 250 FOR IP)

## 2023-11-27 PROCEDURE — 1090F PRES/ABSN URINE INCON ASSESS: CPT | Performed by: SURGERY

## 2023-11-27 PROCEDURE — 82607 VITAMIN B-12: CPT

## 2023-11-27 PROCEDURE — G0279 TOMOSYNTHESIS, MAMMO: HCPCS

## 2023-11-27 PROCEDURE — 1123F ACP DISCUSS/DSCN MKR DOCD: CPT | Performed by: SURGERY

## 2023-11-27 PROCEDURE — 99223 1ST HOSP IP/OBS HIGH 75: CPT | Performed by: INTERNAL MEDICINE

## 2023-11-27 PROCEDURE — 85379 FIBRIN DEGRADATION QUANT: CPT

## 2023-11-27 PROCEDURE — G8484 FLU IMMUNIZE NO ADMIN: HCPCS | Performed by: SURGERY

## 2023-11-27 PROCEDURE — 80053 COMPREHEN METABOLIC PANEL: CPT

## 2023-11-27 PROCEDURE — 71275 CT ANGIOGRAPHY CHEST: CPT

## 2023-11-27 PROCEDURE — 85025 COMPLETE CBC W/AUTO DIFF WBC: CPT

## 2023-11-27 PROCEDURE — 99213 OFFICE O/P EST LOW 20 MIN: CPT | Performed by: SURGERY

## 2023-11-27 PROCEDURE — 84484 ASSAY OF TROPONIN QUANT: CPT

## 2023-11-27 PROCEDURE — 82306 VITAMIN D 25 HYDROXY: CPT

## 2023-11-27 RX ORDER — TIZANIDINE 2 MG/1
2 TABLET ORAL EVERY 8 HOURS PRN
Status: DISCONTINUED | OUTPATIENT
Start: 2023-11-27 | End: 2023-11-29 | Stop reason: HOSPADM

## 2023-11-27 RX ORDER — SODIUM CHLORIDE 0.9 % (FLUSH) 0.9 %
5-40 SYRINGE (ML) INJECTION PRN
Status: DISCONTINUED | OUTPATIENT
Start: 2023-11-27 | End: 2023-11-29 | Stop reason: HOSPADM

## 2023-11-27 RX ORDER — HYDROCODONE BITARTRATE AND ACETAMINOPHEN 7.5; 325 MG/1; MG/1
1 TABLET ORAL EVERY 6 HOURS PRN
Status: DISCONTINUED | OUTPATIENT
Start: 2023-11-27 | End: 2023-11-29 | Stop reason: HOSPADM

## 2023-11-27 RX ORDER — MONTELUKAST SODIUM 10 MG/1
10 TABLET ORAL NIGHTLY
Status: DISCONTINUED | OUTPATIENT
Start: 2023-11-27 | End: 2023-11-27

## 2023-11-27 RX ORDER — ATORVASTATIN CALCIUM 10 MG/1
10 TABLET, FILM COATED ORAL DAILY
Status: DISCONTINUED | OUTPATIENT
Start: 2023-11-28 | End: 2023-11-29 | Stop reason: HOSPADM

## 2023-11-27 RX ORDER — POLYETHYLENE GLYCOL 3350 17 G/17G
17 POWDER, FOR SOLUTION ORAL DAILY PRN
Status: DISCONTINUED | OUTPATIENT
Start: 2023-11-27 | End: 2023-11-29 | Stop reason: HOSPADM

## 2023-11-27 RX ORDER — SODIUM CHLORIDE 0.9 % (FLUSH) 0.9 %
5-40 SYRINGE (ML) INJECTION EVERY 12 HOURS SCHEDULED
Status: DISCONTINUED | OUTPATIENT
Start: 2023-11-27 | End: 2023-11-29 | Stop reason: HOSPADM

## 2023-11-27 RX ORDER — IPRATROPIUM BROMIDE AND ALBUTEROL SULFATE 2.5; .5 MG/3ML; MG/3ML
1 SOLUTION RESPIRATORY (INHALATION)
Status: DISCONTINUED | OUTPATIENT
Start: 2023-11-28 | End: 2023-11-29 | Stop reason: HOSPADM

## 2023-11-27 RX ORDER — SODIUM CHLORIDE 9 MG/ML
INJECTION, SOLUTION INTRAVENOUS PRN
Status: DISCONTINUED | OUTPATIENT
Start: 2023-11-27 | End: 2023-11-29 | Stop reason: HOSPADM

## 2023-11-27 RX ORDER — LANOLIN ALCOHOL/MO/W.PET/CERES
400 CREAM (GRAM) TOPICAL DAILY
Status: DISCONTINUED | OUTPATIENT
Start: 2023-11-28 | End: 2023-11-29 | Stop reason: HOSPADM

## 2023-11-27 RX ORDER — POTASSIUM CHLORIDE 20 MEQ/1
40 TABLET, EXTENDED RELEASE ORAL PRN
Status: DISCONTINUED | OUTPATIENT
Start: 2023-11-27 | End: 2023-11-29 | Stop reason: HOSPADM

## 2023-11-27 RX ORDER — METOPROLOL SUCCINATE 50 MG/1
100 TABLET, EXTENDED RELEASE ORAL DAILY
Status: DISCONTINUED | OUTPATIENT
Start: 2023-11-28 | End: 2023-11-29 | Stop reason: HOSPADM

## 2023-11-27 RX ORDER — BUMETANIDE 0.25 MG/ML
1 INJECTION INTRAMUSCULAR; INTRAVENOUS ONCE
Status: COMPLETED | OUTPATIENT
Start: 2023-11-27 | End: 2023-11-27

## 2023-11-27 RX ORDER — LISINOPRIL 20 MG/1
20 TABLET ORAL DAILY
Status: DISCONTINUED | OUTPATIENT
Start: 2023-11-28 | End: 2023-11-29 | Stop reason: HOSPADM

## 2023-11-27 RX ORDER — ACETAMINOPHEN 325 MG/1
650 TABLET ORAL EVERY 6 HOURS PRN
Status: DISCONTINUED | OUTPATIENT
Start: 2023-11-27 | End: 2023-11-29 | Stop reason: HOSPADM

## 2023-11-27 RX ORDER — MAGNESIUM SULFATE IN WATER 40 MG/ML
2000 INJECTION, SOLUTION INTRAVENOUS PRN
Status: DISCONTINUED | OUTPATIENT
Start: 2023-11-27 | End: 2023-11-29 | Stop reason: HOSPADM

## 2023-11-27 RX ORDER — ONDANSETRON 2 MG/ML
4 INJECTION INTRAMUSCULAR; INTRAVENOUS EVERY 6 HOURS PRN
Status: DISCONTINUED | OUTPATIENT
Start: 2023-11-27 | End: 2023-11-29 | Stop reason: HOSPADM

## 2023-11-27 RX ORDER — MONTELUKAST SODIUM 10 MG/1
10 TABLET ORAL DAILY
Status: DISCONTINUED | OUTPATIENT
Start: 2023-11-28 | End: 2023-11-29 | Stop reason: HOSPADM

## 2023-11-27 RX ORDER — ONDANSETRON 4 MG/1
4 TABLET, ORALLY DISINTEGRATING ORAL EVERY 8 HOURS PRN
Status: DISCONTINUED | OUTPATIENT
Start: 2023-11-27 | End: 2023-11-29 | Stop reason: HOSPADM

## 2023-11-27 RX ORDER — POTASSIUM CHLORIDE 7.45 MG/ML
10 INJECTION INTRAVENOUS PRN
Status: DISCONTINUED | OUTPATIENT
Start: 2023-11-27 | End: 2023-11-29 | Stop reason: HOSPADM

## 2023-11-27 RX ORDER — ACETAMINOPHEN 650 MG/1
650 SUPPOSITORY RECTAL EVERY 6 HOURS PRN
Status: DISCONTINUED | OUTPATIENT
Start: 2023-11-27 | End: 2023-11-29 | Stop reason: HOSPADM

## 2023-11-27 RX ADMIN — IOPAMIDOL 70 ML: 755 INJECTION, SOLUTION INTRAVENOUS at 19:02

## 2023-11-27 RX ADMIN — BUMETANIDE 1 MG: 0.25 INJECTION INTRAMUSCULAR; INTRAVENOUS at 23:42

## 2023-11-27 RX ADMIN — APIXABAN 5 MG: 5 TABLET, FILM COATED ORAL at 23:38

## 2023-11-27 ASSESSMENT — PAIN - FUNCTIONAL ASSESSMENT: PAIN_FUNCTIONAL_ASSESSMENT: NONE - DENIES PAIN

## 2023-11-27 ASSESSMENT — ENCOUNTER SYMPTOMS: SHORTNESS OF BREATH: 1

## 2023-11-28 LAB
25(OH)D3 SERPL-MCNC: 31.9 NG/ML
ANION GAP SERPL CALCULATED.3IONS-SCNC: 14 MMOL/L (ref 7–19)
BUN SERPL-MCNC: 10 MG/DL (ref 8–23)
CALCIUM SERPL-MCNC: 8.7 MG/DL (ref 8.8–10.2)
CHLORIDE SERPL-SCNC: 106 MMOL/L (ref 98–111)
CHOLEST SERPL-MCNC: 127 MG/DL (ref 160–199)
CO2 SERPL-SCNC: 21 MMOL/L (ref 22–29)
CREAT SERPL-MCNC: 0.9 MG/DL (ref 0.5–0.9)
EKG P AXIS: -14 DEGREES
EKG P-R INTERVAL: 158 MS
EKG Q-T INTERVAL: 396 MS
EKG QRS DURATION: 84 MS
EKG QTC CALCULATION (BAZETT): 453 MS
EKG T AXIS: -30 DEGREES
ERYTHROCYTE [DISTWIDTH] IN BLOOD BY AUTOMATED COUNT: 18.8 % (ref 11.5–14.5)
FERRITIN SERPL-MCNC: 913 NG/ML (ref 13–150)
FOLATE SERPL-MCNC: 6.3 NG/ML (ref 4.8–37.3)
GLUCOSE SERPL-MCNC: 98 MG/DL (ref 74–109)
HCT VFR BLD AUTO: 31.5 % (ref 37–47)
HDLC SERPL-MCNC: 20 MG/DL (ref 65–121)
HGB BLD-MCNC: 9.8 G/DL (ref 12–16)
LACTATE BLDV-SCNC: 2.3 MMOL/L (ref 0.5–1.9)
LDLC SERPL CALC-MCNC: 69 MG/DL
MCH RBC QN AUTO: 31.2 PG (ref 27–31)
MCHC RBC AUTO-ENTMCNC: 31.1 G/DL (ref 33–37)
MCV RBC AUTO: 100.3 FL (ref 81–99)
PLATELET # BLD AUTO: 334 K/UL (ref 130–400)
PMV BLD AUTO: 10.8 FL (ref 9.4–12.3)
POTASSIUM SERPL-SCNC: 3.6 MMOL/L (ref 3.5–5)
RBC # BLD AUTO: 3.14 M/UL (ref 4.2–5.4)
SODIUM SERPL-SCNC: 141 MMOL/L (ref 136–145)
TRIGL SERPL-MCNC: 192 MG/DL (ref 0–149)
TROPONIN, HIGH SENSITIVITY: 23 NG/L (ref 0–14)
TSH SERPL DL<=0.005 MIU/L-ACNC: 5.27 UIU/ML (ref 0.27–4.2)
VIT B12 SERPL-MCNC: 1357 PG/ML (ref 211–946)
WBC # BLD AUTO: 9.3 K/UL (ref 4.8–10.8)

## 2023-11-28 PROCEDURE — 83605 ASSAY OF LACTIC ACID: CPT

## 2023-11-28 PROCEDURE — 85027 COMPLETE CBC AUTOMATED: CPT

## 2023-11-28 PROCEDURE — 2580000003 HC RX 258: Performed by: INTERNAL MEDICINE

## 2023-11-28 PROCEDURE — G0378 HOSPITAL OBSERVATION PER HR: HCPCS

## 2023-11-28 PROCEDURE — 93005 ELECTROCARDIOGRAM TRACING: CPT

## 2023-11-28 PROCEDURE — 93306 TTE W/DOPPLER COMPLETE: CPT

## 2023-11-28 PROCEDURE — 2580000003 HC RX 258: Performed by: HOSPITALIST

## 2023-11-28 PROCEDURE — 99223 1ST HOSP IP/OBS HIGH 75: CPT | Performed by: INTERNAL MEDICINE

## 2023-11-28 PROCEDURE — 6370000000 HC RX 637 (ALT 250 FOR IP)

## 2023-11-28 PROCEDURE — 84484 ASSAY OF TROPONIN QUANT: CPT

## 2023-11-28 PROCEDURE — 99232 SBSQ HOSP IP/OBS MODERATE 35: CPT | Performed by: INTERNAL MEDICINE

## 2023-11-28 PROCEDURE — 80061 LIPID PANEL: CPT

## 2023-11-28 PROCEDURE — 93010 ELECTROCARDIOGRAM REPORT: CPT | Performed by: INTERNAL MEDICINE

## 2023-11-28 PROCEDURE — 2580000003 HC RX 258

## 2023-11-28 PROCEDURE — 94640 AIRWAY INHALATION TREATMENT: CPT

## 2023-11-28 PROCEDURE — 2500000003 HC RX 250 WO HCPCS: Performed by: HOSPITALIST

## 2023-11-28 PROCEDURE — 6370000000 HC RX 637 (ALT 250 FOR IP): Performed by: HOSPITALIST

## 2023-11-28 PROCEDURE — 36415 COLL VENOUS BLD VENIPUNCTURE: CPT

## 2023-11-28 PROCEDURE — 94760 N-INVAS EAR/PLS OXIMETRY 1: CPT

## 2023-11-28 PROCEDURE — 80048 BASIC METABOLIC PNL TOTAL CA: CPT

## 2023-11-28 RX ORDER — BUMETANIDE 0.25 MG/ML
1 INJECTION INTRAMUSCULAR; INTRAVENOUS ONCE
Status: COMPLETED | OUTPATIENT
Start: 2023-11-28 | End: 2023-11-28

## 2023-11-28 RX ORDER — SODIUM CHLORIDE, SODIUM LACTATE, POTASSIUM CHLORIDE, CALCIUM CHLORIDE 600; 310; 30; 20 MG/100ML; MG/100ML; MG/100ML; MG/100ML
INJECTION, SOLUTION INTRAVENOUS CONTINUOUS
Status: DISCONTINUED | OUTPATIENT
Start: 2023-11-28 | End: 2023-11-29 | Stop reason: HOSPADM

## 2023-11-28 RX ADMIN — SODIUM CHLORIDE, POTASSIUM CHLORIDE, SODIUM LACTATE AND CALCIUM CHLORIDE: 600; 310; 30; 20 INJECTION, SOLUTION INTRAVENOUS at 09:41

## 2023-11-28 RX ADMIN — APIXABAN 5 MG: 5 TABLET, FILM COATED ORAL at 08:11

## 2023-11-28 RX ADMIN — ATORVASTATIN CALCIUM 10 MG: 10 TABLET, FILM COATED ORAL at 08:11

## 2023-11-28 RX ADMIN — IPRATROPIUM BROMIDE AND ALBUTEROL SULFATE 1 DOSE: 2.5; .5 SOLUTION RESPIRATORY (INHALATION) at 07:02

## 2023-11-28 RX ADMIN — MONTELUKAST 10 MG: 10 TABLET, FILM COATED ORAL at 08:11

## 2023-11-28 RX ADMIN — SODIUM CHLORIDE, PRESERVATIVE FREE 10 ML: 5 INJECTION INTRAVENOUS at 02:33

## 2023-11-28 RX ADMIN — BUMETANIDE 1 MG: 0.25 INJECTION INTRAMUSCULAR; INTRAVENOUS at 06:19

## 2023-11-28 RX ADMIN — IPRATROPIUM BROMIDE AND ALBUTEROL SULFATE 1 DOSE: 2.5; .5 SOLUTION RESPIRATORY (INHALATION) at 10:07

## 2023-11-28 RX ADMIN — IPRATROPIUM BROMIDE AND ALBUTEROL SULFATE 1 DOSE: 2.5; .5 SOLUTION RESPIRATORY (INHALATION) at 18:24

## 2023-11-28 RX ADMIN — IPRATROPIUM BROMIDE AND ALBUTEROL SULFATE 1 DOSE: 2.5; .5 SOLUTION RESPIRATORY (INHALATION) at 14:48

## 2023-11-28 RX ADMIN — SODIUM BICARBONATE: 84 INJECTION, SOLUTION INTRAVENOUS at 04:07

## 2023-11-28 RX ADMIN — Medication 400 MG: at 08:11

## 2023-11-28 ASSESSMENT — ENCOUNTER SYMPTOMS
RESPIRATORY NEGATIVE: 1
COUGH: 0
WHEEZING: 0
EYES NEGATIVE: 1
ABDOMINAL PAIN: 0
SHORTNESS OF BREATH: 1
NAUSEA: 0
ABDOMINAL DISTENTION: 0
DIARRHEA: 0
TROUBLE SWALLOWING: 0
CONSTIPATION: 0
GASTROINTESTINAL NEGATIVE: 1
SHORTNESS OF BREATH: 0
VOMITING: 0
SORE THROAT: 0
SINUS PAIN: 0
BLOOD IN STOOL: 0

## 2023-11-28 NOTE — ACP (ADVANCE CARE PLANNING)
Advance Care Planning     Advance Care Planning Inpatient Note  [facility] Department    Today's Date: 11/28/2023  Unit: [facility]/V[facility]/MED    Received request from rounding. Upon review of chart and communication with care team, patient's decision making abilities are not in question. . Patient, Healthcare Decision Maker, and Spouse was/were present in the room during visit. Goals of ACP Conversation:  Discuss advance care planning documents  Facilitate a discussion related to patient's goals of care as they align with the patient's values and beliefs. To upgrade chart HCDM per next of kin or complete AD if wanted/needed. Health Care Decision Makers:       Primary Decision Maker: Cruz Mitchell - [phone]    Secondary Decision Maker: James Alexi Child - [phone]  Summary:Patient and spouse  50 years, January anniversary. Patient has one child - daughter Zoey Sullivan. Next of kin hierarchy explained to patient and family and chart updated per hierarchy law of [address] as no AD wanted. [facility]  Documented Next of Kin, per patient report    Advance Care Planning Documents (Patient Wishes):  None     Assessment:  Patient awake and alert and is able to speak of diagnosis and chemo treatment with current hope that \"I will be fine\" but is open in acknowledging intent to talk tonight - even giving the time \"6:30\" pm to speak to her daughter and spouse regarding her preferences \"if\" wanting to look at not just current status but also to view future \"if\" referring to the unlikely event not to survive. Patient mentions her daughter who is the one who deflects this conversation whereas spouse, present today at visit, talks openly of value of life and what his wishes would be. Patient does not indicate one way or another what her own choice/preference would be.  encouraged patient and family in ongoing ACP conversation.

## 2023-11-28 NOTE — H&P
[facility] - History & Physical      PCP: Reyes Rama, DO    Date of Admission: 11/27/2023    Date of Service: 11/27/2023    Chief Complaint: Shortness of breath    History Of Present Illness: The patient is a 79 y.o. female with triple negative breast cancer currently receiving Neoadjunctive treatment, DVT complaining of shortness of breath. Patient states that she has had ongoing dyspnea on exertion for the last 6 weeks. She has had echocardiogram with normal LVEF she presents to [facility] ER due to dyspnea at rest.  She endorses generalized weakness and fatigue, nonpitting lower extremity edema, weight loss over the last few months of 20+ pounds. She denies fever, chills, nausea, vomiting, abdominal pain. she denies productive cough or chest pain. Work-up in ER CTA no evidence of pulmonary emboli, large right axillary node stable, troponin 28, , D-dimer 2.8 and respiratory panel negative. Patient is to be observed by hospitalist service with consultation to oncology and cardiology.   Past Medical History:        Diagnosis Date    Acute bilateral low back pain 06/25/2019    Breast cancer ([address])     Hand numbness     right hand s/p arm surgery    Hypercholesterolemia     Hyperlipidemia     Hypertension     Malignant neoplasm of overlapping sites of right breast ([address]) 05/03/2023    Obesity, unspecified obesity severity, unspecified obesity type 09/06/2018    Post-menopausal        Past Surgical History:        Procedure Laterality Date    ARM SURGERY Right     \"nerve release\", dt rocío, with 2nd surgery, debridement    JOINT REPLACEMENT Right 2010    knee    PORT SURGERY N/A 05/19/2023    PORT INSERTION WITH FLUOROSCOPY performed by Molly Mulligan MD at [address] [place] [address] W LOC DEVICE 1ST LESION RIGHT Right 05/01/2023     BREAST BIOPSY W LOC DEVICE 1ST LESION RIGHT 5/1/2023 [facility] [address] Medications:  Prior
Metastatic lymph node should be considered. 3.  No acute findings of the chest otherwise  All CT scans are performed using dose optimization techniques as appropriate to the performed exam and include at least one of the following: Automated exposure control, adjustment of the mA and/or kV according to size, and the use of iterative reconstruction technique. ______________________________________ Electronically signed by: Zoe Olivas M.D. Date:     11/27/2023 Time:    19:38     XR CHEST (2 VW)    Result Date: 11/27/2023  EXAM: CHEST RADIOGRAPH  TECHNIQUE: Two views. Frontal and lateral.  HISTORY: Shortness of breath  COMPARISON: 06/03/2023      The heart size is normal. Mediastinal contours and pulmonary vasculature are within normal limits. The lungs are clear. There is no pleural effusion. There is no pneumothorax. A left-sided central venous catheter is present with tip in the mid to distal SVC. There is scattered degenerative change of the thoracic spine. No acute findings. No active disease.   ______________________________________ Electronically signed by: Burke Briones M.D. Date:     11/27/2023 Time:    18:03     RAF Digital Diagnostic Unilateral Right    Result Date: 11/27/2023  EXAM:  DIGITAL RIGHT DIAGNOSTIC MAMMOGRAM WITH TOMOSYNTHESIS AND DIAGNOSTIC RIGHT BREAST ULTRASOUND  HISTORY:  Follow up right-sided breast cancer  COMPARISON: MRI 11/22/2023 and previous ultrasounds 09/27/2023  FINDINGS:  CC and MLO views of the breast were performed digitally and demonstrate scattered fibroglandular densities. Two right-sided breast masses are present with clips in place. Ultrasound of the right breast demonstrates post biopsy clip and 9 o'clock 10 cm from the nipple with adjacent hypoechoic complex mass measuring 1.4 x 0.6 centimeter which is relatively unchanged.   Additional clip at 2 o'clock 12 cm from the nipple is present with adjacent mass/necrotic fluid collection measuring 2.0 x 1.0 x 4.3

## 2023-11-28 NOTE — CONSULTS
CT ANGIOGRAM WITH AND WITHOUT  IV CONTRAST (AORTA)  HISTORY: Triple negative breast cancer  TECHNIQUE: CTA acquisition of the chest from the thoracic inlet to the upper abdomen following IV contrast administration timed to filling of the aorta. Noncontrast images were also performed. IV Contrast: 100 mL of Omnipaque 350. 3D/MIP/VR images were utilized. CT Dose Reduction Techniques Employed: Yes. COMPARISON: 05/05/2023  FINDINGS: AORTA: No significant aneurysm, dissection, or stenosis. No significant calcified or non-calcified arterial plaque. GREAT VESSELS: The visualized proximal branch vessels are normal in configuration without significant stenosis. The main pulmonary artery is widely patent. No evidence of thromboembolic disease. CHEST: LUNGS/AIRWAYS: No suspicious mass or consolidation. New mild nonspecific centrilobular ground-glass opacification is identified, most notably in the right lower lobe. PLEURAL SPACE: No significant pleural effusion. No pneumothorax. MEDIASTINUM: The heart appears within normal limits. No pericardial effusion. The the the the calcified old granulomatous disease is noted. LYMPH NODES:  No enlarged lymph nodes identified in the chest. The a stable prominent right axillary lymph node is noted. BONES AND SOFT TISSUES: Skin thickening of the right breast is identified. A mass is noted in the medial aspect of the right breast. There is no acute fracture, lytic lesion, or blastic lesion. UPPER ABDOMEN: The visualized portions of the upper abdomen appear within normal limits. 1.  No evidence of metastatic disease in the chest. 2.  The right breast mass and common right axillary lymph nodes are again identified, consistent with provided history of breast cancer.   All CT scans are performed using dose optimization techniques as appropriate to the performed exam and include at least one of the following: Automated exposure control, adjustment of the mA and/or kV according to size,
1.4  cm (craniocaudal X AP X transverse, series 7 image 244, series 402 image 65). This measured 1.6 cm on ultrasound from 09/27/2023 and 5.0 CM on prior MRI. Associated post biopsy hematoma has significantly decreased. At the site of biopsy-proven malignancy at the 2 o'clock position marked medially with a biopsy marker clip there is redemonstration of a heterogeneous conglomerate of rim enhancing masses measuring 3.5 x 3.9 x 2.2 cm, extending to the skin surface medially (AP X transverse X craniocaudal, series 6 image 92, series 7 image 176). This previously measured 3.3 x 3.1 x 4.9 cm on the prior MRI and has overall decreased in size compatible with interval treatment response. Some of this area may be residual post biopsy hematoma, however associated kinetics are present. Previously described satellite masses on MRI located anterior to the biopsy-proven malignancy are not seen on the current exam, compatible with complete treatment response to this area. There is interval decrease in size of multiple previously seen abnormal morphology enlarged level I axillary lymph nodes compatible with interval treatment response. The biopsy-proven malignancy marked with a biopsy marker clip is associated with areas of heterogeneity suggestive of post-biopsy hematoma. The solid enhancing component measures 1.2 x 1.8 cm, previously measuring 2.5 x 2.0 cm (series 6, image 137). At least three remaining lymph nodes appear to demonstrate an enlarged, abnormal morphology although decreased in size compared to the prior exam.  Left breast:   There are no enhancing masses or areas of nonmass enhancement. There is no axillary or internal mammary lymphadenopathy. Other:  Port catheter is present in the left chest wall.       1.  Interval decrease in size of a faintly peripherally enhancing mass at the 9 o'clock position 10 cm from the nipple at the site of biopsy-proven malignancy in the right breast, currently measuring

## 2023-11-28 NOTE — ED PROVIDER NOTES
[facility] 3 L[facility]/VAS/MED  eMERGENCY dEPARTMENT eNCOUnter      Pt Name: Sajan Mendoza  MRN: [id number]  [address] [facility] 1955  Date of evaluation: 11/27/2023  Provider: Vitaliy Bauer       Chief Complaint   Patient presents with    Shortness of Breath     Pt presents to ED with c/o shortness of breath. Ca pt sent by Dr. Magda Carolina. HISTORY OF PRESENT ILLNESS   (Location/Symptom, Timing/Onset,Context/Setting, Quality, Duration, Modifying Factors, Severity)  Note limiting factors. Sajan Mendoza is a 79 y.o. female who presents to the emergency department with shortness of breath x 6 weeks. Patient has right breast triple negative cancer diagnosed in May 2023.  has completed chemo and has seen Dr Magda Carolina for scheduling breast surgery. Also sees Ar. Ar is here in ER seeing pt. SHe denies any productive cough or fever. HAs had a rl eg DVT and is on eliquis. SHortness of breath is worse with activity. Can't hardly take a shower without symptoms    The history is provided by the patient. Shortness of Breath  Severity:  Moderate  Duration:  6 weeks  Timing:  Constant  Progression:  Unchanged  Chronicity:  New  Risk factors: hx of cancer and hx of PE/DVT        NursingNotes were reviewed. REVIEW OF SYSTEMS    (2-9 systems for level 4, 10 or more for level 5)     Review of Systems   Respiratory:  Positive for shortness of breath. Except as noted above the remainder of the review of systems was reviewed and negative.        PAST MEDICAL HISTORY     Past Medical History:   Diagnosis Date    Acute bilateral low back pain 06/25/2019    Breast cancer ([address])     Hand numbness     right hand s/p arm surgery    Hypercholesterolemia     Hyperlipidemia     Hypertension     Malignant neoplasm of overlapping sites of right breast ([address]) 05/03/2023    Obesity, unspecified obesity severity, unspecified obesity type 09/06/2018    Post-menopausal          SURGICALHISTORY       Past Surgical

## 2023-11-29 ENCOUNTER — APPOINTMENT (OUTPATIENT)
Dept: CARDIAC CATH/INVASIVE PROCEDURES | Age: 68
DRG: 204 | End: 2023-11-29
Payer: MEDICARE

## 2023-11-29 VITALS
BODY MASS INDEX: 33.13 KG/M2 | SYSTOLIC BLOOD PRESSURE: 113 MMHG | WEIGHT: 218.6 LBS | DIASTOLIC BLOOD PRESSURE: 68 MMHG | RESPIRATION RATE: 28 BRPM | OXYGEN SATURATION: 95 % | HEIGHT: 68 IN | HEART RATE: 96 BPM | TEMPERATURE: 97.9 F

## 2023-11-29 PROBLEM — R06.09 DYSPNEA ON EXERTION: Status: ACTIVE | Noted: 2023-11-29

## 2023-11-29 LAB
ANION GAP SERPL CALCULATED.3IONS-SCNC: 12 MMOL/L (ref 7–19)
BUN SERPL-MCNC: 7 MG/DL (ref 8–23)
CALCIUM SERPL-MCNC: 8.5 MG/DL (ref 8.8–10.2)
CHLORIDE SERPL-SCNC: 107 MMOL/L (ref 98–111)
CO2 SERPL-SCNC: 24 MMOL/L (ref 22–29)
CREAT SERPL-MCNC: 0.7 MG/DL (ref 0.5–0.9)
ERYTHROCYTE [DISTWIDTH] IN BLOOD BY AUTOMATED COUNT: 18.6 % (ref 11.5–14.5)
GLUCOSE SERPL-MCNC: 98 MG/DL (ref 74–109)
HCT VFR BLD AUTO: 32.2 % (ref 37–47)
HGB BLD-MCNC: 9.7 G/DL (ref 12–16)
MCH RBC QN AUTO: 30.4 PG (ref 27–31)
MCHC RBC AUTO-ENTMCNC: 30.1 G/DL (ref 33–37)
MCV RBC AUTO: 100.9 FL (ref 81–99)
PLATELET # BLD AUTO: 315 K/UL (ref 130–400)
PMV BLD AUTO: 11.1 FL (ref 9.4–12.3)
POTASSIUM SERPL-SCNC: 3.4 MMOL/L (ref 3.5–5)
RBC # BLD AUTO: 3.19 M/UL (ref 4.2–5.4)
SODIUM SERPL-SCNC: 143 MMOL/L (ref 136–145)
WBC # BLD AUTO: 7.1 K/UL (ref 4.8–10.8)

## 2023-11-29 PROCEDURE — 94640 AIRWAY INHALATION TREATMENT: CPT

## 2023-11-29 PROCEDURE — 2500000003 HC RX 250 WO HCPCS

## 2023-11-29 PROCEDURE — B2111ZZ FLUOROSCOPY OF MULTIPLE CORONARY ARTERIES USING LOW OSMOLAR CONTRAST: ICD-10-PCS | Performed by: INTERNAL MEDICINE

## 2023-11-29 PROCEDURE — 6360000002 HC RX W HCPCS

## 2023-11-29 PROCEDURE — 93458 L HRT ARTERY/VENTRICLE ANGIO: CPT

## 2023-11-29 PROCEDURE — B2151ZZ FLUOROSCOPY OF LEFT HEART USING LOW OSMOLAR CONTRAST: ICD-10-PCS | Performed by: INTERNAL MEDICINE

## 2023-11-29 PROCEDURE — 36415 COLL VENOUS BLD VENIPUNCTURE: CPT

## 2023-11-29 PROCEDURE — G0378 HOSPITAL OBSERVATION PER HR: HCPCS

## 2023-11-29 PROCEDURE — 2580000003 HC RX 258

## 2023-11-29 PROCEDURE — 94760 N-INVAS EAR/PLS OXIMETRY 1: CPT

## 2023-11-29 PROCEDURE — C1894 INTRO/SHEATH, NON-LASER: HCPCS

## 2023-11-29 PROCEDURE — 99232 SBSQ HOSP IP/OBS MODERATE 35: CPT | Performed by: INTERNAL MEDICINE

## 2023-11-29 PROCEDURE — 2580000003 HC RX 258: Performed by: INTERNAL MEDICINE

## 2023-11-29 PROCEDURE — 1210000000 HC MED SURG R&B

## 2023-11-29 PROCEDURE — 99152 MOD SED SAME PHYS/QHP 5/>YRS: CPT

## 2023-11-29 PROCEDURE — 6360000004 HC RX CONTRAST MEDICATION: Performed by: INTERNAL MEDICINE

## 2023-11-29 PROCEDURE — 4A023N7 MEASUREMENT OF CARDIAC SAMPLING AND PRESSURE, LEFT HEART, PERCUTANEOUS APPROACH: ICD-10-PCS | Performed by: INTERNAL MEDICINE

## 2023-11-29 PROCEDURE — 2709999900 HC NON-CHARGEABLE SUPPLY

## 2023-11-29 PROCEDURE — 6370000000 HC RX 637 (ALT 250 FOR IP): Performed by: HOSPITALIST

## 2023-11-29 PROCEDURE — C1769 GUIDE WIRE: HCPCS

## 2023-11-29 PROCEDURE — 6370000000 HC RX 637 (ALT 250 FOR IP): Performed by: INTERNAL MEDICINE

## 2023-11-29 PROCEDURE — 6370000000 HC RX 637 (ALT 250 FOR IP)

## 2023-11-29 PROCEDURE — 80048 BASIC METABOLIC PNL TOTAL CA: CPT

## 2023-11-29 PROCEDURE — 85027 COMPLETE CBC AUTOMATED: CPT

## 2023-11-29 RX ORDER — HYDRALAZINE HYDROCHLORIDE 20 MG/ML
10 INJECTION INTRAMUSCULAR; INTRAVENOUS EVERY 10 MIN PRN
OUTPATIENT
Start: 2023-11-29

## 2023-11-29 RX ORDER — SODIUM CHLORIDE 9 MG/ML
INJECTION, SOLUTION INTRAVENOUS CONTINUOUS
OUTPATIENT
Start: 2023-11-29 | End: 2023-11-29

## 2023-11-29 RX ORDER — SODIUM CHLORIDE 9 MG/ML
INJECTION, SOLUTION INTRAVENOUS CONTINUOUS
Status: DISCONTINUED | OUTPATIENT
Start: 2023-11-29 | End: 2023-11-29 | Stop reason: HOSPADM

## 2023-11-29 RX ORDER — ASPIRIN 81 MG/1
81 TABLET ORAL ONCE
Status: COMPLETED | OUTPATIENT
Start: 2023-11-29 | End: 2023-11-29

## 2023-11-29 RX ADMIN — SODIUM CHLORIDE: 9 INJECTION, SOLUTION INTRAVENOUS at 12:18

## 2023-11-29 RX ADMIN — IPRATROPIUM BROMIDE AND ALBUTEROL SULFATE 1 DOSE: 2.5; .5 SOLUTION RESPIRATORY (INHALATION) at 10:05

## 2023-11-29 RX ADMIN — IPRATROPIUM BROMIDE AND ALBUTEROL SULFATE 1 DOSE: 2.5; .5 SOLUTION RESPIRATORY (INHALATION) at 06:22

## 2023-11-29 RX ADMIN — ATORVASTATIN CALCIUM 10 MG: 10 TABLET, FILM COATED ORAL at 08:48

## 2023-11-29 RX ADMIN — METOPROLOL SUCCINATE 100 MG: 50 TABLET, EXTENDED RELEASE ORAL at 08:48

## 2023-11-29 RX ADMIN — Medication 400 MG: at 08:48

## 2023-11-29 RX ADMIN — IOPAMIDOL 85 ML: 612 INJECTION, SOLUTION INTRAVENOUS at 13:49

## 2023-11-29 RX ADMIN — MONTELUKAST 10 MG: 10 TABLET, FILM COATED ORAL at 08:49

## 2023-11-29 RX ADMIN — ASPIRIN 81 MG: 81 TABLET, COATED ORAL at 11:49

## 2023-11-29 RX ADMIN — SODIUM CHLORIDE: 9 INJECTION, SOLUTION INTRAVENOUS at 11:50

## 2023-11-29 RX ADMIN — POTASSIUM CHLORIDE 40 MEQ: 1500 TABLET, EXTENDED RELEASE ORAL at 06:01

## 2023-11-29 RX ADMIN — SODIUM CHLORIDE, PRESERVATIVE FREE 10 ML: 5 INJECTION INTRAVENOUS at 08:49

## 2023-11-29 RX ADMIN — SODIUM CHLORIDE, PRESERVATIVE FREE 10 ML: 5 INJECTION INTRAVENOUS at 12:15

## 2023-11-29 ASSESSMENT — ENCOUNTER SYMPTOMS
BLOOD IN STOOL: 0
SINUS PAIN: 0
SORE THROAT: 0
SHORTNESS OF BREATH: 1
TROUBLE SWALLOWING: 0
DIARRHEA: 0
WHEEZING: 0
ABDOMINAL DISTENTION: 0
VOMITING: 0
COUGH: 0
NAUSEA: 0
ABDOMINAL PAIN: 0
CONSTIPATION: 0

## 2023-11-29 NOTE — PROGRESS NOTES
11/28/23 1228   Encounter Summary   Encounter Overview/Reason  Initial Encounter; Advance Care Planning   Service Provided For: Patient and family together  (At time of visit in room with patient were here- spouse, brother, and mother.)   Referral/Consult From: [address] Family members  (Patient and spouse  50 years - January wedding. Patient has one child- daughter. Next of kin reflected in HCDM per [address] law when no AD)   Complexity of Encounter Moderate   Begin Time 1213   End Time  1245   Total Time Calculated 32 min   Grief, Loss, and Adjustments   Type Life Adjustments; Adjustment to illness  (Patient wearing warm knitted hat and spouse refers first to chemo therapy patient underwent.)   Advance Care Planning   Type ACP conversation  (Patient is accepting and states plan to talk to her family about care preferences. Mentions her daughter not wanting to here what if. ..but only wanting to focus on when. Plans to talk \"at 6:30 tonight to family\")   Assessment/Intervention/Outcome   Assessment Calm;Coping  (Patient sits up in bed eating cup of strawberries and mother sitting right up alongside bed and on oppisite spouse and foot of bed brother. Patient appears calm and engages with  in brief revelation of plan to address care preferences with family)   Intervention Active listening;Nurtured Hope;Read/Provided Scripture  (Patient open to receiving word of scripture that reflects on God's presence today with her with family. HCDM updated per next of kin)   Outcome Coping; Acceptance;Expressed Gratitude  (Patient thanked  in [facility] prayer and scripture passage)   Plan and Referrals   Plan/Referrals No future visits requested;Continue Support (comment)  ( remains available per requested need. [place] will continue to round and offer support if wanted.)     . pl
4 Eyes Skin Assessment     NAME:  Juan Lopez OF BIRTH:  1955  MEDICAL RECORD NUMBER:  [id number]    The patient is being assessed for  Admission    I agree that at least one RN has performed a thorough Head to Toe Skin Assessment on the patient. ALL assessment sites listed below have been assessed. Areas assessed by both nurses:    Head, Face, Ears, Shoulders, Back, Chest, Arms, Elbows, Hands, Sacrum. Buttock, Coccyx, Ischium, and Legs. Feet and Heels        Does the Patient have a Wound?  No noted wound(s)       Klaus Prevention initiated by RN: Yes  Wound Care Orders initiated by RN: No    Pressure Injury (Stage 3,4, Unstageable, DTI, NWPT, and Complex wounds) if present, place Wound referral order by RN under : No    New Ostomies, if present place, Ostomy referral order under : No     Nurse 1 eSignature: Electronically signed by Kay Carrillo RN on 11/27/23 at 11:46 PM CST    **SHARE this note so that the co-signing nurse can place an eSignature**    Nurse 2 eSignature: Electronically signed by Adrianne Valencia LPN on 95/10/01 at 34:62 PM CST
Cardiac catheterization preliminary findings right radial artery approach tolerated well  Left ventricular function satisfactory small area of dyskinesis at the apex  Mild luminal irregularities no significant disease identified medical management recommended
Dr. Queen Thorpe notified of lactic acid of 2.3.
MEDICAL ONCOLOGY PROGRESS NOTE     Pt Name: Abhinav Marshall  MRN: [id number]  YOB: 1955  Date of evaluation: 11/28/2023    Subjective-no overnight events. Denies any chest pain. Continues to complain of shortness of breath on minimal exertion. HISTORY OF PRESENT ILLNESS:  Eric Brown is well-known to my clinic. She has a diagnosis of triple negative breast cancer and is currently receiving neoadjuvant treatment. She is a status post completion of chemotherapy and is currently receiving immunotherapy only with Keytruda. She was seen by Dr. Kd Coello today for plans for surgery. She was also seen by me about 2 weeks ago and had significant complaints of dyspnea on minimal exertion/rest.  We walked her in the clinic and her pulse ox at rest was 98% and dropped to 96% on room air. She had a CTA chest that showed no evidence of pulmonary embolism or any evidence of pneumonia or pneumonitis. In addition, 2D echo was performed and showed LVEF within the normal limits. Her proBNP was elevated as well as troponin was mild elevated. The patient denies any chest pain but continues to experience significant shortness of breath on minimal exertion. She denies any cough. Denies any hemoptysis. She feels quite tired overall. I discussed with the ER provider today. A repeat troponin, proBNP was performed and were abnormal.  Her D-dimer was also elevated. A repeat CTA chest is being performed. I have recommended admission to the hospitalist with consultation to cardiology for further workup of her dyspnea on exertion. 11/27/2023-CTA chest showed no evidence of pulmonary artery thrombus. Enlarged right axillary node related to her breast cancer. Otherwise no other acute findings. Prior ONCOLOGY HISTORY  She is currently undergoing neoadjuvant chemotherapy dose dense AC. She has received 3 cycles.   She is having very poor tolerance with significant fatigue and short of breath on minimal
[facility]      Progress Note    Patient:  David Esquivel  YOB: 1955  Date of Service: 11/29/2023  MRN: [id number]   Acct: [de-identified]   Primary Care Physician: Zoë Lim DO  Advance Directive: Full Code  Admit Date: 11/27/2023       Hospital Day: 0    Portions of this note have been copied forward, however, updated to reflect the most current clinical status of this patient. CHIEF COMPLAINT shortness of breath/ [place]    SUBJECTIVE: Ms. Santa Kelly was resting in chair this morning. Continues to report SOB with exertion at times. Denies SOB at rest. Denies chest pain. Awaiting heart cath later today. CUMULATIVE HOSPITAL COURSE:   The patient is a 79 y.o. female with PMH of triple negative breast cancer currently receiving Neoadjunctive treatment, and DVT who presented to [address] [place] [address] ED with complaints of shortness of breath. Reported ongoing dyspnea on minimal exertion for past 6 weeks. Stated she has had an ECHO with normal LVEF. Endorsed generalized weakness and fatigue, lower extremity edema, weight loss over the past few months. Denied fever, chills, nausea, or vomiting. Workup in ED revealed CTA no evidence of pulmonary emboli, large right axillary node stable, troponin 28, , D-dimer 2.8 and respiratory panel negative. Patient was admitted to hospital medicine with cardiology and oncology consultations. Cardiology recommended cardiac catheterization later today. Review of Systems   Constitutional:  Positive for activity change and appetite change. Negative for chills, diaphoresis, fatigue and fever. HENT:  Negative for congestion, ear pain, sinus pain, sore throat and trouble swallowing. Eyes:  Negative for visual disturbance. Respiratory:  Positive for shortness of breath. Negative for cough and wheezing. Reports shortness of breath with minimal exertion   Cardiovascular:  Positive for leg swelling. Negative for chest pain and palpitations.
popliteal, anterior tibial, peroneal, and posterior tibial veins. There is no evidence of superficial thrombophlebitis of the right lower extremity. 9/27/23 US Breast Limited Right Continued decrease in size of two sites of biopsy-proven malignancy in the right breast located at 9 o'clock 10 cm from the nipple and 2 o'clock 10 cm from the nipple, compatible with partial treatment response. Hypoechoic mass measuring 1.4 x 1.6 x 0.7 cm, previously measuring 1.8 x 1.9 x 1.0 cm. At 2 o'clock 10 cm from the nipple at the site of biopsy-proven malignancy there is continued interval decrease in size of an irregular hypoechoic mass measuring 1.4 x 2.1 x 0.5 cm, previously measuring 1.9 x 2.3 x 0.8 cm. In the right axilla at the site of prior axillary node biopsy there is redemonstration of and an enlarged abnormal morphology lymph node measuring 2.1 x 1.3 x 1.0 cm, previously measuring 2.1 x 1.7 x 1.6 cm. A biopsy marker clip is located within the lymph node. The lymph node previously measured 2.3 x 1.7 x 2.4 cm on 05/01/2023. Stable enlargement of the biopsied right axillary lymph node since July 2023. Previously seen mass located at 10 o'clock 2 cm from the nipple is not visualized by ultrasound and complete treatment response can be assumed at this area. BI-RADS 6 - known malignancy  11/14/2023-CTA pulmonary protocol negative for evidence of pulmonary embolism. Negative for pneumonitis and pneumonia. 11/27/2023-CTA pulmonary protocol remarkable for: No evidence pulmonary embolism. No evidence of pneumonitis or pneumonia. 11/27/2023-mammogram to assess response to therapy remarkable for: Right 9 o'clock breast mass which measures unchanged from prior. Right breast 2 o'clock mass is relatively unchanged in contains necrotic fluid. Right axillary mass/complex fluid collection measures slightly enlarged when compared to prior measurements, but no definitive mass is identified.     11/27/2023-MRI bilateral breast
(11/14/2023) indicated mild MR, normal LV size with preserved LV function with EF 55-60%, mild concentric LVH     - Ddimer 2.80    - CTA negative for PE, Enlarged right axillary lymph nodes stable from 11/14/2023, metastatic lymph node    - Continue Bronchodilators    - supplemental oxygen as warranted    - Monitor on telemetry      Active Problems:    Triple negative breast cancer ([address])-    - Oncology following           DVT Prophylaxis: Eliquis      Discharge planning: TBD        Further Orders per Clinical course/attending. Electronically signed by RYAN Fragoso CNP on 11/28/2023 at 2:33 PM       EMR Dragon/Transcription disclaimer:   Much of this encounter note is an electronic transcription/translation of spoken language to printed text.  The electronic translation of spoken language may permit erroneous, or at times, nonsensical words or phrases to be inadvertently transcribed; although attempts have made to review the note for such errors, some may still exist.

## 2023-11-29 NOTE — PLAN OF CARE
Problem: Safety - Adult  Goal: Free from fall injury  11/29/2023 0008 by Nimo Alberto LPN  Outcome: Progressing  Flowsheets (Taken 11/29/2023 0007)  Free From Fall Injury: Instruct family/caregiver on patient safety  11/28/2023 1121 by Mel Cobb RN  Outcome: Progressing     Problem: Chronic Conditions and Co-morbidities  Goal: Patient's chronic conditions and co-morbidity symptoms are monitored and maintained or improved  11/29/2023 0008 by Katrina Opitz, LPN  Outcome: Progressing  Flowsheets (Taken 11/28/2023 2137)  Care Plan - Patient's Chronic Conditions and Co-Morbidity Symptoms are Monitored and Maintained or Improved: Monitor and assess patient's chronic conditions and comorbid symptoms for stability, deterioration, or improvement  11/28/2023 1121 by Mel Cobb RN  Outcome: Progressing  Flowsheets (Taken 11/28/2023 0730)  Care Plan - Patient's Chronic Conditions and Co-Morbidity Symptoms are Monitored and Maintained or Improved:   Monitor and assess patient's chronic conditions and comorbid symptoms for stability, deterioration, or improvement   Update acute care plan with appropriate goals if chronic or comorbid symptoms are exacerbated and prevent overall improvement and discharge   Collaborate with multidisciplinary team to address chronic and comorbid conditions and prevent exacerbation or deterioration     Problem: Pain  Goal: Verbalizes/displays adequate comfort level or baseline comfort level  11/29/2023 0008 by Katrina Opitz, LPN  Outcome: Progressing  11/28/2023 1121 by Mel Cobb RN  Outcome: Progressing     Problem: Discharge Planning  Goal: Discharge to home or other facility with appropriate resources  11/29/2023 0008 by Nimo Alberto LPN  Outcome: Progressing  Flowsheets (Taken 11/28/2023 2137)  Discharge to home or other facility with appropriate resources: Identify barriers to discharge with patient and caregiver  11/28/2023 1121 by Mel Cobb

## 2023-11-29 NOTE — PLAN OF CARE
Outpatient Physical Therapy Ortho Re-Evaluation  [facility]     Patient Name: Gallo Leary  : 1950  MRN: [id number]  Today's Date: 2020      Visit Date: 2020    Patient Active Problem List   Diagnosis   • Cigarette smoker   • Acute low back pain   • Cerumen impaction        No past medical history on file.     No past surgical history on file.    Visit Dx:     ICD-10-CM ICD-9-CM   1. Acute pain of left shoulder M25.512 719.41   2. Neck pain M54.2 723.1             PT Ortho     Row Name 20 1500       Cervical/Shoulder ROM Screen    Cervical rotation  Normal 65 degrees bilaterally  -GR        Strength Right    # Reps  3  -GR    Right Rung  2  -GR    Right  Test 1  82  -GR    Right  Test 2  82  -GR    Right  Test 3  70  -GR     Strength Average Right  78  -GR        Strength Left    # Reps  3  -GR    Left Rung  2  -GR    Left  Test 1  55  -GR    Left  Test 2  58  -GR    Left  Test 3  84  -GR     Strength Average Left  65.67  -GR      User Key  (r) = Recorded By, (t) = Taken By, (c) = Cosigned By    Initials Name Provider Type    GR Christian Durán, PT Physical Therapist                            PT OP Goals     Row Name 20 1500          PT Short Term Goals    STG Date to Achieve  19  -GR     STG 1  Pt will demonstrate understanding and compliance with initial HEP.  -GR     STG 1 Progress  Met  -GR     STG 2  Pt will report centralization of symptoms from L fingers to L elbow or more proximal.  -GR     STG 2 Progress  Met  -GR        Long Term Goals    LTG Date to Achieve  01/15/20  -GR     LTG 1  Pt will demonstrate tolerance to supine position x 15 minutes without inc L shoulder pain to allow him to tolerate cervical traction.  -GR     LTG 1 Progress  Progressing  -GR     LTG 2  Pt will report tolerance to sleeping without waking due to L shoulder pain.  -GR     LTG 2 Progress  Progressing  -GR     LTG 3  Pt will demonstrate  Problem: Chronic Conditions and Co-morbidities  Goal: Patient's chronic conditions and co-morbidity symptoms are monitored and maintained or improved  11/29/2023 1203 by Venecia Stone RN  Outcome: Progressing  Flowsheets (Taken 11/29/2023 0848)  Care Plan - Patient's Chronic Conditions and Co-Morbidity Symptoms are Monitored and Maintained or Improved:   Monitor and assess patient's chronic conditions and comorbid symptoms for stability, deterioration, or improvement   Collaborate with multidisciplinary team to address chronic and comorbid conditions and prevent exacerbation or deterioration  11/29/2023 0008 by Delfino Leblanc LPN  Outcome: Progressing  Flowsheets (Taken 11/28/2023 2137)  Care Plan - Patient's Chronic Conditions and Co-Morbidity Symptoms are Monitored and Maintained or Improved: Monitor and assess patient's chronic conditions and comorbid symptoms for stability, deterioration, or improvement     Problem: Discharge Planning  Goal: Discharge to home or other facility with appropriate resources  11/29/2023 1203 by Venecia Stone RN  Outcome: Progressing  Flowsheets (Taken 11/29/2023 0848)  Discharge to home or other facility with appropriate resources:   Identify barriers to discharge with patient and caregiver   Arrange for needed discharge resources and transportation as appropriate  11/29/2023 0008 by Sona Alberto LPN  Outcome: Progressing  Flowsheets (Taken 11/28/2023 2137)  Discharge to home or other facility with appropriate resources: Identify barriers to discharge with patient and caregiver improved LUE  strength from 40# to 60# or better to improve his function.  -GR     LTG 3 Progress  Met  -GR       User Key  (r) = Recorded By, (t) = Taken By, (c) = Cosigned By    Initials Name Provider Type    Christian Le, PT Physical Therapist          PT Assessment/Plan     Row Name 06/17/20 1543          PT Assessment    Assessment Comments  Mr. eLary has attended 8 visits of skilled PT for neck/L shoulder pain dating back to Dec 2019 with lapse in care due to illness, then COVID, and conflicting MD appointments for his spouse. Objectively his  strength and cervical ROM have both improved.  He continues to c/o severe shoulder pain that limits his sleeping and he is worried about ability to return to [facility].  He is now available to attend PT more regularly and recommend 1x/week with focus on traction for centralization of symptoms for at least 4 weeks.  -GR        PT Plan    PT Plan Comments  PT1x/week x 4 weeks, continue traction and resume HEP.  -GR       User Key  (r) = Recorded By, (t) = Taken By, (c) = Cosigned By    Initials Name Provider Type    Christian Le, PT Physical Therapist          Modalities     Row Name 06/17/20 1500             Traction 07812    Traction Type  Cervical  -GR      Rx Minutes  15  -GR      Duration  Intermittent  -GR      Position  Other 90/90  -GR      Weight  18 0  -GR      Hold  45  -GR      Relax  15  -GR        User Key  (r) = Recorded By, (t) = Taken By, (c) = Cosigned By    Initials Name Provider Type    Christian Le, PT Physical Therapist        OP Exercises     Row Name 06/17/20 1500             Subjective Comments    Subjective Comments  Patient now eager to return to PT because his wife is cancer free so he doesn't have to make as many doctor visits.   -GR         Subjective Pain    Able to rate subjective pain?  yes  -GR      Pre-Treatment Pain Level  8  -GR         Total Minutes    56612 - PT Therapeutic Exercise Minutes  3  -GR          Exercise 3    Exercise Name 3  Review of current program  -MU        User Key  (r) = Recorded By, (t) = Taken By, (c) = Cosigned By    Initials Name Provider Type     Christian Durán, PT Physical Therapist                                  Time Calculation:     Start Time: 1410  Stop Time: 1430  Time Calculation (min): 20 min  Total Timed Code Minutes- PT: 3 minute(s)     Therapy Charges for Today     Code Description Service Date Service Provider Modifiers Qty    [id number]  PT-TRACTION MECHANICAL 6/17/2020 Christian Durán, PT  1                    Christian Durán, PT  6/17/2020

## 2023-11-29 NOTE — PLAN OF CARE
Problem: Safety - Adult  Goal: Free from fall injury  11/29/2023 1446 by Velvet Cummins RN  Outcome: Completed  11/29/2023 1203 by Velvet Cummins RN  Outcome: Progressing     Problem: Chronic Conditions and Co-morbidities  Goal: Patient's chronic conditions and co-morbidity symptoms are monitored and maintained or improved  11/29/2023 1446 by Velvet Cummins RN  Outcome: Completed  11/29/2023 1203 by Velvet Cummins RN  Outcome: Progressing  Flowsheets (Taken 11/29/2023 0848)  Care Plan - Patient's Chronic Conditions and Co-Morbidity Symptoms are Monitored and Maintained or Improved:   Monitor and assess patient's chronic conditions and comorbid symptoms for stability, deterioration, or improvement   Collaborate with multidisciplinary team to address chronic and comorbid conditions and prevent exacerbation or deterioration     Problem: Pain  Goal: Verbalizes/displays adequate comfort level or baseline comfort level  11/29/2023 1446 by Velvet Cummins RN  Outcome: Completed  11/29/2023 1203 by Velvet Cummins RN  Outcome: Progressing     Problem: Discharge Planning  Goal: Discharge to home or other facility with appropriate resources  11/29/2023 1446 by Velvet Cummins RN  Outcome: Completed  11/29/2023 1203 by Velvet Cummins RN  Outcome: Progressing  Flowsheets (Taken 11/29/2023 0848)  Discharge to home or other facility with appropriate resources:   Identify barriers to discharge with patient and caregiver   Arrange for needed discharge resources and transportation as appropriate

## 2023-11-29 NOTE — CARE COORDINATION
Yelena Brice met with Pt at bedside along with Pt spouse to discuss the Pt IMM (1st) letter Pt was upgraded from Observation status to inpatient status and to answer any questions/concerns they may have at that time. The Pt/Pt spouse did not have any questions/concerns at that time, waivedt he four hour wait period and Pt spouse signed the IMM (1st letter) and this was filed in the Pt soft chart. This writer provided the Pt/Pt spouse with the IMM (1st) letter for their personal records.     11/29/23 1516   IMM Letter   IMM Letter given to Patient/Family/Significant other/Guardian/POA/by: Chuy Mejia    IMM Letter date given: 11/29/23   IMM Letter time given: 5185

## 2023-11-29 NOTE — DISCHARGE SUMMARY
[facility]    [facility] Summary      Sukumar Lance  :  1955  MRN:  [id number]    Admit date:  2023 Discharge date: 2023    Discharging Physician:  Dr. Geraldine Dhaliwal Directive: Full Code    Consults: cardiology and hematology/oncology    Primary Care Physician:  Jonathan Newsome DO    Discharge Diagnoses:  Principal Problem:    SHERWOOD (dyspnea on exertion)  Active Problems:    Triple negative breast cancer [facility])  Resolved Problems:    * No resolved hospital problems. *      Portions of this note have been copied forward, however, changed to reflect the most current clinical status of this patient. Hospital Course: The patient is a 79 y.o. female with PMH of triple negative breast cancer currently receiving Neoadjunctive treatment, and DVT who presented to [facility] ED with complaints of shortness of breath. Reported ongoing dyspnea on minimal exertion for past 6 weeks. Stated she has had an ECHO with normal LVEF. Endorsed generalized weakness and fatigue, lower extremity edema, weight loss over the past few months. Denied fever, chills, nausea, or vomiting. Workup in ED revealed CTA no evidence of pulmonary emboli, large right axillary node stable, troponin 28, , D-dimer 2.8 and respiratory panel negative. Patient was admitted to hospital medicine with cardiology and oncology consultations. Cardiology recommended cardiac catheterization. Underwent Cardiac cath with findings of small area of dyskinesis at the apex, mild luminal irregularities, no significant disease identified, medical management. She has been advised to follow up with PCP, cardiology and oncology as recommended. Patient is currently in stable condition to be discharged home. Significant Diagnostic Studies:     CTA PULMONARY W CONTRAST  Result Date: 2023    Impression: 1. No evidence of pulmonary artery thrombus 2. Enlarged right axillary lymph nodes stable from 2023.

## 2023-12-04 ASSESSMENT — ENCOUNTER SYMPTOMS
GASTROINTESTINAL NEGATIVE: 1
SHORTNESS OF BREATH: 1

## 2023-12-08 DIAGNOSIS — C50.211 MALIGNANT NEOPLASM OF UPPER-INNER QUADRANT OF RIGHT FEMALE BREAST, UNSPECIFIED ESTROGEN RECEPTOR STATUS (HCC): Primary | ICD-10-CM

## 2023-12-08 DIAGNOSIS — C50.919 TRIPLE NEGATIVE BREAST CANCER (HCC): ICD-10-CM

## 2023-12-11 ENCOUNTER — TELEPHONE (OUTPATIENT)
Dept: HEMATOLOGY | Age: 68
End: 2023-12-11

## 2023-12-11 DIAGNOSIS — C50.911 INVASIVE DUCTAL CARCINOMA OF BREAST, FEMALE, RIGHT (HCC): Primary | ICD-10-CM

## 2023-12-11 DIAGNOSIS — R53.83 FATIGUE DUE TO TREATMENT: ICD-10-CM

## 2023-12-11 NOTE — TELEPHONE ENCOUNTER
Called patient and reminded patient of their appointment on 12/12/2023. A detailed voicemail with all appointment details was left for patient.

## 2023-12-12 ENCOUNTER — OFFICE VISIT (OUTPATIENT)
Dept: HEMATOLOGY | Age: 68
End: 2023-12-12
Payer: MEDICARE

## 2023-12-12 ENCOUNTER — HOSPITAL ENCOUNTER (OUTPATIENT)
Dept: INFUSION THERAPY | Age: 68
Discharge: HOME | End: 2023-12-12
Payer: MEDICARE

## 2023-12-12 ENCOUNTER — HOSPITAL ENCOUNTER (OUTPATIENT)
Dept: INFUSION THERAPY | Age: 68
Setting detail: INFUSION SERIES
Discharge: HOME | End: 2023-12-12
Payer: MEDICARE

## 2023-12-12 VITALS
HEART RATE: 102 BPM | DIASTOLIC BLOOD PRESSURE: 68 MMHG | HEIGHT: 68 IN | BODY MASS INDEX: 34.43 KG/M2 | OXYGEN SATURATION: 98 % | WEIGHT: 227.2 LBS | SYSTOLIC BLOOD PRESSURE: 110 MMHG

## 2023-12-12 VITALS
RESPIRATION RATE: 18 BRPM | TEMPERATURE: 98.1 F | HEART RATE: 89 BPM | DIASTOLIC BLOOD PRESSURE: 83 MMHG | SYSTOLIC BLOOD PRESSURE: 131 MMHG | OXYGEN SATURATION: 96 %

## 2023-12-12 DIAGNOSIS — D72.9 NEUTROPHILIC LEUKOCYTOSIS: ICD-10-CM

## 2023-12-12 DIAGNOSIS — E87.6 HYPOKALEMIA: ICD-10-CM

## 2023-12-12 DIAGNOSIS — D64.81 ANTINEOPLASTIC CHEMOTHERAPY INDUCED ANEMIA: ICD-10-CM

## 2023-12-12 DIAGNOSIS — C50.911 INVASIVE DUCTAL CARCINOMA OF BREAST, FEMALE, RIGHT (HCC): ICD-10-CM

## 2023-12-12 DIAGNOSIS — C50.911 INVASIVE DUCTAL CARCINOMA OF BREAST, FEMALE, RIGHT (HCC): Primary | ICD-10-CM

## 2023-12-12 DIAGNOSIS — Z79.01 CHRONIC ANTICOAGULATION: ICD-10-CM

## 2023-12-12 DIAGNOSIS — R53.81 PHYSICAL DECONDITIONING: ICD-10-CM

## 2023-12-12 DIAGNOSIS — Z86.718 HISTORY OF DVT (DEEP VEIN THROMBOSIS): ICD-10-CM

## 2023-12-12 DIAGNOSIS — T45.1X5A ANTINEOPLASTIC CHEMOTHERAPY INDUCED ANEMIA: ICD-10-CM

## 2023-12-12 DIAGNOSIS — R53.83 FATIGUE DUE TO TREATMENT: ICD-10-CM

## 2023-12-12 DIAGNOSIS — C50.919 TRIPLE NEGATIVE BREAST CANCER (HCC): Primary | ICD-10-CM

## 2023-12-12 DIAGNOSIS — Z51.12 ENCOUNTER FOR ANTINEOPLASTIC IMMUNOTHERAPY: ICD-10-CM

## 2023-12-12 LAB
ALBUMIN SERPL-MCNC: 3.2 G/DL (ref 3.5–5.2)
ALP SERPL-CCNC: 102 U/L (ref 35–104)
ALT SERPL-CCNC: 29 U/L (ref 9–52)
ANION GAP SERPL CALCULATED.3IONS-SCNC: 8 MMOL/L (ref 7–19)
AST SERPL-CCNC: 30 U/L (ref 14–36)
BASOPHILS # BLD: 0.08 K/UL (ref 0.01–0.08)
BASOPHILS NFR BLD: 0.6 % (ref 0.1–1.2)
BILIRUB SERPL-MCNC: 0.5 MG/DL (ref 0.2–1.3)
BUN SERPL-MCNC: 9 MG/DL (ref 7–17)
CALCIUM SERPL-MCNC: 8.8 MG/DL (ref 8.4–10.2)
CHLORIDE SERPL-SCNC: 108 MMOL/L (ref 98–111)
CO2 SERPL-SCNC: 24 MMOL/L (ref 22–29)
CORTIS AM PEAK SERPL-MCNC: 13.8 UG/DL (ref 6.2–19.4)
CREAT SERPL-MCNC: 0.5 MG/DL (ref 0.5–1)
EOSINOPHIL # BLD: 2.19 K/UL (ref 0.04–0.54)
EOSINOPHIL NFR BLD: 15.6 % (ref 0.7–7)
ERYTHROCYTE [DISTWIDTH] IN BLOOD BY AUTOMATED COUNT: 18.7 % (ref 11.7–14.4)
GLOBULIN: 3.3 G/DL
GLUCOSE SERPL-MCNC: 118 MG/DL (ref 74–106)
HBV SURFACE AG SERPL QL IA: NORMAL
HCT VFR BLD AUTO: 33.3 % (ref 34.1–44.9)
HGB BLD-MCNC: 10.4 G/DL (ref 11.2–15.7)
LYMPHOCYTES # BLD: 1.08 K/UL (ref 1.18–3.74)
LYMPHOCYTES NFR BLD: 7.7 % (ref 19.3–53.1)
MCH RBC QN AUTO: 30.4 PG (ref 25.6–32.2)
MCHC RBC AUTO-ENTMCNC: 31.2 G/DL (ref 32.3–35.5)
MCV RBC AUTO: 97.4 FL (ref 79.4–94.8)
MONOCYTES # BLD: 1 K/UL (ref 0.24–0.82)
MONOCYTES NFR BLD: 7.1 % (ref 4.7–12.5)
NEUTROPHILS # BLD: 9.67 K/UL (ref 1.56–6.13)
NEUTS SEG NFR BLD: 68.7 % (ref 34–71.1)
PLATELET # BLD AUTO: 323 K/UL (ref 182–369)
PMV BLD AUTO: 10.1 FL (ref 7.4–10.4)
POTASSIUM SERPL-SCNC: 3.2 MMOL/L (ref 3.5–5.1)
PROT SERPL-MCNC: 6.5 G/DL (ref 6.3–8.2)
RBC # BLD AUTO: 3.42 M/UL (ref 3.93–5.22)
SODIUM SERPL-SCNC: 140 MMOL/L (ref 137–145)
T4 FREE SERPL-MCNC: 1.23 NG/DL (ref 0.93–1.7)
TSH SERPL DL<=0.005 MIU/L-ACNC: 4.1 UIU/ML (ref 0.27–4.2)
WBC # BLD AUTO: 14.06 K/UL (ref 3.98–10.04)

## 2023-12-12 PROCEDURE — G8427 DOCREV CUR MEDS BY ELIG CLIN: HCPCS | Performed by: INTERNAL MEDICINE

## 2023-12-12 PROCEDURE — 96413 CHEMO IV INFUSION 1 HR: CPT

## 2023-12-12 PROCEDURE — G8400 PT W/DXA NO RESULTS DOC: HCPCS | Performed by: INTERNAL MEDICINE

## 2023-12-12 PROCEDURE — 1123F ACP DISCUSS/DSCN MKR DOCD: CPT | Performed by: INTERNAL MEDICINE

## 2023-12-12 PROCEDURE — 1111F DSCHRG MED/CURRENT MED MERGE: CPT | Performed by: INTERNAL MEDICINE

## 2023-12-12 PROCEDURE — 1036F TOBACCO NON-USER: CPT | Performed by: INTERNAL MEDICINE

## 2023-12-12 PROCEDURE — 1090F PRES/ABSN URINE INCON ASSESS: CPT | Performed by: INTERNAL MEDICINE

## 2023-12-12 PROCEDURE — 3017F COLORECTAL CA SCREEN DOC REV: CPT | Performed by: INTERNAL MEDICINE

## 2023-12-12 PROCEDURE — 6360000002 HC RX W HCPCS: Performed by: INTERNAL MEDICINE

## 2023-12-12 PROCEDURE — G8417 CALC BMI ABV UP PARAM F/U: HCPCS | Performed by: INTERNAL MEDICINE

## 2023-12-12 PROCEDURE — 80053 COMPREHEN METABOLIC PANEL: CPT

## 2023-12-12 PROCEDURE — 99214 OFFICE O/P EST MOD 30 MIN: CPT | Performed by: INTERNAL MEDICINE

## 2023-12-12 PROCEDURE — 3074F SYST BP LT 130 MM HG: CPT | Performed by: INTERNAL MEDICINE

## 2023-12-12 PROCEDURE — 85025 COMPLETE CBC W/AUTO DIFF WBC: CPT

## 2023-12-12 PROCEDURE — 2580000003 HC RX 258: Performed by: INTERNAL MEDICINE

## 2023-12-12 PROCEDURE — 3078F DIAST BP <80 MM HG: CPT | Performed by: INTERNAL MEDICINE

## 2023-12-12 PROCEDURE — G8484 FLU IMMUNIZE NO ADMIN: HCPCS | Performed by: INTERNAL MEDICINE

## 2023-12-12 PROCEDURE — 36415 COLL VENOUS BLD VENIPUNCTURE: CPT

## 2023-12-12 RX ORDER — SODIUM CHLORIDE 9 MG/ML
INJECTION, SOLUTION INTRAVENOUS CONTINUOUS
OUTPATIENT
Start: 2023-12-12

## 2023-12-12 RX ORDER — ACETAMINOPHEN 325 MG/1
650 TABLET ORAL
OUTPATIENT
Start: 2023-12-12

## 2023-12-12 RX ORDER — HEPARIN SODIUM (PORCINE) LOCK FLUSH IV SOLN 100 UNIT/ML 100 UNIT/ML
500 SOLUTION INTRAVENOUS PRN
Status: CANCELLED | OUTPATIENT
Start: 2023-12-12

## 2023-12-12 RX ORDER — MEPERIDINE HYDROCHLORIDE 50 MG/ML
12.5 INJECTION INTRAMUSCULAR; INTRAVENOUS; SUBCUTANEOUS PRN
OUTPATIENT
Start: 2023-12-12

## 2023-12-12 RX ORDER — DIPHENHYDRAMINE HYDROCHLORIDE 50 MG/ML
50 INJECTION INTRAMUSCULAR; INTRAVENOUS
OUTPATIENT
Start: 2023-12-12

## 2023-12-12 RX ORDER — EPINEPHRINE 1 MG/ML
0.3 INJECTION, SOLUTION, CONCENTRATE INTRAVENOUS PRN
OUTPATIENT
Start: 2023-12-12

## 2023-12-12 RX ORDER — ONDANSETRON 2 MG/ML
8 INJECTION INTRAMUSCULAR; INTRAVENOUS
OUTPATIENT
Start: 2023-12-12

## 2023-12-12 RX ORDER — HEPARIN 100 UNIT/ML
500 SYRINGE INTRAVENOUS PRN
Status: DISCONTINUED | OUTPATIENT
Start: 2023-12-12 | End: 2023-12-13 | Stop reason: HOSPADM

## 2023-12-12 RX ORDER — FAMOTIDINE 10 MG/ML
20 INJECTION, SOLUTION INTRAVENOUS
OUTPATIENT
Start: 2023-12-12

## 2023-12-12 RX ORDER — SODIUM CHLORIDE 0.9 % (FLUSH) 0.9 %
5-40 SYRINGE (ML) INJECTION PRN
Status: CANCELLED | OUTPATIENT
Start: 2023-12-12

## 2023-12-12 RX ORDER — SODIUM CHLORIDE 9 MG/ML
5-250 INJECTION, SOLUTION INTRAVENOUS PRN
OUTPATIENT
Start: 2023-12-12

## 2023-12-12 RX ORDER — SODIUM CHLORIDE 0.9 % (FLUSH) 0.9 %
5-40 SYRINGE (ML) INJECTION PRN
Status: DISCONTINUED | OUTPATIENT
Start: 2023-12-12 | End: 2023-12-13 | Stop reason: HOSPADM

## 2023-12-12 RX ORDER — SODIUM CHLORIDE 9 MG/ML
5-250 INJECTION, SOLUTION INTRAVENOUS PRN
Status: CANCELLED | OUTPATIENT
Start: 2023-12-12

## 2023-12-12 RX ORDER — SODIUM CHLORIDE 9 MG/ML
5-250 INJECTION, SOLUTION INTRAVENOUS PRN
Status: DISCONTINUED | OUTPATIENT
Start: 2023-12-12 | End: 2023-12-13 | Stop reason: HOSPADM

## 2023-12-12 RX ORDER — ALBUTEROL SULFATE 90 UG/1
4 AEROSOL, METERED RESPIRATORY (INHALATION) PRN
OUTPATIENT
Start: 2023-12-12

## 2023-12-12 RX ADMIN — HEPARIN 500 UNITS: 100 SYRINGE at 12:00

## 2023-12-12 RX ADMIN — SODIUM CHLORIDE 400 MG: 9 INJECTION, SOLUTION INTRAVENOUS at 11:22

## 2023-12-12 RX ADMIN — SODIUM CHLORIDE 20 ML/HR: 9 INJECTION, SOLUTION INTRAVENOUS at 11:21

## 2023-12-12 NOTE — PROGRESS NOTES
Pt arrived to [facility] from [address] [place] [address]. Port accessed and labs drawn prior to arrival. Brisk blood return noted from port. Keytruda 400mg administered. Pt tolerated well.      Electronically signed by Denny Engle RN on 12/12/2023 at 12:06 PM

## 2023-12-14 LAB
HBV CORE AB SERPL QL IA: NEGATIVE
HBV SURFACE AB SERPL IA-ACNC: <3.1 IU/L

## 2023-12-27 ENCOUNTER — HOSPITAL ENCOUNTER (OUTPATIENT)
Dept: WOMENS IMAGING | Age: 68
Discharge: HOME | End: 2023-12-27
Payer: MEDICARE

## 2023-12-27 DIAGNOSIS — C50.919 TRIPLE NEGATIVE BREAST CANCER (HCC): ICD-10-CM

## 2023-12-27 DIAGNOSIS — C50.211 MALIGNANT NEOPLASM OF UPPER-INNER QUADRANT OF RIGHT FEMALE BREAST, UNSPECIFIED ESTROGEN RECEPTOR STATUS (HCC): ICD-10-CM

## 2023-12-27 PROCEDURE — 76642 ULTRASOUND BREAST LIMITED: CPT

## 2023-12-29 ENCOUNTER — HOSPITAL ENCOUNTER (OUTPATIENT)
Dept: OCCUPATIONAL THERAPY | Age: 68
Setting detail: THERAPIES SERIES
Discharge: HOME | End: 2023-12-29
Payer: MEDICARE

## 2023-12-29 PROCEDURE — 93702 BIS XTRACELL FLUID ANALYSIS: CPT

## 2023-12-29 PROCEDURE — 97165 OT EVAL LOW COMPLEX 30 MIN: CPT

## 2023-12-29 NOTE — PROGRESS NOTES
normal levels, that is a sign that lymphedema is developing and a referral is made to physical therapy for further evaluation and early compression treatment. Lymphedema assessment with the SOZO L-Dex score is recommended to be done every 3 months for the first 3 years and then every 6 months for years 4 and 5 followed by annually afterwards. GOALS     Patient Goal(s): Patient goals : No goals identified. Evaluation only. TREATMENT PLAN       REQUIRES OT FOLLOW-UP: Yes  Additional Comments: Evaluation only. 3 month f/u scheduled. Eval Complexity:   Decision Making: Low Complexity  Exam: L-dex, BUE circumference measurements      Therapy Time  Individual Time In: 4033  Individual Time Out: 0920  Minutes: 26  Timed Code Treatment Minutes: 26 Minutes     Therapist Signature: Leonid Delgado OT  Electronically signed by TINY Trejo OTR/L, CLT on 12/29/2023 at 9:35 AM Date: 32/29/7059     I certify that the above Occupational Therapy Services are being furnished while the patient is under my care. I agree with the treatment plan and certify that this therapy is necessary. Physician's Signature:  ___________________________   Date:_______                                                                   Jac Briones MD        Physician Comments: _______________________________________________    Please sign and return to [facility] THERAP[facility]. Please fax to the location listed below.  [facility] for this referral!    [address]  [facility] OCCUPATIONAL THERAPY  [address] [place] [address]  Dept: [id number]: [phone]       POC NOTE

## 2024-01-02 ENCOUNTER — ANESTHESIA EVENT (OUTPATIENT)
Dept: OPERATING ROOM | Age: 69
End: 2024-01-02
Payer: MEDICARE

## 2024-01-02 ENCOUNTER — HOSPITAL ENCOUNTER (OUTPATIENT)
Dept: ULTRASOUND IMAGING | Age: 69
Discharge: HOME | End: 2024-01-02
Attending: SURGERY
Payer: MEDICARE

## 2024-01-02 ENCOUNTER — HOSPITAL ENCOUNTER (OUTPATIENT)
Dept: NUCLEAR MEDICINE | Age: 69
Discharge: HOME | End: 2024-01-04
Attending: SURGERY
Payer: MEDICARE

## 2024-01-02 ENCOUNTER — HOSPITAL ENCOUNTER (OUTPATIENT)
Age: 69
Setting detail: OUTPATIENT SURGERY
Discharge: HOME | End: 2024-01-02
Attending: SURGERY | Admitting: SURGERY
Payer: MEDICARE

## 2024-01-02 ENCOUNTER — ANESTHESIA (OUTPATIENT)
Dept: OPERATING ROOM | Age: 69
End: 2024-01-02
Payer: MEDICARE

## 2024-01-02 VITALS
BODY MASS INDEX: 34.84 KG/M2 | HEIGHT: 67 IN | RESPIRATION RATE: 20 BRPM | DIASTOLIC BLOOD PRESSURE: 88 MMHG | WEIGHT: 222 LBS | TEMPERATURE: 98.1 F | SYSTOLIC BLOOD PRESSURE: 151 MMHG | HEART RATE: 69 BPM | OXYGEN SATURATION: 98 %

## 2024-01-02 DIAGNOSIS — C50.211 MALIGNANT NEOPLASM OF UPPER-INNER QUADRANT OF RIGHT FEMALE BREAST, UNSPECIFIED ESTROGEN RECEPTOR STATUS (HCC): ICD-10-CM

## 2024-01-02 DIAGNOSIS — C50.919 TRIPLE NEGATIVE BREAST CANCER (HCC): Primary | ICD-10-CM

## 2024-01-02 DIAGNOSIS — C50.919 TRIPLE NEGATIVE BREAST CANCER (HCC): ICD-10-CM

## 2024-01-02 DIAGNOSIS — C50.911 MALIGNANT NEOPLASM OF RIGHT FEMALE BREAST, UNSPECIFIED ESTROGEN RECEPTOR STATUS, UNSPECIFIED SITE OF BREAST (HCC): ICD-10-CM

## 2024-01-02 PROCEDURE — 6360000002 HC RX W HCPCS: Performed by: ANESTHESIOLOGY

## 2024-01-02 PROCEDURE — 19285 PERQ DEV BREAST 1ST US IMAG: CPT

## 2024-01-02 PROCEDURE — 2720000010 HC SURG SUPPLY STERILE: Performed by: SURGERY

## 2024-01-02 PROCEDURE — A4217 STERILE WATER/SALINE, 500 ML: HCPCS | Performed by: SURGERY

## 2024-01-02 PROCEDURE — 7100000000 HC PACU RECOVERY - FIRST 15 MIN: Performed by: SURGERY

## 2024-01-02 PROCEDURE — 76998 US GUIDE INTRAOP: CPT | Performed by: SURGERY

## 2024-01-02 PROCEDURE — A9520 TC99 TILMANOCEPT DIAG 0.5MCI: HCPCS | Performed by: SURGERY

## 2024-01-02 PROCEDURE — 6360000002 HC RX W HCPCS

## 2024-01-02 PROCEDURE — 6370000000 HC RX 637 (ALT 250 FOR IP): Performed by: SURGERY

## 2024-01-02 PROCEDURE — 2500000003 HC RX 250 WO HCPCS

## 2024-01-02 PROCEDURE — 88305 TISSUE EXAM BY PATHOLOGIST: CPT

## 2024-01-02 PROCEDURE — 7100000011 HC PHASE II RECOVERY - ADDTL 15 MIN: Performed by: SURGERY

## 2024-01-02 PROCEDURE — 88307 TISSUE EXAM BY PATHOLOGIST: CPT

## 2024-01-02 PROCEDURE — 7100000010 HC PHASE II RECOVERY - FIRST 15 MIN: Performed by: SURGERY

## 2024-01-02 PROCEDURE — 19303 MAST SIMPLE COMPLETE: CPT | Performed by: SURGERY

## 2024-01-02 PROCEDURE — 6370000000 HC RX 637 (ALT 250 FOR IP): Performed by: ANESTHESIOLOGY

## 2024-01-02 PROCEDURE — 38792 RA TRACER ID OF SENTINL NODE: CPT

## 2024-01-02 PROCEDURE — 3600000004 HC SURGERY LEVEL 4 BASE: Performed by: SURGERY

## 2024-01-02 PROCEDURE — 3600000014 HC SURGERY LEVEL 4 ADDTL 15MIN: Performed by: SURGERY

## 2024-01-02 PROCEDURE — 3700000000 HC ANESTHESIA ATTENDED CARE: Performed by: SURGERY

## 2024-01-02 PROCEDURE — 6360000002 HC RX W HCPCS: Performed by: SURGERY

## 2024-01-02 PROCEDURE — 2709999900 HC NON-CHARGEABLE SUPPLY: Performed by: SURGERY

## 2024-01-02 PROCEDURE — 38525 BIOPSY/REMOVAL LYMPH NODES: CPT | Performed by: SURGERY

## 2024-01-02 PROCEDURE — 88329 PATH CONSLTJ DRG SURG: CPT

## 2024-01-02 PROCEDURE — 2580000003 HC RX 258: Performed by: ANESTHESIOLOGY

## 2024-01-02 PROCEDURE — 7100000001 HC PACU RECOVERY - ADDTL 15 MIN: Performed by: SURGERY

## 2024-01-02 PROCEDURE — 3700000001 HC ADD 15 MINUTES (ANESTHESIA): Performed by: SURGERY

## 2024-01-02 PROCEDURE — 2580000003 HC RX 258: Performed by: SURGERY

## 2024-01-02 PROCEDURE — 2500000003 HC RX 250 WO HCPCS: Performed by: SURGERY

## 2024-01-02 PROCEDURE — 3430000000 HC RX DIAGNOSTIC RADIOPHARMACEUTICAL: Performed by: SURGERY

## 2024-01-02 DEVICE — CLIP INT M L GRN TI TRNSVRS GRV CHEVRON SHP W/ PRECIS TIP: Type: IMPLANTABLE DEVICE | Site: BREAST | Status: FUNCTIONAL

## 2024-01-02 RX ORDER — METOCLOPRAMIDE HYDROCHLORIDE 5 MG/ML
10 INJECTION INTRAMUSCULAR; INTRAVENOUS
Status: DISCONTINUED | OUTPATIENT
Start: 2024-01-02 | End: 2024-01-02 | Stop reason: HOSPADM

## 2024-01-02 RX ORDER — LIDOCAINE HYDROCHLORIDE 10 MG/ML
1 INJECTION, SOLUTION EPIDURAL; INFILTRATION; INTRACAUDAL; PERINEURAL
Status: DISCONTINUED | OUTPATIENT
Start: 2024-01-02 | End: 2024-01-02 | Stop reason: HOSPADM

## 2024-01-02 RX ORDER — SODIUM CHLORIDE 0.9 % (FLUSH) 0.9 %
5-40 SYRINGE (ML) INJECTION PRN
Status: DISCONTINUED | OUTPATIENT
Start: 2024-01-02 | End: 2024-01-02 | Stop reason: HOSPADM

## 2024-01-02 RX ORDER — GABAPENTIN 300 MG/1
300 CAPSULE ORAL ONCE
Status: COMPLETED | OUTPATIENT
Start: 2024-01-02 | End: 2024-01-02

## 2024-01-02 RX ORDER — OXYCODONE HYDROCHLORIDE AND ACETAMINOPHEN 5; 325 MG/1; MG/1
1 TABLET ORAL EVERY 6 HOURS PRN
Qty: 12 TABLET | Refills: 0 | Status: SHIPPED | OUTPATIENT
Start: 2024-01-02 | End: 2025-01-01

## 2024-01-02 RX ORDER — LABETALOL HYDROCHLORIDE 5 MG/ML
10 INJECTION, SOLUTION INTRAVENOUS
Status: DISCONTINUED | OUTPATIENT
Start: 2024-01-02 | End: 2024-01-02 | Stop reason: HOSPADM

## 2024-01-02 RX ORDER — FENTANYL CITRATE 50 UG/ML
INJECTION, SOLUTION INTRAMUSCULAR; INTRAVENOUS PRN
Status: DISCONTINUED | OUTPATIENT
Start: 2024-01-02 | End: 2024-01-02 | Stop reason: SDUPTHER

## 2024-01-02 RX ORDER — ACETAMINOPHEN 325 MG/1
975 TABLET ORAL ONCE
Status: COMPLETED | OUTPATIENT
Start: 2024-01-02 | End: 2024-01-02

## 2024-01-02 RX ORDER — PROPOFOL 10 MG/ML
INJECTION, EMULSION INTRAVENOUS PRN
Status: DISCONTINUED | OUTPATIENT
Start: 2024-01-02 | End: 2024-01-02 | Stop reason: SDUPTHER

## 2024-01-02 RX ORDER — SODIUM CHLORIDE, SODIUM LACTATE, POTASSIUM CHLORIDE, CALCIUM CHLORIDE 600; 310; 30; 20 MG/100ML; MG/100ML; MG/100ML; MG/100ML
INJECTION, SOLUTION INTRAVENOUS CONTINUOUS
Status: DISCONTINUED | OUTPATIENT
Start: 2024-01-02 | End: 2024-01-02 | Stop reason: HOSPADM

## 2024-01-02 RX ORDER — ISOSULFAN BLUE 50 MG/5ML
INJECTION, SOLUTION SUBCUTANEOUS PRN
Status: DISCONTINUED | OUTPATIENT
Start: 2024-01-02 | End: 2024-01-02 | Stop reason: HOSPADM

## 2024-01-02 RX ORDER — LIDOCAINE HYDROCHLORIDE 10 MG/ML
INJECTION, SOLUTION EPIDURAL; INFILTRATION; INTRACAUDAL; PERINEURAL PRN
Status: DISCONTINUED | OUTPATIENT
Start: 2024-01-02 | End: 2024-01-02 | Stop reason: SDUPTHER

## 2024-01-02 RX ORDER — FAMOTIDINE 20 MG/1
20 TABLET, FILM COATED ORAL ONCE
Status: COMPLETED | OUTPATIENT
Start: 2024-01-02 | End: 2024-01-02

## 2024-01-02 RX ORDER — SODIUM CHLORIDE 0.9 % (FLUSH) 0.9 %
5-40 SYRINGE (ML) INJECTION EVERY 12 HOURS SCHEDULED
Status: DISCONTINUED | OUTPATIENT
Start: 2024-01-02 | End: 2024-01-02 | Stop reason: HOSPADM

## 2024-01-02 RX ORDER — IPRATROPIUM BROMIDE AND ALBUTEROL SULFATE 2.5; .5 MG/3ML; MG/3ML
1 SOLUTION RESPIRATORY (INHALATION)
Status: DISCONTINUED | OUTPATIENT
Start: 2024-01-02 | End: 2024-01-02 | Stop reason: HOSPADM

## 2024-01-02 RX ORDER — CELECOXIB 200 MG/1
200 CAPSULE ORAL ONCE
Status: COMPLETED | OUTPATIENT
Start: 2024-01-02 | End: 2024-01-02

## 2024-01-02 RX ORDER — CEFAZOLIN SODIUM 1 G/3ML
INJECTION, POWDER, FOR SOLUTION INTRAMUSCULAR; INTRAVENOUS PRN
Status: DISCONTINUED | OUTPATIENT
Start: 2024-01-02 | End: 2024-01-02 | Stop reason: SDUPTHER

## 2024-01-02 RX ORDER — SODIUM CHLORIDE 9 MG/ML
INJECTION, SOLUTION INTRAVENOUS PRN
Status: DISCONTINUED | OUTPATIENT
Start: 2024-01-02 | End: 2024-01-02 | Stop reason: HOSPADM

## 2024-01-02 RX ORDER — ONDANSETRON 2 MG/ML
INJECTION INTRAMUSCULAR; INTRAVENOUS PRN
Status: DISCONTINUED | OUTPATIENT
Start: 2024-01-02 | End: 2024-01-02 | Stop reason: SDUPTHER

## 2024-01-02 RX ORDER — HYDROMORPHONE HYDROCHLORIDE 1 MG/ML
0.5 INJECTION, SOLUTION INTRAMUSCULAR; INTRAVENOUS; SUBCUTANEOUS EVERY 5 MIN PRN
Status: DISCONTINUED | OUTPATIENT
Start: 2024-01-02 | End: 2024-01-02 | Stop reason: HOSPADM

## 2024-01-02 RX ORDER — DIPHENHYDRAMINE HYDROCHLORIDE 50 MG/ML
INJECTION INTRAMUSCULAR; INTRAVENOUS PRN
Status: DISCONTINUED | OUTPATIENT
Start: 2024-01-02 | End: 2024-01-02 | Stop reason: SDUPTHER

## 2024-01-02 RX ORDER — HYDROMORPHONE HYDROCHLORIDE 1 MG/ML
0.25 INJECTION, SOLUTION INTRAMUSCULAR; INTRAVENOUS; SUBCUTANEOUS EVERY 5 MIN PRN
Status: DISCONTINUED | OUTPATIENT
Start: 2024-01-02 | End: 2024-01-02 | Stop reason: HOSPADM

## 2024-01-02 RX ORDER — HYDRALAZINE HYDROCHLORIDE 20 MG/ML
10 INJECTION INTRAMUSCULAR; INTRAVENOUS
Status: DISCONTINUED | OUTPATIENT
Start: 2024-01-02 | End: 2024-01-02 | Stop reason: HOSPADM

## 2024-01-02 RX ORDER — DIPHENHYDRAMINE HYDROCHLORIDE 50 MG/ML
12.5 INJECTION INTRAMUSCULAR; INTRAVENOUS
Status: DISCONTINUED | OUTPATIENT
Start: 2024-01-02 | End: 2024-01-02 | Stop reason: HOSPADM

## 2024-01-02 RX ADMIN — PROPOFOL 100 MG: 10 INJECTION, EMULSION INTRAVENOUS at 13:57

## 2024-01-02 RX ADMIN — FENTANYL CITRATE 50 MCG: 0.05 INJECTION, SOLUTION INTRAMUSCULAR; INTRAVENOUS at 13:57

## 2024-01-02 RX ADMIN — FAMOTIDINE 20 MG: 20 TABLET ORAL at 11:26

## 2024-01-02 RX ADMIN — SODIUM CHLORIDE, POTASSIUM CHLORIDE, SODIUM LACTATE AND CALCIUM CHLORIDE: 600; 310; 30; 20 INJECTION, SOLUTION INTRAVENOUS at 11:25

## 2024-01-02 RX ADMIN — FENTANYL CITRATE 50 MCG: 0.05 INJECTION, SOLUTION INTRAMUSCULAR; INTRAVENOUS at 14:03

## 2024-01-02 RX ADMIN — TILMANOCEPT 500 MICRO CURIE: KIT at 14:09

## 2024-01-02 RX ADMIN — HYDROMORPHONE HYDROCHLORIDE 1 MG: 1 INJECTION, SOLUTION INTRAMUSCULAR; INTRAVENOUS; SUBCUTANEOUS at 15:30

## 2024-01-02 RX ADMIN — CEFAZOLIN 2 G: 1 INJECTION, POWDER, FOR SOLUTION INTRAMUSCULAR; INTRAVENOUS at 14:05

## 2024-01-02 RX ADMIN — CELECOXIB 200 MG: 200 CAPSULE ORAL at 11:26

## 2024-01-02 RX ADMIN — ACETAMINOPHEN 975 MG: 325 TABLET ORAL at 11:26

## 2024-01-02 RX ADMIN — PROPOFOL 140 MCG/KG/MIN: 10 INJECTION, EMULSION INTRAVENOUS at 14:00

## 2024-01-02 RX ADMIN — FENTANYL CITRATE 50 MCG: 0.05 INJECTION, SOLUTION INTRAMUSCULAR; INTRAVENOUS at 14:33

## 2024-01-02 RX ADMIN — GABAPENTIN 300 MG: 300 CAPSULE ORAL at 11:26

## 2024-01-02 RX ADMIN — LIDOCAINE HYDROCHLORIDE 5 ML: 10 INJECTION, SOLUTION EPIDURAL; INFILTRATION; INTRACAUDAL; PERINEURAL at 13:57

## 2024-01-02 RX ADMIN — ONDANSETRON 4 MG: 2 INJECTION INTRAMUSCULAR; INTRAVENOUS at 16:25

## 2024-01-02 RX ADMIN — HYDROMORPHONE HYDROCHLORIDE 0.5 MG: 1 INJECTION, SOLUTION INTRAMUSCULAR; INTRAVENOUS; SUBCUTANEOUS at 18:33

## 2024-01-02 RX ADMIN — FENTANYL CITRATE 50 MCG: 0.05 INJECTION, SOLUTION INTRAMUSCULAR; INTRAVENOUS at 14:36

## 2024-01-02 RX ADMIN — PROPOFOL 50 MG: 10 INJECTION, EMULSION INTRAVENOUS at 13:58

## 2024-01-02 RX ADMIN — DIPHENHYDRAMINE HYDROCHLORIDE 12.5 MG: 50 INJECTION, SOLUTION INTRAMUSCULAR; INTRAVENOUS at 14:00

## 2024-01-02 RX ADMIN — HYDROCORTISONE SODIUM SUCCINATE 100 MG: 250 INJECTION, POWDER, FOR SOLUTION INTRAMUSCULAR; INTRAVENOUS at 14:00

## 2024-01-02 ASSESSMENT — PAIN SCALES - GENERAL: PAINLEVEL_OUTOF10: 7

## 2024-01-02 ASSESSMENT — PAIN DESCRIPTION - LOCATION: LOCATION: BREAST

## 2024-01-02 ASSESSMENT — LIFESTYLE VARIABLES: SMOKING_STATUS: 0

## 2024-01-02 ASSESSMENT — PAIN DESCRIPTION - DESCRIPTORS
DESCRIPTORS: DISCOMFORT
DESCRIPTORS: ACHING

## 2024-01-02 ASSESSMENT — PAIN - FUNCTIONAL ASSESSMENT: PAIN_FUNCTIONAL_ASSESSMENT: ADULT NONVERBAL PAIN SCALE (NPVS)

## 2024-01-02 ASSESSMENT — PAIN DESCRIPTION - ORIENTATION: ORIENTATION: RIGHT

## 2024-01-02 NOTE — ANESTHESIA PRE PROCEDURE
Mallampati: II  TM distance: >3 FB   Neck ROM: full  Mouth opening: > = 3 FB   Dental: normal exam         Pulmonary: breath sounds clear to auscultation      (-) COPD, asthma, sleep apnea and not a current smoker                          ROS comment: + covid ( clear rhinnorhea, dry cough), denies fevers, productive cough   Cardiovascular:    (+) hypertension:      ECG reviewed  Rhythm: regular  Rate: normal                 ROS comment:  Structurally normal mitral valve.   Mitral annular calcification is present.   Trivial mitral regurgitation is present.   Aortic valve appears to be tricuspid.   Structurally normal aortic valve.   Tricuspid valve was not well visualized.   Trivial tricuspid regurgitation.   Left ventricular ejection fraction is visually estimated at 55-60%.   No evidence of left ventricular mass or thrombus noted.   Normal diastolic function.   No regional wall motion abnormalities.          Neuro/Psych:      (-) seizures and CVA           GI/Hepatic/Renal:   (+) hiatal hernia     (-) GERD, liver disease and no renal disease       Endo/Other:    (+) blood dyscrasia::., malignancy/cancer (breast cancer).    (-) diabetes mellitus, hypothyroidism, hyperthyroidism               Abdominal:             Vascular:   + DVT (on eliquis, had DVT with first round of chemo).       Other Findings:         Anesthesia Plan      general     ASA 3     (GA with LMA needed per Jasper  Pre op pepcid)  Induction: intravenous.    MIPS: Postoperative opioids intended and Prophylactic antiemetics administered.  Anesthetic plan and risks discussed with patient.    Use of blood products discussed with patient whom consented to blood products.      Attending anesthesiologist reviewed and agrees with Preprocedure content              Renate Hurtado MD   1/2/2024

## 2024-01-02 NOTE — H&P
Ms. Mcdonald presents today following imaging to discuss surgery.     She has completed neoadjuvant chemotherapy for her triple negative right breast cancer         She is recently status post ultrasound guided breast biopsy  on the right which revealed a 2.5 cm at 9:00 and 1.8 cm at 2:00 high grade invasive ductal carcinoma. 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  She is not febrile and tested positive for COVID a few days ago.    Her last chemotherapy was over 2 months ago and she has already been delayed once for other medical issues.  This is now a time sensitive case that has a significant degree of urgency.  I think it is appropriate that we proceed with her bilateral mastectomy.  I have discussed this at length with anesthesia and the patient and with nursing administration.     MRI-11/22/2023  Right breast:  At the site of biopsy-proven malignancy at 9 o'clock 10 cm from the nipple there is interval decrease in size of a faintly peripherally enhancing mass marked centrally with a biopsy marker clip measuring on the current exam 1.3 x 1.6 x 1.4   cm (craniocaudal X AP X transverse, series 7 image 244, series 402 image 65).  This measured 1.6 cm on ultrasound from 09/27/2023 and 5.0 CM on prior MRI.  Associated post biopsy hematoma has significantly decreased.     At the site of biopsy-proven malignancy at the 2 o'clock position marked medially with a biopsy marker clip there is redemonstration of a heterogeneous conglomerate of rim enhancing masses measuring 3.5 x 3.9 x 2.2 cm, extending to the skin surface   medially (AP X transverse X  craniocaudal, series 6 image 92, series 7 image 176).  This previously measured 3.3 x 3.1 x 4.9 cm on the prior MRI and has overall decreased in size compatible with interval treatment response.  Some of this area may be   residual post biopsy hematoma, however associated kinetics are present.     Previously described satellite masses on MRI located anterior to the biopsy-proven malignancy are not seen on the current exam, compatible with complete treatment response to this area.     There is interval decrease in size of multiple previously seen abnormal morphology enlarged level I axillary lymph nodes compatible with interval treatment response.  The biopsy-proven malignancy marked with a biopsy marker clip is associated with areas   of heterogeneity suggestive of post-biopsy hematoma.  The solid enhancing component measures 1.2 x 1.8 cm, previously measuring 2.5 x 2.0 cm (series 6, image 137).  At least three remaining lymph nodes appear to demonstrate an enlarged, abnormal   morphology although decreased in size compared to the prior exam.     Left breast:   There are no enhancing masses or areas of nonmass enhancement. There is no axillary or internal mammary lymphadenopathy.     Other:  Port catheter is present in the left chest wall.     IMPRESSION:  1.  Interval decrease in size of a faintly peripherally enhancing mass at the 9 o'clock position 10 cm from the nipple at the site of biopsy-proven malignancy in the right breast, currently measuring 1.6 cm.  2.  Interval decrease in size of a conglomerate of rim enhancing masses in the right breast at the 2 o'clock position with residual enhancement measuring up to 3.9 cm, involving the skin surface medially.  3.  Previously seen enhancing satellite masses located anterior to the malignant mass at the 2 o'clock position are not seen on the current exam, compatible with treatment response.  4.  Interval decrease in size of persistently enlarged, abnormal

## 2024-01-02 NOTE — BRIEF OP NOTE
Brief Postoperative Note      DATE OF PROCEDURE: 1/2/2024     SURGEON: Hadley Hutchinson MD    PREOPERATIVE DIAGNOSIS:  Malignant neoplasm of right female breast, unspecified estrogen receptor status, unspecified site of breast (HCC) [C50.911]    POSTOPERATIVE DIAGNOSIS: Same     OPERATION: Procedure(s):  BILATERAL SIMPLE MASTECTOMY WITH RIGHT SENTINEL NODE, BILATERAL PEC BLOCK, ULTRASOUND GUIDED NEEDLE LOCALIZATION X 2    ANESTHESIA: Monitor Anesthesia Care    ESTIMATED BLOOD LOSS: Minimal    COMPLICATIONS: None.     SPECIMENS:   ID Type Source Tests Collected by Time Destination   A : left breast tissue Tissue Breast SURGICAL PATHOLOGY Hadley Hutchinson MD 1/2/2024 1442    B : right breast tissue Tissue Breast SURGICAL PATHOLOGY Hadley Hutchinson MD 1/2/2024 1442    C : right sentinel node Tissue Breast SURGICAL PATHOLOGY Hadley Hutchinson MD 1/2/2024 1443    D : left breast advancement flap Tissue Breast SURGICAL PATHOLOGY Hadley Hutchinson MD 1/2/2024 1521    E : right breast advancement flap Tissue Breast SURGICAL PATHOLOGY Hadley Hutchinson MD 1/2/2024 1538        DRAINS: 4 JPs    The patient tolerated the procedure well.    Electronically signed by Hadley Hutchinson MD  on 1/2/2024 at 4:41 PM

## 2024-01-02 NOTE — ANESTHESIA POSTPROCEDURE EVALUATION
Department of Anesthesiology  Postprocedure Note    Patient: Vivienne Mcdonald  MRN: [id number]  YOB: 1955  Date of evaluation: 1/2/2024    Procedure Summary     Date: 01/02/24 Room / Location: [address]    Anesthesia Start: 1352 Anesthesia Stop: 1752    Procedure: BILATERAL SIMPLE MASTECTOMY WITH RIGHT SENTINEL NODE, BILATERAL PEC BLOCK, ULTRASOUND GUIDED NEEDLE LOCALIZATION X 2 (Bilateral) Diagnosis:       Malignant neoplasm of right female breast, unspecified estrogen receptor status, unspecified site of breast (HCC)      (Malignant neoplasm of right female breast, unspecified estrogen receptor status, unspecified site of breast (HCC) [C50.911])    Surgeons: Hadley Hutchinson MD Responsible Provider: Regine Florez APRN - CRNA    Anesthesia Type: general ASA Status: 3          Anesthesia Type: No value filed.    Anamaria Phase I:      Anamaria Phase II:      Anesthesia Post Evaluation    Patient location during evaluation: PACU  Patient participation: waiting for patient participation  Level of consciousness: sleepy but conscious  Pain score: 0  Airway patency: patent  Nausea & Vomiting: no nausea and no vomiting  Cardiovascular status: hemodynamically stable and blood pressure returned to baseline  Respiratory status: acceptable and face mask  Hydration status: stable  Comments: BP (!) 157/85   Pulse 77   Temp 98.7 °F (37.1 °C)   Resp 18   Ht 1.702 m (5' 7\")   Wt 100.7 kg (222 lb)   SpO2 90%   BMI 34.77 kg/m²     Pain management: adequate        No notable events documented.

## 2024-01-03 NOTE — OP NOTE
Dictated    Operation performed with curative intent: Yes  Tracer(s) used to identify sentinel nodes in the upfront surgery (nonneoadjuvant) setting (select all that apply) : Not Applicable  Tracer(s) used to identify sentinel nodes in the neoadjuvant setting (select all that apply): Radioactive tracer  All nodes (colored or noncolored) present at the end of a dye-filled lymphatic channel were removed: Not Applicable  All significantly radioactive nodes were removed: Yes  All palpably suspicious nodes were removed: Yes  Biopsy-proven positive nodes marked with clips prior to chemotherapy were identified and removed: Yes        
Pathology to find out.  She also  had a node in the right axilla that we identified and inserted a 5 cm  Kopans wire into that.  We then prepped the skin with chlorhexidine and  proceeded with bilateral pectoral block.  We used a solution of 100 mL  of 0.2% ropivacaine, 100 mL of normal saline, and 16 mg of Decadron.  We  used ultrasound guidance to inject the intercostals on either side of  the sternum.  This was 6 levels on each side for a total of 12 levels.   I then injected the inframammary crease bilaterally and the  infraclavicular area bilaterally.  We then went laterally and used  ultrasound again and injected the interpectoral space bilaterally and  then the lateral intercostals at the level of the serratus, again 6  levels and again with ultrasound guidance.  Once this was done, we  re-prepped, re-draped.  We began with the left side.  We elevated our  superior and inferior flaps using the Bovie electrocautery, the facelift  scissors, and the Enseal device.  We swept the breast off the chest  wall, it was fairly complicated cleaning up the lateral aspect of the  dog ear, but we were able to get this looking quite nice.  We then  placed a single sheet of Surgicel, two large round Dread-Talbert drains  and then closed the subcutaneous with running 2-0 PDS and the skin with  running subcuticular 3-0 nylon.  She has terrible reaction to Vicryl.   We then clipped the end of the stitches with hemoclips.  We then turned  to the right side, in a similar fashion, used the Bovie electrocautery,  the facelift scissors and the Enseal device and then completed our  mastectomy, then entered the axilla.  We also injected 3 mL of isosulfan  blue in the right biceps crease to facilitate axillary reverse mapping.   We also used ultrasound to again identify the clip immediately before  and then injected 0.5 mL of methylene blue into the area of interest.   We then entered the axilla and there was actually a large palpable

## 2024-01-03 NOTE — DISCHARGE INSTRUCTIONS
1. You may remove the dressing or Bandaid after 2 days. Leave steri strips on for 1-2 weeks.  2. Wearing a supportive bra, such as a sports bra can significantly reduce postoperative pain and swelling.  3. You may bathe or shower as desired 48 hours after surgery. Always just pat the incision dry, never rub.  4. Call office if a small area of the incision becomes red and inflamed or for a temperature of 101.1 or greater at [phone].  5. Firmness in the area of the incision will last 1-2 months.  6. You may drive 2 days after surgery.  7. If you become constipated you may use one of the following over-the-counter products, Fleets enema, 1 bottle Magnesium Citrate,Ducolax tablets or suppositories, If no results after using one or more of these call the office.  8. You may ordinarily return to work 2-3 days after surgery.  9.Call the office with any questions during regular hours mon-fri 9:00-4:00 . In case of after hours emergencies the answering service will take your call.  10. Follow up 1 month, call to schedule if it was not scheduled by hospital.   11. Call 1 hour before appointment to see if office is running on time.  12. Call the office for the pathology report in 3 days.    Mastectomy: What to Expect at Home  Your Recovery     Right after the surgery, you will probably feel weak, and you may feel sore for 2 to 3 days. You may feel pulling or stretching near or under your arm. You may also have itching, tingling, and throbbing in the area. This will get better in a few days.  You will likely have several drains near your incision. These help with healing. The drains will be removed when the fluid buildup slows. Drains are usually removed in the first few weeks after surgery.  You may be able to go back to your normal routine or return to work in several weeks, but it may take longer. How long it takes you to recover will depend on the type of surgery you had. It also depends on whether you had breast

## 2024-01-03 NOTE — PROGRESS NOTES
Patient almost dressed at this time. Family assisting her. Moderate amount of old blood drying noted to dressing around LEIF on the right side. Dressing removed and new gauze applied.

## 2024-01-04 NOTE — PROGRESS NOTES
CLINICAL PHARMACY NOTE: MEDS TO BEDS    Total # of Prescriptions Filled: 2   The following medications were delivered to the patient:  Oxycodone-APAP  Narcan     Additional Documentation:     Delivered Rx's to [facility]. Paid with flores.

## 2024-01-09 NOTE — PROGRESS NOTES
HISTORY OF PRESENT ILLNESS:    Ms. Mcdonald presents today for a post op following bilateral simple mastectomy with right sentinel lymph node biopsy on 1/2/2024    She underwent neoadjuvant chemotherapy for her triple negative right breast cancer with AC and CarboTaxol.  She is still getting Keytruda.    She had a pathologic complete response.    She is originally status post ultrasound guided breast biopsy  on the right which revealed a 2.5 cm at 9:00 and 1.8 cm at 2:00 high grade invasive ductal carcinoma. ER negative [place] negative Her2 negative Ki67 84%    Mammaprint High Risk Basal Type    Her genetic testing demonstrated a pathogenic variant in the T p53 gene  This is associated with Li-Fraumeni syndrome    She has a fairly severe inflammatory response to absorbable Vicryl suture and we will want to not use this on her if at all possible    MRI-11/22/2023  Right breast:  At the site of biopsy-proven malignancy at 9 o'clock 10 cm from the nipple there is interval decrease in size of a faintly peripherally enhancing mass marked centrally with a biopsy marker clip measuring on the current exam 1.3 x 1.6 x 1.4   cm (craniocaudal X AP X transverse, series 7 image 244, series 402 image 65).  This measured 1.6 cm on ultrasound from 09/27/2023 and 5.0 CM on prior MRI.  Associated post biopsy hematoma has significantly decreased.     At the site of biopsy-proven malignancy at the 2 o'clock position marked medially with a biopsy marker clip there is redemonstration of a heterogeneous conglomerate of rim enhancing masses measuring 3.5 x 3.9 x 2.2 cm, extending to the skin surface   medially (AP X transverse X craniocaudal, series 6 image 92, series 7 image 176).  This previously measured 3.3 x 3.1 x 4.9 cm on the prior MRI and has overall decreased in size compatible with interval treatment response.  Some of this area may be   residual post biopsy hematoma, however associated kinetics are present.     Previously

## 2024-01-10 ENCOUNTER — OFFICE VISIT (OUTPATIENT)
Dept: SURGERY | Age: 69
End: 2024-01-10

## 2024-01-10 VITALS — BODY MASS INDEX: 34.53 KG/M2 | WEIGHT: 220 LBS | HEIGHT: 67 IN | HEART RATE: 76 BPM

## 2024-01-10 DIAGNOSIS — C50.919 TRIPLE NEGATIVE BREAST CANCER (HCC): Primary | ICD-10-CM

## 2024-01-10 DIAGNOSIS — Z90.13 S/P BILATERAL MASTECTOMY: ICD-10-CM

## 2024-01-10 PROCEDURE — 99024 POSTOP FOLLOW-UP VISIT: CPT | Performed by: SURGERY

## 2024-01-12 ENCOUNTER — TELEPHONE (OUTPATIENT)
Dept: SURGERY | Age: 69
End: 2024-01-12

## 2024-01-12 PROBLEM — Z90.13 S/P BILATERAL MASTECTOMY: Status: ACTIVE | Noted: 2024-01-12

## 2024-01-12 RX ORDER — OXYCODONE HYDROCHLORIDE AND ACETAMINOPHEN 5; 325 MG/1; MG/1
1 TABLET ORAL EVERY 6 HOURS PRN
Qty: 12 TABLET | Refills: 0 | Status: SHIPPED | OUTPATIENT
Start: 2024-01-12 | End: 2024-01-15

## 2024-01-12 NOTE — TELEPHONE ENCOUNTER
Pt was seen in the office on 01/10 and states Dr Hutchinson was suppose to call in pain medication    Cc: [facility]

## 2024-01-12 NOTE — TELEPHONE ENCOUNTER
I called patient to let her know I would get with Dr. Hutchinson and let her know when he sends to pharmacy

## 2024-01-17 ENCOUNTER — OFFICE VISIT (OUTPATIENT)
Dept: SURGERY | Age: 69
End: 2024-01-17

## 2024-01-17 VITALS
BODY MASS INDEX: 34.28 KG/M2 | DIASTOLIC BLOOD PRESSURE: 70 MMHG | HEIGHT: 67 IN | WEIGHT: 218.4 LBS | SYSTOLIC BLOOD PRESSURE: 120 MMHG

## 2024-01-17 DIAGNOSIS — Z90.13 S/P BILATERAL MASTECTOMY: Primary | ICD-10-CM

## 2024-01-17 PROCEDURE — 99024 POSTOP FOLLOW-UP VISIT: CPT | Performed by: PHYSICIAN ASSISTANT

## 2024-01-17 NOTE — PROGRESS NOTES
Subjective  Vivienne Mcdonald comes today s/p  bilateral simple mastectomy with right sentinel lymph node biopsy on 1/2/2024     She underwent neoadjuvant chemotherapy for her triple negative right breast cancer with AC and CarboTaxol.  She is still getting Keytruda.     She had a pathologic complete response.     She is originally status post ultrasound guided breast biopsy  on the right which revealed a 2.5 cm at 9:00 and 1.8 cm at 2:00 high grade invasive ductal carcinoma. ER negative ID negative Her2 negative Ki67 84%     Mammaprint High Risk Basal Type     Her genetic testing demonstrated a pathogenic variant in the T p53 gene  This is associated with Li-Fraumeni syndrome        Objective  Patient Active Problem List    Diagnosis Date Noted    S/P bilateral mastectomy with right SNB -1/2/2024 01/12/2024    Dyspnea on exertion 11/29/2023    SHERWOOD (dyspnea on exertion) 11/27/2023    Poor venous access 11/14/2023    Li-Fraumeni syndrome TP53 pathogenic mutation 07/27/2023    Invasive ductal carcinoma of breast, female, right (HCC) 05/24/2023    Triple negative breast cancer (HCC) 05/19/2023    Schatzki's ring 08/28/2019    Hiatal hernia 06/28/2019    Volume depletion 06/26/2019    Leukocytosis 06/25/2019    GI bleed 06/25/2019    Gastroesophageal reflux disease with esophagitis 06/25/2019    Colitis 06/25/2019    Acute neck pain 06/25/2019    Acute bilateral low back pain 06/25/2019    Obesity, unspecified obesity severity, unspecified obesity type 09/06/2018    HTN (hypertension), benign 09/06/2018       Current Outpatient Medications   Medication Sig Dispense Refill    oxyCODONE-acetaminophen (PERCOCET) 5-325 MG per tablet Take 1 tablet by mouth every 6 hours as needed for Pain. Max Daily Amount: 4 tablets 12 tablet 0    ELIQUIS 5 MG TABS tablet TAKE 1 TABLET BY MOUTH 2 TIMES DAILY 60 tablet 5    ondansetron (ZOFRAN) 4 MG tablet Take 1 tablet by mouth every 8 hours as needed for Nausea or Vomiting 30 tablet 5

## 2024-01-19 NOTE — PROGRESS NOTES
MEDICAL ONCOLOGY PROGRESS NOTE    Pt Name: Vivienne Mcdonald  MRN: [id number]  YOB: 1955  Date of evaluation: 1/23/2024    HISTORY OF PRESENT ILLNESS:    Reason for MD visit: Toxicity assessment and disease management/treatment compliance..  The patient is a very pleasant 68 years old female who has been diagnosed with stage IIIc triple negative breast cancer.  She is status post completion neoadjuvant chemotherapy with dose dense AC and Taxol.  She did not stop due to severe deconditioning.  She is currently receiving pembrolizumab adjuvantly.  She is status post bilateral mastectomy with Dr. Hadley Hutchinson.  She is here to discuss results of pathology and further recommendations.  She has improved significantly from her prior severe weakness.  She is healing well from her mastectomy.  She was found to have a DVT of the right lower extremity is currently on anticoagulation with Eliquis.  She is compliant with treatment.  She denies any bleeding.      Diagnosis  Invasive ductal carcinoma, right breast 9:00 & 2:00, May 2023  Grade 3  ER 0.2%. HI 0, HER-2 0/negative, Ki67 84%  MammaPrint: Basal type  Right axillary/retropectoral lymphadenopathy  [id number], stage IIIC->ypT0N0  [facility] [address] Genetic Testing TP53 Positive.  Li-Fraumeni syndrome.  DVT right lower extremity    Treatment Summary  6/12/23 Initiated  Pembrolizumab, Carboplatin Taxol weekly followed by DD AC every 2 weeks X4 cycles with Keytruda  8/21/23 Discontinued Carboplatin and dose reduction to DD AC Adriamycin 50 mg/M2 and cyclophosphamide 500 mg/M2  11/14/2023-discontinue dose dense AC, cycle #4.  She will continue Keytruda alone.  1/2/24 Bilateral simple mastectomy with negative right SLNB by Dr. Hadley Hutchinson/[facility]  Anticipate adjuvant radiation therapy    Cancer History  Vivienne Mcdonald was first seen by me on 5/24/2023. She was referred by Dr. Hadley Hutchinson for a diagnosis of triple negative breast cancer. She felt a mass in the right

## 2024-01-23 ENCOUNTER — OFFICE VISIT (OUTPATIENT)
Dept: HEMATOLOGY | Age: 69
End: 2024-01-23
Payer: MEDICARE

## 2024-01-23 ENCOUNTER — HOSPITAL ENCOUNTER (OUTPATIENT)
Dept: INFUSION THERAPY | Age: 69
Discharge: HOME | End: 2024-01-23
Payer: MEDICARE

## 2024-01-23 ENCOUNTER — APPOINTMENT (OUTPATIENT)
Dept: INFUSION THERAPY | Age: 69
End: 2024-01-23
Payer: MEDICARE

## 2024-01-23 VITALS
BODY MASS INDEX: 34.69 KG/M2 | TEMPERATURE: 97.7 F | WEIGHT: 221 LBS | DIASTOLIC BLOOD PRESSURE: 94 MMHG | SYSTOLIC BLOOD PRESSURE: 139 MMHG | OXYGEN SATURATION: 96 % | RESPIRATION RATE: 18 BRPM | HEIGHT: 67 IN | HEART RATE: 86 BPM

## 2024-01-23 DIAGNOSIS — C50.919 TRIPLE NEGATIVE BREAST CANCER (HCC): ICD-10-CM

## 2024-01-23 DIAGNOSIS — C50.911 INVASIVE DUCTAL CARCINOMA OF BREAST, FEMALE, RIGHT (HCC): Primary | ICD-10-CM

## 2024-01-23 DIAGNOSIS — C50.911 INVASIVE DUCTAL CARCINOMA OF BREAST, FEMALE, RIGHT (HCC): ICD-10-CM

## 2024-01-23 DIAGNOSIS — Z51.12 ENCOUNTER FOR ANTINEOPLASTIC IMMUNOTHERAPY: ICD-10-CM

## 2024-01-23 DIAGNOSIS — C50.919 TRIPLE NEGATIVE BREAST CANCER (HCC): Primary | ICD-10-CM

## 2024-01-23 DIAGNOSIS — R53.0 NEOPLASTIC MALIGNANT RELATED FATIGUE: ICD-10-CM

## 2024-01-23 DIAGNOSIS — Z86.718 HISTORY OF DVT (DEEP VEIN THROMBOSIS): ICD-10-CM

## 2024-01-23 DIAGNOSIS — Z79.01 CHRONIC ANTICOAGULATION: ICD-10-CM

## 2024-01-23 LAB
ALBUMIN SERPL-MCNC: 3.6 G/DL (ref 3.5–5.2)
ALP SERPL-CCNC: 102 U/L (ref 35–104)
ALT SERPL-CCNC: 28 U/L (ref 9–52)
ANION GAP SERPL CALCULATED.3IONS-SCNC: 13 MMOL/L (ref 7–19)
AST SERPL-CCNC: 38 U/L (ref 14–36)
BASOPHILS # BLD: 0.06 K/UL (ref 0.01–0.08)
BASOPHILS NFR BLD: 0.7 % (ref 0.1–1.2)
BILIRUB SERPL-MCNC: 0.4 MG/DL (ref 0.2–1.3)
BUN SERPL-MCNC: 7 MG/DL (ref 7–17)
CALCIUM SERPL-MCNC: 8.4 MG/DL (ref 8.4–10.2)
CHLORIDE SERPL-SCNC: 101 MMOL/L (ref 98–111)
CO2 SERPL-SCNC: 25 MMOL/L (ref 22–29)
CORTIS AM PEAK SERPL-MCNC: 13.2 UG/DL (ref 6.2–19.4)
CREAT SERPL-MCNC: <0.5 MG/DL (ref 0.5–1)
EOSINOPHIL # BLD: 0.72 K/UL (ref 0.04–0.54)
EOSINOPHIL NFR BLD: 8 % (ref 0.7–7)
ERYTHROCYTE [DISTWIDTH] IN BLOOD BY AUTOMATED COUNT: 13.7 % (ref 11.7–14.4)
GLOBULIN: 3 G/DL
GLUCOSE SERPL-MCNC: 113 MG/DL (ref 74–106)
HCT VFR BLD AUTO: 33.5 % (ref 34.1–44.9)
HGB BLD-MCNC: 10.7 G/DL (ref 11.2–15.7)
LYMPHOCYTES # BLD: 0.97 K/UL (ref 1.18–3.74)
LYMPHOCYTES NFR BLD: 10.7 % (ref 19.3–53.1)
MCH RBC QN AUTO: 31 PG (ref 25.6–32.2)
MCHC RBC AUTO-ENTMCNC: 31.9 G/DL (ref 32.3–35.5)
MCV RBC AUTO: 97.1 FL (ref 79.4–94.8)
MONOCYTES # BLD: 0.84 K/UL (ref 0.24–0.82)
MONOCYTES NFR BLD: 9.3 % (ref 4.7–12.5)
NEUTROPHILS # BLD: 6.43 K/UL (ref 1.56–6.13)
NEUTS SEG NFR BLD: 71.1 % (ref 34–71.1)
PLATELET # BLD AUTO: 342 K/UL (ref 182–369)
PMV BLD AUTO: 9.8 FL (ref 7.4–10.4)
POTASSIUM SERPL-SCNC: 3.6 MMOL/L (ref 3.5–5.1)
PROT SERPL-MCNC: 6.6 G/DL (ref 6.3–8.2)
RBC # BLD AUTO: 3.45 M/UL (ref 3.93–5.22)
SODIUM SERPL-SCNC: 139 MMOL/L (ref 137–145)
T4 FREE SERPL-MCNC: 1.11 NG/DL (ref 0.93–1.7)
TSH SERPL DL<=0.005 MIU/L-ACNC: 5.07 UIU/ML (ref 0.27–4.2)
WBC # BLD AUTO: 9.04 K/UL (ref 3.98–10.04)

## 2024-01-23 PROCEDURE — 80053 COMPREHEN METABOLIC PANEL: CPT

## 2024-01-23 PROCEDURE — G8484 FLU IMMUNIZE NO ADMIN: HCPCS | Performed by: INTERNAL MEDICINE

## 2024-01-23 PROCEDURE — G8427 DOCREV CUR MEDS BY ELIG CLIN: HCPCS | Performed by: INTERNAL MEDICINE

## 2024-01-23 PROCEDURE — 6360000002 HC RX W HCPCS: Performed by: INTERNAL MEDICINE

## 2024-01-23 PROCEDURE — G8400 PT W/DXA NO RESULTS DOC: HCPCS | Performed by: INTERNAL MEDICINE

## 2024-01-23 PROCEDURE — 1123F ACP DISCUSS/DSCN MKR DOCD: CPT | Performed by: INTERNAL MEDICINE

## 2024-01-23 PROCEDURE — 36415 COLL VENOUS BLD VENIPUNCTURE: CPT

## 2024-01-23 PROCEDURE — 96413 CHEMO IV INFUSION 1 HR: CPT

## 2024-01-23 PROCEDURE — 3080F DIAST BP >= 90 MM HG: CPT | Performed by: INTERNAL MEDICINE

## 2024-01-23 PROCEDURE — 2580000003 HC RX 258: Performed by: INTERNAL MEDICINE

## 2024-01-23 PROCEDURE — 85025 COMPLETE CBC W/AUTO DIFF WBC: CPT

## 2024-01-23 PROCEDURE — 3075F SYST BP GE 130 - 139MM HG: CPT | Performed by: INTERNAL MEDICINE

## 2024-01-23 PROCEDURE — 3017F COLORECTAL CA SCREEN DOC REV: CPT | Performed by: INTERNAL MEDICINE

## 2024-01-23 PROCEDURE — 1036F TOBACCO NON-USER: CPT | Performed by: INTERNAL MEDICINE

## 2024-01-23 PROCEDURE — 1090F PRES/ABSN URINE INCON ASSESS: CPT | Performed by: INTERNAL MEDICINE

## 2024-01-23 PROCEDURE — 99214 OFFICE O/P EST MOD 30 MIN: CPT | Performed by: INTERNAL MEDICINE

## 2024-01-23 PROCEDURE — G8417 CALC BMI ABV UP PARAM F/U: HCPCS | Performed by: INTERNAL MEDICINE

## 2024-01-23 RX ORDER — FAMOTIDINE 10 MG/ML
20 INJECTION, SOLUTION INTRAVENOUS
Status: CANCELLED | OUTPATIENT
Start: 2024-01-23

## 2024-01-23 RX ORDER — SODIUM CHLORIDE 9 MG/ML
INJECTION, SOLUTION INTRAVENOUS CONTINUOUS
Status: CANCELLED | OUTPATIENT
Start: 2024-01-23

## 2024-01-23 RX ORDER — DIPHENHYDRAMINE HYDROCHLORIDE 50 MG/ML
50 INJECTION INTRAMUSCULAR; INTRAVENOUS
Status: CANCELLED | OUTPATIENT
Start: 2024-01-23

## 2024-01-23 RX ORDER — SODIUM CHLORIDE 9 MG/ML
5-250 INJECTION, SOLUTION INTRAVENOUS PRN
Status: CANCELLED | OUTPATIENT
Start: 2024-01-23

## 2024-01-23 RX ORDER — ALBUTEROL SULFATE 90 UG/1
4 AEROSOL, METERED RESPIRATORY (INHALATION) PRN
Status: CANCELLED | OUTPATIENT
Start: 2024-01-23

## 2024-01-23 RX ORDER — ACETAMINOPHEN 325 MG/1
650 TABLET ORAL
Status: CANCELLED | OUTPATIENT
Start: 2024-01-23

## 2024-01-23 RX ORDER — ONDANSETRON 2 MG/ML
8 INJECTION INTRAMUSCULAR; INTRAVENOUS
Status: CANCELLED | OUTPATIENT
Start: 2024-01-23

## 2024-01-23 RX ORDER — EPINEPHRINE 1 MG/ML
0.3 INJECTION, SOLUTION, CONCENTRATE INTRAVENOUS PRN
Status: CANCELLED | OUTPATIENT
Start: 2024-01-23

## 2024-01-23 RX ORDER — HEPARIN SODIUM (PORCINE) LOCK FLUSH IV SOLN 100 UNIT/ML 100 UNIT/ML
500 SOLUTION INTRAVENOUS PRN
Status: CANCELLED | OUTPATIENT
Start: 2024-01-23

## 2024-01-23 RX ORDER — MEPERIDINE HYDROCHLORIDE 50 MG/ML
12.5 INJECTION INTRAMUSCULAR; INTRAVENOUS; SUBCUTANEOUS PRN
Status: CANCELLED | OUTPATIENT
Start: 2024-01-23

## 2024-01-23 RX ORDER — SODIUM CHLORIDE 0.9 % (FLUSH) 0.9 %
5-40 SYRINGE (ML) INJECTION PRN
Status: CANCELLED | OUTPATIENT
Start: 2024-01-23

## 2024-01-23 RX ORDER — HEPARIN 100 UNIT/ML
500 SYRINGE INTRAVENOUS PRN
Status: DISCONTINUED | OUTPATIENT
Start: 2024-01-23 | End: 2024-01-24 | Stop reason: HOSPADM

## 2024-01-23 RX ORDER — SODIUM CHLORIDE 0.9 % (FLUSH) 0.9 %
5-40 SYRINGE (ML) INJECTION PRN
Status: DISCONTINUED | OUTPATIENT
Start: 2024-01-23 | End: 2024-01-24 | Stop reason: HOSPADM

## 2024-01-23 RX ADMIN — SODIUM CHLORIDE, PRESERVATIVE FREE 10 ML: 5 INJECTION INTRAVENOUS at 09:33

## 2024-01-23 RX ADMIN — HEPARIN 500 UNITS: 100 SYRINGE at 09:33

## 2024-01-23 RX ADMIN — SODIUM CHLORIDE 400 MG: 9 INJECTION, SOLUTION INTRAVENOUS at 09:03

## 2024-01-24 ENCOUNTER — OFFICE VISIT (OUTPATIENT)
Dept: SURGERY | Age: 69
End: 2024-01-24

## 2024-01-24 VITALS
DIASTOLIC BLOOD PRESSURE: 78 MMHG | SYSTOLIC BLOOD PRESSURE: 130 MMHG | BODY MASS INDEX: 34.65 KG/M2 | WEIGHT: 220.8 LBS | HEIGHT: 67 IN

## 2024-01-24 DIAGNOSIS — Z90.13 S/P BILATERAL MASTECTOMY: Primary | ICD-10-CM

## 2024-01-24 PROCEDURE — 99024 POSTOP FOLLOW-UP VISIT: CPT | Performed by: PHYSICIAN ASSISTANT

## 2024-01-31 ENCOUNTER — OFFICE VISIT (OUTPATIENT)
Dept: SURGERY | Age: 69
End: 2024-01-31

## 2024-01-31 VITALS
DIASTOLIC BLOOD PRESSURE: 80 MMHG | SYSTOLIC BLOOD PRESSURE: 130 MMHG | BODY MASS INDEX: 34.53 KG/M2 | WEIGHT: 220 LBS | HEIGHT: 67 IN

## 2024-02-05 ENCOUNTER — TELEPHONE (OUTPATIENT)
Dept: GASTROENTEROLOGY | Facility: CLINIC | Age: 69
End: 2024-02-05

## 2024-02-05 NOTE — TELEPHONE ENCOUNTER
Provider: DR PITTS    Caller: CANID    Relationship to Patient: SELF    Phone Number: [phone]     Reason for Call: PATIENT CALLED IN, SHE RECEIVED THE RECALL LETTER FOR HER COLONOSCOPY. SHE SAID THAT SHE JUST WENT THROUGH A DOUBLE MASTECTOMY AND GETTING READY TO START RADIATION FOR BREAST CANCER. SHE WANTS TO PUSH THIS OUT 6 MONTHS OR A LITTLE LONGER, THE CHEMO HAS BEEN HARD ON HER AND SHE CAN'T DO THIS AS WELL AT THE MOMENT SHE SAID.

## 2024-02-07 ENCOUNTER — OFFICE VISIT (OUTPATIENT)
Dept: SURGERY | Age: 69
End: 2024-02-07

## 2024-02-07 VITALS
WEIGHT: 220 LBS | DIASTOLIC BLOOD PRESSURE: 74 MMHG | HEIGHT: 67 IN | BODY MASS INDEX: 34.53 KG/M2 | SYSTOLIC BLOOD PRESSURE: 120 MMHG

## 2024-02-07 DIAGNOSIS — Z90.13 S/P BILATERAL MASTECTOMY: Primary | ICD-10-CM

## 2024-02-07 DIAGNOSIS — C50.919 TRIPLE NEGATIVE BREAST CANCER (HCC): ICD-10-CM

## 2024-02-07 PROCEDURE — 99024 POSTOP FOLLOW-UP VISIT: CPT | Performed by: PHYSICIAN ASSISTANT

## 2024-02-09 PROBLEM — C50.911 INVASIVE DUCTAL CARCINOMA OF BREAST, FEMALE, RIGHT: Status: ACTIVE | Noted: 2023-05-24

## 2024-02-13 PROBLEM — Z98.890 S/P LYMPH NODE BIOPSY: Status: ACTIVE | Noted: 2024-02-13

## 2024-02-13 PROBLEM — Z90.13 S/P BILATERAL MASTECTOMY: Status: ACTIVE | Noted: 2024-02-13

## 2024-02-14 ENCOUNTER — HOSPITAL ENCOUNTER (OUTPATIENT)
Dept: RADIATION ONCOLOGY | Facility: HOSPITAL | Age: 69
Setting detail: RADIATION/ONCOLOGY SERIES
End: 2024-02-14
Payer: MEDICARE

## 2024-02-15 ENCOUNTER — APPOINTMENT (OUTPATIENT)
Dept: RADIATION ONCOLOGY | Facility: HOSPITAL | Age: 69
End: 2024-02-15
Payer: MEDICARE

## 2024-02-15 ENCOUNTER — CONSULT (OUTPATIENT)
Dept: RADIATION ONCOLOGY | Facility: HOSPITAL | Age: 69
End: 2024-02-15
Payer: MEDICARE

## 2024-02-15 VITALS
DIASTOLIC BLOOD PRESSURE: 86 MMHG | HEIGHT: 67 IN | WEIGHT: 215.6 LBS | SYSTOLIC BLOOD PRESSURE: 150 MMHG | BODY MASS INDEX: 33.84 KG/M2 | HEART RATE: 84 BPM | OXYGEN SATURATION: 99 %

## 2024-02-15 DIAGNOSIS — Z98.890 S/P LYMPH NODE BIOPSY: ICD-10-CM

## 2024-02-15 DIAGNOSIS — Z87.891 FORMER SMOKER: ICD-10-CM

## 2024-02-15 DIAGNOSIS — C50.911 INVASIVE DUCTAL CARCINOMA OF BREAST, FEMALE, RIGHT: Primary | ICD-10-CM

## 2024-02-15 DIAGNOSIS — Z90.13 S/P BILATERAL MASTECTOMY: ICD-10-CM

## 2024-02-15 PROCEDURE — 77334 RADIATION TREATMENT AID(S): CPT | Performed by: RADIOLOGY

## 2024-02-15 PROCEDURE — 77290 THER RAD SIMULAJ FIELD CPLX: CPT | Performed by: RADIOLOGY

## 2024-02-15 PROCEDURE — G0463 HOSPITAL OUTPT CLINIC VISIT: HCPCS | Performed by: RADIOLOGY

## 2024-02-15 RX ORDER — HYDROCODONE BITARTRATE AND ACETAMINOPHEN 7.5; 325 MG/1; MG/1
1 TABLET ORAL 2 TIMES DAILY
COMMUNITY
Start: 2024-01-17

## 2024-02-21 NOTE — PROGRESS NOTES
Patient Assistance    Met with: Vivienne Hayesator Type: Infusion  Documentation Type: New Patient  Contact Type: Telephone  Navigation Status: New Patient  Status of Patient Insurance Coverage: Patient has active coverage (Patient only has Medicare A & B)     Additional assistance information given to patient: Will give a [place]    Additional notes: Called and introduced myself to Margi Valentine and went over her health benefits. She is concerned with the cost of treatment. She only has Medicare A & B so I am going to try and help with free drug when available and [facility] [facility] [place] as well as other PAP when it becomes available. She gave me permission to speak to her daughter Norma Goldman who is a pharmacists at [facility] ([facility]). I did give her my contact information for any future questions.     Drug Name: [facility]  Form of PAP Assistance: Free Drug - Pending Universal Safety Interventions

## 2024-02-23 PROCEDURE — 77334 RADIATION TREATMENT AID(S): CPT | Performed by: RADIOLOGY

## 2024-02-23 PROCEDURE — 77295 3-D RADIOTHERAPY PLAN: CPT | Performed by: RADIOLOGY

## 2024-02-23 PROCEDURE — 77300 RADIATION THERAPY DOSE PLAN: CPT | Performed by: RADIOLOGY

## 2024-02-27 ENCOUNTER — HOSPITAL ENCOUNTER (OUTPATIENT)
Dept: RADIATION ONCOLOGY | Facility: HOSPITAL | Age: 69
Setting detail: RADIATION/ONCOLOGY SERIES
Discharge: HOME | End: 2024-02-27
Payer: MEDICARE

## 2024-02-27 ENCOUNTER — DOCUMENTATION (OUTPATIENT)
Dept: RADIATION ONCOLOGY | Facility: HOSPITAL | Age: 69
End: 2024-02-27
Payer: MEDICARE

## 2024-02-27 LAB
RAD ONC ARIA COURSE ID: NORMAL
RAD ONC ARIA COURSE LAST TREATMENT DATE: NORMAL
RAD ONC ARIA COURSE START DATE: NORMAL
RAD ONC ARIA COURSE TREATMENT ELAPSED DAYS: 0
RAD ONC ARIA FIRST TREATMENT DATE: NORMAL
RAD ONC ARIA PLAN FRACTIONS TREATED TO DATE: 1
RAD ONC ARIA PLAN ID: NORMAL
RAD ONC ARIA PLAN PRESCRIBED DOSE PER FRACTION: 0.58 GY
RAD ONC ARIA PLAN PRESCRIBED DOSE PER FRACTION: 1.8 GY
RAD ONC ARIA PLAN PRESCRIBED DOSE PER FRACTION: 1.8 GY
RAD ONC ARIA PLAN PRIMARY REFERENCE POINT: NORMAL
RAD ONC ARIA PLAN TOTAL FRACTIONS PRESCRIBED: 14
RAD ONC ARIA PLAN TOTAL FRACTIONS PRESCRIBED: 28
RAD ONC ARIA PLAN TOTAL FRACTIONS PRESCRIBED: 28
RAD ONC ARIA PLAN TOTAL PRESCRIBED DOSE: 1625.6 CGY
RAD ONC ARIA PLAN TOTAL PRESCRIBED DOSE: 2520 CGY
RAD ONC ARIA PLAN TOTAL PRESCRIBED DOSE: 5040 CGY
RAD ONC ARIA REFERENCE POINT DOSAGE GIVEN TO DATE: 1.8 GY
RAD ONC ARIA REFERENCE POINT ID: NORMAL
RAD ONC ARIA REFERENCE POINT SESSION DOSAGE GIVEN: 1.8 GY

## 2024-02-27 PROCEDURE — 77332 RADIATION TREATMENT AID(S): CPT | Performed by: RADIOLOGY

## 2024-02-27 PROCEDURE — 77412 RADIATION TX DELIVERY LVL 3: CPT | Performed by: RADIOLOGY

## 2024-02-27 PROCEDURE — 77280 THER RAD SIMULAJ FIELD SMPL: CPT | Performed by: RADIOLOGY

## 2024-02-27 NOTE — PROGRESS NOTES
SATYA met with Mrs. Leung who is here to start radiation treatment for invasive ductal carcinoma of breast. SATYA introduced self and explained role and source of support. She is 68 years old and lives with her spouse. Her support system includes her daughter and . Currently, she does not have transportation or financial concerns. Mrs. Leung states she was nervous to start radiation but feels at ease now that her first treatment is done. She does not take any medication for anxiety/depression, and she does not see a counselor. SATYA encouraged her to call if assistance is needed in the future.

## 2024-02-28 ENCOUNTER — HOSPITAL ENCOUNTER (OUTPATIENT)
Dept: RADIATION ONCOLOGY | Facility: HOSPITAL | Age: 69
Setting detail: RADIATION/ONCOLOGY SERIES
Discharge: HOME | End: 2024-02-28
Payer: MEDICARE

## 2024-02-28 LAB
RAD ONC ARIA COURSE ID: NORMAL
RAD ONC ARIA COURSE LAST TREATMENT DATE: NORMAL
RAD ONC ARIA COURSE START DATE: NORMAL
RAD ONC ARIA COURSE TREATMENT ELAPSED DAYS: 1
RAD ONC ARIA FIRST TREATMENT DATE: NORMAL
RAD ONC ARIA PLAN FRACTIONS TREATED TO DATE: 1
RAD ONC ARIA PLAN FRACTIONS TREATED TO DATE: 2
RAD ONC ARIA PLAN FRACTIONS TREATED TO DATE: 2
RAD ONC ARIA PLAN ID: NORMAL
RAD ONC ARIA PLAN PRESCRIBED DOSE PER FRACTION: 0.58 GY
RAD ONC ARIA PLAN PRESCRIBED DOSE PER FRACTION: 1.8 GY
RAD ONC ARIA PLAN PRESCRIBED DOSE PER FRACTION: 1.8 GY
RAD ONC ARIA PLAN PRIMARY REFERENCE POINT: NORMAL
RAD ONC ARIA PLAN TOTAL FRACTIONS PRESCRIBED: 14
RAD ONC ARIA PLAN TOTAL FRACTIONS PRESCRIBED: 28
RAD ONC ARIA PLAN TOTAL FRACTIONS PRESCRIBED: 28
RAD ONC ARIA PLAN TOTAL PRESCRIBED DOSE: 1625.6 CGY
RAD ONC ARIA PLAN TOTAL PRESCRIBED DOSE: 2520 CGY
RAD ONC ARIA PLAN TOTAL PRESCRIBED DOSE: 5040 CGY
RAD ONC ARIA REFERENCE POINT DOSAGE GIVEN TO DATE: 3.6 GY
RAD ONC ARIA REFERENCE POINT ID: NORMAL
RAD ONC ARIA REFERENCE POINT SESSION DOSAGE GIVEN: 1.8 GY

## 2024-02-28 PROCEDURE — 77412 RADIATION TX DELIVERY LVL 3: CPT | Performed by: RADIOLOGY

## 2024-02-28 PROCEDURE — 77417 THER RADIOLOGY PORT IMAGE(S): CPT | Performed by: RADIOLOGY

## 2024-02-29 ENCOUNTER — HOSPITAL ENCOUNTER (OUTPATIENT)
Dept: RADIATION ONCOLOGY | Facility: HOSPITAL | Age: 69
Setting detail: RADIATION/ONCOLOGY SERIES
Discharge: HOME | End: 2024-02-29
Payer: MEDICARE

## 2024-02-29 LAB
RAD ONC ARIA COURSE ID: NORMAL
RAD ONC ARIA COURSE LAST TREATMENT DATE: NORMAL
RAD ONC ARIA COURSE START DATE: NORMAL
RAD ONC ARIA COURSE TREATMENT ELAPSED DAYS: 2
RAD ONC ARIA FIRST TREATMENT DATE: NORMAL
RAD ONC ARIA PLAN FRACTIONS TREATED TO DATE: 2
RAD ONC ARIA PLAN FRACTIONS TREATED TO DATE: 3
RAD ONC ARIA PLAN FRACTIONS TREATED TO DATE: 3
RAD ONC ARIA PLAN ID: NORMAL
RAD ONC ARIA PLAN PRESCRIBED DOSE PER FRACTION: 0.58 GY
RAD ONC ARIA PLAN PRESCRIBED DOSE PER FRACTION: 1.8 GY
RAD ONC ARIA PLAN PRESCRIBED DOSE PER FRACTION: 1.8 GY
RAD ONC ARIA PLAN PRIMARY REFERENCE POINT: NORMAL
RAD ONC ARIA PLAN TOTAL FRACTIONS PRESCRIBED: 14
RAD ONC ARIA PLAN TOTAL FRACTIONS PRESCRIBED: 28
RAD ONC ARIA PLAN TOTAL FRACTIONS PRESCRIBED: 28
RAD ONC ARIA PLAN TOTAL PRESCRIBED DOSE: 1625.6 CGY
RAD ONC ARIA PLAN TOTAL PRESCRIBED DOSE: 2520 CGY
RAD ONC ARIA PLAN TOTAL PRESCRIBED DOSE: 5040 CGY
RAD ONC ARIA REFERENCE POINT DOSAGE GIVEN TO DATE: 5.4 GY
RAD ONC ARIA REFERENCE POINT ID: NORMAL
RAD ONC ARIA REFERENCE POINT SESSION DOSAGE GIVEN: 1.8 GY

## 2024-02-29 PROCEDURE — 77334 RADIATION TREATMENT AID(S): CPT | Performed by: RADIOLOGY

## 2024-02-29 PROCEDURE — 77290 THER RAD SIMULAJ FIELD CPLX: CPT | Performed by: RADIOLOGY

## 2024-02-29 PROCEDURE — 77412 RADIATION TX DELIVERY LVL 3: CPT | Performed by: RADIOLOGY

## 2024-02-29 PROCEDURE — 77321 SPECIAL TELETX PORT PLAN: CPT | Performed by: RADIOLOGY

## 2024-03-01 ENCOUNTER — HOSPITAL ENCOUNTER (OUTPATIENT)
Dept: RADIATION ONCOLOGY | Facility: HOSPITAL | Age: 69
Setting detail: RADIATION/ONCOLOGY SERIES
End: 2024-03-01
Payer: MEDICARE

## 2024-03-01 ENCOUNTER — HOSPITAL ENCOUNTER (OUTPATIENT)
Dept: RADIATION ONCOLOGY | Facility: HOSPITAL | Age: 69
Setting detail: RADIATION/ONCOLOGY SERIES
Discharge: HOME | End: 2024-03-01
Payer: MEDICARE

## 2024-03-01 LAB
RAD ONC ARIA COURSE ID: NORMAL
RAD ONC ARIA COURSE LAST TREATMENT DATE: NORMAL
RAD ONC ARIA COURSE START DATE: NORMAL
RAD ONC ARIA COURSE TREATMENT ELAPSED DAYS: 3
RAD ONC ARIA FIRST TREATMENT DATE: NORMAL
RAD ONC ARIA PLAN FRACTIONS TREATED TO DATE: 2
RAD ONC ARIA PLAN FRACTIONS TREATED TO DATE: 4
RAD ONC ARIA PLAN FRACTIONS TREATED TO DATE: 4
RAD ONC ARIA PLAN ID: NORMAL
RAD ONC ARIA PLAN PRESCRIBED DOSE PER FRACTION: 0.58 GY
RAD ONC ARIA PLAN PRESCRIBED DOSE PER FRACTION: 1.8 GY
RAD ONC ARIA PLAN PRESCRIBED DOSE PER FRACTION: 1.8 GY
RAD ONC ARIA PLAN PRIMARY REFERENCE POINT: NORMAL
RAD ONC ARIA PLAN TOTAL FRACTIONS PRESCRIBED: 14
RAD ONC ARIA PLAN TOTAL FRACTIONS PRESCRIBED: 28
RAD ONC ARIA PLAN TOTAL FRACTIONS PRESCRIBED: 28
RAD ONC ARIA PLAN TOTAL PRESCRIBED DOSE: 1625.6 CGY
RAD ONC ARIA PLAN TOTAL PRESCRIBED DOSE: 2520 CGY
RAD ONC ARIA PLAN TOTAL PRESCRIBED DOSE: 5040 CGY
RAD ONC ARIA REFERENCE POINT DOSAGE GIVEN TO DATE: 7.2 GY
RAD ONC ARIA REFERENCE POINT ID: NORMAL
RAD ONC ARIA REFERENCE POINT SESSION DOSAGE GIVEN: 1.8 GY

## 2024-03-01 PROCEDURE — 77412 RADIATION TX DELIVERY LVL 3: CPT | Performed by: RADIOLOGY

## 2024-03-01 PROCEDURE — 77336 RADIATION PHYSICS CONSULT: CPT | Performed by: RADIOLOGY

## 2024-03-01 NOTE — PROGRESS NOTES
Orthopedics for new onset left knee pain    History psoriatic arthritis  Refer to Dr Deana Saravia/Rheumatology for history psoriatic arthritis for evaluation/treatment    PLAN:  RTC with MD in treatment room 6 weeks  CBC CMP, TSH, Cortisol AM today and each treatment for toxicity monitoring  C#7/9 Keytruda 400mg today and every 6 weeks   Continue to follow up with Dr Baljeet Live/[facility] for radiation therapy- 6/33 completed  Recommend MicroK 20mEq daily-script sent  Continue Eliquis 5mg twice a day  Continue Phenergan and Zofran as needed for nausea  Continue follow-up with Dr Hutchinson/[facility]   Continue follow-up with appointment with Dr. Paul[facility]  Refer to Dr Pk Ramirez/[facility] for new onset left knee pain/osteoarthritis  Refer to Dr Deana Saravia/Rheumatology for history psoriatic arthritis for evaluation/treatment      Follow Up:   Return in about 6 weeks (around 4/16/2024) for Treatment & see  in [place] RM Keytruda.  Refer to Dr Pk Ramirez/[facility]  Refer to Dr Deana Saravia/Rheumatology   TSH, T4, Cortisol/Keytruda 6 weeks    Ruth TAPIA am pre-charting as a registered nurse for Pedro Joyner MD. Electronically signed by Ruth Jc RN on 3/5/2024 at 4:03 PM CST.     I have seen, examined and reviewed this patient medication list, appropriate labs and imaging studies. I reviewed relevant medical records and others physician’s notes. I discussed the plans of care with the patient. I answered all the questions to the patient’s satisfaction. I have also reviewed the chief complaint (CC) and part of the history (History of Present Illness (HPI), Past Family Social History (PFSH), or Review of Systems (ROS) and made changes when appropriated.       (Please note that portions of this note were completed with a voice recognition program. Efforts were made to edit the dictations but occasionally words are

## 2024-03-04 ENCOUNTER — HOSPITAL ENCOUNTER (OUTPATIENT)
Dept: RADIATION ONCOLOGY | Facility: HOSPITAL | Age: 69
Setting detail: RADIATION/ONCOLOGY SERIES
Discharge: HOME | End: 2024-03-04
Payer: MEDICARE

## 2024-03-04 LAB
RAD ONC ARIA COURSE ID: NORMAL
RAD ONC ARIA COURSE LAST TREATMENT DATE: NORMAL
RAD ONC ARIA COURSE START DATE: NORMAL
RAD ONC ARIA COURSE TREATMENT ELAPSED DAYS: 6
RAD ONC ARIA FIRST TREATMENT DATE: NORMAL
RAD ONC ARIA PLAN FRACTIONS TREATED TO DATE: 3
RAD ONC ARIA PLAN FRACTIONS TREATED TO DATE: 5
RAD ONC ARIA PLAN FRACTIONS TREATED TO DATE: 5
RAD ONC ARIA PLAN ID: NORMAL
RAD ONC ARIA PLAN PRESCRIBED DOSE PER FRACTION: 0.58 GY
RAD ONC ARIA PLAN PRESCRIBED DOSE PER FRACTION: 1.8 GY
RAD ONC ARIA PLAN PRESCRIBED DOSE PER FRACTION: 1.8 GY
RAD ONC ARIA PLAN PRIMARY REFERENCE POINT: NORMAL
RAD ONC ARIA PLAN TOTAL FRACTIONS PRESCRIBED: 14
RAD ONC ARIA PLAN TOTAL FRACTIONS PRESCRIBED: 28
RAD ONC ARIA PLAN TOTAL FRACTIONS PRESCRIBED: 28
RAD ONC ARIA PLAN TOTAL PRESCRIBED DOSE: 1625.6 CGY
RAD ONC ARIA PLAN TOTAL PRESCRIBED DOSE: 2520 CGY
RAD ONC ARIA PLAN TOTAL PRESCRIBED DOSE: 5040 CGY
RAD ONC ARIA REFERENCE POINT DOSAGE GIVEN TO DATE: 9 GY
RAD ONC ARIA REFERENCE POINT ID: NORMAL
RAD ONC ARIA REFERENCE POINT SESSION DOSAGE GIVEN: 1.8 GY

## 2024-03-04 PROCEDURE — 77412 RADIATION TX DELIVERY LVL 3: CPT | Performed by: RADIOLOGY

## 2024-03-05 ENCOUNTER — OFFICE VISIT (OUTPATIENT)
Dept: HEMATOLOGY | Age: 69
End: 2024-03-05
Payer: MEDICARE

## 2024-03-05 ENCOUNTER — HOSPITAL ENCOUNTER (OUTPATIENT)
Dept: INFUSION THERAPY | Age: 69
Discharge: HOME | End: 2024-03-05
Payer: MEDICARE

## 2024-03-05 ENCOUNTER — HOSPITAL ENCOUNTER (OUTPATIENT)
Dept: RADIATION ONCOLOGY | Facility: HOSPITAL | Age: 69
Setting detail: RADIATION/ONCOLOGY SERIES
Discharge: HOME | End: 2024-03-05
Payer: MEDICARE

## 2024-03-05 VITALS
SYSTOLIC BLOOD PRESSURE: 131 MMHG | BODY MASS INDEX: 34.14 KG/M2 | HEART RATE: 92 BPM | DIASTOLIC BLOOD PRESSURE: 88 MMHG | WEIGHT: 217.5 LBS | TEMPERATURE: 97.9 F | RESPIRATION RATE: 18 BRPM | HEIGHT: 67 IN | OXYGEN SATURATION: 99 %

## 2024-03-05 DIAGNOSIS — C50.919 TRIPLE NEGATIVE BREAST CANCER (HCC): ICD-10-CM

## 2024-03-05 DIAGNOSIS — Z51.12 ENCOUNTER FOR ANTINEOPLASTIC IMMUNOTHERAPY: ICD-10-CM

## 2024-03-05 DIAGNOSIS — R53.83 FATIGUE DUE TO TREATMENT: ICD-10-CM

## 2024-03-05 DIAGNOSIS — M25.562 ACUTE PAIN OF LEFT KNEE: ICD-10-CM

## 2024-03-05 DIAGNOSIS — L40.50 PSORIATIC ARTHRITIS (HCC): ICD-10-CM

## 2024-03-05 DIAGNOSIS — C50.911 INVASIVE DUCTAL CARCINOMA OF BREAST, FEMALE, RIGHT (HCC): Primary | ICD-10-CM

## 2024-03-05 DIAGNOSIS — E87.6 HYPOKALEMIA: ICD-10-CM

## 2024-03-05 DIAGNOSIS — C50.919 TRIPLE NEGATIVE BREAST CANCER (HCC): Primary | ICD-10-CM

## 2024-03-05 LAB
ALBUMIN SERPL-MCNC: 3.4 G/DL (ref 3.5–5.2)
ALP SERPL-CCNC: 81 U/L (ref 35–104)
ALT SERPL-CCNC: 17 U/L (ref 9–52)
ANION GAP SERPL CALCULATED.3IONS-SCNC: 13 MMOL/L (ref 7–19)
AST SERPL-CCNC: 27 U/L (ref 14–36)
BASOPHILS # BLD: 0.03 K/UL (ref 0.01–0.08)
BASOPHILS NFR BLD: 0.3 % (ref 0.1–1.2)
BILIRUB SERPL-MCNC: 0.5 MG/DL (ref 0.2–1.3)
BUN SERPL-MCNC: 10 MG/DL (ref 7–17)
CALCIUM SERPL-MCNC: 8.7 MG/DL (ref 8.4–10.2)
CHLORIDE SERPL-SCNC: 103 MMOL/L (ref 98–111)
CO2 SERPL-SCNC: 24 MMOL/L (ref 22–29)
CORTIS AM PEAK SERPL-MCNC: 8.7 UG/DL (ref 6.2–19.4)
CREAT SERPL-MCNC: 0.5 MG/DL (ref 0.5–1)
EOSINOPHIL # BLD: 0.27 K/UL (ref 0.04–0.54)
EOSINOPHIL NFR BLD: 3.1 % (ref 0.7–7)
ERYTHROCYTE [DISTWIDTH] IN BLOOD BY AUTOMATED COUNT: 13.7 % (ref 11.7–14.4)
GLOBULIN: 3.2 G/DL
GLUCOSE SERPL-MCNC: 104 MG/DL (ref 74–106)
HCT VFR BLD AUTO: 34.2 % (ref 34.1–44.9)
HGB BLD-MCNC: 10.9 G/DL (ref 11.2–15.7)
LYMPHOCYTES # BLD: 0.9 K/UL (ref 1.18–3.74)
LYMPHOCYTES NFR BLD: 10.2 % (ref 19.3–53.1)
MCH RBC QN AUTO: 28.5 PG (ref 25.6–32.2)
MCHC RBC AUTO-ENTMCNC: 31.9 G/DL (ref 32.3–35.5)
MCV RBC AUTO: 89.5 FL (ref 79.4–94.8)
MONOCYTES # BLD: 0.78 K/UL (ref 0.24–0.82)
MONOCYTES NFR BLD: 8.8 % (ref 4.7–12.5)
NEUTROPHILS # BLD: 6.81 K/UL (ref 1.56–6.13)
NEUTS SEG NFR BLD: 77.3 % (ref 34–71.1)
PLATELET # BLD AUTO: 338 K/UL (ref 182–369)
PMV BLD AUTO: 9.8 FL (ref 7.4–10.4)
POTASSIUM SERPL-SCNC: 3.3 MMOL/L (ref 3.5–5.1)
PROT SERPL-MCNC: 6.6 G/DL (ref 6.3–8.2)
RAD ONC ARIA COURSE ID: NORMAL
RAD ONC ARIA COURSE LAST TREATMENT DATE: NORMAL
RAD ONC ARIA COURSE START DATE: NORMAL
RAD ONC ARIA COURSE TREATMENT ELAPSED DAYS: 7
RAD ONC ARIA FIRST TREATMENT DATE: NORMAL
RAD ONC ARIA PLAN FRACTIONS TREATED TO DATE: 3
RAD ONC ARIA PLAN FRACTIONS TREATED TO DATE: 6
RAD ONC ARIA PLAN FRACTIONS TREATED TO DATE: 6
RAD ONC ARIA PLAN ID: NORMAL
RAD ONC ARIA PLAN PRESCRIBED DOSE PER FRACTION: 0.58 GY
RAD ONC ARIA PLAN PRESCRIBED DOSE PER FRACTION: 1.8 GY
RAD ONC ARIA PLAN PRESCRIBED DOSE PER FRACTION: 1.8 GY
RAD ONC ARIA PLAN PRIMARY REFERENCE POINT: NORMAL
RAD ONC ARIA PLAN TOTAL FRACTIONS PRESCRIBED: 14
RAD ONC ARIA PLAN TOTAL FRACTIONS PRESCRIBED: 28
RAD ONC ARIA PLAN TOTAL FRACTIONS PRESCRIBED: 28
RAD ONC ARIA PLAN TOTAL PRESCRIBED DOSE: 1625.6 CGY
RAD ONC ARIA PLAN TOTAL PRESCRIBED DOSE: 2520 CGY
RAD ONC ARIA PLAN TOTAL PRESCRIBED DOSE: 5040 CGY
RAD ONC ARIA REFERENCE POINT DOSAGE GIVEN TO DATE: 10.8 GY
RAD ONC ARIA REFERENCE POINT ID: NORMAL
RAD ONC ARIA REFERENCE POINT SESSION DOSAGE GIVEN: 1.8 GY
RBC # BLD AUTO: 3.82 M/UL (ref 3.93–5.22)
SODIUM SERPL-SCNC: 140 MMOL/L (ref 137–145)
T4 FREE SERPL-MCNC: 1.16 NG/DL (ref 0.93–1.7)
WBC # BLD AUTO: 8.82 K/UL (ref 3.98–10.04)

## 2024-03-05 PROCEDURE — 3017F COLORECTAL CA SCREEN DOC REV: CPT | Performed by: INTERNAL MEDICINE

## 2024-03-05 PROCEDURE — 3079F DIAST BP 80-89 MM HG: CPT | Performed by: INTERNAL MEDICINE

## 2024-03-05 PROCEDURE — G8427 DOCREV CUR MEDS BY ELIG CLIN: HCPCS | Performed by: INTERNAL MEDICINE

## 2024-03-05 PROCEDURE — 36415 COLL VENOUS BLD VENIPUNCTURE: CPT

## 2024-03-05 PROCEDURE — 3075F SYST BP GE 130 - 139MM HG: CPT | Performed by: INTERNAL MEDICINE

## 2024-03-05 PROCEDURE — 85025 COMPLETE CBC W/AUTO DIFF WBC: CPT

## 2024-03-05 PROCEDURE — 1036F TOBACCO NON-USER: CPT | Performed by: INTERNAL MEDICINE

## 2024-03-05 PROCEDURE — 96413 CHEMO IV INFUSION 1 HR: CPT

## 2024-03-05 PROCEDURE — G8484 FLU IMMUNIZE NO ADMIN: HCPCS | Performed by: INTERNAL MEDICINE

## 2024-03-05 PROCEDURE — G8417 CALC BMI ABV UP PARAM F/U: HCPCS | Performed by: INTERNAL MEDICINE

## 2024-03-05 PROCEDURE — 1090F PRES/ABSN URINE INCON ASSESS: CPT | Performed by: INTERNAL MEDICINE

## 2024-03-05 PROCEDURE — 2580000003 HC RX 258: Performed by: INTERNAL MEDICINE

## 2024-03-05 PROCEDURE — G2211 COMPLEX E/M VISIT ADD ON: HCPCS | Performed by: INTERNAL MEDICINE

## 2024-03-05 PROCEDURE — 80053 COMPREHEN METABOLIC PANEL: CPT

## 2024-03-05 PROCEDURE — 36415 COLL VENOUS BLD VENIPUNCTURE: CPT | Performed by: INTERNAL MEDICINE

## 2024-03-05 PROCEDURE — G8400 PT W/DXA NO RESULTS DOC: HCPCS | Performed by: INTERNAL MEDICINE

## 2024-03-05 PROCEDURE — 6360000002 HC RX W HCPCS: Performed by: INTERNAL MEDICINE

## 2024-03-05 PROCEDURE — 99214 OFFICE O/P EST MOD 30 MIN: CPT | Performed by: INTERNAL MEDICINE

## 2024-03-05 PROCEDURE — 77412 RADIATION TX DELIVERY LVL 3: CPT | Performed by: RADIOLOGY

## 2024-03-05 PROCEDURE — 1123F ACP DISCUSS/DSCN MKR DOCD: CPT | Performed by: INTERNAL MEDICINE

## 2024-03-05 RX ORDER — POTASSIUM CHLORIDE 750 MG/1
20 CAPSULE, EXTENDED RELEASE ORAL DAILY
Qty: 60 CAPSULE | Refills: 1 | Status: SHIPPED | OUTPATIENT
Start: 2024-03-05

## 2024-03-05 RX ORDER — DIPHENHYDRAMINE HYDROCHLORIDE 50 MG/ML
50 INJECTION INTRAMUSCULAR; INTRAVENOUS
Status: CANCELLED | OUTPATIENT
Start: 2024-03-05

## 2024-03-05 RX ORDER — HEPARIN SODIUM (PORCINE) LOCK FLUSH IV SOLN 100 UNIT/ML 100 UNIT/ML
500 SOLUTION INTRAVENOUS PRN
Status: CANCELLED | OUTPATIENT
Start: 2024-03-05

## 2024-03-05 RX ORDER — SODIUM CHLORIDE 9 MG/ML
5-250 INJECTION, SOLUTION INTRAVENOUS PRN
Status: CANCELLED | OUTPATIENT
Start: 2024-03-05

## 2024-03-05 RX ORDER — ONDANSETRON 2 MG/ML
8 INJECTION INTRAMUSCULAR; INTRAVENOUS
Status: CANCELLED | OUTPATIENT
Start: 2024-03-05

## 2024-03-05 RX ORDER — EPINEPHRINE 1 MG/ML
0.3 INJECTION, SOLUTION, CONCENTRATE INTRAVENOUS PRN
Status: CANCELLED | OUTPATIENT
Start: 2024-03-05

## 2024-03-05 RX ORDER — SODIUM CHLORIDE 0.9 % (FLUSH) 0.9 %
5-40 SYRINGE (ML) INJECTION PRN
Status: CANCELLED | OUTPATIENT
Start: 2024-03-05

## 2024-03-05 RX ORDER — ALBUTEROL SULFATE 90 UG/1
4 AEROSOL, METERED RESPIRATORY (INHALATION) PRN
Status: CANCELLED | OUTPATIENT
Start: 2024-03-05

## 2024-03-05 RX ORDER — HEPARIN 100 UNIT/ML
500 SYRINGE INTRAVENOUS PRN
Status: DISCONTINUED | OUTPATIENT
Start: 2024-03-05 | End: 2024-03-06 | Stop reason: HOSPADM

## 2024-03-05 RX ORDER — SODIUM CHLORIDE 0.9 % (FLUSH) 0.9 %
5-40 SYRINGE (ML) INJECTION PRN
Status: DISCONTINUED | OUTPATIENT
Start: 2024-03-05 | End: 2024-03-06 | Stop reason: HOSPADM

## 2024-03-05 RX ORDER — MEPERIDINE HYDROCHLORIDE 50 MG/ML
12.5 INJECTION INTRAMUSCULAR; INTRAVENOUS; SUBCUTANEOUS PRN
Status: CANCELLED | OUTPATIENT
Start: 2024-03-05

## 2024-03-05 RX ORDER — SODIUM CHLORIDE 9 MG/ML
INJECTION, SOLUTION INTRAVENOUS CONTINUOUS
Status: CANCELLED | OUTPATIENT
Start: 2024-03-05

## 2024-03-05 RX ORDER — ACETAMINOPHEN 325 MG/1
650 TABLET ORAL
Status: CANCELLED | OUTPATIENT
Start: 2024-03-05

## 2024-03-05 RX ORDER — FAMOTIDINE 10 MG/ML
20 INJECTION, SOLUTION INTRAVENOUS
Status: CANCELLED | OUTPATIENT
Start: 2024-03-05

## 2024-03-05 RX ADMIN — HEPARIN 500 UNITS: 100 SYRINGE at 11:40

## 2024-03-05 RX ADMIN — SODIUM CHLORIDE, PRESERVATIVE FREE 10 ML: 5 INJECTION INTRAVENOUS at 11:40

## 2024-03-05 RX ADMIN — SODIUM CHLORIDE 400 MG: 9 INJECTION, SOLUTION INTRAVENOUS at 11:08

## 2024-03-05 NOTE — PROGRESS NOTES
Lab Results   Component Value Date    WBC 8.82 03/05/2024    HGB 10.9 (L) 03/05/2024    HCT 34.2 03/05/2024    MCV 89.5 03/05/2024     03/05/2024     Lab Results   Component Value Date    NEUTROABS 6.81 (H) 03/05/2024     Lab Results   Component Value Date     03/05/2024    K 3.3 (L) 03/05/2024     03/05/2024    CO2 24 03/05/2024    BUN 10 03/05/2024    CREATININE 0.5 03/05/2024    GLUCOSE 104 03/05/2024    CALCIUM 8.7 03/05/2024    PROT 6.6 03/05/2024    LABALBU 3.4 (L) 03/05/2024    BILITOT 0.5 03/05/2024    ALKPHOS 81 03/05/2024    AST 27 03/05/2024    ALT 17 03/05/2024    LABGLOM >60 03/05/2024    GLOB 3.2 03/05/2024

## 2024-03-06 ENCOUNTER — HOSPITAL ENCOUNTER (OUTPATIENT)
Dept: RADIATION ONCOLOGY | Facility: HOSPITAL | Age: 69
Setting detail: RADIATION/ONCOLOGY SERIES
Discharge: HOME | End: 2024-03-06
Payer: MEDICARE

## 2024-03-06 LAB
RAD ONC ARIA COURSE ID: NORMAL
RAD ONC ARIA COURSE LAST TREATMENT DATE: NORMAL
RAD ONC ARIA COURSE START DATE: NORMAL
RAD ONC ARIA COURSE TREATMENT ELAPSED DAYS: 8
RAD ONC ARIA FIRST TREATMENT DATE: NORMAL
RAD ONC ARIA PLAN FRACTIONS TREATED TO DATE: 4
RAD ONC ARIA PLAN FRACTIONS TREATED TO DATE: 7
RAD ONC ARIA PLAN FRACTIONS TREATED TO DATE: 7
RAD ONC ARIA PLAN ID: NORMAL
RAD ONC ARIA PLAN PRESCRIBED DOSE PER FRACTION: 0.58 GY
RAD ONC ARIA PLAN PRESCRIBED DOSE PER FRACTION: 1.8 GY
RAD ONC ARIA PLAN PRESCRIBED DOSE PER FRACTION: 1.8 GY
RAD ONC ARIA PLAN PRIMARY REFERENCE POINT: NORMAL
RAD ONC ARIA PLAN TOTAL FRACTIONS PRESCRIBED: 14
RAD ONC ARIA PLAN TOTAL FRACTIONS PRESCRIBED: 28
RAD ONC ARIA PLAN TOTAL FRACTIONS PRESCRIBED: 28
RAD ONC ARIA PLAN TOTAL PRESCRIBED DOSE: 1625.6 CGY
RAD ONC ARIA PLAN TOTAL PRESCRIBED DOSE: 2520 CGY
RAD ONC ARIA PLAN TOTAL PRESCRIBED DOSE: 5040 CGY
RAD ONC ARIA REFERENCE POINT DOSAGE GIVEN TO DATE: 12.6 GY
RAD ONC ARIA REFERENCE POINT ID: NORMAL
RAD ONC ARIA REFERENCE POINT SESSION DOSAGE GIVEN: 1.8 GY

## 2024-03-06 PROCEDURE — 77417 THER RADIOLOGY PORT IMAGE(S): CPT | Performed by: RADIOLOGY

## 2024-03-06 PROCEDURE — 77412 RADIATION TX DELIVERY LVL 3: CPT | Performed by: RADIOLOGY

## 2024-03-07 ENCOUNTER — HOSPITAL ENCOUNTER (OUTPATIENT)
Dept: RADIATION ONCOLOGY | Facility: HOSPITAL | Age: 69
Setting detail: RADIATION/ONCOLOGY SERIES
Discharge: HOME | End: 2024-03-07
Payer: MEDICARE

## 2024-03-07 DIAGNOSIS — R22.2 MASS OF LEFT CHEST WALL: Primary | ICD-10-CM

## 2024-03-07 LAB
RAD ONC ARIA COURSE ID: NORMAL
RAD ONC ARIA COURSE LAST TREATMENT DATE: NORMAL
RAD ONC ARIA COURSE START DATE: NORMAL
RAD ONC ARIA COURSE TREATMENT ELAPSED DAYS: 9
RAD ONC ARIA FIRST TREATMENT DATE: NORMAL
RAD ONC ARIA PLAN FRACTIONS TREATED TO DATE: 4
RAD ONC ARIA PLAN FRACTIONS TREATED TO DATE: 8
RAD ONC ARIA PLAN FRACTIONS TREATED TO DATE: 8
RAD ONC ARIA PLAN ID: NORMAL
RAD ONC ARIA PLAN PRESCRIBED DOSE PER FRACTION: 0.58 GY
RAD ONC ARIA PLAN PRESCRIBED DOSE PER FRACTION: 1.8 GY
RAD ONC ARIA PLAN PRESCRIBED DOSE PER FRACTION: 1.8 GY
RAD ONC ARIA PLAN PRIMARY REFERENCE POINT: NORMAL
RAD ONC ARIA PLAN TOTAL FRACTIONS PRESCRIBED: 14
RAD ONC ARIA PLAN TOTAL FRACTIONS PRESCRIBED: 28
RAD ONC ARIA PLAN TOTAL FRACTIONS PRESCRIBED: 28
RAD ONC ARIA PLAN TOTAL PRESCRIBED DOSE: 1625.6 CGY
RAD ONC ARIA PLAN TOTAL PRESCRIBED DOSE: 2520 CGY
RAD ONC ARIA PLAN TOTAL PRESCRIBED DOSE: 5040 CGY
RAD ONC ARIA REFERENCE POINT DOSAGE GIVEN TO DATE: 14.4 GY
RAD ONC ARIA REFERENCE POINT ID: NORMAL
RAD ONC ARIA REFERENCE POINT SESSION DOSAGE GIVEN: 1.8 GY

## 2024-03-07 PROCEDURE — 77336 RADIATION PHYSICS CONSULT: CPT | Performed by: RADIOLOGY

## 2024-03-07 PROCEDURE — 77412 RADIATION TX DELIVERY LVL 3: CPT | Performed by: RADIOLOGY

## 2024-03-08 ENCOUNTER — HOSPITAL ENCOUNTER (OUTPATIENT)
Dept: RADIATION ONCOLOGY | Facility: HOSPITAL | Age: 69
Setting detail: RADIATION/ONCOLOGY SERIES
Discharge: HOME | End: 2024-03-08
Payer: MEDICARE

## 2024-03-08 LAB
RAD ONC ARIA COURSE ID: NORMAL
RAD ONC ARIA COURSE LAST TREATMENT DATE: NORMAL
RAD ONC ARIA COURSE START DATE: NORMAL
RAD ONC ARIA COURSE TREATMENT ELAPSED DAYS: 10
RAD ONC ARIA FIRST TREATMENT DATE: NORMAL
RAD ONC ARIA PLAN FRACTIONS TREATED TO DATE: 5
RAD ONC ARIA PLAN FRACTIONS TREATED TO DATE: 9
RAD ONC ARIA PLAN FRACTIONS TREATED TO DATE: 9
RAD ONC ARIA PLAN ID: NORMAL
RAD ONC ARIA PLAN PRESCRIBED DOSE PER FRACTION: 0.58 GY
RAD ONC ARIA PLAN PRESCRIBED DOSE PER FRACTION: 1.8 GY
RAD ONC ARIA PLAN PRESCRIBED DOSE PER FRACTION: 1.8 GY
RAD ONC ARIA PLAN PRIMARY REFERENCE POINT: NORMAL
RAD ONC ARIA PLAN TOTAL FRACTIONS PRESCRIBED: 14
RAD ONC ARIA PLAN TOTAL FRACTIONS PRESCRIBED: 28
RAD ONC ARIA PLAN TOTAL FRACTIONS PRESCRIBED: 28
RAD ONC ARIA PLAN TOTAL PRESCRIBED DOSE: 1625.6 CGY
RAD ONC ARIA PLAN TOTAL PRESCRIBED DOSE: 2520 CGY
RAD ONC ARIA PLAN TOTAL PRESCRIBED DOSE: 5040 CGY
RAD ONC ARIA REFERENCE POINT DOSAGE GIVEN TO DATE: 16.2 GY
RAD ONC ARIA REFERENCE POINT ID: NORMAL
RAD ONC ARIA REFERENCE POINT SESSION DOSAGE GIVEN: 1.8 GY

## 2024-03-08 PROCEDURE — 77412 RADIATION TX DELIVERY LVL 3: CPT | Performed by: RADIOLOGY

## 2024-03-11 ENCOUNTER — HOSPITAL ENCOUNTER (OUTPATIENT)
Dept: RADIATION ONCOLOGY | Facility: HOSPITAL | Age: 69
Setting detail: RADIATION/ONCOLOGY SERIES
Discharge: HOME | End: 2024-03-11
Payer: MEDICARE

## 2024-03-11 LAB
RAD ONC ARIA COURSE ID: NORMAL
RAD ONC ARIA COURSE LAST TREATMENT DATE: NORMAL
RAD ONC ARIA COURSE START DATE: NORMAL
RAD ONC ARIA COURSE TREATMENT ELAPSED DAYS: 13
RAD ONC ARIA FIRST TREATMENT DATE: NORMAL
RAD ONC ARIA PLAN FRACTIONS TREATED TO DATE: 10
RAD ONC ARIA PLAN FRACTIONS TREATED TO DATE: 10
RAD ONC ARIA PLAN FRACTIONS TREATED TO DATE: 5
RAD ONC ARIA PLAN ID: NORMAL
RAD ONC ARIA PLAN PRESCRIBED DOSE PER FRACTION: 0.58 GY
RAD ONC ARIA PLAN PRESCRIBED DOSE PER FRACTION: 1.8 GY
RAD ONC ARIA PLAN PRESCRIBED DOSE PER FRACTION: 1.8 GY
RAD ONC ARIA PLAN PRIMARY REFERENCE POINT: NORMAL
RAD ONC ARIA PLAN TOTAL FRACTIONS PRESCRIBED: 14
RAD ONC ARIA PLAN TOTAL FRACTIONS PRESCRIBED: 28
RAD ONC ARIA PLAN TOTAL FRACTIONS PRESCRIBED: 28
RAD ONC ARIA PLAN TOTAL PRESCRIBED DOSE: 1625.6 CGY
RAD ONC ARIA PLAN TOTAL PRESCRIBED DOSE: 2520 CGY
RAD ONC ARIA PLAN TOTAL PRESCRIBED DOSE: 5040 CGY
RAD ONC ARIA REFERENCE POINT DOSAGE GIVEN TO DATE: 18 GY
RAD ONC ARIA REFERENCE POINT ID: NORMAL
RAD ONC ARIA REFERENCE POINT SESSION DOSAGE GIVEN: 1.8 GY

## 2024-03-11 PROCEDURE — 77412 RADIATION TX DELIVERY LVL 3: CPT | Performed by: RADIOLOGY

## 2024-03-12 ENCOUNTER — HOSPITAL ENCOUNTER (OUTPATIENT)
Dept: RADIATION ONCOLOGY | Facility: HOSPITAL | Age: 69
Setting detail: RADIATION/ONCOLOGY SERIES
Discharge: HOME | End: 2024-03-12
Payer: MEDICARE

## 2024-03-12 LAB
RAD ONC ARIA COURSE ID: NORMAL
RAD ONC ARIA COURSE LAST TREATMENT DATE: NORMAL
RAD ONC ARIA COURSE START DATE: NORMAL
RAD ONC ARIA COURSE TREATMENT ELAPSED DAYS: 14
RAD ONC ARIA FIRST TREATMENT DATE: NORMAL
RAD ONC ARIA PLAN FRACTIONS TREATED TO DATE: 11
RAD ONC ARIA PLAN FRACTIONS TREATED TO DATE: 11
RAD ONC ARIA PLAN FRACTIONS TREATED TO DATE: 6
RAD ONC ARIA PLAN ID: NORMAL
RAD ONC ARIA PLAN PRESCRIBED DOSE PER FRACTION: 0.58 GY
RAD ONC ARIA PLAN PRESCRIBED DOSE PER FRACTION: 1.8 GY
RAD ONC ARIA PLAN PRESCRIBED DOSE PER FRACTION: 1.8 GY
RAD ONC ARIA PLAN PRIMARY REFERENCE POINT: NORMAL
RAD ONC ARIA PLAN TOTAL FRACTIONS PRESCRIBED: 14
RAD ONC ARIA PLAN TOTAL FRACTIONS PRESCRIBED: 28
RAD ONC ARIA PLAN TOTAL FRACTIONS PRESCRIBED: 28
RAD ONC ARIA PLAN TOTAL PRESCRIBED DOSE: 1625.6 CGY
RAD ONC ARIA PLAN TOTAL PRESCRIBED DOSE: 2520 CGY
RAD ONC ARIA PLAN TOTAL PRESCRIBED DOSE: 5040 CGY
RAD ONC ARIA REFERENCE POINT DOSAGE GIVEN TO DATE: 19.8 GY
RAD ONC ARIA REFERENCE POINT ID: NORMAL
RAD ONC ARIA REFERENCE POINT SESSION DOSAGE GIVEN: 1.8 GY

## 2024-03-12 PROCEDURE — 77412 RADIATION TX DELIVERY LVL 3: CPT | Performed by: RADIOLOGY

## 2024-03-13 ENCOUNTER — HOSPITAL ENCOUNTER (OUTPATIENT)
Dept: RADIATION ONCOLOGY | Facility: HOSPITAL | Age: 69
Setting detail: RADIATION/ONCOLOGY SERIES
Discharge: HOME | End: 2024-03-13
Payer: MEDICARE

## 2024-03-13 LAB
RAD ONC ARIA COURSE ID: NORMAL
RAD ONC ARIA COURSE LAST TREATMENT DATE: NORMAL
RAD ONC ARIA COURSE START DATE: NORMAL
RAD ONC ARIA COURSE TREATMENT ELAPSED DAYS: 15
RAD ONC ARIA FIRST TREATMENT DATE: NORMAL
RAD ONC ARIA PLAN FRACTIONS TREATED TO DATE: 12
RAD ONC ARIA PLAN FRACTIONS TREATED TO DATE: 12
RAD ONC ARIA PLAN FRACTIONS TREATED TO DATE: 6
RAD ONC ARIA PLAN ID: NORMAL
RAD ONC ARIA PLAN PRESCRIBED DOSE PER FRACTION: 0.58 GY
RAD ONC ARIA PLAN PRESCRIBED DOSE PER FRACTION: 1.8 GY
RAD ONC ARIA PLAN PRESCRIBED DOSE PER FRACTION: 1.8 GY
RAD ONC ARIA PLAN PRIMARY REFERENCE POINT: NORMAL
RAD ONC ARIA PLAN TOTAL FRACTIONS PRESCRIBED: 14
RAD ONC ARIA PLAN TOTAL FRACTIONS PRESCRIBED: 28
RAD ONC ARIA PLAN TOTAL FRACTIONS PRESCRIBED: 28
RAD ONC ARIA PLAN TOTAL PRESCRIBED DOSE: 1625.6 CGY
RAD ONC ARIA PLAN TOTAL PRESCRIBED DOSE: 2520 CGY
RAD ONC ARIA PLAN TOTAL PRESCRIBED DOSE: 5040 CGY
RAD ONC ARIA REFERENCE POINT DOSAGE GIVEN TO DATE: 21.6 GY
RAD ONC ARIA REFERENCE POINT ID: NORMAL
RAD ONC ARIA REFERENCE POINT SESSION DOSAGE GIVEN: 1.8 GY

## 2024-03-13 PROCEDURE — 77412 RADIATION TX DELIVERY LVL 3: CPT | Performed by: RADIOLOGY

## 2024-03-13 PROCEDURE — 77417 THER RADIOLOGY PORT IMAGE(S): CPT | Performed by: RADIOLOGY

## 2024-03-14 ENCOUNTER — HOSPITAL ENCOUNTER (OUTPATIENT)
Dept: RADIATION ONCOLOGY | Facility: HOSPITAL | Age: 69
Setting detail: RADIATION/ONCOLOGY SERIES
Discharge: HOME | End: 2024-03-14
Payer: MEDICARE

## 2024-03-14 ENCOUNTER — TELEPHONE (OUTPATIENT)
Dept: SURGERY | Age: 69
End: 2024-03-14

## 2024-03-14 LAB
RAD ONC ARIA COURSE ID: NORMAL
RAD ONC ARIA COURSE LAST TREATMENT DATE: NORMAL
RAD ONC ARIA COURSE START DATE: NORMAL
RAD ONC ARIA COURSE TREATMENT ELAPSED DAYS: 16
RAD ONC ARIA FIRST TREATMENT DATE: NORMAL
RAD ONC ARIA PLAN FRACTIONS TREATED TO DATE: 13
RAD ONC ARIA PLAN FRACTIONS TREATED TO DATE: 13
RAD ONC ARIA PLAN FRACTIONS TREATED TO DATE: 7
RAD ONC ARIA PLAN ID: NORMAL
RAD ONC ARIA PLAN PRESCRIBED DOSE PER FRACTION: 0.58 GY
RAD ONC ARIA PLAN PRESCRIBED DOSE PER FRACTION: 1.8 GY
RAD ONC ARIA PLAN PRESCRIBED DOSE PER FRACTION: 1.8 GY
RAD ONC ARIA PLAN PRIMARY REFERENCE POINT: NORMAL
RAD ONC ARIA PLAN TOTAL FRACTIONS PRESCRIBED: 14
RAD ONC ARIA PLAN TOTAL FRACTIONS PRESCRIBED: 28
RAD ONC ARIA PLAN TOTAL FRACTIONS PRESCRIBED: 28
RAD ONC ARIA PLAN TOTAL PRESCRIBED DOSE: 1625.6 CGY
RAD ONC ARIA PLAN TOTAL PRESCRIBED DOSE: 2520 CGY
RAD ONC ARIA PLAN TOTAL PRESCRIBED DOSE: 5040 CGY
RAD ONC ARIA REFERENCE POINT DOSAGE GIVEN TO DATE: 23.4 GY
RAD ONC ARIA REFERENCE POINT ID: NORMAL
RAD ONC ARIA REFERENCE POINT SESSION DOSAGE GIVEN: 1.8 GY

## 2024-03-14 PROCEDURE — 77336 RADIATION PHYSICS CONSULT: CPT | Performed by: RADIOLOGY

## 2024-03-14 PROCEDURE — 77412 RADIATION TX DELIVERY LVL 3: CPT | Performed by: RADIOLOGY

## 2024-03-15 ENCOUNTER — HOSPITAL ENCOUNTER (OUTPATIENT)
Dept: RADIATION ONCOLOGY | Facility: HOSPITAL | Age: 69
Setting detail: RADIATION/ONCOLOGY SERIES
Discharge: HOME | End: 2024-03-15
Payer: MEDICARE

## 2024-03-15 LAB
RAD ONC ARIA COURSE ID: NORMAL
RAD ONC ARIA COURSE LAST TREATMENT DATE: NORMAL
RAD ONC ARIA COURSE START DATE: NORMAL
RAD ONC ARIA COURSE TREATMENT ELAPSED DAYS: 17
RAD ONC ARIA FIRST TREATMENT DATE: NORMAL
RAD ONC ARIA PLAN FRACTIONS TREATED TO DATE: 14
RAD ONC ARIA PLAN FRACTIONS TREATED TO DATE: 14
RAD ONC ARIA PLAN FRACTIONS TREATED TO DATE: 7
RAD ONC ARIA PLAN ID: NORMAL
RAD ONC ARIA PLAN PRESCRIBED DOSE PER FRACTION: 0.58 GY
RAD ONC ARIA PLAN PRESCRIBED DOSE PER FRACTION: 1.8 GY
RAD ONC ARIA PLAN PRESCRIBED DOSE PER FRACTION: 1.8 GY
RAD ONC ARIA PLAN PRIMARY REFERENCE POINT: NORMAL
RAD ONC ARIA PLAN TOTAL FRACTIONS PRESCRIBED: 14
RAD ONC ARIA PLAN TOTAL FRACTIONS PRESCRIBED: 28
RAD ONC ARIA PLAN TOTAL FRACTIONS PRESCRIBED: 28
RAD ONC ARIA PLAN TOTAL PRESCRIBED DOSE: 1625.6 CGY
RAD ONC ARIA PLAN TOTAL PRESCRIBED DOSE: 2520 CGY
RAD ONC ARIA PLAN TOTAL PRESCRIBED DOSE: 5040 CGY
RAD ONC ARIA REFERENCE POINT DOSAGE GIVEN TO DATE: 25.2 GY
RAD ONC ARIA REFERENCE POINT ID: NORMAL
RAD ONC ARIA REFERENCE POINT SESSION DOSAGE GIVEN: 1.8 GY

## 2024-03-15 PROCEDURE — 77412 RADIATION TX DELIVERY LVL 3: CPT | Performed by: RADIOLOGY

## 2024-03-18 ENCOUNTER — HOSPITAL ENCOUNTER (OUTPATIENT)
Dept: RADIATION ONCOLOGY | Facility: HOSPITAL | Age: 69
Setting detail: RADIATION/ONCOLOGY SERIES
Discharge: HOME | End: 2024-03-18
Payer: MEDICARE

## 2024-03-18 DIAGNOSIS — Z90.13 S/P BILATERAL MASTECTOMY: ICD-10-CM

## 2024-03-18 DIAGNOSIS — C50.211 MALIGNANT NEOPLASM OF UPPER-INNER QUADRANT OF RIGHT FEMALE BREAST, UNSPECIFIED ESTROGEN RECEPTOR STATUS (HCC): Primary | ICD-10-CM

## 2024-03-18 LAB
RAD ONC ARIA COURSE ID: NORMAL
RAD ONC ARIA COURSE LAST TREATMENT DATE: NORMAL
RAD ONC ARIA COURSE START DATE: NORMAL
RAD ONC ARIA COURSE TREATMENT ELAPSED DAYS: 20
RAD ONC ARIA FIRST TREATMENT DATE: NORMAL
RAD ONC ARIA PLAN FRACTIONS TREATED TO DATE: 15
RAD ONC ARIA PLAN FRACTIONS TREATED TO DATE: 15
RAD ONC ARIA PLAN FRACTIONS TREATED TO DATE: 8
RAD ONC ARIA PLAN ID: NORMAL
RAD ONC ARIA PLAN PRESCRIBED DOSE PER FRACTION: 0.58 GY
RAD ONC ARIA PLAN PRESCRIBED DOSE PER FRACTION: 1.8 GY
RAD ONC ARIA PLAN PRESCRIBED DOSE PER FRACTION: 1.8 GY
RAD ONC ARIA PLAN PRIMARY REFERENCE POINT: NORMAL
RAD ONC ARIA PLAN TOTAL FRACTIONS PRESCRIBED: 14
RAD ONC ARIA PLAN TOTAL FRACTIONS PRESCRIBED: 28
RAD ONC ARIA PLAN TOTAL FRACTIONS PRESCRIBED: 28
RAD ONC ARIA PLAN TOTAL PRESCRIBED DOSE: 1625.6 CGY
RAD ONC ARIA PLAN TOTAL PRESCRIBED DOSE: 2520 CGY
RAD ONC ARIA PLAN TOTAL PRESCRIBED DOSE: 5040 CGY
RAD ONC ARIA REFERENCE POINT DOSAGE GIVEN TO DATE: 27 GY
RAD ONC ARIA REFERENCE POINT ID: NORMAL
RAD ONC ARIA REFERENCE POINT SESSION DOSAGE GIVEN: 1.8 GY

## 2024-03-18 PROCEDURE — 77412 RADIATION TX DELIVERY LVL 3: CPT | Performed by: RADIOLOGY

## 2024-03-19 ENCOUNTER — HOSPITAL ENCOUNTER (OUTPATIENT)
Dept: RADIATION ONCOLOGY | Facility: HOSPITAL | Age: 69
Setting detail: RADIATION/ONCOLOGY SERIES
Discharge: HOME | End: 2024-03-19
Payer: MEDICARE

## 2024-03-19 LAB
RAD ONC ARIA COURSE ID: NORMAL
RAD ONC ARIA COURSE LAST TREATMENT DATE: NORMAL
RAD ONC ARIA COURSE START DATE: NORMAL
RAD ONC ARIA COURSE TREATMENT ELAPSED DAYS: 21
RAD ONC ARIA FIRST TREATMENT DATE: NORMAL
RAD ONC ARIA PLAN FRACTIONS TREATED TO DATE: 16
RAD ONC ARIA PLAN FRACTIONS TREATED TO DATE: 16
RAD ONC ARIA PLAN FRACTIONS TREATED TO DATE: 8
RAD ONC ARIA PLAN ID: NORMAL
RAD ONC ARIA PLAN PRESCRIBED DOSE PER FRACTION: 0.58 GY
RAD ONC ARIA PLAN PRESCRIBED DOSE PER FRACTION: 1.8 GY
RAD ONC ARIA PLAN PRESCRIBED DOSE PER FRACTION: 1.8 GY
RAD ONC ARIA PLAN PRIMARY REFERENCE POINT: NORMAL
RAD ONC ARIA PLAN TOTAL FRACTIONS PRESCRIBED: 14
RAD ONC ARIA PLAN TOTAL FRACTIONS PRESCRIBED: 28
RAD ONC ARIA PLAN TOTAL FRACTIONS PRESCRIBED: 28
RAD ONC ARIA PLAN TOTAL PRESCRIBED DOSE: 1625.6 CGY
RAD ONC ARIA PLAN TOTAL PRESCRIBED DOSE: 2520 CGY
RAD ONC ARIA PLAN TOTAL PRESCRIBED DOSE: 5040 CGY
RAD ONC ARIA REFERENCE POINT DOSAGE GIVEN TO DATE: 28.8 GY
RAD ONC ARIA REFERENCE POINT ID: NORMAL
RAD ONC ARIA REFERENCE POINT SESSION DOSAGE GIVEN: 1.8 GY

## 2024-03-19 PROCEDURE — 77412 RADIATION TX DELIVERY LVL 3: CPT | Performed by: RADIOLOGY

## 2024-03-20 ENCOUNTER — HOSPITAL ENCOUNTER (OUTPATIENT)
Dept: RADIATION ONCOLOGY | Facility: HOSPITAL | Age: 69
Setting detail: RADIATION/ONCOLOGY SERIES
Discharge: HOME | End: 2024-03-20
Payer: MEDICARE

## 2024-03-20 LAB
RAD ONC ARIA COURSE ID: NORMAL
RAD ONC ARIA COURSE LAST TREATMENT DATE: NORMAL
RAD ONC ARIA COURSE START DATE: NORMAL
RAD ONC ARIA COURSE TREATMENT ELAPSED DAYS: 22
RAD ONC ARIA FIRST TREATMENT DATE: NORMAL
RAD ONC ARIA PLAN FRACTIONS TREATED TO DATE: 17
RAD ONC ARIA PLAN FRACTIONS TREATED TO DATE: 17
RAD ONC ARIA PLAN FRACTIONS TREATED TO DATE: 9
RAD ONC ARIA PLAN ID: NORMAL
RAD ONC ARIA PLAN PRESCRIBED DOSE PER FRACTION: 0.58 GY
RAD ONC ARIA PLAN PRESCRIBED DOSE PER FRACTION: 1.8 GY
RAD ONC ARIA PLAN PRESCRIBED DOSE PER FRACTION: 1.8 GY
RAD ONC ARIA PLAN PRIMARY REFERENCE POINT: NORMAL
RAD ONC ARIA PLAN TOTAL FRACTIONS PRESCRIBED: 14
RAD ONC ARIA PLAN TOTAL FRACTIONS PRESCRIBED: 28
RAD ONC ARIA PLAN TOTAL FRACTIONS PRESCRIBED: 28
RAD ONC ARIA PLAN TOTAL PRESCRIBED DOSE: 1625.6 CGY
RAD ONC ARIA PLAN TOTAL PRESCRIBED DOSE: 2520 CGY
RAD ONC ARIA PLAN TOTAL PRESCRIBED DOSE: 5040 CGY
RAD ONC ARIA REFERENCE POINT DOSAGE GIVEN TO DATE: 30.6 GY
RAD ONC ARIA REFERENCE POINT ID: NORMAL
RAD ONC ARIA REFERENCE POINT SESSION DOSAGE GIVEN: 1.8 GY

## 2024-03-20 PROCEDURE — 77417 THER RADIOLOGY PORT IMAGE(S): CPT | Performed by: RADIOLOGY

## 2024-03-20 PROCEDURE — 77412 RADIATION TX DELIVERY LVL 3: CPT | Performed by: RADIOLOGY

## 2024-03-21 ENCOUNTER — OFFICE VISIT (OUTPATIENT)
Dept: SURGERY | Age: 69
End: 2024-03-21

## 2024-03-21 ENCOUNTER — HOSPITAL ENCOUNTER (OUTPATIENT)
Dept: RADIATION ONCOLOGY | Facility: HOSPITAL | Age: 69
Discharge: HOME | End: 2024-03-21

## 2024-03-21 VITALS
HEIGHT: 67 IN | DIASTOLIC BLOOD PRESSURE: 68 MMHG | BODY MASS INDEX: 34.53 KG/M2 | WEIGHT: 220 LBS | SYSTOLIC BLOOD PRESSURE: 114 MMHG

## 2024-03-21 DIAGNOSIS — R22.9 SUBCUTANEOUS MASS: Primary | ICD-10-CM

## 2024-03-21 LAB
RAD ONC ARIA COURSE ID: NORMAL
RAD ONC ARIA COURSE LAST TREATMENT DATE: NORMAL
RAD ONC ARIA COURSE START DATE: NORMAL
RAD ONC ARIA COURSE TREATMENT ELAPSED DAYS: 23
RAD ONC ARIA FIRST TREATMENT DATE: NORMAL
RAD ONC ARIA PLAN FRACTIONS TREATED TO DATE: 18
RAD ONC ARIA PLAN FRACTIONS TREATED TO DATE: 18
RAD ONC ARIA PLAN FRACTIONS TREATED TO DATE: 9
RAD ONC ARIA PLAN ID: NORMAL
RAD ONC ARIA PLAN PRESCRIBED DOSE PER FRACTION: 0.58 GY
RAD ONC ARIA PLAN PRESCRIBED DOSE PER FRACTION: 1.8 GY
RAD ONC ARIA PLAN PRESCRIBED DOSE PER FRACTION: 1.8 GY
RAD ONC ARIA PLAN PRIMARY REFERENCE POINT: NORMAL
RAD ONC ARIA PLAN TOTAL FRACTIONS PRESCRIBED: 14
RAD ONC ARIA PLAN TOTAL FRACTIONS PRESCRIBED: 28
RAD ONC ARIA PLAN TOTAL FRACTIONS PRESCRIBED: 28
RAD ONC ARIA PLAN TOTAL PRESCRIBED DOSE: 1625.6 CGY
RAD ONC ARIA PLAN TOTAL PRESCRIBED DOSE: 2520 CGY
RAD ONC ARIA PLAN TOTAL PRESCRIBED DOSE: 5040 CGY
RAD ONC ARIA REFERENCE POINT DOSAGE GIVEN TO DATE: 32.4 GY
RAD ONC ARIA REFERENCE POINT ID: NORMAL
RAD ONC ARIA REFERENCE POINT SESSION DOSAGE GIVEN: 1.8 GY

## 2024-03-21 PROCEDURE — 77336 RADIATION PHYSICS CONSULT: CPT | Performed by: RADIOLOGY

## 2024-03-21 PROCEDURE — 77412 RADIATION TX DELIVERY LVL 3: CPT | Performed by: RADIOLOGY

## 2024-03-22 ENCOUNTER — HOSPITAL ENCOUNTER (OUTPATIENT)
Dept: RADIATION ONCOLOGY | Facility: HOSPITAL | Age: 69
Setting detail: RADIATION/ONCOLOGY SERIES
Discharge: HOME | End: 2024-03-22
Payer: MEDICARE

## 2024-03-22 LAB
RAD ONC ARIA COURSE ID: NORMAL
RAD ONC ARIA COURSE LAST TREATMENT DATE: NORMAL
RAD ONC ARIA COURSE START DATE: NORMAL
RAD ONC ARIA COURSE TREATMENT ELAPSED DAYS: 24
RAD ONC ARIA FIRST TREATMENT DATE: NORMAL
RAD ONC ARIA PLAN FRACTIONS TREATED TO DATE: 10
RAD ONC ARIA PLAN FRACTIONS TREATED TO DATE: 19
RAD ONC ARIA PLAN FRACTIONS TREATED TO DATE: 19
RAD ONC ARIA PLAN ID: NORMAL
RAD ONC ARIA PLAN PRESCRIBED DOSE PER FRACTION: 0.58 GY
RAD ONC ARIA PLAN PRESCRIBED DOSE PER FRACTION: 1.8 GY
RAD ONC ARIA PLAN PRESCRIBED DOSE PER FRACTION: 1.8 GY
RAD ONC ARIA PLAN PRIMARY REFERENCE POINT: NORMAL
RAD ONC ARIA PLAN TOTAL FRACTIONS PRESCRIBED: 14
RAD ONC ARIA PLAN TOTAL FRACTIONS PRESCRIBED: 28
RAD ONC ARIA PLAN TOTAL FRACTIONS PRESCRIBED: 28
RAD ONC ARIA PLAN TOTAL PRESCRIBED DOSE: 1625.6 CGY
RAD ONC ARIA PLAN TOTAL PRESCRIBED DOSE: 2520 CGY
RAD ONC ARIA PLAN TOTAL PRESCRIBED DOSE: 5040 CGY
RAD ONC ARIA REFERENCE POINT DOSAGE GIVEN TO DATE: 34.2 GY
RAD ONC ARIA REFERENCE POINT ID: NORMAL
RAD ONC ARIA REFERENCE POINT SESSION DOSAGE GIVEN: 1.8 GY

## 2024-03-22 PROCEDURE — 77412 RADIATION TX DELIVERY LVL 3: CPT | Performed by: RADIOLOGY

## 2024-03-22 NOTE — PROGRESS NOTES
Subjective  Vivienne Mcdonald comes today for suture removal for left chest wall mass.  She is doing well.    Objective  Patient Active Problem List    Diagnosis Date Noted    S/P bilateral mastectomy with right SNB -1/2/2024 01/12/2024    Dyspnea on exertion 11/29/2023    SHERWOOD (dyspnea on exertion) 11/27/2023    Poor venous access 11/14/2023    Li-Fraumeni syndrome TP53 pathogenic mutation 07/27/2023    Invasive ductal carcinoma of breast, female, right (HCC) 05/24/2023    Triple negative breast cancer (HCC) 05/19/2023    Schatzki's ring 08/28/2019    Hiatal hernia 06/28/2019    Volume depletion 06/26/2019    Leukocytosis 06/25/2019    GI bleed 06/25/2019    Gastroesophageal reflux disease with esophagitis 06/25/2019    Colitis 06/25/2019    Acute neck pain 06/25/2019    Acute bilateral low back pain 06/25/2019    Obesity, unspecified obesity severity, unspecified obesity type 09/06/2018    HTN (hypertension), benign 09/06/2018       Current Outpatient Medications   Medication Sig Dispense Refill    potassium chloride (MICRO-K) 10 MEQ extended release capsule Take 2 capsules by mouth daily 60 capsule 1    oxyCODONE-acetaminophen (PERCOCET) 5-325 MG per tablet Take 1 tablet by mouth every 6 hours as needed for Pain. Max Daily Amount: 4 tablets 12 tablet 0    ELIQUIS 5 MG TABS tablet TAKE 1 TABLET BY MOUTH 2 TIMES DAILY 60 tablet 5    ondansetron (ZOFRAN) 4 MG tablet Take 1 tablet by mouth every 8 hours as needed for Nausea or Vomiting 30 tablet 5    HYDROcodone-acetaminophen (NORCO) 7.5-325 MG per tablet Take 1 tablet by mouth every 6 hours as needed for Pain.      lovastatin (MEVACOR) 20 MG tablet Take 1 tablet by mouth nightly      metoprolol succinate (TOPROL XL) 100 MG extended release tablet Take 1 tablet by mouth daily      montelukast (SINGULAIR) 10 MG tablet Take 1 tablet by mouth nightly       No current facility-administered medications for this visit.       Allergies Amoxicillin and Other    Past Medical

## 2024-03-25 ENCOUNTER — HOSPITAL ENCOUNTER (OUTPATIENT)
Dept: RADIATION ONCOLOGY | Facility: HOSPITAL | Age: 69
Setting detail: RADIATION/ONCOLOGY SERIES
Discharge: HOME | End: 2024-03-25
Payer: MEDICARE

## 2024-03-25 LAB
RAD ONC ARIA COURSE ID: NORMAL
RAD ONC ARIA COURSE LAST TREATMENT DATE: NORMAL
RAD ONC ARIA COURSE START DATE: NORMAL
RAD ONC ARIA COURSE TREATMENT ELAPSED DAYS: 27
RAD ONC ARIA FIRST TREATMENT DATE: NORMAL
RAD ONC ARIA PLAN FRACTIONS TREATED TO DATE: 10
RAD ONC ARIA PLAN FRACTIONS TREATED TO DATE: 20
RAD ONC ARIA PLAN FRACTIONS TREATED TO DATE: 20
RAD ONC ARIA PLAN ID: NORMAL
RAD ONC ARIA PLAN PRESCRIBED DOSE PER FRACTION: 0.58 GY
RAD ONC ARIA PLAN PRESCRIBED DOSE PER FRACTION: 1.8 GY
RAD ONC ARIA PLAN PRESCRIBED DOSE PER FRACTION: 1.8 GY
RAD ONC ARIA PLAN PRIMARY REFERENCE POINT: NORMAL
RAD ONC ARIA PLAN TOTAL FRACTIONS PRESCRIBED: 14
RAD ONC ARIA PLAN TOTAL FRACTIONS PRESCRIBED: 28
RAD ONC ARIA PLAN TOTAL FRACTIONS PRESCRIBED: 28
RAD ONC ARIA PLAN TOTAL PRESCRIBED DOSE: 1625.6 CGY
RAD ONC ARIA PLAN TOTAL PRESCRIBED DOSE: 2520 CGY
RAD ONC ARIA PLAN TOTAL PRESCRIBED DOSE: 5040 CGY
RAD ONC ARIA REFERENCE POINT DOSAGE GIVEN TO DATE: 36 GY
RAD ONC ARIA REFERENCE POINT ID: NORMAL
RAD ONC ARIA REFERENCE POINT SESSION DOSAGE GIVEN: 1.8 GY

## 2024-03-25 PROCEDURE — 77412 RADIATION TX DELIVERY LVL 3: CPT | Performed by: RADIOLOGY

## 2024-03-26 ENCOUNTER — HOSPITAL ENCOUNTER (OUTPATIENT)
Dept: RADIATION ONCOLOGY | Facility: HOSPITAL | Age: 69
Setting detail: RADIATION/ONCOLOGY SERIES
Discharge: HOME | End: 2024-03-26
Payer: MEDICARE

## 2024-03-26 LAB
RAD ONC ARIA COURSE ID: NORMAL
RAD ONC ARIA COURSE LAST TREATMENT DATE: NORMAL
RAD ONC ARIA COURSE START DATE: NORMAL
RAD ONC ARIA COURSE TREATMENT ELAPSED DAYS: 28
RAD ONC ARIA FIRST TREATMENT DATE: NORMAL
RAD ONC ARIA PLAN FRACTIONS TREATED TO DATE: 11
RAD ONC ARIA PLAN FRACTIONS TREATED TO DATE: 21
RAD ONC ARIA PLAN FRACTIONS TREATED TO DATE: 21
RAD ONC ARIA PLAN ID: NORMAL
RAD ONC ARIA PLAN PRESCRIBED DOSE PER FRACTION: 0.58 GY
RAD ONC ARIA PLAN PRESCRIBED DOSE PER FRACTION: 1.8 GY
RAD ONC ARIA PLAN PRESCRIBED DOSE PER FRACTION: 1.8 GY
RAD ONC ARIA PLAN PRIMARY REFERENCE POINT: NORMAL
RAD ONC ARIA PLAN TOTAL FRACTIONS PRESCRIBED: 14
RAD ONC ARIA PLAN TOTAL FRACTIONS PRESCRIBED: 28
RAD ONC ARIA PLAN TOTAL FRACTIONS PRESCRIBED: 28
RAD ONC ARIA PLAN TOTAL PRESCRIBED DOSE: 1625.6 CGY
RAD ONC ARIA PLAN TOTAL PRESCRIBED DOSE: 2520 CGY
RAD ONC ARIA PLAN TOTAL PRESCRIBED DOSE: 5040 CGY
RAD ONC ARIA REFERENCE POINT DOSAGE GIVEN TO DATE: 37.8 GY
RAD ONC ARIA REFERENCE POINT ID: NORMAL
RAD ONC ARIA REFERENCE POINT SESSION DOSAGE GIVEN: 1.8 GY

## 2024-03-26 PROCEDURE — 77412 RADIATION TX DELIVERY LVL 3: CPT | Performed by: RADIOLOGY

## 2024-03-27 ENCOUNTER — HOSPITAL ENCOUNTER (OUTPATIENT)
Dept: RADIATION ONCOLOGY | Facility: HOSPITAL | Age: 69
Setting detail: RADIATION/ONCOLOGY SERIES
Discharge: HOME | End: 2024-03-27
Payer: MEDICARE

## 2024-03-27 LAB
RAD ONC ARIA COURSE ID: NORMAL
RAD ONC ARIA COURSE LAST TREATMENT DATE: NORMAL
RAD ONC ARIA COURSE START DATE: NORMAL
RAD ONC ARIA COURSE TREATMENT ELAPSED DAYS: 29
RAD ONC ARIA FIRST TREATMENT DATE: NORMAL
RAD ONC ARIA PLAN FRACTIONS TREATED TO DATE: 11
RAD ONC ARIA PLAN FRACTIONS TREATED TO DATE: 22
RAD ONC ARIA PLAN FRACTIONS TREATED TO DATE: 22
RAD ONC ARIA PLAN ID: NORMAL
RAD ONC ARIA PLAN PRESCRIBED DOSE PER FRACTION: 0.58 GY
RAD ONC ARIA PLAN PRESCRIBED DOSE PER FRACTION: 1.8 GY
RAD ONC ARIA PLAN PRESCRIBED DOSE PER FRACTION: 1.8 GY
RAD ONC ARIA PLAN PRIMARY REFERENCE POINT: NORMAL
RAD ONC ARIA PLAN TOTAL FRACTIONS PRESCRIBED: 14
RAD ONC ARIA PLAN TOTAL FRACTIONS PRESCRIBED: 28
RAD ONC ARIA PLAN TOTAL FRACTIONS PRESCRIBED: 28
RAD ONC ARIA PLAN TOTAL PRESCRIBED DOSE: 1625.6 CGY
RAD ONC ARIA PLAN TOTAL PRESCRIBED DOSE: 2520 CGY
RAD ONC ARIA PLAN TOTAL PRESCRIBED DOSE: 5040 CGY
RAD ONC ARIA REFERENCE POINT DOSAGE GIVEN TO DATE: 39.6 GY
RAD ONC ARIA REFERENCE POINT ID: NORMAL
RAD ONC ARIA REFERENCE POINT SESSION DOSAGE GIVEN: 1.8 GY

## 2024-03-27 PROCEDURE — 77336 RADIATION PHYSICS CONSULT: CPT | Performed by: RADIOLOGY

## 2024-03-27 PROCEDURE — 77417 THER RADIOLOGY PORT IMAGE(S): CPT | Performed by: RADIOLOGY

## 2024-03-27 PROCEDURE — 77412 RADIATION TX DELIVERY LVL 3: CPT | Performed by: RADIOLOGY

## 2024-03-28 ENCOUNTER — HOSPITAL ENCOUNTER (OUTPATIENT)
Dept: RADIATION ONCOLOGY | Facility: HOSPITAL | Age: 69
Setting detail: RADIATION/ONCOLOGY SERIES
Discharge: HOME | End: 2024-03-28
Payer: MEDICARE

## 2024-03-28 LAB
RAD ONC ARIA COURSE ID: NORMAL
RAD ONC ARIA COURSE LAST TREATMENT DATE: NORMAL
RAD ONC ARIA COURSE START DATE: NORMAL
RAD ONC ARIA COURSE TREATMENT ELAPSED DAYS: 30
RAD ONC ARIA FIRST TREATMENT DATE: NORMAL
RAD ONC ARIA PLAN FRACTIONS TREATED TO DATE: 12
RAD ONC ARIA PLAN FRACTIONS TREATED TO DATE: 23
RAD ONC ARIA PLAN FRACTIONS TREATED TO DATE: 23
RAD ONC ARIA PLAN ID: NORMAL
RAD ONC ARIA PLAN PRESCRIBED DOSE PER FRACTION: 0.58 GY
RAD ONC ARIA PLAN PRESCRIBED DOSE PER FRACTION: 1.8 GY
RAD ONC ARIA PLAN PRESCRIBED DOSE PER FRACTION: 1.8 GY
RAD ONC ARIA PLAN PRIMARY REFERENCE POINT: NORMAL
RAD ONC ARIA PLAN TOTAL FRACTIONS PRESCRIBED: 14
RAD ONC ARIA PLAN TOTAL FRACTIONS PRESCRIBED: 28
RAD ONC ARIA PLAN TOTAL FRACTIONS PRESCRIBED: 28
RAD ONC ARIA PLAN TOTAL PRESCRIBED DOSE: 1625.6 CGY
RAD ONC ARIA PLAN TOTAL PRESCRIBED DOSE: 2520 CGY
RAD ONC ARIA PLAN TOTAL PRESCRIBED DOSE: 5040 CGY
RAD ONC ARIA REFERENCE POINT DOSAGE GIVEN TO DATE: 41.4 GY
RAD ONC ARIA REFERENCE POINT ID: NORMAL
RAD ONC ARIA REFERENCE POINT SESSION DOSAGE GIVEN: 1.8 GY

## 2024-03-28 PROCEDURE — 77412 RADIATION TX DELIVERY LVL 3: CPT | Performed by: RADIOLOGY

## 2024-03-29 ENCOUNTER — HOSPITAL ENCOUNTER (OUTPATIENT)
Dept: OCCUPATIONAL THERAPY | Age: 69
Setting detail: THERAPIES SERIES
Discharge: HOME | End: 2024-03-29
Payer: MEDICARE

## 2024-03-29 ENCOUNTER — HOSPITAL ENCOUNTER (OUTPATIENT)
Dept: RADIATION ONCOLOGY | Facility: HOSPITAL | Age: 69
Setting detail: RADIATION/ONCOLOGY SERIES
Discharge: HOME | End: 2024-03-29
Payer: MEDICARE

## 2024-03-29 LAB
RAD ONC ARIA COURSE ID: NORMAL
RAD ONC ARIA COURSE LAST TREATMENT DATE: NORMAL
RAD ONC ARIA COURSE START DATE: NORMAL
RAD ONC ARIA COURSE TREATMENT ELAPSED DAYS: 31
RAD ONC ARIA FIRST TREATMENT DATE: NORMAL
RAD ONC ARIA PLAN FRACTIONS TREATED TO DATE: 12
RAD ONC ARIA PLAN FRACTIONS TREATED TO DATE: 24
RAD ONC ARIA PLAN FRACTIONS TREATED TO DATE: 24
RAD ONC ARIA PLAN ID: NORMAL
RAD ONC ARIA PLAN PRESCRIBED DOSE PER FRACTION: 0.58 GY
RAD ONC ARIA PLAN PRESCRIBED DOSE PER FRACTION: 1.8 GY
RAD ONC ARIA PLAN PRESCRIBED DOSE PER FRACTION: 1.8 GY
RAD ONC ARIA PLAN PRIMARY REFERENCE POINT: NORMAL
RAD ONC ARIA PLAN TOTAL FRACTIONS PRESCRIBED: 14
RAD ONC ARIA PLAN TOTAL FRACTIONS PRESCRIBED: 28
RAD ONC ARIA PLAN TOTAL FRACTIONS PRESCRIBED: 28
RAD ONC ARIA PLAN TOTAL PRESCRIBED DOSE: 1625.6 CGY
RAD ONC ARIA PLAN TOTAL PRESCRIBED DOSE: 2520 CGY
RAD ONC ARIA PLAN TOTAL PRESCRIBED DOSE: 5040 CGY
RAD ONC ARIA REFERENCE POINT DOSAGE GIVEN TO DATE: 43.2 GY
RAD ONC ARIA REFERENCE POINT ID: NORMAL
RAD ONC ARIA REFERENCE POINT SESSION DOSAGE GIVEN: 1.8 GY

## 2024-03-29 PROCEDURE — 93702 BIS XTRACELL FLUID ANALYSIS: CPT

## 2024-03-29 PROCEDURE — 77412 RADIATION TX DELIVERY LVL 3: CPT | Performed by: RADIOLOGY

## 2024-03-29 PROCEDURE — 97165 OT EVAL LOW COMPLEX 30 MIN: CPT

## 2024-03-29 NOTE — PROGRESS NOTES
Occupational Therapy: Initial Evaluation   Patient: Vivienne Mcdonald (68 y.o. female)   Examination Date: 2024  Plan of Care Certification Period: 3/29/2024 to  3/29/2024      :  1955  MRN: [id number]  CSN: [id number]   Insurance: Payor: MEDICARE / Plan: MEDICARE PART A AND B / Product Type: *No Product type* /   Insurance ID: [id number] - (Medicare) Secondary Insurance (if applicable):    Insurance Information: Medicare   Referring Physician: Hadley Hutchinson MD Daniel Howard, MD   PCP: Shirley Ramirez DO Visits to Date/Visits Approved:   /      No Show/Cancelled Appts:    /       Medical Diagnosis: Malignant neoplasm of upper-inner quadrant of right female breast [C50.211]  Acquired absence of bilateral breasts and nipples [Z90.13] C50.211, C50.919  No data recorded     PERTINENT MEDICAL HISTORY   Patient assessed for rehabilitation services?: Yes  Self reported health status:: Excellent    Medical History: Chart Reviewed: Yes   Past Medical History:   Diagnosis Date    Acute bilateral low back pain 2019    Breast cancer (HCC) 2023    right    Hand numbness     right hand s/p arm surgery    Hypercholesterolemia     Hyperlipidemia     Hypertension     Malignant neoplasm of overlapping sites of right breast (HCC) 2023    Obesity, unspecified obesity severity, unspecified obesity type 2018    Post-menopausal      Surgical History:   Past Surgical History:   Procedure Laterality Date    ARM SURGERY Right     \"nerve release\", dt rocío, with 2nd surgery, debridement    JOINT REPLACEMENT Right     knee    MASTECTOMY Bilateral 2024    BILATERAL SIMPLE MASTECTOMY WITH RIGHT SENTINEL NODE, BILATERAL PEC BLOCK, ULTRASOUND GUIDED NEEDLE LOCALIZATION X 2 performed by Hadley Hutchinson MD at [facility] OR    PORT SURGERY N/A 2023    PORT INSERTION WITH FLUOROSCOPY performed by Hadley Hutchinson MD at [facility] OR    TOTAL KNEE ARTHROPLASTY Right     US BREAST BIOPSY W LOC DEVICE 1ST

## 2024-04-01 ENCOUNTER — HOSPITAL ENCOUNTER (OUTPATIENT)
Dept: RADIATION ONCOLOGY | Facility: HOSPITAL | Age: 69
Setting detail: RADIATION/ONCOLOGY SERIES
End: 2024-04-01
Payer: MEDICARE

## 2024-04-01 ENCOUNTER — HOSPITAL ENCOUNTER (OUTPATIENT)
Dept: RADIATION ONCOLOGY | Facility: HOSPITAL | Age: 69
Setting detail: RADIATION/ONCOLOGY SERIES
Discharge: HOME | End: 2024-04-01
Payer: MEDICARE

## 2024-04-01 LAB
RAD ONC ARIA COURSE ID: NORMAL
RAD ONC ARIA COURSE LAST TREATMENT DATE: NORMAL
RAD ONC ARIA COURSE START DATE: NORMAL
RAD ONC ARIA COURSE TREATMENT ELAPSED DAYS: 34
RAD ONC ARIA FIRST TREATMENT DATE: NORMAL
RAD ONC ARIA PLAN FRACTIONS TREATED TO DATE: 13
RAD ONC ARIA PLAN FRACTIONS TREATED TO DATE: 25
RAD ONC ARIA PLAN FRACTIONS TREATED TO DATE: 25
RAD ONC ARIA PLAN ID: NORMAL
RAD ONC ARIA PLAN PRESCRIBED DOSE PER FRACTION: 0.58 GY
RAD ONC ARIA PLAN PRESCRIBED DOSE PER FRACTION: 1.8 GY
RAD ONC ARIA PLAN PRESCRIBED DOSE PER FRACTION: 1.8 GY
RAD ONC ARIA PLAN PRIMARY REFERENCE POINT: NORMAL
RAD ONC ARIA PLAN TOTAL FRACTIONS PRESCRIBED: 14
RAD ONC ARIA PLAN TOTAL FRACTIONS PRESCRIBED: 28
RAD ONC ARIA PLAN TOTAL FRACTIONS PRESCRIBED: 28
RAD ONC ARIA PLAN TOTAL PRESCRIBED DOSE: 1625.6 CGY
RAD ONC ARIA PLAN TOTAL PRESCRIBED DOSE: 2520 CGY
RAD ONC ARIA PLAN TOTAL PRESCRIBED DOSE: 5040 CGY
RAD ONC ARIA REFERENCE POINT DOSAGE GIVEN TO DATE: 45 GY
RAD ONC ARIA REFERENCE POINT ID: NORMAL
RAD ONC ARIA REFERENCE POINT SESSION DOSAGE GIVEN: 1.8 GY

## 2024-04-01 PROCEDURE — 77412 RADIATION TX DELIVERY LVL 3: CPT | Performed by: RADIOLOGY

## 2024-04-02 ENCOUNTER — HOSPITAL ENCOUNTER (OUTPATIENT)
Dept: RADIATION ONCOLOGY | Facility: HOSPITAL | Age: 69
Setting detail: RADIATION/ONCOLOGY SERIES
Discharge: HOME | End: 2024-04-02
Payer: MEDICARE

## 2024-04-02 LAB
RAD ONC ARIA COURSE ID: NORMAL
RAD ONC ARIA COURSE LAST TREATMENT DATE: NORMAL
RAD ONC ARIA COURSE START DATE: NORMAL
RAD ONC ARIA COURSE TREATMENT ELAPSED DAYS: 35
RAD ONC ARIA FIRST TREATMENT DATE: NORMAL
RAD ONC ARIA PLAN FRACTIONS TREATED TO DATE: 13
RAD ONC ARIA PLAN FRACTIONS TREATED TO DATE: 26
RAD ONC ARIA PLAN FRACTIONS TREATED TO DATE: 26
RAD ONC ARIA PLAN ID: NORMAL
RAD ONC ARIA PLAN PRESCRIBED DOSE PER FRACTION: 0.58 GY
RAD ONC ARIA PLAN PRESCRIBED DOSE PER FRACTION: 1.8 GY
RAD ONC ARIA PLAN PRESCRIBED DOSE PER FRACTION: 1.8 GY
RAD ONC ARIA PLAN PRIMARY REFERENCE POINT: NORMAL
RAD ONC ARIA PLAN TOTAL FRACTIONS PRESCRIBED: 14
RAD ONC ARIA PLAN TOTAL FRACTIONS PRESCRIBED: 28
RAD ONC ARIA PLAN TOTAL FRACTIONS PRESCRIBED: 28
RAD ONC ARIA PLAN TOTAL PRESCRIBED DOSE: 1625.6 CGY
RAD ONC ARIA PLAN TOTAL PRESCRIBED DOSE: 2520 CGY
RAD ONC ARIA PLAN TOTAL PRESCRIBED DOSE: 5040 CGY
RAD ONC ARIA REFERENCE POINT DOSAGE GIVEN TO DATE: 46.8 GY
RAD ONC ARIA REFERENCE POINT ID: NORMAL
RAD ONC ARIA REFERENCE POINT SESSION DOSAGE GIVEN: 1.8 GY

## 2024-04-02 PROCEDURE — 77412 RADIATION TX DELIVERY LVL 3: CPT | Performed by: RADIOLOGY

## 2024-04-03 ENCOUNTER — HOSPITAL ENCOUNTER (OUTPATIENT)
Dept: RADIATION ONCOLOGY | Facility: HOSPITAL | Age: 69
Setting detail: RADIATION/ONCOLOGY SERIES
Discharge: HOME | End: 2024-04-03
Payer: MEDICARE

## 2024-04-03 LAB
RAD ONC ARIA COURSE ID: NORMAL
RAD ONC ARIA COURSE LAST TREATMENT DATE: NORMAL
RAD ONC ARIA COURSE START DATE: NORMAL
RAD ONC ARIA COURSE TREATMENT ELAPSED DAYS: 36
RAD ONC ARIA FIRST TREATMENT DATE: NORMAL
RAD ONC ARIA PLAN FRACTIONS TREATED TO DATE: 14
RAD ONC ARIA PLAN FRACTIONS TREATED TO DATE: 27
RAD ONC ARIA PLAN FRACTIONS TREATED TO DATE: 27
RAD ONC ARIA PLAN ID: NORMAL
RAD ONC ARIA PLAN PRESCRIBED DOSE PER FRACTION: 0.58 GY
RAD ONC ARIA PLAN PRESCRIBED DOSE PER FRACTION: 1.8 GY
RAD ONC ARIA PLAN PRESCRIBED DOSE PER FRACTION: 1.8 GY
RAD ONC ARIA PLAN PRIMARY REFERENCE POINT: NORMAL
RAD ONC ARIA PLAN TOTAL FRACTIONS PRESCRIBED: 14
RAD ONC ARIA PLAN TOTAL FRACTIONS PRESCRIBED: 28
RAD ONC ARIA PLAN TOTAL FRACTIONS PRESCRIBED: 28
RAD ONC ARIA PLAN TOTAL PRESCRIBED DOSE: 1625.6 CGY
RAD ONC ARIA PLAN TOTAL PRESCRIBED DOSE: 2520 CGY
RAD ONC ARIA PLAN TOTAL PRESCRIBED DOSE: 5040 CGY
RAD ONC ARIA REFERENCE POINT DOSAGE GIVEN TO DATE: 48.6 GY
RAD ONC ARIA REFERENCE POINT ID: NORMAL
RAD ONC ARIA REFERENCE POINT SESSION DOSAGE GIVEN: 1.8 GY

## 2024-04-03 PROCEDURE — 77417 THER RADIOLOGY PORT IMAGE(S): CPT | Performed by: RADIOLOGY

## 2024-04-03 PROCEDURE — 77412 RADIATION TX DELIVERY LVL 3: CPT | Performed by: RADIOLOGY

## 2024-04-04 ENCOUNTER — HOSPITAL ENCOUNTER (OUTPATIENT)
Dept: RADIATION ONCOLOGY | Facility: HOSPITAL | Age: 69
Setting detail: RADIATION/ONCOLOGY SERIES
Discharge: HOME | End: 2024-04-04
Payer: MEDICARE

## 2024-04-04 LAB
RAD ONC ARIA COURSE ID: NORMAL
RAD ONC ARIA COURSE LAST TREATMENT DATE: NORMAL
RAD ONC ARIA COURSE START DATE: NORMAL
RAD ONC ARIA COURSE TREATMENT ELAPSED DAYS: 37
RAD ONC ARIA FIRST TREATMENT DATE: NORMAL
RAD ONC ARIA PLAN FRACTIONS TREATED TO DATE: 14
RAD ONC ARIA PLAN FRACTIONS TREATED TO DATE: 28
RAD ONC ARIA PLAN FRACTIONS TREATED TO DATE: 28
RAD ONC ARIA PLAN ID: NORMAL
RAD ONC ARIA PLAN PRESCRIBED DOSE PER FRACTION: 0.58 GY
RAD ONC ARIA PLAN PRESCRIBED DOSE PER FRACTION: 1.8 GY
RAD ONC ARIA PLAN PRESCRIBED DOSE PER FRACTION: 1.8 GY
RAD ONC ARIA PLAN PRIMARY REFERENCE POINT: NORMAL
RAD ONC ARIA PLAN TOTAL FRACTIONS PRESCRIBED: 14
RAD ONC ARIA PLAN TOTAL FRACTIONS PRESCRIBED: 28
RAD ONC ARIA PLAN TOTAL FRACTIONS PRESCRIBED: 28
RAD ONC ARIA PLAN TOTAL PRESCRIBED DOSE: 1625.6 CGY
RAD ONC ARIA PLAN TOTAL PRESCRIBED DOSE: 2520 CGY
RAD ONC ARIA PLAN TOTAL PRESCRIBED DOSE: 5040 CGY
RAD ONC ARIA REFERENCE POINT DOSAGE GIVEN TO DATE: 50.4 GY
RAD ONC ARIA REFERENCE POINT ID: NORMAL
RAD ONC ARIA REFERENCE POINT SESSION DOSAGE GIVEN: 1.8 GY

## 2024-04-04 PROCEDURE — 77412 RADIATION TX DELIVERY LVL 3: CPT | Performed by: RADIOLOGY

## 2024-04-04 PROCEDURE — 77336 RADIATION PHYSICS CONSULT: CPT | Performed by: RADIOLOGY

## 2024-04-05 ENCOUNTER — HOSPITAL ENCOUNTER (OUTPATIENT)
Dept: RADIATION ONCOLOGY | Facility: HOSPITAL | Age: 69
Setting detail: RADIATION/ONCOLOGY SERIES
Discharge: HOME | End: 2024-04-05
Payer: MEDICARE

## 2024-04-05 LAB
RAD ONC ARIA COURSE ID: NORMAL
RAD ONC ARIA COURSE LAST TREATMENT DATE: NORMAL
RAD ONC ARIA COURSE START DATE: NORMAL
RAD ONC ARIA COURSE TREATMENT ELAPSED DAYS: 38
RAD ONC ARIA FIRST TREATMENT DATE: NORMAL
RAD ONC ARIA PLAN FRACTIONS TREATED TO DATE: 1
RAD ONC ARIA PLAN ID: NORMAL
RAD ONC ARIA PLAN PRESCRIBED DOSE PER FRACTION: 2 GY
RAD ONC ARIA PLAN PRIMARY REFERENCE POINT: NORMAL
RAD ONC ARIA PLAN TOTAL FRACTIONS PRESCRIBED: 5
RAD ONC ARIA PLAN TOTAL PRESCRIBED DOSE: 1000 CGY
RAD ONC ARIA REFERENCE POINT DOSAGE GIVEN TO DATE: 2.11 GY
RAD ONC ARIA REFERENCE POINT DOSAGE GIVEN TO DATE: 52.4 GY
RAD ONC ARIA REFERENCE POINT ID: NORMAL
RAD ONC ARIA REFERENCE POINT ID: NORMAL
RAD ONC ARIA REFERENCE POINT SESSION DOSAGE GIVEN: 2 GY
RAD ONC ARIA REFERENCE POINT SESSION DOSAGE GIVEN: 2.11 GY

## 2024-04-05 PROCEDURE — 77412 RADIATION TX DELIVERY LVL 3: CPT | Performed by: RADIOLOGY

## 2024-04-09 ENCOUNTER — HOSPITAL ENCOUNTER (OUTPATIENT)
Dept: RADIATION ONCOLOGY | Facility: HOSPITAL | Age: 69
Setting detail: RADIATION/ONCOLOGY SERIES
Discharge: HOME | End: 2024-04-09
Payer: MEDICARE

## 2024-04-09 LAB
RAD ONC ARIA COURSE ID: NORMAL
RAD ONC ARIA COURSE LAST TREATMENT DATE: NORMAL
RAD ONC ARIA COURSE START DATE: NORMAL
RAD ONC ARIA COURSE TREATMENT ELAPSED DAYS: 42
RAD ONC ARIA FIRST TREATMENT DATE: NORMAL
RAD ONC ARIA PLAN FRACTIONS TREATED TO DATE: 2
RAD ONC ARIA PLAN ID: NORMAL
RAD ONC ARIA PLAN PRESCRIBED DOSE PER FRACTION: 2 GY
RAD ONC ARIA PLAN PRIMARY REFERENCE POINT: NORMAL
RAD ONC ARIA PLAN TOTAL FRACTIONS PRESCRIBED: 5
RAD ONC ARIA PLAN TOTAL PRESCRIBED DOSE: 1000 CGY
RAD ONC ARIA REFERENCE POINT DOSAGE GIVEN TO DATE: 4.21 GY
RAD ONC ARIA REFERENCE POINT DOSAGE GIVEN TO DATE: 54.4 GY
RAD ONC ARIA REFERENCE POINT ID: NORMAL
RAD ONC ARIA REFERENCE POINT ID: NORMAL
RAD ONC ARIA REFERENCE POINT SESSION DOSAGE GIVEN: 2 GY
RAD ONC ARIA REFERENCE POINT SESSION DOSAGE GIVEN: 2.11 GY

## 2024-04-09 PROCEDURE — 77412 RADIATION TX DELIVERY LVL 3: CPT | Performed by: RADIOLOGY

## 2024-04-10 ENCOUNTER — HOSPITAL ENCOUNTER (OUTPATIENT)
Dept: RADIATION ONCOLOGY | Facility: HOSPITAL | Age: 69
Setting detail: RADIATION/ONCOLOGY SERIES
Discharge: HOME | End: 2024-04-10
Payer: MEDICARE

## 2024-04-10 LAB
RAD ONC ARIA COURSE ID: NORMAL
RAD ONC ARIA COURSE LAST TREATMENT DATE: NORMAL
RAD ONC ARIA COURSE START DATE: NORMAL
RAD ONC ARIA COURSE TREATMENT ELAPSED DAYS: 43
RAD ONC ARIA FIRST TREATMENT DATE: NORMAL
RAD ONC ARIA PLAN FRACTIONS TREATED TO DATE: 3
RAD ONC ARIA PLAN ID: NORMAL
RAD ONC ARIA PLAN PRESCRIBED DOSE PER FRACTION: 2 GY
RAD ONC ARIA PLAN PRIMARY REFERENCE POINT: NORMAL
RAD ONC ARIA PLAN TOTAL FRACTIONS PRESCRIBED: 5
RAD ONC ARIA PLAN TOTAL PRESCRIBED DOSE: 1000 CGY
RAD ONC ARIA REFERENCE POINT DOSAGE GIVEN TO DATE: 56.4 GY
RAD ONC ARIA REFERENCE POINT DOSAGE GIVEN TO DATE: 6.32 GY
RAD ONC ARIA REFERENCE POINT ID: NORMAL
RAD ONC ARIA REFERENCE POINT ID: NORMAL
RAD ONC ARIA REFERENCE POINT SESSION DOSAGE GIVEN: 2 GY
RAD ONC ARIA REFERENCE POINT SESSION DOSAGE GIVEN: 2.11 GY

## 2024-04-10 PROCEDURE — 77412 RADIATION TX DELIVERY LVL 3: CPT | Performed by: RADIOLOGY

## 2024-04-10 PROCEDURE — 77336 RADIATION PHYSICS CONSULT: CPT | Performed by: RADIOLOGY

## 2024-04-11 ENCOUNTER — HOSPITAL ENCOUNTER (OUTPATIENT)
Dept: RADIATION ONCOLOGY | Facility: HOSPITAL | Age: 69
Setting detail: RADIATION/ONCOLOGY SERIES
Discharge: HOME | End: 2024-04-11
Payer: MEDICARE

## 2024-04-11 LAB
RAD ONC ARIA COURSE ID: NORMAL
RAD ONC ARIA COURSE LAST TREATMENT DATE: NORMAL
RAD ONC ARIA COURSE START DATE: NORMAL
RAD ONC ARIA COURSE TREATMENT ELAPSED DAYS: 44
RAD ONC ARIA FIRST TREATMENT DATE: NORMAL
RAD ONC ARIA PLAN FRACTIONS TREATED TO DATE: 4
RAD ONC ARIA PLAN ID: NORMAL
RAD ONC ARIA PLAN PRESCRIBED DOSE PER FRACTION: 2 GY
RAD ONC ARIA PLAN PRIMARY REFERENCE POINT: NORMAL
RAD ONC ARIA PLAN TOTAL FRACTIONS PRESCRIBED: 5
RAD ONC ARIA PLAN TOTAL PRESCRIBED DOSE: 1000 CGY
RAD ONC ARIA REFERENCE POINT DOSAGE GIVEN TO DATE: 58.4 GY
RAD ONC ARIA REFERENCE POINT DOSAGE GIVEN TO DATE: 8.42 GY
RAD ONC ARIA REFERENCE POINT ID: NORMAL
RAD ONC ARIA REFERENCE POINT ID: NORMAL
RAD ONC ARIA REFERENCE POINT SESSION DOSAGE GIVEN: 2 GY
RAD ONC ARIA REFERENCE POINT SESSION DOSAGE GIVEN: 2.11 GY

## 2024-04-11 PROCEDURE — 77412 RADIATION TX DELIVERY LVL 3: CPT | Performed by: RADIOLOGY

## 2024-04-12 ENCOUNTER — HOSPITAL ENCOUNTER (OUTPATIENT)
Dept: RADIATION ONCOLOGY | Facility: HOSPITAL | Age: 69
Setting detail: RADIATION/ONCOLOGY SERIES
Discharge: HOME | End: 2024-04-12
Payer: MEDICARE

## 2024-04-12 LAB
RAD ONC ARIA COURSE ID: NORMAL
RAD ONC ARIA COURSE LAST TREATMENT DATE: NORMAL
RAD ONC ARIA COURSE START DATE: NORMAL
RAD ONC ARIA COURSE TREATMENT ELAPSED DAYS: 45
RAD ONC ARIA FIRST TREATMENT DATE: NORMAL
RAD ONC ARIA PLAN FRACTIONS TREATED TO DATE: 5
RAD ONC ARIA PLAN ID: NORMAL
RAD ONC ARIA PLAN PRESCRIBED DOSE PER FRACTION: 2 GY
RAD ONC ARIA PLAN PRIMARY REFERENCE POINT: NORMAL
RAD ONC ARIA PLAN TOTAL FRACTIONS PRESCRIBED: 5
RAD ONC ARIA PLAN TOTAL PRESCRIBED DOSE: 1000 CGY
RAD ONC ARIA REFERENCE POINT DOSAGE GIVEN TO DATE: 10.53 GY
RAD ONC ARIA REFERENCE POINT DOSAGE GIVEN TO DATE: 60.4 GY
RAD ONC ARIA REFERENCE POINT ID: NORMAL
RAD ONC ARIA REFERENCE POINT ID: NORMAL
RAD ONC ARIA REFERENCE POINT SESSION DOSAGE GIVEN: 2 GY
RAD ONC ARIA REFERENCE POINT SESSION DOSAGE GIVEN: 2.11 GY

## 2024-04-12 PROCEDURE — 77412 RADIATION TX DELIVERY LVL 3: CPT | Performed by: RADIOLOGY

## 2024-04-12 NOTE — DISCHARGE INSTRUCTIONS
Urine preg negative.    paclitaxel  Pronunciation:  JOHN li MERCEDES asael  Brand:  Onxol  What is the most important information I should know about paclitaxel? Get emergency medical help if you have signs of an allergic reaction: hives; difficult breathing; feeling like you might pass out; swelling of your face, lips, tongue, or throat. Paclitaxel can lower blood cells that help your body fight infections and help your blood to clot. You may get an infection or bleed more easily. Call your doctor if you have unusual bruising or bleeding, or signs of infection (fever, sore throat, mouth sores, cough). What is paclitaxel? Paclitaxel is used to treat breast cancer, ovarian cancer, and lung cancer. It is also used to treat AIDS-related Kaposi's sarcoma. Paclitaxel is sometimes given after other treatments have failed. Paclitaxel may also be used for purposes not listed in this medication guide. What should I discuss with my healthcare provider before receiving paclitaxel? You should not be treated with paclitaxel if you are allergic to it, or if you have:  low white blood cell (WBC) counts; or  an allergy to castor oil (contained in paclitaxel and other medicines such as cyclosporine or teniposide). Tell your doctor if you have ever had:  heart problems; or  liver disease. Paclitaxel may harm an unborn baby. Use effective birth control to prevent pregnancy, and tell your doctor if you become pregnant. You should not breastfeed while you are using paclitaxel. How is paclitaxel given? Paclitaxel is given as an infusion into a vein. A healthcare provider will give you this injection. This medicine must be given slowly, and the infusion can take 3 to 24 hours to complete. Paclitaxel is usually given once every 2 to 3 weeks. Follow your doctor's dosing instructions very carefully. You may be given other medications to prevent an allergic reaction while you are receiving paclitaxel.   Your breathing, blood pressure, oxygen levels,

## 2024-04-15 NOTE — PROGRESS NOTES
MEDICAL ONCOLOGY PROGRESS NOTE    Pt Name: Vivienne Mcdonald  MRN: [id number]  YOB: 1955  Date of evaluation: 4/16/2024      HISTORY OF PRESENT ILLNESS:    Reason for MD visit: Toxicity assessment and disease management/treatment compliance.  The patient is a very pleasant 68 years old female who has been diagnosed with stage IIIc triple negative breast cancer.  She is status post completion neoadjuvant chemotherapy with dose dense AC and Taxol.  Status post bilateral simple mastectomy and is currently receiving adjuvant pembrolizumab.  She has been tolerating treatment relatively well.  She does complain of significant left knee pain today.  She has a history of psoriatic arthritis.  She has a history of right knee replacement 2010.  She has been seen by rheumatology, Dr. Fernando Schofield in the past and has a new appointment on 4/22/24 for evaluation.  She has complaints of right elbow pain as well.  She denies any diarrhea.  She denies any skin rash.  She is current received adjuvant radiation.  She is now completing adjuvant breast radiation.  She has significant radiation-induced dermatitis.  She feels fatigued with a.m. of the day.  She denies any skin rash.  She denies any nausea vomit diarrhea.  She denies any new respiratory symptoms.    Diagnosis  Invasive ductal carcinoma, right breast 9:00 & 2:00, May 2023  Grade 3  ER 0.2%. NM 0, HER-2 0/negative, Ki67 84%  MammaPrint: Basal type  Right axillary/retropectoral lymphadenopathy  [id number], stage IIIC->ypT0N0  [facility] [employer] Genetic Testing TP53 Positive.  Li-Fraumeni syndrome.  DVT right lower extremity    Treatment Summary  6/12/23 Initiated  Pembrolizumab, Carboplatin Taxol weekly followed by DD AC every 2 weeks X4 cycles with Keytruda  8/21/23 Discontinued Carboplatin and dose reduction to DD AC Adriamycin 50 mg/M2 and cyclophosphamide 500 mg/M2  11/14/2023-discontinue dose dense AC, cycle #4.  She will continue Keytruda alone.  1/2/24

## 2024-04-16 ENCOUNTER — HOSPITAL ENCOUNTER (OUTPATIENT)
Dept: INFUSION THERAPY | Age: 69
Discharge: HOME | End: 2024-04-16
Payer: MEDICARE

## 2024-04-16 ENCOUNTER — OFFICE VISIT (OUTPATIENT)
Dept: HEMATOLOGY | Age: 69
End: 2024-04-16
Payer: MEDICARE

## 2024-04-16 VITALS
HEART RATE: 94 BPM | HEIGHT: 67 IN | TEMPERATURE: 98.4 F | RESPIRATION RATE: 18 BRPM | SYSTOLIC BLOOD PRESSURE: 117 MMHG | BODY MASS INDEX: 34.01 KG/M2 | DIASTOLIC BLOOD PRESSURE: 76 MMHG | OXYGEN SATURATION: 99 % | WEIGHT: 216.7 LBS

## 2024-04-16 DIAGNOSIS — Z51.12 ENCOUNTER FOR ANTINEOPLASTIC IMMUNOTHERAPY: ICD-10-CM

## 2024-04-16 DIAGNOSIS — R53.0 NEOPLASTIC MALIGNANT RELATED FATIGUE: ICD-10-CM

## 2024-04-16 DIAGNOSIS — Z79.01 CHRONIC ANTICOAGULATION: ICD-10-CM

## 2024-04-16 DIAGNOSIS — C50.911 INVASIVE DUCTAL CARCINOMA OF BREAST, FEMALE, RIGHT (HCC): ICD-10-CM

## 2024-04-16 DIAGNOSIS — R53.83 FATIGUE DUE TO TREATMENT: ICD-10-CM

## 2024-04-16 DIAGNOSIS — L40.50 PSORIATIC ARTHRITIS (HCC): ICD-10-CM

## 2024-04-16 DIAGNOSIS — C50.911 INVASIVE DUCTAL CARCINOMA OF BREAST, FEMALE, RIGHT (HCC): Primary | ICD-10-CM

## 2024-04-16 DIAGNOSIS — Z71.89 CARE PLAN DISCUSSED WITH PATIENT: ICD-10-CM

## 2024-04-16 DIAGNOSIS — Z15.01 LI-FRAUMENI SYNDROME: ICD-10-CM

## 2024-04-16 DIAGNOSIS — C50.919 TRIPLE NEGATIVE BREAST CANCER (HCC): Primary | ICD-10-CM

## 2024-04-16 DIAGNOSIS — C50.919 TRIPLE NEGATIVE BREAST CANCER (HCC): ICD-10-CM

## 2024-04-16 DIAGNOSIS — Z86.718 HISTORY OF DVT (DEEP VEIN THROMBOSIS): ICD-10-CM

## 2024-04-16 DIAGNOSIS — M25.562 ACUTE PAIN OF LEFT KNEE: ICD-10-CM

## 2024-04-16 LAB
ALBUMIN SERPL-MCNC: 3.6 G/DL (ref 3.5–5.2)
ALP SERPL-CCNC: 86 U/L (ref 35–104)
ALT SERPL-CCNC: 17 U/L (ref 9–52)
ANION GAP SERPL CALCULATED.3IONS-SCNC: 13 MMOL/L (ref 7–19)
AST SERPL-CCNC: 25 U/L (ref 14–36)
BASOPHILS # BLD: 0.06 K/UL (ref 0.01–0.08)
BASOPHILS NFR BLD: 0.7 % (ref 0.1–1.2)
BILIRUB SERPL-MCNC: 0.5 MG/DL (ref 0.2–1.3)
BUN SERPL-MCNC: 11 MG/DL (ref 7–17)
CALCIUM SERPL-MCNC: 8.7 MG/DL (ref 8.4–10.2)
CHLORIDE SERPL-SCNC: 106 MMOL/L (ref 98–111)
CO2 SERPL-SCNC: 23 MMOL/L (ref 22–29)
CORTIS AM PEAK SERPL-MCNC: 12.4 UG/DL (ref 6.2–19.4)
CREAT SERPL-MCNC: 0.5 MG/DL (ref 0.5–1)
EOSINOPHIL # BLD: 0.79 K/UL (ref 0.04–0.54)
EOSINOPHIL NFR BLD: 8.8 % (ref 0.7–7)
ERYTHROCYTE [DISTWIDTH] IN BLOOD BY AUTOMATED COUNT: 16.6 % (ref 11.7–14.4)
GLOBULIN: 3.1 G/DL
GLUCOSE SERPL-MCNC: 99 MG/DL (ref 74–106)
HCT VFR BLD AUTO: 34 % (ref 34.1–44.9)
HGB BLD-MCNC: 10.6 G/DL (ref 11.2–15.7)
LYMPHOCYTES # BLD: 0.57 K/UL (ref 1.18–3.74)
LYMPHOCYTES NFR BLD: 6.4 % (ref 19.3–53.1)
MCH RBC QN AUTO: 27 PG (ref 25.6–32.2)
MCHC RBC AUTO-ENTMCNC: 31.2 G/DL (ref 32.3–35.5)
MCV RBC AUTO: 86.5 FL (ref 79.4–94.8)
MONOCYTES # BLD: 0.76 K/UL (ref 0.24–0.82)
MONOCYTES NFR BLD: 8.5 % (ref 4.7–12.5)
NEUTROPHILS # BLD: 6.73 K/UL (ref 1.56–6.13)
NEUTS SEG NFR BLD: 75.3 % (ref 34–71.1)
PLATELET # BLD AUTO: 286 K/UL (ref 182–369)
PMV BLD AUTO: 9.5 FL (ref 7.4–10.4)
POTASSIUM SERPL-SCNC: 3.6 MMOL/L (ref 3.5–5.1)
PROT SERPL-MCNC: 6.6 G/DL (ref 6.3–8.2)
RBC # BLD AUTO: 3.93 M/UL (ref 3.93–5.22)
SODIUM SERPL-SCNC: 142 MMOL/L (ref 137–145)
T4 FREE SERPL-MCNC: 1.11 NG/DL (ref 0.93–1.7)
TSH SERPL DL<=0.005 MIU/L-ACNC: 3.67 UIU/ML (ref 0.27–4.2)
WBC # BLD AUTO: 8.94 K/UL (ref 3.98–10.04)

## 2024-04-16 PROCEDURE — 3017F COLORECTAL CA SCREEN DOC REV: CPT | Performed by: INTERNAL MEDICINE

## 2024-04-16 PROCEDURE — 1123F ACP DISCUSS/DSCN MKR DOCD: CPT | Performed by: INTERNAL MEDICINE

## 2024-04-16 PROCEDURE — 3078F DIAST BP <80 MM HG: CPT | Performed by: INTERNAL MEDICINE

## 2024-04-16 PROCEDURE — G8427 DOCREV CUR MEDS BY ELIG CLIN: HCPCS | Performed by: INTERNAL MEDICINE

## 2024-04-16 PROCEDURE — 3074F SYST BP LT 130 MM HG: CPT | Performed by: INTERNAL MEDICINE

## 2024-04-16 PROCEDURE — 80053 COMPREHEN METABOLIC PANEL: CPT

## 2024-04-16 PROCEDURE — 1090F PRES/ABSN URINE INCON ASSESS: CPT | Performed by: INTERNAL MEDICINE

## 2024-04-16 PROCEDURE — 99214 OFFICE O/P EST MOD 30 MIN: CPT | Performed by: INTERNAL MEDICINE

## 2024-04-16 PROCEDURE — 96413 CHEMO IV INFUSION 1 HR: CPT

## 2024-04-16 PROCEDURE — 85025 COMPLETE CBC W/AUTO DIFF WBC: CPT

## 2024-04-16 PROCEDURE — 6360000002 HC RX W HCPCS: Performed by: INTERNAL MEDICINE

## 2024-04-16 PROCEDURE — G8400 PT W/DXA NO RESULTS DOC: HCPCS | Performed by: INTERNAL MEDICINE

## 2024-04-16 PROCEDURE — 36415 COLL VENOUS BLD VENIPUNCTURE: CPT

## 2024-04-16 PROCEDURE — 1036F TOBACCO NON-USER: CPT | Performed by: INTERNAL MEDICINE

## 2024-04-16 PROCEDURE — 2580000003 HC RX 258: Performed by: INTERNAL MEDICINE

## 2024-04-16 PROCEDURE — G2211 COMPLEX E/M VISIT ADD ON: HCPCS | Performed by: INTERNAL MEDICINE

## 2024-04-16 PROCEDURE — G8417 CALC BMI ABV UP PARAM F/U: HCPCS | Performed by: INTERNAL MEDICINE

## 2024-04-16 RX ORDER — SODIUM CHLORIDE 9 MG/ML
5-250 INJECTION, SOLUTION INTRAVENOUS PRN
Status: CANCELLED | OUTPATIENT
Start: 2024-04-16

## 2024-04-16 RX ORDER — ACETAMINOPHEN 325 MG/1
650 TABLET ORAL
Status: CANCELLED | OUTPATIENT
Start: 2024-04-16

## 2024-04-16 RX ORDER — SODIUM CHLORIDE 9 MG/ML
INJECTION, SOLUTION INTRAVENOUS CONTINUOUS
Status: CANCELLED | OUTPATIENT
Start: 2024-04-16

## 2024-04-16 RX ORDER — HEPARIN 100 UNIT/ML
500 SYRINGE INTRAVENOUS PRN
Status: DISCONTINUED | OUTPATIENT
Start: 2024-04-16 | End: 2024-04-17 | Stop reason: HOSPADM

## 2024-04-16 RX ORDER — SODIUM CHLORIDE 0.9 % (FLUSH) 0.9 %
5-40 SYRINGE (ML) INJECTION PRN
Status: CANCELLED | OUTPATIENT
Start: 2024-04-16

## 2024-04-16 RX ORDER — MEPERIDINE HYDROCHLORIDE 50 MG/ML
12.5 INJECTION INTRAMUSCULAR; INTRAVENOUS; SUBCUTANEOUS PRN
Status: CANCELLED | OUTPATIENT
Start: 2024-04-16

## 2024-04-16 RX ORDER — DIPHENHYDRAMINE HYDROCHLORIDE 50 MG/ML
50 INJECTION INTRAMUSCULAR; INTRAVENOUS
Status: CANCELLED | OUTPATIENT
Start: 2024-04-16

## 2024-04-16 RX ORDER — HEPARIN SODIUM (PORCINE) LOCK FLUSH IV SOLN 100 UNIT/ML 100 UNIT/ML
500 SOLUTION INTRAVENOUS PRN
Status: CANCELLED | OUTPATIENT
Start: 2024-04-16

## 2024-04-16 RX ORDER — ALBUTEROL SULFATE 90 UG/1
4 AEROSOL, METERED RESPIRATORY (INHALATION) PRN
Status: CANCELLED | OUTPATIENT
Start: 2024-04-16

## 2024-04-16 RX ORDER — EPINEPHRINE 1 MG/ML
0.3 INJECTION, SOLUTION, CONCENTRATE INTRAVENOUS PRN
Status: CANCELLED | OUTPATIENT
Start: 2024-04-16

## 2024-04-16 RX ORDER — SODIUM CHLORIDE 0.9 % (FLUSH) 0.9 %
5-40 SYRINGE (ML) INJECTION PRN
Status: DISCONTINUED | OUTPATIENT
Start: 2024-04-16 | End: 2024-04-17 | Stop reason: HOSPADM

## 2024-04-16 RX ORDER — ONDANSETRON 2 MG/ML
8 INJECTION INTRAMUSCULAR; INTRAVENOUS
Status: CANCELLED | OUTPATIENT
Start: 2024-04-16

## 2024-04-16 RX ORDER — FAMOTIDINE 10 MG/ML
20 INJECTION, SOLUTION INTRAVENOUS
Status: CANCELLED | OUTPATIENT
Start: 2024-04-16

## 2024-04-16 RX ADMIN — HEPARIN 500 UNITS: 100 SYRINGE at 10:26

## 2024-04-16 RX ADMIN — SODIUM CHLORIDE, PRESERVATIVE FREE 10 ML: 5 INJECTION INTRAVENOUS at 10:25

## 2024-04-16 RX ADMIN — SODIUM CHLORIDE 400 MG: 9 INJECTION, SOLUTION INTRAVENOUS at 09:51

## 2024-05-01 NOTE — PROGRESS NOTES
HISTORY OF PRESENT ILLNESS:    Ms. Mcdonald presents today for a post op following bilateral simple mastectomy with right sentinel lymph node biopsy on 1/2/2024    She underwent neoadjuvant chemotherapy for her triple negative right breast cancer with AC and CarboTaxol.  She is still getting Keytruda.    She had a pathologic complete response.    She is originally status post ultrasound guided breast biopsy  on the right which revealed a 2.5 cm at 9:00 and 1.8 cm at 2:00 high grade invasive ductal carcinoma. 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  This measured 1.6 cm on ultrasound from 09/27/2023 and 5.0 CM on prior MRI.  Associated post biopsy hematoma has significantly decreased.     At the site of biopsy-proven malignancy at the 2 o'clock position marked medially with a biopsy marker clip there is redemonstration of a heterogeneous conglomerate of rim enhancing masses measuring 3.5 x 3.9 x 2.2 cm, extending to the skin surface   medially (AP X transverse X craniocaudal, series 6 image 92, series 7 image 176).  This previously measured 3.3 x 3.1 x 4.9 cm on the prior MRI and has overall decreased in size compatible with interval treatment response.  Some of this area may be   residual post biopsy hematoma, however associated kinetics are present.     Previously

## 2024-05-02 ENCOUNTER — OFFICE VISIT (OUTPATIENT)
Dept: SURGERY | Age: 69
End: 2024-05-02

## 2024-05-02 VITALS — WEIGHT: 210 LBS | BODY MASS INDEX: 32.96 KG/M2 | OXYGEN SATURATION: 98 % | HEIGHT: 67 IN | HEART RATE: 84 BPM

## 2024-05-02 DIAGNOSIS — C50.919 TRIPLE NEGATIVE BREAST CANCER (HCC): ICD-10-CM

## 2024-05-02 DIAGNOSIS — Z15.01 LI-FRAUMENI SYNDROME: Primary | ICD-10-CM

## 2024-05-02 DIAGNOSIS — Z90.13 S/P BILATERAL MASTECTOMY: ICD-10-CM

## 2024-05-07 ENCOUNTER — TELEPHONE (OUTPATIENT)
Dept: INFUSION THERAPY | Age: 69
End: 2024-05-07

## 2024-05-07 NOTE — TELEPHONE ENCOUNTER
Received voicemail from Anabela with [facility] stating the patient is needing to receive consentyx, however they wanted to check with our office first since she is currently receiving keytruda. Per Dr. Joyner, there is no interaction between keytruda and cosentyx and ok to receive. Returned phone call to [facility] and left voicemail for Anabela on nurse line with this information.

## 2024-05-23 NOTE — PROGRESS NOTES
MEDICAL ONCOLOGY PROGRESS NOTE    Pt Name: Vivienne Mcdonald  MRN: [id number]  YOB: 1955  Date of evaluation: 5/28/2024      HISTORY OF PRESENT ILLNESS:    Reason for MD visit: Toxicity assessment and disease management/treatment compliance.  The patient is a very pleasant 68 years old female who has been diagnosed with stage IIIc triple negative breast cancer.  She is status post completion neoadjuvant chemotherapy with dose dense AC and Taxol.  Status post bilateral simple mastectomy and is currently receiving adjuvant pembrolizumab.  She presents today for her last treatment for adjuvant pembrolizumab.  She denies any skin rash.  Denies any nausea vomit diarrhea.  Denies any new breathing problems.  She is scheduled to be seen by orthopedics for consideration of knee replacement.  She is currently on Eliquis for a diagnosis of right lower extremity DVT September 2023.  She denies any bleeding.    Diagnosis  Invasive ductal carcinoma, right breast 9:00 & 2:00, May 2023  Grade 3  ER 0.2%. FL 0, HER-2 0/negative, Ki67 84%  MammaPrint: Basal type  Right axillary/retropectoral lymphadenopathy  [id number], stage IIIC->ypT0N0  [employer] [address] Genetic Testing TP53 Positive.  Li-Fraumeni syndrome.  DVT right lower extremity, September 2023    Treatment Summary  6/12/23 Initiated  Pembrolizumab, Carboplatin Taxol weekly followed by DD AC every 2 weeks X4 cycles with Keytruda  8/21/23 Discontinued Carboplatin and dose reduction to DD AC Adriamycin 50 mg/M2 and cyclophosphamide 500 mg/M2  11/14/2023-discontinue dose dense AC, cycle #4.  She will continue Keytruda alone.  1/2/24 Bilateral simple mastectomy with negative right SLNB by Dr. Hadley Hutchinson/[facility]  2/27/24-4/12/24 Completed radiation of 5040 cGy with 1000 cGy boost to the tumor bed for a total of 6040 cGy/33 fractions  5/28/24 Completion adjuvant Pembrolizumab     Cancer History  Vivienne Mcdonald was first seen by me on 5/24/2023. She was referred by

## 2024-05-28 ENCOUNTER — OFFICE VISIT (OUTPATIENT)
Dept: HEMATOLOGY | Age: 69
End: 2024-05-28
Payer: MEDICARE

## 2024-05-28 ENCOUNTER — HOSPITAL ENCOUNTER (OUTPATIENT)
Dept: INFUSION THERAPY | Age: 69
Discharge: HOME | End: 2024-05-28
Payer: MEDICARE

## 2024-05-28 ENCOUNTER — PREP FOR PROCEDURE (OUTPATIENT)
Dept: SURGERY | Age: 69
End: 2024-05-28

## 2024-05-28 VITALS
WEIGHT: 216 LBS | BODY MASS INDEX: 33.9 KG/M2 | HEART RATE: 83 BPM | DIASTOLIC BLOOD PRESSURE: 75 MMHG | OXYGEN SATURATION: 100 % | RESPIRATION RATE: 18 BRPM | SYSTOLIC BLOOD PRESSURE: 113 MMHG | HEIGHT: 67 IN | TEMPERATURE: 97.8 F

## 2024-05-28 DIAGNOSIS — R53.83 FATIGUE DUE TO TREATMENT: ICD-10-CM

## 2024-05-28 DIAGNOSIS — Z15.01 LI-FRAUMENI SYNDROME: ICD-10-CM

## 2024-05-28 DIAGNOSIS — Z51.12 ENCOUNTER FOR ANTINEOPLASTIC IMMUNOTHERAPY: ICD-10-CM

## 2024-05-28 DIAGNOSIS — Z79.01 CHRONIC ANTICOAGULATION: ICD-10-CM

## 2024-05-28 DIAGNOSIS — C50.919 TRIPLE NEGATIVE BREAST CANCER (HCC): ICD-10-CM

## 2024-05-28 DIAGNOSIS — C50.911 INVASIVE DUCTAL CARCINOMA OF BREAST, FEMALE, RIGHT (HCC): Primary | ICD-10-CM

## 2024-05-28 DIAGNOSIS — L40.50 PSORIATIC ARTHRITIS (HCC): ICD-10-CM

## 2024-05-28 DIAGNOSIS — Z45.2 ENCOUNTER FOR CENTRAL LINE CARE: ICD-10-CM

## 2024-05-28 DIAGNOSIS — Z71.89 CARE PLAN DISCUSSED WITH PATIENT: ICD-10-CM

## 2024-05-28 DIAGNOSIS — Z86.718 HISTORY OF DVT (DEEP VEIN THROMBOSIS): ICD-10-CM

## 2024-05-28 LAB
ALBUMIN SERPL-MCNC: 3.8 G/DL (ref 3.5–5.2)
ALP SERPL-CCNC: 85 U/L (ref 35–104)
ALT SERPL-CCNC: 12 U/L (ref 9–52)
ANION GAP SERPL CALCULATED.3IONS-SCNC: 10 MMOL/L (ref 7–19)
AST SERPL-CCNC: 17 U/L (ref 14–36)
BASOPHILS # BLD: 0.05 K/UL (ref 0.01–0.08)
BASOPHILS NFR BLD: 0.5 % (ref 0.1–1.2)
BILIRUB SERPL-MCNC: 0.7 MG/DL (ref 0.2–1.3)
BUN SERPL-MCNC: 11 MG/DL (ref 7–17)
CALCIUM SERPL-MCNC: 8.8 MG/DL (ref 8.4–10.2)
CHLORIDE SERPL-SCNC: 103 MMOL/L (ref 98–111)
CO2 SERPL-SCNC: 24 MMOL/L (ref 22–29)
CORTIS AM PEAK SERPL-MCNC: 14.2 UG/DL (ref 6.2–19.4)
CREAT SERPL-MCNC: 0.6 MG/DL (ref 0.5–1)
EOSINOPHIL # BLD: 0.37 K/UL (ref 0.04–0.54)
EOSINOPHIL NFR BLD: 3.7 % (ref 0.7–7)
ERYTHROCYTE [DISTWIDTH] IN BLOOD BY AUTOMATED COUNT: 17 % (ref 11.7–14.4)
GLOBULIN: 2.8 G/DL
GLUCOSE SERPL-MCNC: 107 MG/DL (ref 74–106)
HCT VFR BLD AUTO: 35.9 % (ref 34.1–44.9)
HGB BLD-MCNC: 11.2 G/DL (ref 11.2–15.7)
LYMPHOCYTES # BLD: 0.69 K/UL (ref 1.18–3.74)
LYMPHOCYTES NFR BLD: 6.9 % (ref 19.3–53.1)
MCH RBC QN AUTO: 27.9 PG (ref 25.6–32.2)
MCHC RBC AUTO-ENTMCNC: 31.2 G/DL (ref 32.3–35.5)
MCV RBC AUTO: 89.3 FL (ref 79.4–94.8)
MONOCYTES # BLD: 0.87 K/UL (ref 0.24–0.82)
MONOCYTES NFR BLD: 8.7 % (ref 4.7–12.5)
NEUTROPHILS # BLD: 7.96 K/UL (ref 1.56–6.13)
NEUTS SEG NFR BLD: 79.9 % (ref 34–71.1)
PLATELET # BLD AUTO: 281 K/UL (ref 182–369)
PMV BLD AUTO: 9.7 FL (ref 7.4–10.4)
POTASSIUM SERPL-SCNC: 3.5 MMOL/L (ref 3.5–5.1)
PROT SERPL-MCNC: 6.5 G/DL (ref 6.3–8.2)
RBC # BLD AUTO: 4.02 M/UL (ref 3.93–5.22)
SODIUM SERPL-SCNC: 137 MMOL/L (ref 137–145)
T4 FREE SERPL-MCNC: 1.18 NG/DL (ref 0.93–1.7)
TSH SERPL DL<=0.005 MIU/L-ACNC: 4.22 UIU/ML (ref 0.27–4.2)
WBC # BLD AUTO: 9.97 K/UL (ref 3.98–10.04)

## 2024-05-28 PROCEDURE — 80053 COMPREHEN METABOLIC PANEL: CPT

## 2024-05-28 PROCEDURE — 1036F TOBACCO NON-USER: CPT | Performed by: INTERNAL MEDICINE

## 2024-05-28 PROCEDURE — 2580000003 HC RX 258: Performed by: INTERNAL MEDICINE

## 2024-05-28 PROCEDURE — 1090F PRES/ABSN URINE INCON ASSESS: CPT | Performed by: INTERNAL MEDICINE

## 2024-05-28 PROCEDURE — G8417 CALC BMI ABV UP PARAM F/U: HCPCS | Performed by: INTERNAL MEDICINE

## 2024-05-28 PROCEDURE — 36415 COLL VENOUS BLD VENIPUNCTURE: CPT

## 2024-05-28 PROCEDURE — G8400 PT W/DXA NO RESULTS DOC: HCPCS | Performed by: INTERNAL MEDICINE

## 2024-05-28 PROCEDURE — 99214 OFFICE O/P EST MOD 30 MIN: CPT | Performed by: INTERNAL MEDICINE

## 2024-05-28 PROCEDURE — G2211 COMPLEX E/M VISIT ADD ON: HCPCS | Performed by: INTERNAL MEDICINE

## 2024-05-28 PROCEDURE — 3017F COLORECTAL CA SCREEN DOC REV: CPT | Performed by: INTERNAL MEDICINE

## 2024-05-28 PROCEDURE — 85025 COMPLETE CBC W/AUTO DIFF WBC: CPT

## 2024-05-28 PROCEDURE — 3078F DIAST BP <80 MM HG: CPT | Performed by: INTERNAL MEDICINE

## 2024-05-28 PROCEDURE — 1123F ACP DISCUSS/DSCN MKR DOCD: CPT | Performed by: INTERNAL MEDICINE

## 2024-05-28 PROCEDURE — 3074F SYST BP LT 130 MM HG: CPT | Performed by: INTERNAL MEDICINE

## 2024-05-28 PROCEDURE — G8427 DOCREV CUR MEDS BY ELIG CLIN: HCPCS | Performed by: INTERNAL MEDICINE

## 2024-05-28 PROCEDURE — 96413 CHEMO IV INFUSION 1 HR: CPT

## 2024-05-28 PROCEDURE — 6360000002 HC RX W HCPCS: Performed by: INTERNAL MEDICINE

## 2024-05-28 RX ORDER — SODIUM CHLORIDE 9 MG/ML
INJECTION, SOLUTION INTRAVENOUS CONTINUOUS
Status: CANCELLED | OUTPATIENT
Start: 2024-05-28

## 2024-05-28 RX ORDER — ALBUTEROL SULFATE 90 UG/1
4 AEROSOL, METERED RESPIRATORY (INHALATION) PRN
Status: CANCELLED | OUTPATIENT
Start: 2024-05-28

## 2024-05-28 RX ORDER — MEPERIDINE HYDROCHLORIDE 25 MG/ML
12.5 INJECTION INTRAMUSCULAR; INTRAVENOUS; SUBCUTANEOUS PRN
Status: CANCELLED | OUTPATIENT
Start: 2024-05-28

## 2024-05-28 RX ORDER — HEPARIN 100 UNIT/ML
500 SYRINGE INTRAVENOUS PRN
Status: CANCELLED | OUTPATIENT
Start: 2024-05-28

## 2024-05-28 RX ORDER — ACETAMINOPHEN 325 MG/1
650 TABLET ORAL
Status: CANCELLED | OUTPATIENT
Start: 2024-05-28

## 2024-05-28 RX ORDER — HEPARIN 100 UNIT/ML
500 SYRINGE INTRAVENOUS PRN
Status: DISCONTINUED | OUTPATIENT
Start: 2024-05-28 | End: 2024-05-28

## 2024-05-28 RX ORDER — SODIUM CHLORIDE 9 MG/ML
5-250 INJECTION, SOLUTION INTRAVENOUS PRN
Status: CANCELLED | OUTPATIENT
Start: 2024-05-28

## 2024-05-28 RX ORDER — ONDANSETRON 2 MG/ML
8 INJECTION INTRAMUSCULAR; INTRAVENOUS
Status: CANCELLED | OUTPATIENT
Start: 2024-05-28

## 2024-05-28 RX ORDER — SODIUM CHLORIDE 0.9 % (FLUSH) 0.9 %
5-40 SYRINGE (ML) INJECTION PRN
Status: CANCELLED | OUTPATIENT
Start: 2024-05-28

## 2024-05-28 RX ORDER — SODIUM CHLORIDE 0.9 % (FLUSH) 0.9 %
5-40 SYRINGE (ML) INJECTION PRN
Status: DISCONTINUED | OUTPATIENT
Start: 2024-05-28 | End: 2024-05-28

## 2024-05-28 RX ORDER — EPINEPHRINE 1 MG/ML
0.3 INJECTION, SOLUTION, CONCENTRATE INTRAVENOUS PRN
Status: CANCELLED | OUTPATIENT
Start: 2024-05-28

## 2024-05-28 RX ORDER — DIPHENHYDRAMINE HYDROCHLORIDE 50 MG/ML
50 INJECTION INTRAMUSCULAR; INTRAVENOUS
Status: CANCELLED | OUTPATIENT
Start: 2024-05-28

## 2024-05-28 RX ADMIN — HEPARIN 500 UNITS: 100 SYRINGE at 10:27

## 2024-05-28 RX ADMIN — SODIUM CHLORIDE 200 MG: 9 INJECTION, SOLUTION INTRAVENOUS at 09:56

## 2024-05-28 RX ADMIN — SODIUM CHLORIDE, PRESERVATIVE FREE 10 ML: 5 INJECTION INTRAVENOUS at 10:27

## 2024-05-28 NOTE — PROGRESS NOTES
Lab Results   Component Value Date    WBC 9.97 05/28/2024    HGB 11.2 05/28/2024    HCT 35.9 05/28/2024    MCV 89.3 05/28/2024     05/28/2024     Lab Results   Component Value Date    NEUTROABS 7.96 (H) 05/28/2024     Lab Results   Component Value Date     05/28/2024    K 3.5 05/28/2024     05/28/2024    CO2 24 05/28/2024    BUN 11 05/28/2024    CREATININE 0.6 05/28/2024    GLUCOSE 107 (H) 05/28/2024    CALCIUM 8.8 05/28/2024    BILITOT 0.7 05/28/2024    ALKPHOS 85 05/28/2024    AST 17 05/28/2024    ALT 12 05/28/2024    LABGLOM >90 05/28/2024    GLOB 2.8 05/28/2024

## 2024-05-29 ENCOUNTER — HOSPITAL ENCOUNTER (OUTPATIENT)
Dept: RADIATION ONCOLOGY | Facility: HOSPITAL | Age: 69
Setting detail: RADIATION/ONCOLOGY SERIES
End: 2024-05-29
Payer: MEDICARE

## 2024-05-29 DIAGNOSIS — I82.4Z1 ACUTE DEEP VEIN THROMBOSIS (DVT) OF DISTAL END OF RIGHT LOWER EXTREMITY (HCC): ICD-10-CM

## 2024-05-29 PROBLEM — Z87.891 FORMER SMOKER: Status: ACTIVE | Noted: 2024-05-29

## 2024-05-29 PROBLEM — Z92.3 HISTORY OF RADIATION THERAPY: Status: ACTIVE | Noted: 2024-05-29

## 2024-05-29 RX ORDER — APIXABAN 5 MG/1
5 TABLET, FILM COATED ORAL 2 TIMES DAILY
Qty: 60 TABLET | Refills: 5 | Status: SHIPPED | OUTPATIENT
Start: 2024-05-29

## 2024-05-29 NOTE — PROGRESS NOTES
[facility]  Radiation Oncology Clinic   MD Ludwig Rojas MD Casegem GoelREUBEN  _______________________________________________    Department of Radiation Oncology  [address] [place] [id number]  Office: [phone]  Fax: [phone]    DATE: 05/31/2024  PATIENT: Christel Leung  1955                         MEDICAL RECORD #: [id number]    1. Invasive ductal carcinoma of breast, female, right    2. S/P bilateral mastectomy    3. S/P lymph node biopsy    4. History of radiation therapy    5. Former smoker                                           REASON FOR VISIT:    Chief Complaint   Patient presents with    Breast Cancer     Christel Leung is a very pleasant 68 y.o. patient that has completed radiation therapy for carcinoma of the breast and returns to the clinic today for inital follow up exam. Reports fatigue and arthralgias (bilateral knees). Denies appetite change, unexpected weight change, activity change, nausea/vomiting, diarrhea, light-headedness, dizziness, and headaches. She continues to follow  and .     HISTORY OF PRESENT ILLNESS:  Diagnosed with Stage IIIC (T3, N1, M0, G3) triple negative IDC of right breast. She completed 6040 cGy in 33 fractions to the right breast wall/supraclavicular/scar boost on 04/12/2024.     04/03/2023 - Bilateral Diagnostic Mammogram:  3 circumscribed masses are identified in the right breast. The palpable lump located at 2 o'clock in the right breast is the smallest mass measuring 1.1cm.   The other mass located at 2 o'clock in the right breast measures 2.6cm.   Third right breast mass is located at 9 o'clock and measures 2.2cm.     04/03/2023 - Right Breast Ultrasound:  Demonstrates the palpable lump to represent a 1.1 c 0.9 cm hypoechoic mass at the 2 o'clock position, 8cm from the nipple no definite internal blood flow.   A second mass located at the 2 o'clock  position in the right breast.   8 cm from the nipple represents a hypoechoic 2.6 x 2.0 cm mass. This mass is lobulated and demonstrates acoustic shadowing.   The third mass is located in the 9 o'clock position, 9 cm form the nipple and measures 2.2 x 1.9cm.   No acoustic shadowing but internal blood flow is present to this mass.     05/01/2023 -  Right breast biopsies:  Right lateral breast mass, fine-needle aspiration (smears and ThinPrep):   Breast carcinoma.   Blacks modified nuclear grade 3 (poorly differentiated).   Mass, lateral right breast at 9:00, ultrasound-guided needle biopsies:   Invasive carcinoma of no special type (ductal).   Histologic grade 3.   Maximum tumor diameter is at least 2.5 cm.   Invasive tumor is present in all cores of block A1 and A2.   Mass, medial right breast at 2:00, ultrasound-guided needle biopsies:   Invasive carcinoma no special type (ductal).   Histologic grade 3.   Maximum tumor diameter is at least 1.8 cm.   Invasive tumor present in all cores of block B1 and B2.   COMMENT:  Both lesions have identical histologic features.  Material was sent from specimen a to [employer] for estrogen, progesterone, HER2, and Ki-67 immunohistochemical image analysis studies.  An additional outside report will follow.       Skin, right breast, punch biopsy:   Benign skin with minimal superficial perivascular chronic inflammation.   No evidence of Paget's disease, lymph-vascular space invasion, in situ carcinoma, or invasive carcinoma.   TRIPLE NEGATIVE    05/05/2023 - CT Chest/Abdomen/Pelvis with contrast:  Right breast lesions, as described above, with right axillary and right retropectoral lymphadenopathy.   No evidence for metastatic disease within the abdomen or pelvis.     05/05/2023 - MRI Bilateral Breasts with and without contrast:  Right breast, BI-RADS 5, highly suggestive of additional malignancy. There are multiple additional small satellite lesions in the medial right breast, not  biopsied. The largest one measures 18 mm and is seen mammographically. However, there are other even smaller satellite nodules around this location. This 18 mm nodule demonstrates MRI findings most suggestive of direct secondary skin invasion. There is marked abnormal skin enhancement and thickening throughout the medial right breast which could also directs secondary invasion and/or inflammatory component regardless of a negative punch biopsy. Recommend additional biopsy of the dominant mass measuring 18 mm if this would affect clinical management and/or surgical planning.   Biopsy-proven malignant mass with biopsy clip at 9:00 in the right breast measures up to 50 mm.   Biopsy-proven malignant mass with biopsy clip at 2:00 measures up to 50 mm.   Marked level 1 right axillary lymphadenopathy, one of which has been cytologically evaluated, please see separate fine-needle aspiration report. Please also see separately reported chest CT which best details level 2 axillary lymphadenopathy.   No suspicious MRI finding seen in the left breast.   Overall assessment, BI-RADS 5, highly suggestive of additional malignancy.     05/05/2023 - Bone Scan:  No evidence of osseous metastatic disease.   Increased soft tissue uptake in the right breast compatible with given history of right breast carcinoma.     06/08/2023 - Genetic Testing:  [facility] [facility]:  Negative for 80 out of 81 genes  A pathogenic variant in the TP53 gene called c.8246>A was detected. This variant is associated with an increased cancer risk.    06/12/2023 - 11/14/2023 - Chemotherapy course:  Pembrolizumab/Carbo/Taxol weekly followed by DD AC every 2 weeks x 4 cycles    07/26/2023 - Right Breast Ultrasound:  Neoadjuvant chemotherapy and imaging demonstrates a response to  therapy. Some residual does persist.   BI-RADS CATEGORY 6: KNOWN BIOPSY WITH PROVEN MALIGNANCY.     09/27/2023 - Right Breast Ultrasound:  Continued decrease in size of two sites of  biopsy-proven malignancy in the right breast located at 9 o'clock 10 cm from the nipple and 2 o'clock 10 cm from the nipple, compatible with partial treatment response.   Stable enlargement of the biopsied right axillary lymph node since July 2023.   Previously seen mass located at 10 o'clock 2 cm from the nipple is not visualized by ultrasound and complete treatment response can be assumed at this area.     11/14/2023 - CT Angio Chest:  No evidence of metastatic disease in the chest.   The right breast mass and common right axillary lymph nodes are again identified, consistent with provided history of breast cancer.     11/22/2023 - MRI Bilateral Breasts with and without contrast:  Interval decrease in size of a faintly peripherally enhancing mass at the 9 o'clock position 10 cm from the nipple at the site of biopsy-proven malignancy in the right breast, currently measuring 1.6 cm.   Interval decrease in size of a conglomerate of rim enhancing masses in the right breast at the 2 o'clock position with residual enhancement measuring up to 3.9 cm, involving the skin surface medially.   Previously seen enhancing satellite masses located anterior to the malignant mass at the 2 o'clock position are not seen on the current exam, compatible with treatment response.   Interval decrease in size of persistently enlarged, abnormal morphology right level I axillary lymph nodes, compatible with partial treatment response.   No MRI evidence of malignancy in the left breast.   BI-RADS 6 - Known Malignancy     11/27/2023 - Right Diagnostic Mammogram/Ultrasound:  Right 9 o'clock breast mass which measures unchanged from prior.   Right breast 2 o'clock mass is relatively unchanged in contains necrotic fluid.   Right axillary mass/complex fluid collection measures slightly enlarged when compared to prior measurements, but no definitive mass is identified.   BIRADS category 6:  known malignancy     01/02/2024 -  Bilateral mastectomies  and lymph node biopsy per :  Breast, left mastectomy:   Benign breast parenchyma with patchy mild fibrosis.   Sections of nipple and skin, negative for evidence of malignancy.   Breast, right mastectomy:   Two separate prior biopsy sites identified, negative for evidence of residual malignancy.   Focal fibrosis and severe acute inflammation consistent with abscess identified at both prior biopsy sites.   Surgical excision margins are negative for the prior biopsy sites.   Sections of nipple and skin, negative for evidence of malignancy.   Lymph node, right sentinel lymph node biopsy:   2 out of 8 lymph nodes demonstrate fibrosis with necrosis and foamy macrophages consistent with prior metastasis, no residual viable malignancy identified.   Breast, excision of left breast advancement flap: Benign skin and subcutaneous tissue.   Breast, excision of right breast advancement flap: Benign skin and subcutaneous tissue.   AJCC STAGE: ypT0, pN0, pMx     01/10/2024 - Appointment with :  PLAN:   See Todd in one week for a fluid check.   See Dr. Mathis as scheduled.   I will see her back in the office in one month.    01/23/2024 - Appointment with :  Invasive ductal carcinoma, multicentric, Grade 3, ER 0.2%. IL 0, HER-2 0/negative, Ki67 84%, May 2023 [id number], stage IIIc->ypT0N0  Essentially, stage IIIc triple negative breast cancer. Metastatic work-up negative. Recommended neoadjuvant chemotherapy and immunotherapy with 12 weekly cycles of carboplatin, Taxol followed by dose dense fashion Adriamycin and cyclophosphamide x4 cycles with G-CSF support, 1 year of Keytruda.  -Very poor tolerance to weekly carboplatin and Taxol on a stable dose.  -Discontinue carboplatin and reduced dose of Taxol 60 mg/M2 due to poor tolerance  -Status post bilateral cystectomy-complete pathologic response.   -Continue adjuvant pembrolizumab to complete 1 year of treatment.  Discussed results of pathology with  patient at length. She is recommended to continue adjuvant pembrolizumab. She had a complete pathologic response. This is a very good prognostic sign. The patient will need adjuvant radiation and therefore referral placed today. She had positive lymph nodes at diagnosis.  PLAN:  RTC with MD in 6 weeks  CBC CMP, TSH, Cortisol AM today and each treatment for toxicity monitoring  C#6/9 Keytruda 400mg today and every 6 weeks   Refer to Dr Jasiel Holloway/[facility] for evaluation  Continue Phenergan and Zofran as needed for nausea  Continue follow-up with Dr Loo/[facility]   Continue follow-up with appointment with Dr. Hensley/[facility]  Continue Eliquis 5mg BID  Referred for adjuvant radiation.  Follow Up:   Return in about 6 weeks (around 3/5/2024) for Treatment & see  in [place] RM Keytruda.  Refer to Dr Jasiel Holloway/[facility] for evaluation     02/15/2024 - Consult with :  We have discussed the role of radiation therapy with this diagnosis as well as the indications and rationale of adjuvant radiation therapy according to the NCCN Guidelines. She has verbalized understanding of our conversation and agrees to the treatment recommendations for postoperative radiation therapy to the right chest wall and involved lymph nodes. following bilateral mastectomies. I anticipate a dose of 5040 cGy with 1000 cGy boost to the tumor bed for a total of 6040 cGy/33 fractions.. We will simulate treatment fields to begin the treatment planning, final dose to be determined.  Discussed post surgery and radiation risk for lymphedema, referral will be placed to lymphedema clinic for initial evaluation/education, she will continue ongoing management per primary care physician and other specialists. Thank you for allowing me to assist in her care.   Plan:  Plan on 33 daily treatments (Monday-Friday).    02/27/2024 - 04/12/2024 - Completed radiation course:  Received 6040 cGy in 33  fractions to Right chest wall/supraclavicular/scar boost via External Beam Radiation - EBRT.     05/02/2024 - Appointment with :  Follow-up in 6 months for physical exam    05/28/2024 - Appointment with :  PLAN:  RTC with MD in 4 months  CBC CMP, TSH, Cortisol AM today for toxicity monitoring  CBC, CMP, TSH at next MD visit in 4 months  Adjuvant C#9/9 Keytruda 400mg today   Continue to follow up with Dr Jasiel Holloway/[facility], 5/31/24  Continue Micro-K 20mEq daily  Continue Eliquis 5mg twice a day  Continue Phenergan and Zofran as needed for nausea  Refer back to Dr Loo/[facility] for port removal  Encouraged follow-up with appointment with Dr. Hensley/[facility]  Proceed with initial consultation with Dr Nael Marks/[facility] for new onset left knee pain/osteoarthritis, 6/27/24 at 8:15  Continue follow-up with Dr Jim Dixon/Rheumatology for history psoriatic arthritis for evaluation/treatment, 11/4/24  Follow Up:   Return in about 4 months (around 9/28/2024) for CBC, CMP, TSH, Appointment with Dr. Mathis.  Refer back to Dr Dylan Loo/[facility] for port removal        History obtained from  PATIENT and CHART    PAST MEDICAL HISTORY  Past Medical History:   Diagnosis Date    Arthritis     Breast cancer     Hx of colonic polyp     Hyperlipemia     Hypertension     Psoriatic arthritis      PAST SURGICAL HISTORY  Past Surgical History:   Procedure Laterality Date    BREAST BIOPSY Right 05/01/2023    COLONOSCOPY  08/14/2013    Diverticulosis repeat exam in 5 years    COLONOSCOPY N/A 12/10/2018    7mm hyperplastic polyp 60cm proximal to anus, 5mm hyperplastic in rectum, diverticulosis sigmoid colon    COLONOSCOPY W/ POLYPECTOMY  06/15/2008    Hyperplastic polyp at 30 cm repeat exam in 5 years    ENDOSCOPY N/A 06/27/2019    Small HH, LA grade D reflux    ENDOSCOPY N/A 09/19/2019    Procedure: ESOPHAGOGASTRODUODENOSCOPY WITH ANESTHESIA WITH  DILATION;  Surgeon: Emile Clifton MD;  Location: [facility];  Service: Gastroenterology    JOINT REPLACEMENT Right     knee    MASTECTOMY Bilateral 01/02/2024    TOTAL KNEE ARTHROPLASTY Right       FAMILY HISTORY  family history includes Colon polyps in her mother; Heart disease in her mother; No Known Problems in her brother, father, maternal grandfather, maternal grandmother, paternal grandfather, paternal grandmother, and sister.    SOCIAL HISTORY  Social History     Tobacco Use    Smoking status: Former     Types: Cigarettes    Smokeless tobacco: Never    Tobacco comments:     Quit 2 months ago   Substance Use Topics    Alcohol use: No    Drug use: No      ALLERGIES  Amoxicillin     MEDICATIONS  Current Outpatient Medications   Medication Sig Dispense Refill    apixaban (Eliquis) 5 MG tablet tablet Take 1 tablet by mouth 2 (Two) Times a Day.      HYDROcodone-acetaminophen (NORCO) 7.5-325 MG per tablet Take 1 tablet by mouth 2 (Two) Times a Day.      lovastatin (MEVACOR) 20 MG tablet Take 1 tablet by mouth Every Night.      metoprolol succinate XL (TOPROL-XL) 100 MG 24 hr tablet Take 1 tablet by mouth Daily.      montelukast (SINGULAIR) 10 MG tablet Take 1 tablet by mouth Every Night.       No current facility-administered medications for this visit.      The following portions of the patient's history were reviewed and updated as appropriate: allergies, current medications, past family history, past medical history, past social history, past surgical history and problem list.    REVIEW OF SYSTEMS  Review of Systems   Constitutional:  Positive for fatigue.   HENT: Negative.     Eyes: Negative.    Respiratory: Negative.     Cardiovascular: Negative.    Gastrointestinal: Negative.    Endocrine: Negative.    Genitourinary: Negative.    Musculoskeletal:  Positive for arthralgias (bilateral knees).   Skin:  Positive for color change (slight hyperpigmentation to right chest).   Allergic/Immunologic: Negative.   "  Neurological:  Negative for dizziness, light-headedness and headaches.   Hematological:  Bruises/bleeds easily (r/t Eliquis).     I have reviewed and confirmed the accuracy of the ROS as documented by the MA/KIRILLN/RN REUBEN Larson    PHYSICAL EXAM  VITAL SIGNS:   Vitals:    05/31/24 0944   BP: 131/72   Pulse: 82   SpO2: 98%  Comment: room air   Weight: 100 kg (220 lb 14.4 oz)   Height: 170.2 cm (67\")   PainSc: 0-No pain        Physical Exam  Vitals reviewed.   Constitutional:       Appearance: Normal appearance.   HENT:      Head: Normocephalic.      Nose: Nose normal.   Eyes:      Pupils: Pupils are equal, round, and reactive to light.   Cardiovascular:      Rate and Rhythm: Normal rate and regular rhythm.      Pulses: Normal pulses.      Heart sounds: Normal heart sounds.   Pulmonary:      Effort: Pulmonary effort is normal. No respiratory distress.      Breath sounds: Normal breath sounds. No wheezing.   Chest:      Comments: Bilateral breasts s/p mastectomies. Right chest wall s/p radiation therapy; well-healed. Moderate hyperpigmentation noted throughout chest wall and axilla. No palpable masses noted.  Abdominal:      General: Bowel sounds are normal.      Palpations: There is no mass.   Musculoskeletal:         General: Normal range of motion.      Cervical back: Normal range of motion and neck supple. No tenderness.   Lymphadenopathy:      Cervical: No cervical adenopathy.      Upper Body:      Right upper body: No supraclavicular, axillary or pectoral adenopathy.      Left upper body: No supraclavicular, axillary or pectoral adenopathy.   Skin:     General: Skin is warm and dry.      Capillary Refill: Capillary refill takes less than 2 seconds.   Neurological:      General: No focal deficit present.      Mental Status: She is alert and oriented to person, place, and time.      Motor: No weakness.   Psychiatric:         Mood and Affect: Mood normal.         Behavior: Behavior normal. "         Performance Status: ECOG (2) Ambulatory and capable of self care, unable to carry out work activity, up and about > 50% or waking hours    Clinical Quality Measures  -Pain Documented by Standardized Tool, FPS Christel Leung reports a pain score of 0.  Given her pain assessment as noted, treatment options were discussed and the following options were decided upon as a follow-up plan to address the patient's pain:  No pain, no plan given .  Pain Medications               HYDROcodone-acetaminophen (NORCO) 7.5-325 MG per tablet Take 1 tablet by mouth 2 (Two) Times a Day.          Body Mass Index Screening and Follow-Up Plan  BMI is >= 30 and <35. (Class 1 Obesity). The following options were offered after discussion;: referral to primary care    Tobacco Use: Screening and Cessation Intervention  Social History    Tobacco Use      Smoking status: Former        Types: Cigarettes      Smokeless tobacco: Never      Tobacco comments: Quit 2 months ago    Advanced Care Planning Advance Care Planning   ACP discussion was held with the patient during this visit. Patient does not have an advance directive, information provided.    ASSESSMENT AND PLAN  1. Invasive ductal carcinoma of breast, female, right    2. S/P bilateral mastectomy    3. S/P lymph node biopsy    4. History of radiation therapy    5. Former smoker      No orders of the defined types were placed in this encounter.    Recommendations:  Christel Leung is status post completion of adjuvant radiation therapy to the right breast and presents to our clinic today for follow up exam. Diagnosed with Stage IIIC (T3, N1, M0, G3) triple negative IDC of right breast. She completed 6040 cGy in 33 fractions to the right breast wall/supraclavicular/scar boost on 04/12/2024.     We discussed expected post radiation long and short term effects, monthly self exams and NCCN surveillance recommendations. She is doing well and has no evidence of recurrent or metastatic  disease today. She has completed keytruda therapy. Continue ongoing management with other physicians, will follow up with us in 6 months or sooner if needed.    Patient Instructions   1) return in 6 months    Return in about 6 months (around 11/30/2024).     Time Spent: I spent 32 minutes caring for Christel on this date of service. This time includes time spent by me in the following activities: preparing for the visit, reviewing tests, obtaining and/or reviewing a separately obtained history, performing a medically appropriate examination and/or evaluation, counseling and educating the patient/family/caregiver, ordering medications, tests, or procedures, referring and communicating with other health care professionals, documenting information in the medical record, independently interpreting results and communicating that information with the patient/family/caregiver, and care coordination.   Anshul Goel, APRN  05/31/2024

## 2024-05-31 ENCOUNTER — OFFICE VISIT (OUTPATIENT)
Age: 69
End: 2024-05-31
Payer: MEDICARE

## 2024-05-31 VITALS
HEIGHT: 67 IN | BODY MASS INDEX: 34.67 KG/M2 | OXYGEN SATURATION: 98 % | HEART RATE: 82 BPM | DIASTOLIC BLOOD PRESSURE: 72 MMHG | SYSTOLIC BLOOD PRESSURE: 131 MMHG | WEIGHT: 220.9 LBS

## 2024-05-31 DIAGNOSIS — Z98.890 S/P LYMPH NODE BIOPSY: ICD-10-CM

## 2024-05-31 DIAGNOSIS — C50.911 INVASIVE DUCTAL CARCINOMA OF BREAST, FEMALE, RIGHT: Primary | ICD-10-CM

## 2024-05-31 DIAGNOSIS — Z87.891 FORMER SMOKER: ICD-10-CM

## 2024-05-31 DIAGNOSIS — Z92.3 HISTORY OF RADIATION THERAPY: ICD-10-CM

## 2024-05-31 DIAGNOSIS — Z90.13 S/P BILATERAL MASTECTOMY: ICD-10-CM

## 2024-05-31 PROCEDURE — G0463 HOSPITAL OUTPT CLINIC VISIT: HCPCS | Performed by: RADIOLOGY

## 2024-06-12 DIAGNOSIS — Z90.13 S/P BILATERAL MASTECTOMY: ICD-10-CM

## 2024-06-12 DIAGNOSIS — C50.919 TRIPLE NEGATIVE BREAST CANCER (HCC): Primary | ICD-10-CM

## 2024-07-01 ENCOUNTER — HOSPITAL ENCOUNTER (OUTPATIENT)
Dept: OCCUPATIONAL THERAPY | Age: 69
Setting detail: THERAPIES SERIES
Discharge: HOME | End: 2024-07-01
Payer: MEDICARE

## 2024-07-01 PROCEDURE — 97165 OT EVAL LOW COMPLEX 30 MIN: CPT

## 2024-07-01 PROCEDURE — 93702 BIS XTRACELL FLUID ANALYSIS: CPT

## 2024-07-01 NOTE — PROGRESS NOTES
Occupational Therapy: Initial Evaluation   Patient: Vivienne Mcdonald (68 y.o. female)   Examination Date: 2024  Plan of Care Certification Period: 2024 to  2024      :  1955  MRN: [id number]  CSN: [id number]   Insurance: Payor: MEDICARE / Plan: MEDICARE PART A AND B / Product Type: *No Product type* /   Insurance ID: [id number] - (Medicare) Secondary Insurance (if applicable):    Insurance Information: Medicare   Referring Physician: Hadley Hutchinson MD Daniel Howard, MD   PCP: Shirley Ramirez DO Visits to Date/Visits Approved:   /      No Show/Cancelled Appts:    /       Medical Diagnosis: Malignant neoplasm of unspecified site of unspecified female breast [C50.919]  Acquired absence of bilateral breasts and nipples [Z90.13] C50.919, Z90.13  No data recorded     PERTINENT MEDICAL HISTORY   Patient assessed for rehabilitation services?: Yes  Self reported health status:: Excellent    Medical History: Chart Reviewed: Yes   Past Medical History:   Diagnosis Date    Acute bilateral low back pain 2019    Breast cancer (HCC) 2023    right    Hand numbness     right hand s/p arm surgery    Hypercholesterolemia     Hyperlipidemia     Hypertension     Malignant neoplasm of overlapping sites of right breast (HCC) 2023    Obesity, unspecified obesity severity, unspecified obesity type 2018    Post-menopausal      Surgical History:   Past Surgical History:   Procedure Laterality Date    ARM SURGERY Right     \"nerve release\", dt rocío, with 2nd surgery, debridement    JOINT REPLACEMENT Right     knee    MASTECTOMY Bilateral 2024    BILATERAL SIMPLE MASTECTOMY WITH RIGHT SENTINEL NODE, BILATERAL PEC BLOCK, ULTRASOUND GUIDED NEEDLE LOCALIZATION X 2 performed by Hadley Hutchinson MD at [facility] OR    PORT SURGERY N/A 2023    PORT INSERTION WITH FLUOROSCOPY performed by Hadley Hutchinson MD at [facility] OR    TOTAL KNEE ARTHROPLASTY Right     US BREAST BIOPSY W LOC DEVICE 1ST LESION

## 2024-07-08 ENCOUNTER — HOSPITAL ENCOUNTER (OUTPATIENT)
Dept: PREADMISSION TESTING | Age: 69
Discharge: HOME | End: 2024-07-12
Payer: MEDICARE

## 2024-07-08 VITALS — HEIGHT: 67 IN | WEIGHT: 214 LBS | BODY MASS INDEX: 33.59 KG/M2

## 2024-07-08 LAB
EKG P AXIS: 22 DEGREES
EKG P-R INTERVAL: 172 MS
EKG Q-T INTERVAL: 386 MS
EKG QRS DURATION: 80 MS
EKG QTC CALCULATION (BAZETT): 415 MS
EKG T AXIS: 16 DEGREES

## 2024-07-08 PROCEDURE — 93010 ELECTROCARDIOGRAM REPORT: CPT | Performed by: INTERNAL MEDICINE

## 2024-07-08 PROCEDURE — 93005 ELECTROCARDIOGRAM TRACING: CPT | Performed by: ANESTHESIOLOGY

## 2024-07-08 NOTE — DISCHARGE INSTRUCTIONS
waiting area.    5.  No children under 13 allowed in the pre-post op areas unless they are the patient.    6.  Two people may be with an underage surgical/procedural patient in preop/postop        room.      7.  If you are admitted to the hospital post operatively, there are NO RESTRICTIONS on       the floor at this time.      8.  If you are admitted to ICU postoperatively, you may have one visitor in the room from        7A-7P.  A second visitor may sit in the ICU waiting room.  No overnight visitors in         ICU waiting room.       Take your prescribed betablocker METOPROLOL       with a sip of water the morning of surgery.

## 2024-07-26 ENCOUNTER — ANESTHESIA EVENT (OUTPATIENT)
Dept: OPERATING ROOM | Age: 69
End: 2024-07-26
Payer: MEDICARE

## 2024-07-26 ENCOUNTER — ANESTHESIA (OUTPATIENT)
Dept: OPERATING ROOM | Age: 69
End: 2024-07-26
Payer: MEDICARE

## 2024-07-26 ENCOUNTER — HOSPITAL ENCOUNTER (OUTPATIENT)
Age: 69
Setting detail: OUTPATIENT SURGERY
Discharge: HOME | End: 2024-07-26
Attending: SURGERY | Admitting: SURGERY
Payer: MEDICARE

## 2024-07-26 VITALS
TEMPERATURE: 97.6 F | DIASTOLIC BLOOD PRESSURE: 79 MMHG | BODY MASS INDEX: 33.59 KG/M2 | WEIGHT: 214 LBS | SYSTOLIC BLOOD PRESSURE: 117 MMHG | HEIGHT: 67 IN | OXYGEN SATURATION: 100 % | HEART RATE: 60 BPM | RESPIRATION RATE: 20 BRPM

## 2024-07-26 PROCEDURE — 6370000000 HC RX 637 (ALT 250 FOR IP): Performed by: SURGERY

## 2024-07-26 PROCEDURE — 2500000003 HC RX 250 WO HCPCS: Performed by: SURGERY

## 2024-07-26 PROCEDURE — 7100000010 HC PHASE II RECOVERY - FIRST 15 MIN: Performed by: SURGERY

## 2024-07-26 PROCEDURE — 6360000002 HC RX W HCPCS: Performed by: NURSE ANESTHETIST, CERTIFIED REGISTERED

## 2024-07-26 PROCEDURE — 7100000000 HC PACU RECOVERY - FIRST 15 MIN: Performed by: SURGERY

## 2024-07-26 PROCEDURE — 6360000002 HC RX W HCPCS: Performed by: SURGERY

## 2024-07-26 PROCEDURE — 3700000000 HC ANESTHESIA ATTENDED CARE: Performed by: SURGERY

## 2024-07-26 PROCEDURE — 3600000012 HC SURGERY LEVEL 2 ADDTL 15MIN: Performed by: SURGERY

## 2024-07-26 PROCEDURE — 3600000002 HC SURGERY LEVEL 2 BASE: Performed by: SURGERY

## 2024-07-26 PROCEDURE — 2500000003 HC RX 250 WO HCPCS: Performed by: NURSE ANESTHETIST, CERTIFIED REGISTERED

## 2024-07-26 PROCEDURE — 2709999900 HC NON-CHARGEABLE SUPPLY: Performed by: SURGERY

## 2024-07-26 PROCEDURE — 7100000011 HC PHASE II RECOVERY - ADDTL 15 MIN: Performed by: SURGERY

## 2024-07-26 PROCEDURE — 3700000001 HC ADD 15 MINUTES (ANESTHESIA): Performed by: SURGERY

## 2024-07-26 PROCEDURE — 2580000003 HC RX 258: Performed by: SURGERY

## 2024-07-26 PROCEDURE — A4217 STERILE WATER/SALINE, 500 ML: HCPCS | Performed by: SURGERY

## 2024-07-26 RX ORDER — ACETAMINOPHEN 325 MG/1
975 TABLET ORAL ONCE
Status: COMPLETED | OUTPATIENT
Start: 2024-07-26 | End: 2024-07-26

## 2024-07-26 RX ORDER — OXYCODONE HYDROCHLORIDE AND ACETAMINOPHEN 5; 325 MG/1; MG/1
1 TABLET ORAL EVERY 4 HOURS PRN
COMMUNITY

## 2024-07-26 RX ORDER — LIDOCAINE HYDROCHLORIDE 10 MG/ML
INJECTION, SOLUTION EPIDURAL; INFILTRATION; INTRACAUDAL; PERINEURAL PRN
Status: DISCONTINUED | OUTPATIENT
Start: 2024-07-26 | End: 2024-07-26 | Stop reason: SDUPTHER

## 2024-07-26 RX ORDER — SODIUM CHLORIDE, SODIUM LACTATE, POTASSIUM CHLORIDE, CALCIUM CHLORIDE 600; 310; 30; 20 MG/100ML; MG/100ML; MG/100ML; MG/100ML
INJECTION, SOLUTION INTRAVENOUS CONTINUOUS
Status: DISCONTINUED | OUTPATIENT
Start: 2024-07-26 | End: 2024-07-26 | Stop reason: HOSPADM

## 2024-07-26 RX ORDER — GINSENG 100 MG
CAPSULE ORAL PRN
Status: DISCONTINUED | OUTPATIENT
Start: 2024-07-26 | End: 2024-07-26 | Stop reason: HOSPADM

## 2024-07-26 RX ORDER — PROPOFOL 10 MG/ML
INJECTION, EMULSION INTRAVENOUS PRN
Status: DISCONTINUED | OUTPATIENT
Start: 2024-07-26 | End: 2024-07-26 | Stop reason: SDUPTHER

## 2024-07-26 RX ORDER — CELECOXIB 200 MG/1
200 CAPSULE ORAL ONCE
Status: COMPLETED | OUTPATIENT
Start: 2024-07-26 | End: 2024-07-26

## 2024-07-26 RX ADMIN — LIDOCAINE HYDROCHLORIDE 50 MG: 10 INJECTION, SOLUTION EPIDURAL; INFILTRATION; INTRACAUDAL; PERINEURAL at 18:29

## 2024-07-26 RX ADMIN — CELECOXIB 200 MG: 200 CAPSULE ORAL at 13:40

## 2024-07-26 RX ADMIN — ACETAMINOPHEN 975 MG: 325 TABLET ORAL at 13:40

## 2024-07-26 RX ADMIN — SODIUM CHLORIDE, POTASSIUM CHLORIDE, SODIUM LACTATE AND CALCIUM CHLORIDE: 600; 310; 30; 20 INJECTION, SOLUTION INTRAVENOUS at 13:39

## 2024-07-26 RX ADMIN — PROPOFOL 70 MG: 10 INJECTION, EMULSION INTRAVENOUS at 18:29

## 2024-07-26 RX ADMIN — PROPOFOL 140 MCG/KG/MIN: 10 INJECTION, EMULSION INTRAVENOUS at 18:30

## 2024-07-26 ASSESSMENT — LIFESTYLE VARIABLES: SMOKING_STATUS: 0

## 2024-07-26 NOTE — DISCHARGE INSTRUCTIONS
1.Shower or bathe as desired 48 hours after surgery, unless there are staples or stitches, then wait 72 hours. Pat incision dry do not rub. Do not submerge in any water until incision is healed at least 10 days  2.If small area of wound seperates or becomes red and inflamed, you have red streaks leading away from incision, drainage, fever greater than 100.4 or any other questions or concerns - call the office and make an appointment.  3.Pain medication may be constipating, you may take laxative of choice.  4.Call the office if you have any further questionsduring the nurse's hours, Monday-Friday, 9:00 to 4:00. In case of emergency after hours the answering service will take your call. [phone]  5.Call one hour before your appointment time to see if appointments are running on schedule. Call the office for you pathology report 3 working days after your procedure.

## 2024-07-26 NOTE — BRIEF OP NOTE
Brief Postoperative Note      DATE OF PROCEDURE: 7/26/2024     SURGEON: Hadley Hutchinson MD    PREOPERATIVE DIAGNOSIS:  Invasive ductal carcinoma of breast, female, right (HCC) [C50.911]    POSTOPERATIVE DIAGNOSIS: Same     OPERATION: Procedure(s):  PORT REMOVAL    ANESTHESIA: Monitor Anesthesia Care    ESTIMATED BLOOD LOSS: Minimal    COMPLICATIONS: None.     SPECIMENS: * No specimens in log *    DRAINS: None    The patient tolerated the procedure well.    Electronically signed by Hadley Hutchinson MD  on 7/26/2024 at 6:59 PM

## 2024-07-26 NOTE — H&P
REVEALS:  FINAL DIAGNOSIS:     A.  Breast, left mastectomy:   1.  Benign breast parenchyma with patchy mild fibrosis.   2.  Sections of nipple and skin, negative for evidence of malignancy.     B.  Breast, right mastectomy:   1.  Two separate prior biopsy sites identified, negative for evidence of   residual malignancy.   2.  Focal fibrosis and severe acute inflammation consistent with abscess   identified at both prior biopsy sites.   3.  Surgical excision margins are negative for the prior biopsy sites.   4.  Sections of nipple and skin, negative for evidence of malignancy.     C.  Lymph node, right sentinel lymph node biopsy: 2 out of 8 lymph nodes   demonstrate fibrosis with necrosis and foamy macrophages consistent with   prior metastasis, no residual viable malignancy identified.     D.  Breast, excision of left breast advancement flap: Benign skin and   subcutaneous tissue.     E.  Breast, excision of right breast advancement flap: Benign skin and   subcutaneous tissue.     AJCC STAGE: ypT0, pN0, pMx      She is slowly getting her energy back and is much better than 6 months ago but she has a long way to go.    Vivienne Mcdonald is a 69 y.o. female with the following history as recorded in [facility]:  Patient Active Problem List    Diagnosis Date Noted    S/P bilateral mastectomy with right SNB -1/2/2024 01/12/2024    Dyspnea on exertion 11/29/2023    SHERWOOD (dyspnea on exertion) 11/27/2023    Poor venous access 11/14/2023    Li-Fraumeni syndrome TP53 pathogenic mutation 07/27/2023    Invasive ductal carcinoma of breast, female, right (HCC) 05/24/2023    Triple negative breast cancer (HCC) 05/19/2023    Schatzki's ring 08/28/2019    Hiatal hernia 06/28/2019    Volume depletion 06/26/2019    Leukocytosis 06/25/2019    GI bleed 06/25/2019    Gastroesophageal reflux disease with esophagitis 06/25/2019    Colitis 06/25/2019    Acute neck pain 06/25/2019    Acute bilateral low back pain 06/25/2019    Obesity,

## 2024-07-26 NOTE — ANESTHESIA PRE PROCEDURE
Department of Anesthesiology  Preprocedure Note       Name:  Vivienne Mcdonald   Age:  69 y.o.  :  1955                                          MRN:  [id number]         Date:  2024      Surgeon: Surgeon(s):  Hadley Hutchinson MD    Procedure: Procedure(s):  PORT REMOVAL    Medications prior to admission:   Prior to Admission medications    Medication Sig Start Date End Date Taking? Authorizing Provider   oxyCODONE-acetaminophen (PERCOCET) 5-325 MG per tablet Take 1 tablet by mouth every 4 hours as needed for Pain. Max Daily Amount: 6 tablets   Yes Bel Cooper MD   Secukinumab (COSENTYX, 300 MG DOSE, SC) Inject 1 Dose into the skin every 30 days    Bel Cooper MD   ELIQUIS 5 MG TABS tablet TAKE 1 TABLET BY MOUTH 2 TIMES DAILY 24   Pedro Joyner MD   ondansetron (ZOFRAN) 4 MG tablet Take 1 tablet by mouth every 8 hours as needed for Nausea or Vomiting 23   Pedro Joyner MD   HYDROcodone-acetaminophen (NORCO) 7.5-325 MG per tablet Take 0.5-1 tablets by mouth every 6 hours as needed for Pain.  Patient not taking: Reported on 2024   Bel Cooper MD   lovastatin (MEVACOR) 20 MG tablet Take 1 tablet by mouth nightly 17   Bel Cooper MD   metoprolol succinate (TOPROL XL) 100 MG extended release tablet Take 1 tablet by mouth daily 18   Bel Cooper MD   montelukast (SINGULAIR) 10 MG tablet Take 1 tablet by mouth daily 10/27/16   Bel Cooper MD       Current medications:    Current Facility-Administered Medications   Medication Dose Route Frequency Provider Last Rate Last Admin    lactated ringers IV soln infusion   IntraVENous Continuous Hadley Hutchinson  mL/hr at 24 1339 New Bag at 24 1339       Allergies:    Allergies   Allergen Reactions    Amoxicillin Swelling     Facial swelling    Other Other (See Comments)     Patient allergic to absorbable sutures-\"infection from vicryl sutures\"

## 2024-07-27 NOTE — OP NOTE
[id number] [place] [address]                            OPERATIVE REPORT      PATIENT NAME: RICA DAVID             : 1955  MED REC NO: [id number]                          ROOM: [facility]  ACCOUNT NO: [id number]                       ADMIT DATE: 2024  PROVIDER: Laura Franco MD      DATE OF PROCEDURE:  2024    SURGEON:  aLura Franco MD    PREOPERATIVE DIAGNOSES:  Breast cancer, completion of chemotherapy.    POSTOPERATIVE DIAGNOSES:  Breast cancer, completion of chemotherapy.    PROCEDURE:  Removal of LifePort catheter.    ANESTHESIA:  Local with sedation.    INDICATIONS:  The patient is a 69-year-old who has completed her chemotherapy for breast cancer.  She is ready to get her port out.  We discussed risks and benefits of removal.  She understands and agreeable.    DESCRIPTION OF PROCEDURE:  Today, she was brought to the operating room, adequately sedated, and prepped and draped in a sterile fashion.  The area around the port was anesthetized with 1/16% Xylocaine.  We opened the old transverse incision, dissected down to the catheter.  We placed a 3-0 Monocryl pursestring around the insertion site, withdrew the catheter and tied down our pursestring.  We then excised the port with its capsule.  We then irrigated copiously, made sure good hemostasis, and closed with interrupted 3-0 nylons as she is allergic to Vicryl.  A Bactroban and gauze dressing were placed.  Estimated blood loss, minimal.  Complication none.  She tolerated the procedure well.          LAURA FRANCO MD      D:  2024 20:01:32     T:  2024 00:44:05     Replaced by [facility]  Job #:  [id number]     Doc#:  [id number]

## 2024-07-27 NOTE — PROGRESS NOTES
Obtaining first bailey and vitals and calling report to Op care 1 per Jeannette BURKS. Electronically signed by Sy Corcoran RN on 7/26/2024 at 7:08 PM

## 2024-07-27 NOTE — PROGRESS NOTES
Patient to room 5. She is alert, oriented and able to make needs known. VSS. She denies pain. VSS.  at bedside.

## 2024-07-27 NOTE — ANESTHESIA POSTPROCEDURE EVALUATION
Department of Anesthesiology  Postprocedure Note    Patient: Vivienne Mcdonald  MRN: [id number]  YOB: 1955  Date of evaluation: 7/26/2024    Procedure Summary       Date: 07/26/24 Room / Location: [address]    Anesthesia Start: 1824 Anesthesia Stop: 1903    Procedure: PORT REMOVAL Diagnosis:       Invasive ductal carcinoma of breast, female, right (HCC)      (Invasive ductal carcinoma of breast, female, right (HCC) [C50.911])    Surgeons: Hadley Hutchinson MD Responsible Provider: Jeannette Ramirez APRN - CRNA    Anesthesia Type: general, TIVA ASA Status: 3            Anesthesia Type: No value filed.    Anamaria Phase I: Anamaria Score: 10    Anamaria Phase II: Anamaria Score: 10    Anesthesia Post Evaluation    Patient location during evaluation: PACU  Patient participation: complete - patient participated  Level of consciousness: awake and alert  Pain score: 0  Airway patency: patent  Nausea & Vomiting: no nausea and no vomiting  Cardiovascular status: hemodynamically stable  Respiratory status: acceptable  Hydration status: stable  Pain management: adequate    No notable events documented.

## 2024-07-31 ENCOUNTER — TELEPHONE (OUTPATIENT)
Dept: SURGERY | Age: 69
End: 2024-07-31

## 2024-07-31 NOTE — TELEPHONE ENCOUNTER
Patient called to schedule an appointment with the nurse in 3 weeks for stitches removal. Patient had a port removed by dr mccormick on 07/26/24. Please contact patient.

## 2024-08-21 ENCOUNTER — OFFICE VISIT (OUTPATIENT)
Dept: SURGERY | Age: 69
End: 2024-08-21

## 2024-08-21 VITALS
DIASTOLIC BLOOD PRESSURE: 64 MMHG | BODY MASS INDEX: 33.59 KG/M2 | SYSTOLIC BLOOD PRESSURE: 118 MMHG | WEIGHT: 214 LBS | HEIGHT: 67 IN

## 2024-08-21 DIAGNOSIS — C50.919 TRIPLE NEGATIVE BREAST CANCER (HCC): ICD-10-CM

## 2024-08-21 DIAGNOSIS — Z90.13 S/P BILATERAL MASTECTOMY: Primary | ICD-10-CM

## 2024-08-21 PROCEDURE — 99024 POSTOP FOLLOW-UP VISIT: CPT | Performed by: PHYSICIAN ASSISTANT

## 2024-08-29 ENCOUNTER — OFFICE VISIT (OUTPATIENT)
Dept: SURGERY | Age: 69
End: 2024-08-29

## 2024-08-29 VITALS
OXYGEN SATURATION: 97 % | WEIGHT: 214 LBS | HEART RATE: 72 BPM | TEMPERATURE: 97 F | HEIGHT: 67 IN | BODY MASS INDEX: 33.59 KG/M2

## 2024-08-29 DIAGNOSIS — Z90.13 S/P BILATERAL MASTECTOMY: Primary | ICD-10-CM

## 2024-08-29 PROCEDURE — 99024 POSTOP FOLLOW-UP VISIT: CPT | Performed by: PHYSICIAN ASSISTANT

## 2024-09-13 DIAGNOSIS — Z90.13 S/P BILATERAL MASTECTOMY: Primary | ICD-10-CM

## 2024-09-13 DIAGNOSIS — C50.211 MALIGNANT NEOPLASM OF UPPER-INNER QUADRANT OF RIGHT FEMALE BREAST, UNSPECIFIED ESTROGEN RECEPTOR STATUS (HCC): ICD-10-CM

## 2024-09-23 ENCOUNTER — TELEPHONE (OUTPATIENT)
Dept: HEMATOLOGY | Age: 69
End: 2024-09-23

## 2024-09-25 DIAGNOSIS — R53.83 OTHER FATIGUE: ICD-10-CM

## 2024-09-25 DIAGNOSIS — C50.911 INVASIVE DUCTAL CARCINOMA OF BREAST, FEMALE, RIGHT (HCC): Primary | ICD-10-CM

## 2024-09-26 ENCOUNTER — OFFICE VISIT (OUTPATIENT)
Dept: HEMATOLOGY | Age: 69
End: 2024-09-26
Payer: MEDICARE

## 2024-09-26 ENCOUNTER — HOSPITAL ENCOUNTER (OUTPATIENT)
Dept: INFUSION THERAPY | Age: 69
Discharge: HOME | End: 2024-09-26
Payer: MEDICARE

## 2024-09-26 VITALS
OXYGEN SATURATION: 97 % | DIASTOLIC BLOOD PRESSURE: 80 MMHG | WEIGHT: 228 LBS | BODY MASS INDEX: 35.79 KG/M2 | SYSTOLIC BLOOD PRESSURE: 122 MMHG | HEART RATE: 83 BPM | TEMPERATURE: 97.1 F | HEIGHT: 67 IN

## 2024-09-26 DIAGNOSIS — M25.522 ARTHRALGIA OF BOTH ELBOWS: ICD-10-CM

## 2024-09-26 DIAGNOSIS — Z86.718 HISTORY OF DVT (DEEP VEIN THROMBOSIS): Primary | ICD-10-CM

## 2024-09-26 DIAGNOSIS — M25.521 ARTHRALGIA OF BOTH ELBOWS: ICD-10-CM

## 2024-09-26 DIAGNOSIS — C50.911 INVASIVE DUCTAL CARCINOMA OF BREAST, FEMALE, RIGHT (HCC): ICD-10-CM

## 2024-09-26 DIAGNOSIS — Z71.89 CARE PLAN DISCUSSED WITH PATIENT: ICD-10-CM

## 2024-09-26 DIAGNOSIS — G62.0 CHEMOTHERAPY-INDUCED NEUROPATHY (HCC): ICD-10-CM

## 2024-09-26 DIAGNOSIS — M25.562 ARTHRALGIA OF KNEE, LEFT: ICD-10-CM

## 2024-09-26 DIAGNOSIS — R53.83 OTHER FATIGUE: ICD-10-CM

## 2024-09-26 DIAGNOSIS — Z79.01 ANTICOAGULATION MANAGEMENT ENCOUNTER: ICD-10-CM

## 2024-09-26 DIAGNOSIS — R53.83 FATIGUE DUE TO TREATMENT: ICD-10-CM

## 2024-09-26 DIAGNOSIS — T45.1X5A CHEMOTHERAPY-INDUCED NEUROPATHY (HCC): ICD-10-CM

## 2024-09-26 DIAGNOSIS — Z51.81 ANTICOAGULATION MANAGEMENT ENCOUNTER: ICD-10-CM

## 2024-09-26 LAB
ALBUMIN SERPL-MCNC: 3.5 G/DL (ref 3.5–5.2)
ALP SERPL-CCNC: 96 U/L (ref 35–104)
ALT SERPL-CCNC: 15 U/L (ref 5–33)
ANION GAP SERPL CALCULATED.3IONS-SCNC: 11 MMOL/L (ref 7–19)
AST SERPL-CCNC: 17 U/L (ref 5–32)
BASOPHILS # BLD: 0.04 K/UL (ref 0.01–0.08)
BASOPHILS NFR BLD: 0.5 % (ref 0.1–1.2)
BILIRUB SERPL-MCNC: 0.4 MG/DL (ref 0–1.2)
BUN SERPL-MCNC: 10 MG/DL (ref 8–23)
CALCIUM SERPL-MCNC: 8.8 MG/DL (ref 8.8–10.2)
CHLORIDE SERPL-SCNC: 107 MMOL/L (ref 98–107)
CO2 SERPL-SCNC: 22 MMOL/L (ref 22–29)
CREAT SERPL-MCNC: 0.6 MG/DL (ref 0.5–0.9)
EOSINOPHIL # BLD: 0.5 K/UL (ref 0.04–0.54)
EOSINOPHIL NFR BLD: 6 % (ref 0.7–7)
ERYTHROCYTE [DISTWIDTH] IN BLOOD BY AUTOMATED COUNT: 15.9 % (ref 11.7–14.4)
GLUCOSE SERPL-MCNC: 106 MG/DL (ref 70–99)
HCT VFR BLD AUTO: 38.2 % (ref 34.1–44.9)
HGB BLD-MCNC: 12.6 G/DL (ref 11.2–15.7)
LYMPHOCYTES # BLD: 0.57 K/UL (ref 1.18–3.74)
LYMPHOCYTES NFR BLD: 6.9 % (ref 19.3–53.1)
MCH RBC QN AUTO: 30.9 PG (ref 25.6–32.2)
MCHC RBC AUTO-ENTMCNC: 33 G/DL (ref 32.3–35.5)
MCV RBC AUTO: 93.6 FL (ref 79.4–94.8)
MONOCYTES # BLD: 0.23 K/UL (ref 0.24–0.82)
MONOCYTES NFR BLD: 2.8 % (ref 4.7–12.5)
NEUTROPHILS # BLD: 6.92 K/UL (ref 1.56–6.13)
NEUTS SEG NFR BLD: 83.4 % (ref 34–71.1)
PLATELET # BLD AUTO: 264 K/UL (ref 182–369)
PMV BLD AUTO: 9.6 FL (ref 7.4–10.4)
POTASSIUM SERPL-SCNC: 4.3 MMOL/L (ref 3.5–5.1)
PROT SERPL-MCNC: 6.3 G/DL (ref 6.4–8.3)
RBC # BLD AUTO: 4.08 M/UL (ref 3.93–5.22)
SODIUM SERPL-SCNC: 140 MMOL/L (ref 136–145)
TSH SERPL DL<=0.005 MIU/L-ACNC: 3.36 UIU/ML (ref 0.27–4.2)
WBC # BLD AUTO: 8.29 K/UL (ref 3.98–10.04)

## 2024-09-26 PROCEDURE — G8400 PT W/DXA NO RESULTS DOC: HCPCS | Performed by: INTERNAL MEDICINE

## 2024-09-26 PROCEDURE — 1036F TOBACCO NON-USER: CPT | Performed by: INTERNAL MEDICINE

## 2024-09-26 PROCEDURE — 1090F PRES/ABSN URINE INCON ASSESS: CPT | Performed by: INTERNAL MEDICINE

## 2024-09-26 PROCEDURE — G8427 DOCREV CUR MEDS BY ELIG CLIN: HCPCS | Performed by: INTERNAL MEDICINE

## 2024-09-26 PROCEDURE — 3074F SYST BP LT 130 MM HG: CPT | Performed by: INTERNAL MEDICINE

## 2024-09-26 PROCEDURE — 85025 COMPLETE CBC W/AUTO DIFF WBC: CPT

## 2024-09-26 PROCEDURE — 99214 OFFICE O/P EST MOD 30 MIN: CPT | Performed by: INTERNAL MEDICINE

## 2024-09-26 PROCEDURE — 1123F ACP DISCUSS/DSCN MKR DOCD: CPT | Performed by: INTERNAL MEDICINE

## 2024-09-26 PROCEDURE — 3017F COLORECTAL CA SCREEN DOC REV: CPT | Performed by: INTERNAL MEDICINE

## 2024-09-26 PROCEDURE — 80053 COMPREHEN METABOLIC PANEL: CPT

## 2024-09-26 PROCEDURE — 99212 OFFICE O/P EST SF 10 MIN: CPT

## 2024-09-26 PROCEDURE — 3079F DIAST BP 80-89 MM HG: CPT | Performed by: INTERNAL MEDICINE

## 2024-09-26 PROCEDURE — G2211 COMPLEX E/M VISIT ADD ON: HCPCS | Performed by: INTERNAL MEDICINE

## 2024-09-26 PROCEDURE — 36415 COLL VENOUS BLD VENIPUNCTURE: CPT

## 2024-09-26 PROCEDURE — G8417 CALC BMI ABV UP PARAM F/U: HCPCS | Performed by: INTERNAL MEDICINE

## 2024-09-26 RX ORDER — PREDNISONE 10 MG/1
TABLET ORAL
COMMUNITY
Start: 2024-07-18

## 2024-10-01 ENCOUNTER — HOSPITAL ENCOUNTER (OUTPATIENT)
Dept: OCCUPATIONAL THERAPY | Age: 69
Setting detail: THERAPIES SERIES
Discharge: HOME | End: 2024-10-01
Payer: MEDICARE

## 2024-10-01 PROCEDURE — 93702 BIS XTRACELL FLUID ANALYSIS: CPT

## 2024-10-01 PROCEDURE — 97165 OT EVAL LOW COMPLEX 30 MIN: CPT

## 2024-10-01 NOTE — PROGRESS NOTES
Occupational Therapy: Initial Evaluation   Patient: Vivienne Mcdonald (69 y.o. female)   Examination Date: 10/01/2024  Plan of Care Certification Period: 10/1/2024 to  10/1/2024      :  1955  MRN: [id number]  CSN: [id number]   Insurance: Payor: MEDICARE / Plan: MEDICARE PART A AND B / Product Type: *No Product type* /   Insurance ID: [id number] - (Medicare) Secondary Insurance (if applicable):    Insurance Information: Medicare   Referring Physician: Hadley Hutchinson MD Daniel Howard, MD   PCP: Shirley Ramirez DO Visits to Date/Visits Approved:   /      No Show/Cancelled Appts:    /       Medical Diagnosis: Malignant neoplasm of upper-inner quadrant of right female breast [C50.211]  Acquired absence of bilateral breasts and nipples [Z90.13] C50.919, Z90.13  No data recorded     PERTINENT MEDICAL HISTORY   Patient assessed for rehabilitation services?: Yes  Self reported health status:: Excellent    Medical History: Chart Reviewed: Yes   Past Medical History:   Diagnosis Date    Acute bilateral low back pain 2019    Arthritis     Breast cancer (HCC) 2023    right    Hand numbness     right hand s/p arm surgery    Hx of blood clots     Hypercholesterolemia     Hyperlipidemia     Hypertension     Malignant neoplasm of overlapping sites of right breast (HCC) 2023    Obesity, unspecified obesity severity, unspecified obesity type 2018    Post-menopausal      Surgical History:   Past Surgical History:   Procedure Laterality Date    ARM SURGERY Right     \"nerve release\", dt rocío, with 2nd surgery, debridement    JOINT REPLACEMENT Right     knee    MASTECTOMY Bilateral 2024    BILATERAL SIMPLE MASTECTOMY WITH RIGHT SENTINEL NODE, BILATERAL PEC BLOCK, ULTRASOUND GUIDED NEEDLE LOCALIZATION X 2 performed by Hadley Hutchinson MD at [facility] OR    PORT SURGERY N/A 2023    PORT INSERTION WITH FLUOROSCOPY performed by Hadley Hutchinson MD at [facility] OR    PORT SURGERY N/A 2024    PORT

## 2024-10-30 ENCOUNTER — NURSE ONLY (OUTPATIENT)
Age: 69
End: 2024-10-30

## 2024-10-30 VITALS — WEIGHT: 225 LBS | HEIGHT: 67 IN | BODY MASS INDEX: 35.31 KG/M2

## 2024-10-30 DIAGNOSIS — Z96.652 CHRONIC KNEE PAIN AFTER TOTAL REPLACEMENT OF LEFT KNEE JOINT: ICD-10-CM

## 2024-10-30 DIAGNOSIS — M25.562 CHRONIC KNEE PAIN AFTER TOTAL REPLACEMENT OF LEFT KNEE JOINT: ICD-10-CM

## 2024-10-30 DIAGNOSIS — G89.29 CHRONIC KNEE PAIN AFTER TOTAL REPLACEMENT OF LEFT KNEE JOINT: ICD-10-CM

## 2024-10-30 DIAGNOSIS — M17.12 PRIMARY OSTEOARTHRITIS OF LEFT KNEE: Primary | ICD-10-CM

## 2024-10-30 RX ORDER — BETAMETHASONE SODIUM PHOSPHATE AND BETAMETHASONE ACETATE 3; 3 MG/ML; MG/ML
12 INJECTION, SUSPENSION INTRA-ARTICULAR; INTRALESIONAL; INTRAMUSCULAR; SOFT TISSUE ONCE
Status: COMPLETED | OUTPATIENT
Start: 2024-10-30 | End: 2024-10-30

## 2024-10-30 RX ORDER — BUPIVACAINE HYDROCHLORIDE 5 MG/ML
5 INJECTION, SOLUTION PERINEURAL ONCE
Status: COMPLETED | OUTPATIENT
Start: 2024-10-30 | End: 2024-10-30

## 2024-10-30 RX ORDER — LIDOCAINE HYDROCHLORIDE 10 MG/ML
2.5 INJECTION, SOLUTION INFILTRATION; PERINEURAL ONCE
Status: COMPLETED | OUTPATIENT
Start: 2024-10-30 | End: 2024-10-30

## 2024-10-30 RX ADMIN — LIDOCAINE HYDROCHLORIDE 2.5 ML: 10 INJECTION, SOLUTION INFILTRATION; PERINEURAL at 13:02

## 2024-10-30 RX ADMIN — BETAMETHASONE SODIUM PHOSPHATE AND BETAMETHASONE ACETATE 12 MG: 3; 3 INJECTION, SUSPENSION INTRA-ARTICULAR; INTRALESIONAL; INTRAMUSCULAR; SOFT TISSUE at 12:50

## 2024-10-30 RX ADMIN — BUPIVACAINE HYDROCHLORIDE 25 MG: 5 INJECTION, SOLUTION PERINEURAL at 13:01

## 2024-10-30 NOTE — PROGRESS NOTES
GUIDED NEEDLE LOC OF RIGHT BREAST 1/2/2024 [facility] ULTRASOUND       Current Medications:   Prior to Admission medications    Medication Sig Start Date End Date Taking? Authorizing Provider   oxyCODONE-acetaminophen (PERCOCET) 5-325 MG per tablet Take 1 tablet by mouth every 4 hours as needed for Pain.   Yes Bel Cooper MD   Secukinumab (COSENTYX, 300 MG DOSE, SC) Inject 1 Dose into the skin every 30 days   Yes Bel Cooper MD   ELIQUIS 5 MG TABS tablet TAKE 1 TABLET BY MOUTH 2 TIMES DAILY 5/29/24  Yes Pedro Joyner MD   HYDROcodone-acetaminophen (NORCO) 7.5-325 MG per tablet Take 0.5-1 tablets by mouth every 6 hours as needed for Pain. 1/19/18  Yes Bel Cooper MD   lovastatin (MEVACOR) 20 MG tablet Take 1 tablet by mouth nightly 12/28/17  Yes Bel Cooper MD   metoprolol succinate (TOPROL XL) 100 MG extended release tablet Take 1 tablet by mouth daily 1/30/18  Yes Bel Cooper MD   montelukast (SINGULAIR) 10 MG tablet Take 1 tablet by mouth daily 10/27/16  Yes Bel Cooper MD   predniSONE (DELTASONE) 10 MG tablet 4 po qd 3 days then 3 po qd x 3 days then 2 po qd x 3 days then 1 po qd x 3 days then 1/2 po qd x 3 day  Patient not taking: Reported on 10/30/2024 7/18/24   Bel Cooper MD   ondansetron (ZOFRAN) 4 MG tablet Take 1 tablet by mouth every 8 hours as needed for Nausea or Vomiting  Patient not taking: Reported on 10/30/2024 5/24/23   Pedro Joyner MD       Allergies:  Amoxicillin and Other    System Neg/Pos Details   Constitutional Negative Fatigue and Fever.   Respiratory Negative Cough and Dyspnea.   Cardio Negative Chest pain.   GI Negative Abdominal pain and Vomiting.    Negative Urinary incontinence.   Neuro Negative Headache.   Psych Negative Psychiatric symptoms.       PHYSICAL EXAM:    General:  Appears stated age, no distress.  Orientation:  Alert and oriented to time, place, and person.  Mood and Affect:  Cooperative and

## 2024-11-06 ENCOUNTER — OFFICE VISIT (OUTPATIENT)
Dept: SURGERY | Age: 69
End: 2024-11-06

## 2024-11-06 ENCOUNTER — OFFICE VISIT (OUTPATIENT)
Dept: HEMATOLOGY | Age: 69
End: 2024-11-06
Payer: MEDICARE

## 2024-11-06 ENCOUNTER — HOSPITAL ENCOUNTER (OUTPATIENT)
Dept: INFUSION THERAPY | Age: 69
Discharge: HOME | End: 2024-11-06
Payer: MEDICARE

## 2024-11-06 VITALS — HEART RATE: 90 BPM | BODY MASS INDEX: 36.1 KG/M2 | OXYGEN SATURATION: 97 % | WEIGHT: 230 LBS | HEIGHT: 67 IN

## 2024-11-06 VITALS
OXYGEN SATURATION: 99 % | HEART RATE: 92 BPM | BODY MASS INDEX: 36.29 KG/M2 | WEIGHT: 231.2 LBS | HEIGHT: 67 IN | DIASTOLIC BLOOD PRESSURE: 74 MMHG | TEMPERATURE: 97.7 F | SYSTOLIC BLOOD PRESSURE: 126 MMHG

## 2024-11-06 DIAGNOSIS — R53.83 OTHER FATIGUE: ICD-10-CM

## 2024-11-06 DIAGNOSIS — C92.00 ACUTE MYELOID LEUKEMIA NOT HAVING ACHIEVED REMISSION (HCC): ICD-10-CM

## 2024-11-06 DIAGNOSIS — Z90.13 S/P BILATERAL MASTECTOMY: ICD-10-CM

## 2024-11-06 DIAGNOSIS — Z71.89 COORDINATION OF COMPLEX CARE: ICD-10-CM

## 2024-11-06 DIAGNOSIS — Z15.01 LI-FRAUMENI SYNDROME: Primary | ICD-10-CM

## 2024-11-06 DIAGNOSIS — R53.83 FATIGUE DUE TO TREATMENT: ICD-10-CM

## 2024-11-06 DIAGNOSIS — C92.00 ACUTE MYELOID LEUKEMIA NOT HAVING ACHIEVED REMISSION (HCC): Primary | ICD-10-CM

## 2024-11-06 DIAGNOSIS — Z17.421 TRIPLE NEGATIVE BREAST CANCER (HCC): ICD-10-CM

## 2024-11-06 DIAGNOSIS — R23.3 EASY BRUISING: ICD-10-CM

## 2024-11-06 DIAGNOSIS — Z71.89 CARE PLAN DISCUSSED WITH PATIENT: ICD-10-CM

## 2024-11-06 DIAGNOSIS — C50.919 TRIPLE NEGATIVE BREAST CANCER (HCC): ICD-10-CM

## 2024-11-06 LAB
ALBUMIN SERPL-MCNC: 3.9 G/DL (ref 3.5–5.2)
ALP SERPL-CCNC: 100 U/L (ref 35–104)
ALT SERPL-CCNC: 14 U/L (ref 5–33)
ANION GAP SERPL CALCULATED.3IONS-SCNC: 13 MMOL/L (ref 7–19)
APTT PPP: 23.6 SEC (ref 26–36.2)
AST SERPL-CCNC: 26 U/L (ref 5–32)
BASOPHILS # BLD: 0.1 K/UL (ref 0.01–0.08)
BASOPHILS NFR BLD: 0.1 % (ref 0.1–1.2)
BILIRUB SERPL-MCNC: 0.3 MG/DL (ref 0–1.2)
BUN SERPL-MCNC: 12 MG/DL (ref 8–23)
CALCIUM SERPL-MCNC: 9.3 MG/DL (ref 8.8–10.2)
CHLORIDE SERPL-SCNC: 103 MMOL/L (ref 98–107)
CO2 SERPL-SCNC: 23 MMOL/L (ref 22–29)
CREAT SERPL-MCNC: 0.7 MG/DL (ref 0.5–0.9)
EOSINOPHIL # BLD: 0.15 K/UL (ref 0.04–0.54)
EOSINOPHIL NFR BLD: 0.1 % (ref 0.7–7)
ERYTHROCYTE [DISTWIDTH] IN BLOOD BY AUTOMATED COUNT: 17.5 % (ref 11.7–14.4)
GLUCOSE SERPL-MCNC: 105 MG/DL (ref 70–99)
HCT VFR BLD AUTO: 38.4 % (ref 34.1–44.9)
HGB BLD-MCNC: 12.6 G/DL (ref 11.2–15.7)
INR PPP: 1.17 (ref 0.88–1.18)
LDH SERPL-CCNC: 510 U/L (ref 135–214)
LYMPHOCYTES # BLD: 50.34 K/UL (ref 1.18–3.74)
LYMPHOCYTES NFR BLD: 39.6 % (ref 19.3–53.1)
MCH RBC QN AUTO: 31.1 PG (ref 25.6–32.2)
MCHC RBC AUTO-ENTMCNC: 32.8 G/DL (ref 32.3–35.5)
MCV RBC AUTO: 94.8 FL (ref 79.4–94.8)
MONOCYTES # BLD: 76.04 K/UL (ref 0.24–0.82)
MONOCYTES NFR BLD: 59.8 % (ref 4.7–12.5)
NEUTROPHILS # BLD: 0.48 K/UL (ref 1.56–6.13)
NEUTS SEG NFR BLD: 0.3 % (ref 34–71.1)
PLATELET # BLD AUTO: 89 K/UL (ref 182–369)
PMV BLD AUTO: 8.9 FL (ref 7.4–10.4)
POTASSIUM SERPL-SCNC: 3.6 MMOL/L (ref 3.5–5.1)
PROT SERPL-MCNC: 7 G/DL (ref 6.4–8.3)
PROTHROMBIN TIME: 14.5 SEC (ref 12–14.6)
RBC # BLD AUTO: 4.05 M/UL (ref 3.93–5.22)
SODIUM SERPL-SCNC: 139 MMOL/L (ref 136–145)
TSH SERPL DL<=0.005 MIU/L-ACNC: 4.62 UIU/ML (ref 0.27–4.2)
URATE SERPL-MCNC: 5.8 MG/DL (ref 2.4–5.7)
WBC # BLD AUTO: 127.26 K/UL (ref 3.98–10.04)

## 2024-11-06 PROCEDURE — G8417 CALC BMI ABV UP PARAM F/U: HCPCS | Performed by: INTERNAL MEDICINE

## 2024-11-06 PROCEDURE — 3074F SYST BP LT 130 MM HG: CPT | Performed by: INTERNAL MEDICINE

## 2024-11-06 PROCEDURE — 99215 OFFICE O/P EST HI 40 MIN: CPT | Performed by: INTERNAL MEDICINE

## 2024-11-06 PROCEDURE — G8427 DOCREV CUR MEDS BY ELIG CLIN: HCPCS | Performed by: INTERNAL MEDICINE

## 2024-11-06 PROCEDURE — 3078F DIAST BP <80 MM HG: CPT | Performed by: INTERNAL MEDICINE

## 2024-11-06 PROCEDURE — 1090F PRES/ABSN URINE INCON ASSESS: CPT | Performed by: INTERNAL MEDICINE

## 2024-11-06 PROCEDURE — G8482 FLU IMMUNIZE ORDER/ADMIN: HCPCS | Performed by: INTERNAL MEDICINE

## 2024-11-06 PROCEDURE — 1036F TOBACCO NON-USER: CPT | Performed by: INTERNAL MEDICINE

## 2024-11-06 PROCEDURE — 99213 OFFICE O/P EST LOW 20 MIN: CPT

## 2024-11-06 PROCEDURE — 3017F COLORECTAL CA SCREEN DOC REV: CPT | Performed by: INTERNAL MEDICINE

## 2024-11-06 PROCEDURE — 1125F AMNT PAIN NOTED PAIN PRSNT: CPT | Performed by: INTERNAL MEDICINE

## 2024-11-06 PROCEDURE — G2211 COMPLEX E/M VISIT ADD ON: HCPCS | Performed by: INTERNAL MEDICINE

## 2024-11-06 PROCEDURE — 80053 COMPREHEN METABOLIC PANEL: CPT

## 2024-11-06 PROCEDURE — G2212 PROLONG OUTPT/OFFICE VIS: HCPCS | Performed by: INTERNAL MEDICINE

## 2024-11-06 PROCEDURE — 1159F MED LIST DOCD IN RCRD: CPT | Performed by: INTERNAL MEDICINE

## 2024-11-06 PROCEDURE — 84550 ASSAY OF BLOOD/URIC ACID: CPT

## 2024-11-06 PROCEDURE — G8400 PT W/DXA NO RESULTS DOC: HCPCS | Performed by: INTERNAL MEDICINE

## 2024-11-06 PROCEDURE — 83615 LACTATE (LD) (LDH) ENZYME: CPT

## 2024-11-06 PROCEDURE — 1123F ACP DISCUSS/DSCN MKR DOCD: CPT | Performed by: INTERNAL MEDICINE

## 2024-11-06 PROCEDURE — 36415 COLL VENOUS BLD VENIPUNCTURE: CPT

## 2024-11-06 PROCEDURE — 85025 COMPLETE CBC W/AUTO DIFF WBC: CPT

## 2024-11-06 NOTE — PROGRESS NOTES
HISTORY OF PRESENT ILLNESS:    Ms. Mcdonald presents today for exam following bilateral simple mastectomy with right sentinel lymph node biopsy on 1/2/2024    She is doing quite well from her breast cancer perspective however her white blood cell count 2 days ago was 82,000.  Flow cytometry indicates likely AML.  I called Dr. Joyner and sent her straight to his office.    She underwent neoadjuvant chemotherapy for her triple negative right breast cancer with AC and CarboTaxol.  She is still getting Keytruda.    She had a pathologic complete response.    She is originally status post ultrasound guided breast biopsy  on the right which revealed a 2.5 cm at 9:00 and 1.8 cm at 2:00 high grade invasive ductal carcinoma. ER negative ID negative Her2 negative Ki67 84%    Mammaprint High Risk Basal Type    Her genetic testing demonstrated a pathogenic variant in the T p53 gene  This is associated with Li-Fraumeni syndrome    She has a fairly severe inflammatory response to absorbable Vicryl suture and we will want to not use this on her if at all possible    MRI-11/22/2023  Right breast:  At the site of biopsy-proven malignancy at 9 o'clock 10 cm from the nipple there is interval decrease in size of a faintly peripherally enhancing mass marked centrally with a biopsy marker clip measuring on the current exam 1.3 x 1.6 x 1.4   cm (craniocaudal X AP X transverse, series 7 image 244, series 402 image 65).  This measured 1.6 cm on ultrasound from 09/27/2023 and 5.0 CM on prior MRI.  Associated post biopsy hematoma has significantly decreased.     At the site of biopsy-proven malignancy at the 2 o'clock position marked medially with a biopsy marker clip there is redemonstration of a heterogeneous conglomerate of rim enhancing masses measuring 3.5 x 3.9 x 2.2 cm, extending to the skin surface   medially (AP X transverse X craniocaudal, series 6 image 92, series 7 image 176).  This previously measured 3.3 x 3.1 x 4.9 cm on

## 2024-11-06 NOTE — PROGRESS NOTES
satisfaction. I have also reviewed the chief complaint (CC) and part of the history (History of Present Illness (HPI), Past Family Social History (PFSH), or Review of Systems (ROS) and made changes when appropriated.     (Please note that portions of this note were completed with a voice recognition program. Efforts were made to edit the dictations but occasionally words are mis-transcribed.)Electronically signed by Pedro Joyner MD on 11/6/2024 at 3:57 PM      The total time, 65 min I spent to see the patient today includes at least one or more of the following: preparing to see the patient by reviewing prior tests, prior notes or other relevant information, performing appropriate independent examination and evaluation, counseling, ordering of medications, tests or procedures, referrals, communicating with other healthcare professionals when appropriated to coordinate care, documenting clinic information in the electronic medical record or other health records, independently interpreting results of tests, managing test results and communicating the results to the patient/family or caregiver.

## 2024-12-16 DIAGNOSIS — C92.00 ACUTE MYELOID LEUKEMIA NOT HAVING ACHIEVED REMISSION (HCC): Primary | ICD-10-CM

## 2024-12-18 ENCOUNTER — TELEPHONE (OUTPATIENT)
Dept: HEMATOLOGY | Age: 69
End: 2024-12-18

## 2024-12-18 NOTE — TELEPHONE ENCOUNTER
Called Patient and reminded patient of their appointment on 12/20/2024 and patient confirmed they would be here. Reminded patient to just come at appointment time, and to not come at the lab appointment time. Reminded patient that we will not check them in any more than 30 minutes before appointment time.  We have now moved to the [facility] [place] that is located between our old office and the ER at the [facility]. Letting the Pt know that our front entrance faces the  [place]. Reminded pt to eat well and be well hydrated for their labs.

## 2024-12-19 NOTE — PROGRESS NOTES
severity, unspecified obesity type 09/06/2018    Post-menopausal      Past Surgical History:    Past Surgical History:   Procedure Laterality Date    ARM SURGERY Right     \"nerve release\", dt rocío, with 2nd surgery, debridement    JOINT REPLACEMENT Right 2010    knee    MASTECTOMY Bilateral 1/2/2024    BILATERAL SIMPLE MASTECTOMY WITH RIGHT SENTINEL NODE, BILATERAL PEC BLOCK, ULTRASOUND GUIDED NEEDLE LOCALIZATION X 2 performed by Hadley Hutchinson MD at [facility] OR    PORT SURGERY N/A 05/19/2023    PORT INSERTION WITH FLUOROSCOPY performed by Hadley Hutchinson MD at [facility] OR    PORT SURGERY N/A 7/26/2024    PORT REMOVAL performed by Hadley Hutchinson MD at [facility] OR    TOTAL KNEE ARTHROPLASTY Right     US BREAST BIOPSY W LOC DEVICE 1ST LESION RIGHT Right 05/01/2023    US BREAST BIOPSY W LOC DEVICE 1ST LESION RIGHT 5/1/2023 [facility]    US PLACE BREAST LOC DEVICE 1ST LESION RIGHT Right 1/2/2024    US GUIDED NEEDLE LOC OF RIGHT BREAST 1/2/2024 [facility] ULTRASOUND    US PLACE BREAST LOC DEVICE 1ST LESION RIGHT Right 1/2/2024    US GUIDED NEEDLE LOC OF RIGHT BREAST 1/2/2024 [facility] ULTRASOUND     Social History:    Marital status:   Smoking status:No  ETOH status:No  Resides: [place], [place]    Family History:   Family History   Problem Relation Age of Onset    High Blood Pressure Mother     Pacemaker Mother     High Blood Pressure Father     Heart Attack Father      Current Hospital Medications:    Current Outpatient Medications   Medication Sig Dispense Refill    prochlorperazine (COMPAZINE) 10 MG tablet Take 1 tablet by mouth every 6 hours as needed      ondansetron (ZOFRAN) 4 MG tablet Take 1 tablet by mouth every 8 hours as needed for Nausea or Vomiting 30 tablet 5    lovastatin (MEVACOR) 20 MG tablet Take 1 tablet by mouth nightly      montelukast (SINGULAIR) 10 MG tablet Take 1 tablet by mouth daily      predniSONE (DELTASONE) 10 MG tablet  (Patient not taking: Reported on 12/20/2024)      oxyCODONE-acetaminophen

## 2024-12-20 ENCOUNTER — HOSPITAL ENCOUNTER (OUTPATIENT)
Dept: INFUSION THERAPY | Age: 69
Discharge: HOME | End: 2024-12-20
Payer: MEDICARE

## 2024-12-20 ENCOUNTER — OFFICE VISIT (OUTPATIENT)
Dept: HEMATOLOGY | Age: 69
End: 2024-12-20
Payer: MEDICARE

## 2024-12-20 VITALS
OXYGEN SATURATION: 99 % | BODY MASS INDEX: 33.4 KG/M2 | DIASTOLIC BLOOD PRESSURE: 62 MMHG | HEART RATE: 72 BPM | SYSTOLIC BLOOD PRESSURE: 102 MMHG | HEIGHT: 67 IN | TEMPERATURE: 97.7 F | WEIGHT: 212.8 LBS

## 2024-12-20 DIAGNOSIS — Z17.421 TRIPLE NEGATIVE BREAST CANCER (HCC): Primary | ICD-10-CM

## 2024-12-20 DIAGNOSIS — T45.1X5A CHEMOTHERAPY-INDUCED NEUROPATHY (HCC): ICD-10-CM

## 2024-12-20 DIAGNOSIS — R53.83 FATIGUE DUE TO TREATMENT: ICD-10-CM

## 2024-12-20 DIAGNOSIS — C50.919 TRIPLE NEGATIVE BREAST CANCER (HCC): Primary | ICD-10-CM

## 2024-12-20 DIAGNOSIS — Z71.89 CARE PLAN DISCUSSED WITH PATIENT: ICD-10-CM

## 2024-12-20 DIAGNOSIS — Z71.89 COORDINATION OF COMPLEX CARE: ICD-10-CM

## 2024-12-20 DIAGNOSIS — T45.1X5D ADVERSE EFFECT OF CHEMOTHERAPY, SUBSEQUENT ENCOUNTER: ICD-10-CM

## 2024-12-20 DIAGNOSIS — R53.1 WEAKNESS: ICD-10-CM

## 2024-12-20 DIAGNOSIS — C92.00 ACUTE MYELOID LEUKEMIA NOT HAVING ACHIEVED REMISSION (HCC): ICD-10-CM

## 2024-12-20 DIAGNOSIS — G62.0 CHEMOTHERAPY-INDUCED NEUROPATHY (HCC): ICD-10-CM

## 2024-12-20 DIAGNOSIS — Z51.11 CHEMOTHERAPY MANAGEMENT, ENCOUNTER FOR: ICD-10-CM

## 2024-12-20 LAB
ALBUMIN SERPL-MCNC: 3.5 G/DL (ref 3.5–5.2)
ALP SERPL-CCNC: 190 U/L (ref 35–104)
ALT SERPL-CCNC: 10 U/L (ref 5–33)
ANION GAP SERPL CALCULATED.3IONS-SCNC: 14 MMOL/L (ref 7–19)
AST SERPL-CCNC: 20 U/L (ref 5–32)
BASOPHILS # BLD: 0.01 K/UL (ref 0.01–0.08)
BASOPHILS NFR BLD: 0.4 % (ref 0.1–1.2)
BILIRUB SERPL-MCNC: 0.4 MG/DL (ref 0–1.2)
BUN SERPL-MCNC: 14 MG/DL (ref 8–23)
CALCIUM SERPL-MCNC: 8.6 MG/DL (ref 8.8–10.2)
CHLORIDE SERPL-SCNC: 105 MMOL/L (ref 98–107)
CO2 SERPL-SCNC: 20 MMOL/L (ref 22–29)
CREAT SERPL-MCNC: 0.9 MG/DL (ref 0.5–0.9)
EOSINOPHIL # BLD: 0 K/UL (ref 0.04–0.54)
EOSINOPHIL NFR BLD: 0 % (ref 0.7–7)
ERYTHROCYTE [DISTWIDTH] IN BLOOD BY AUTOMATED COUNT: 13.5 % (ref 11.7–14.4)
GLUCOSE SERPL-MCNC: 111 MG/DL (ref 70–99)
HCT VFR BLD AUTO: 28.5 % (ref 34.1–44.9)
HGB BLD-MCNC: 9.4 G/DL (ref 11.2–15.7)
LYMPHOCYTES # BLD: 0.59 K/UL (ref 1.18–3.74)
LYMPHOCYTES NFR BLD: 22.5 % (ref 19.3–53.1)
MCH RBC QN AUTO: 29.8 PG (ref 25.6–32.2)
MCHC RBC AUTO-ENTMCNC: 33 G/DL (ref 32.3–35.5)
MCV RBC AUTO: 90.5 FL (ref 79.4–94.8)
MONOCYTES # BLD: 0.11 K/UL (ref 0.24–0.82)
MONOCYTES NFR BLD: 4.2 % (ref 4.7–12.5)
NEUTROPHILS # BLD: 1.86 K/UL (ref 1.56–6.13)
NEUTS SEG NFR BLD: 71 % (ref 34–71.1)
PLATELET # BLD AUTO: 134 K/UL (ref 182–369)
PMV BLD AUTO: 10.3 FL (ref 7.4–10.4)
POTASSIUM SERPL-SCNC: 3.8 MMOL/L (ref 3.5–5.1)
PROT SERPL-MCNC: 6.2 G/DL (ref 6.4–8.3)
RBC # BLD AUTO: 3.15 M/UL (ref 3.93–5.22)
SODIUM SERPL-SCNC: 139 MMOL/L (ref 136–145)
WBC # BLD AUTO: 2.62 K/UL (ref 3.98–10.04)

## 2024-12-20 PROCEDURE — 36415 COLL VENOUS BLD VENIPUNCTURE: CPT

## 2024-12-20 PROCEDURE — 80053 COMPREHEN METABOLIC PANEL: CPT

## 2024-12-20 PROCEDURE — 85025 COMPLETE CBC W/AUTO DIFF WBC: CPT

## 2024-12-20 RX ORDER — PROCHLORPERAZINE MALEATE 10 MG
10 TABLET ORAL EVERY 6 HOURS PRN
COMMUNITY

## 2024-12-27 DIAGNOSIS — N18.30 CHRONIC KIDNEY DISEASE, STAGE 3 UNSPECIFIED (HCC): ICD-10-CM

## 2024-12-27 DIAGNOSIS — R53.83 FATIGUE DUE TO TREATMENT: ICD-10-CM

## 2024-12-27 DIAGNOSIS — R23.3 EASY BRUISING: ICD-10-CM

## 2024-12-27 DIAGNOSIS — E87.6 HYPOKALEMIA: ICD-10-CM

## 2024-12-27 DIAGNOSIS — C50.919 TRIPLE NEGATIVE BREAST CANCER (HCC): Primary | ICD-10-CM

## 2024-12-27 DIAGNOSIS — C92.00 ACUTE MYELOID LEUKEMIA NOT HAVING ACHIEVED REMISSION (HCC): ICD-10-CM

## 2024-12-27 DIAGNOSIS — R79.9 ABNORMAL FINDING OF BLOOD CHEMISTRY, UNSPECIFIED: ICD-10-CM

## 2024-12-27 DIAGNOSIS — Z17.421 TRIPLE NEGATIVE BREAST CANCER (HCC): Primary | ICD-10-CM

## 2025-01-03 ENCOUNTER — HOSPITAL ENCOUNTER (OUTPATIENT)
Dept: INFUSION THERAPY | Age: 70
Discharge: HOME | End: 2025-01-03
Payer: MEDICARE

## 2025-01-03 DIAGNOSIS — R53.83 FATIGUE DUE TO TREATMENT: ICD-10-CM

## 2025-01-03 DIAGNOSIS — E87.6 HYPOKALEMIA: ICD-10-CM

## 2025-01-03 DIAGNOSIS — C92.00 ACUTE MYELOID LEUKEMIA NOT HAVING ACHIEVED REMISSION (HCC): ICD-10-CM

## 2025-01-03 DIAGNOSIS — R79.9 ABNORMAL FINDING OF BLOOD CHEMISTRY, UNSPECIFIED: ICD-10-CM

## 2025-01-03 DIAGNOSIS — R23.3 EASY BRUISING: ICD-10-CM

## 2025-01-03 DIAGNOSIS — N18.30 CHRONIC KIDNEY DISEASE, STAGE 3 UNSPECIFIED (HCC): ICD-10-CM

## 2025-01-03 DIAGNOSIS — E87.6 HYPOKALEMIA: Primary | ICD-10-CM

## 2025-01-03 LAB
ALBUMIN SERPL-MCNC: 3.4 G/DL (ref 3.5–5.2)
ALP SERPL-CCNC: 119 U/L (ref 35–104)
ALT SERPL-CCNC: 14 U/L (ref 5–33)
ANION GAP SERPL CALCULATED.3IONS-SCNC: 13 MMOL/L (ref 7–19)
AST SERPL-CCNC: 21 U/L (ref 5–32)
BASOPHILS # BLD: 0.02 K/UL (ref 0.01–0.08)
BASOPHILS NFR BLD: 0.4 % (ref 0.1–1.2)
BILIRUB SERPL-MCNC: 0.3 MG/DL (ref 0–1.2)
BUN SERPL-MCNC: 9 MG/DL (ref 8–23)
CALCIUM SERPL-MCNC: 8.9 MG/DL (ref 8.8–10.2)
CHLORIDE SERPL-SCNC: 105 MMOL/L (ref 98–107)
CO2 SERPL-SCNC: 21 MMOL/L (ref 22–29)
CREAT SERPL-MCNC: 0.8 MG/DL (ref 0.5–0.9)
CRP SERPL HS-MCNC: 1.43 MG/DL (ref 0–0.5)
EOSINOPHIL # BLD: 0.11 K/UL (ref 0.04–0.54)
EOSINOPHIL NFR BLD: 2.3 % (ref 0.7–7)
ERYTHROCYTE [DISTWIDTH] IN BLOOD BY AUTOMATED COUNT: 18 % (ref 11.7–14.4)
FERRITIN SERPL-MCNC: 1274 NG/ML (ref 13–150)
GLUCOSE SERPL-MCNC: 106 MG/DL (ref 70–99)
HCT VFR BLD AUTO: 28.1 % (ref 34.1–44.9)
HGB BLD-MCNC: 9.3 G/DL (ref 11.2–15.7)
LDH SERPL-CCNC: 152 U/L (ref 135–214)
LYMPHOCYTES # BLD: 0.61 K/UL (ref 1.18–3.74)
LYMPHOCYTES NFR BLD: 12.5 % (ref 19.3–53.1)
MAGNESIUM SERPL-MCNC: 1.8 MG/DL (ref 1.6–2.4)
MCH RBC QN AUTO: 30.6 PG (ref 25.6–32.2)
MCHC RBC AUTO-ENTMCNC: 33.1 G/DL (ref 32.3–35.5)
MCV RBC AUTO: 92.4 FL (ref 79.4–94.8)
MONOCYTES # BLD: 0.73 K/UL (ref 0.24–0.82)
MONOCYTES NFR BLD: 15 % (ref 4.7–12.5)
NEUTROPHILS # BLD: 3.38 K/UL (ref 1.56–6.13)
NEUTS SEG NFR BLD: 69.4 % (ref 34–71.1)
PHOSPHATE SERPL-MCNC: 4 MG/DL (ref 2.5–4.5)
PLATELET # BLD AUTO: 326 K/UL (ref 182–369)
PMV BLD AUTO: 9.1 FL (ref 7.4–10.4)
POTASSIUM SERPL-SCNC: 3.1 MMOL/L (ref 3.5–5.1)
PROT SERPL-MCNC: 6.3 G/DL (ref 6.4–8.3)
RBC # BLD AUTO: 3.04 M/UL (ref 3.93–5.22)
SODIUM SERPL-SCNC: 139 MMOL/L (ref 136–145)
URATE SERPL-MCNC: 4.9 MG/DL (ref 2.4–5.7)
WBC # BLD AUTO: 4.87 K/UL (ref 3.98–10.04)

## 2025-01-03 PROCEDURE — 84100 ASSAY OF PHOSPHORUS: CPT

## 2025-01-03 PROCEDURE — 83615 LACTATE (LD) (LDH) ENZYME: CPT

## 2025-01-03 PROCEDURE — 36415 COLL VENOUS BLD VENIPUNCTURE: CPT

## 2025-01-03 PROCEDURE — 83735 ASSAY OF MAGNESIUM: CPT

## 2025-01-03 PROCEDURE — 84550 ASSAY OF BLOOD/URIC ACID: CPT

## 2025-01-03 PROCEDURE — 85025 COMPLETE CBC W/AUTO DIFF WBC: CPT

## 2025-01-03 PROCEDURE — 80053 COMPREHEN METABOLIC PANEL: CPT

## 2025-01-03 RX ORDER — POTASSIUM CHLORIDE 750 MG/1
20 CAPSULE, EXTENDED RELEASE ORAL DAILY
Qty: 60 CAPSULE | Refills: 5 | Status: SHIPPED | OUTPATIENT
Start: 2025-01-03

## 2025-01-03 NOTE — PROGRESS NOTES
Lab Results   Component Value Date     01/03/2025    K 3.1 (L) 01/03/2025     01/03/2025    CO2 21 (L) 01/03/2025    BUN 9 01/03/2025    CREATININE 0.8 01/03/2025    GLUCOSE 106 (H) 01/03/2025    CALCIUM 8.9 01/03/2025    BILITOT 0.3 01/03/2025    ALKPHOS 119 (H) 01/03/2025    AST 21 01/03/2025    ALT 14 01/03/2025    LABGLOM 80 01/03/2025    GLOB 2.8 05/28/2024     Recommend starting Micro K 20mEq daily. Script sent to pharmacy. Patient notified of results and recommendations.

## 2025-01-08 LAB
AMYLASE SERPL-CCNC: 45 U/L (ref 28–100)
D DIMER PPP FEU-MCNC: 2.74 UG/ML FEU (ref 0–0.48)
FIBRINOGEN PPP-MCNC: 539 MG/DL (ref 238–505)
HBA1C MFR BLD: 5.6 % (ref 4–5.6)
LIPASE SERPL-CCNC: 26 U/L (ref 13–60)

## 2025-01-17 ENCOUNTER — HOSPITAL ENCOUNTER (OUTPATIENT)
Dept: INFUSION THERAPY | Age: 70
Discharge: HOME | End: 2025-01-17
Payer: MEDICARE

## 2025-01-17 DIAGNOSIS — C92.00 ACUTE MYELOID LEUKEMIA NOT HAVING ACHIEVED REMISSION (HCC): ICD-10-CM

## 2025-01-17 LAB
ALBUMIN SERPL-MCNC: 3.5 G/DL (ref 3.5–5.2)
ALP SERPL-CCNC: 112 U/L (ref 35–104)
ALT SERPL-CCNC: 17 U/L (ref 5–33)
ANION GAP SERPL CALCULATED.3IONS-SCNC: 12 MMOL/L (ref 7–19)
AST SERPL-CCNC: 23 U/L (ref 5–32)
BASOPHILS # BLD: 0.03 K/UL (ref 0–0.2)
BASOPHILS NFR BLD: 0.8 % (ref 0–1)
BILIRUB SERPL-MCNC: 0.5 MG/DL (ref 0–1.2)
BUN SERPL-MCNC: 9 MG/DL (ref 8–23)
CALCIUM SERPL-MCNC: 9.2 MG/DL (ref 8.8–10.2)
CHLORIDE SERPL-SCNC: 106 MMOL/L (ref 98–107)
CO2 SERPL-SCNC: 23 MMOL/L (ref 22–29)
CREAT SERPL-MCNC: 0.8 MG/DL (ref 0.5–0.9)
EOSINOPHIL # BLD: 0.09 K/UL (ref 0–0.6)
EOSINOPHIL NFR BLD: 2.5 % (ref 0–5)
ERYTHROCYTE [DISTWIDTH] IN BLOOD BY AUTOMATED COUNT: 21.3 % (ref 11.5–14.5)
GLUCOSE SERPL-MCNC: 104 MG/DL (ref 70–99)
HCT VFR BLD AUTO: 30.8 % (ref 37–47)
HGB BLD-MCNC: 10.2 G/DL (ref 12–16)
LYMPHOCYTES # BLD: 0.47 K/UL (ref 1.1–4.5)
LYMPHOCYTES NFR BLD: 13.2 % (ref 20–40)
MAGNESIUM SERPL-MCNC: 1.9 MG/DL (ref 1.6–2.4)
MCH RBC QN AUTO: 32.2 PG (ref 27–31)
MCHC RBC AUTO-ENTMCNC: 33.1 G/DL (ref 33–37)
MCV RBC AUTO: 97.2 FL (ref 81–99)
MONOCYTES # BLD: 0.51 K/UL (ref 0–0.9)
MONOCYTES NFR BLD: 14.4 % (ref 1–10)
NEUTROPHILS # BLD: 2.44 K/UL (ref 1.5–7.5)
NEUTS SEG NFR BLD: 68.8 % (ref 50–65)
PLATELET # BLD AUTO: 257 K/UL (ref 130–400)
PMV BLD AUTO: 9.7 FL (ref 9.4–12.3)
POTASSIUM SERPL-SCNC: 4 MMOL/L (ref 3.5–5.1)
PROT SERPL-MCNC: 6.4 G/DL (ref 6.4–8.3)
RBC # BLD AUTO: 3.17 M/UL (ref 4.2–5.4)
SODIUM SERPL-SCNC: 141 MMOL/L (ref 136–145)
WBC # BLD AUTO: 3.55 K/UL (ref 4.8–10.8)

## 2025-01-17 PROCEDURE — 36415 COLL VENOUS BLD VENIPUNCTURE: CPT

## 2025-01-17 PROCEDURE — 85025 COMPLETE CBC W/AUTO DIFF WBC: CPT

## 2025-01-17 PROCEDURE — 83735 ASSAY OF MAGNESIUM: CPT

## 2025-01-17 PROCEDURE — 80053 COMPREHEN METABOLIC PANEL: CPT

## 2025-01-22 ENCOUNTER — TELEPHONE (OUTPATIENT)
Dept: HEMATOLOGY | Age: 70
End: 2025-01-22

## 2025-01-22 NOTE — TELEPHONE ENCOUNTER

## 2025-01-23 DIAGNOSIS — C92.00 ACUTE MYELOID LEUKEMIA NOT HAVING ACHIEVED REMISSION (HCC): Primary | ICD-10-CM

## 2025-01-23 NOTE — PROGRESS NOTES
by me on 5/24/2023. She was referred by Dr. Hadley Hutchinson for a diagnosis of triple negative breast cancer. She felt a mass in the right breast.  Patient denies any family history of breast cancer or any other cancer.  4/3/23 Bilateral diagnostic mammogram ([facility]): 3 circumscribed masses are identified in the right breast. The palpable lump located at 2 o'clock in the right breast is the smallest mass measuring 1.1cm. The other mass located at 2 o'clock in the right breast measures 2.6cm. Third right breast mass is located at 9 o'clock and measures 2.2cm.   4/3/23 US right breast ([facility]): Demonstrates the palpable lump to represent a 1.1 c 0.9 cm hypoechoic mass at the 2 o'clock position, 8cm from the nipple no definite internal blood flow. A second mass located at the 2 o'clock position in the right breast. 8 cm from the nipple represents a hypoechoic 2.6 x 2.0 cm mass. This mass is lobulated and demonstrates acoustic shadowing. The third mass is located in the 9 o'clock position, 9 cm form the nipple and measures 2.2 x 1.9cm. No acoustic shadowing but internal blood flow is present to this mass.   5/1/23 Mass, lateral right breast at 9:00, ultrasound-guided needle biopsies:   Invasive carcinoma of no special type (ductal). Histologic grade 3. Maximum tumor diameter is at least 2.5 cm. Invasive tumor is present in all cores of block A1 and A2. Mass, medial right breast at 2:00, ultrasound-guided needle biopsies: Invasive carcinoma no special type (ductal). Histologic grade 3. Maximum tumor diameter is at least 1.8 cm. Invasive tumor present in all cores of block B1 and B2. CYTOLOGY: Right lateral breast mass, fine-needle aspiration (smears and ThinPrep): Breast carcinoma. Blacks modified nuclear grade 3 (poorly differentiated). Skin, right breast, punch biopsy: Benign skin with minimal superficial perivascular chronic inflammation. No evidence of Paget's disease, lymph-vascular space

## 2025-01-24 ENCOUNTER — OFFICE VISIT (OUTPATIENT)
Dept: HEMATOLOGY | Age: 70
End: 2025-01-24
Payer: MEDICARE

## 2025-01-24 ENCOUNTER — HOSPITAL ENCOUNTER (OUTPATIENT)
Dept: INFUSION THERAPY | Age: 70
Discharge: HOME | End: 2025-01-24
Payer: MEDICARE

## 2025-01-24 VITALS
TEMPERATURE: 97 F | BODY MASS INDEX: 33.48 KG/M2 | HEART RATE: 97 BPM | OXYGEN SATURATION: 100 % | HEIGHT: 67 IN | DIASTOLIC BLOOD PRESSURE: 68 MMHG | WEIGHT: 213.3 LBS | SYSTOLIC BLOOD PRESSURE: 108 MMHG

## 2025-01-24 DIAGNOSIS — C92.00 ACUTE MYELOID LEUKEMIA NOT HAVING ACHIEVED REMISSION (HCC): ICD-10-CM

## 2025-01-24 DIAGNOSIS — C50.919 TRIPLE NEGATIVE BREAST CANCER (HCC): Primary | ICD-10-CM

## 2025-01-24 DIAGNOSIS — R53.83 FATIGUE DUE TO TREATMENT: ICD-10-CM

## 2025-01-24 DIAGNOSIS — D64.81 ANTINEOPLASTIC CHEMOTHERAPY INDUCED ANEMIA: ICD-10-CM

## 2025-01-24 DIAGNOSIS — T45.1X5A ANTINEOPLASTIC CHEMOTHERAPY INDUCED ANEMIA: ICD-10-CM

## 2025-01-24 DIAGNOSIS — Z17.421 TRIPLE NEGATIVE BREAST CANCER (HCC): Primary | ICD-10-CM

## 2025-01-24 DIAGNOSIS — Z71.89 COORDINATION OF COMPLEX CARE: ICD-10-CM

## 2025-01-24 DIAGNOSIS — Z71.89 CARE PLAN DISCUSSED WITH PATIENT: ICD-10-CM

## 2025-01-24 LAB
ALBUMIN SERPL-MCNC: 3.8 G/DL (ref 3.5–5.2)
ALP SERPL-CCNC: 119 U/L (ref 35–104)
ALT SERPL-CCNC: 18 U/L (ref 5–33)
ANION GAP SERPL CALCULATED.3IONS-SCNC: 12 MMOL/L (ref 7–19)
AST SERPL-CCNC: 22 U/L (ref 5–32)
BASOPHILS # BLD: 0.02 K/UL (ref 0–0.2)
BASOPHILS NFR BLD: 0.4 % (ref 0–1)
BILIRUB SERPL-MCNC: 0.6 MG/DL (ref 0–1.2)
BUN SERPL-MCNC: 10 MG/DL (ref 8–23)
CALCIUM SERPL-MCNC: 9.2 MG/DL (ref 8.8–10.2)
CHLORIDE SERPL-SCNC: 104 MMOL/L (ref 98–107)
CO2 SERPL-SCNC: 22 MMOL/L (ref 22–29)
CREAT SERPL-MCNC: 0.8 MG/DL (ref 0.5–0.9)
EOSINOPHIL # BLD: 0.16 K/UL (ref 0–0.6)
EOSINOPHIL NFR BLD: 3.2 % (ref 0–5)
ERYTHROCYTE [DISTWIDTH] IN BLOOD BY AUTOMATED COUNT: 21.4 % (ref 11.5–14.5)
GLUCOSE SERPL-MCNC: 102 MG/DL (ref 70–99)
HCT VFR BLD AUTO: 32.6 % (ref 37–47)
HGB BLD-MCNC: 10.6 G/DL (ref 12–16)
LYMPHOCYTES # BLD: 0.42 K/UL (ref 1.1–4.5)
LYMPHOCYTES NFR BLD: 8.4 % (ref 20–40)
MAGNESIUM SERPL-MCNC: 1.9 MG/DL (ref 1.6–2.4)
MCH RBC QN AUTO: 32.3 PG (ref 27–31)
MCHC RBC AUTO-ENTMCNC: 32.5 G/DL (ref 33–37)
MCV RBC AUTO: 99.4 FL (ref 81–99)
MONOCYTES # BLD: 0.08 K/UL (ref 0–0.9)
MONOCYTES NFR BLD: 1.6 % (ref 1–10)
NEUTROPHILS # BLD: 4.33 K/UL (ref 1.5–7.5)
NEUTS SEG NFR BLD: 86.2 % (ref 50–65)
PLATELET # BLD AUTO: 332 K/UL (ref 130–400)
PMV BLD AUTO: 9 FL (ref 9.4–12.3)
POTASSIUM SERPL-SCNC: 4 MMOL/L (ref 3.5–5.1)
PROT SERPL-MCNC: 6.7 G/DL (ref 6.4–8.3)
RBC # BLD AUTO: 3.28 M/UL (ref 4.2–5.4)
SODIUM SERPL-SCNC: 138 MMOL/L (ref 136–145)
WBC # BLD AUTO: 5.02 K/UL (ref 4.8–10.8)

## 2025-01-24 PROCEDURE — 99214 OFFICE O/P EST MOD 30 MIN: CPT | Performed by: INTERNAL MEDICINE

## 2025-01-24 PROCEDURE — 1090F PRES/ABSN URINE INCON ASSESS: CPT | Performed by: INTERNAL MEDICINE

## 2025-01-24 PROCEDURE — 3074F SYST BP LT 130 MM HG: CPT | Performed by: INTERNAL MEDICINE

## 2025-01-24 PROCEDURE — 1123F ACP DISCUSS/DSCN MKR DOCD: CPT | Performed by: INTERNAL MEDICINE

## 2025-01-24 PROCEDURE — 3017F COLORECTAL CA SCREEN DOC REV: CPT | Performed by: INTERNAL MEDICINE

## 2025-01-24 PROCEDURE — G2211 COMPLEX E/M VISIT ADD ON: HCPCS | Performed by: INTERNAL MEDICINE

## 2025-01-24 PROCEDURE — G8427 DOCREV CUR MEDS BY ELIG CLIN: HCPCS | Performed by: INTERNAL MEDICINE

## 2025-01-24 PROCEDURE — 1126F AMNT PAIN NOTED NONE PRSNT: CPT | Performed by: INTERNAL MEDICINE

## 2025-01-24 PROCEDURE — 3078F DIAST BP <80 MM HG: CPT | Performed by: INTERNAL MEDICINE

## 2025-01-24 PROCEDURE — 36415 COLL VENOUS BLD VENIPUNCTURE: CPT

## 2025-01-24 PROCEDURE — 80053 COMPREHEN METABOLIC PANEL: CPT

## 2025-01-24 PROCEDURE — 99212 OFFICE O/P EST SF 10 MIN: CPT

## 2025-01-24 PROCEDURE — 1159F MED LIST DOCD IN RCRD: CPT | Performed by: INTERNAL MEDICINE

## 2025-01-24 PROCEDURE — G8417 CALC BMI ABV UP PARAM F/U: HCPCS | Performed by: INTERNAL MEDICINE

## 2025-01-24 PROCEDURE — 1036F TOBACCO NON-USER: CPT | Performed by: INTERNAL MEDICINE

## 2025-01-24 PROCEDURE — 83735 ASSAY OF MAGNESIUM: CPT

## 2025-01-24 PROCEDURE — G8400 PT W/DXA NO RESULTS DOC: HCPCS | Performed by: INTERNAL MEDICINE

## 2025-01-24 PROCEDURE — 85025 COMPLETE CBC W/AUTO DIFF WBC: CPT

## 2025-01-28 ENCOUNTER — HOSPITAL ENCOUNTER (OUTPATIENT)
Dept: INFUSION THERAPY | Age: 70
Discharge: HOME | End: 2025-01-28
Payer: MEDICARE

## 2025-01-28 DIAGNOSIS — C92.00 ACUTE MYELOID LEUKEMIA NOT HAVING ACHIEVED REMISSION (HCC): ICD-10-CM

## 2025-01-28 DIAGNOSIS — Z17.421 TRIPLE NEGATIVE BREAST CANCER (HCC): ICD-10-CM

## 2025-01-28 DIAGNOSIS — C50.919 TRIPLE NEGATIVE BREAST CANCER (HCC): ICD-10-CM

## 2025-01-28 LAB
ALBUMIN SERPL-MCNC: 3.7 G/DL (ref 3.5–5.2)
ALP SERPL-CCNC: 117 U/L (ref 35–104)
ALT SERPL-CCNC: 16 U/L (ref 5–33)
ANION GAP SERPL CALCULATED.3IONS-SCNC: 12 MMOL/L (ref 7–19)
AST SERPL-CCNC: 19 U/L (ref 5–32)
BASOPHILS # BLD: 0.05 K/UL (ref 0–0.2)
BASOPHILS NFR BLD: 2.7 % (ref 0–1)
BILIRUB SERPL-MCNC: 0.4 MG/DL (ref 0–1.2)
BUN SERPL-MCNC: 12 MG/DL (ref 8–23)
CALCIUM SERPL-MCNC: 9.1 MG/DL (ref 8.8–10.2)
CHLORIDE SERPL-SCNC: 105 MMOL/L (ref 98–107)
CO2 SERPL-SCNC: 23 MMOL/L (ref 22–29)
CREAT SERPL-MCNC: 0.8 MG/DL (ref 0.5–0.9)
EOSINOPHIL # BLD: 0.07 K/UL (ref 0–0.6)
EOSINOPHIL NFR BLD: 3.8 % (ref 0–5)
ERYTHROCYTE [DISTWIDTH] IN BLOOD BY AUTOMATED COUNT: 20.5 % (ref 11.5–14.5)
GLUCOSE SERPL-MCNC: 108 MG/DL (ref 70–99)
HCT VFR BLD AUTO: 33.5 % (ref 37–47)
HGB BLD-MCNC: 11.1 G/DL (ref 12–16)
LYMPHOCYTES # BLD: 0.49 K/UL (ref 1.1–4.5)
LYMPHOCYTES NFR BLD: 26.3 % (ref 20–40)
MAGNESIUM SERPL-MCNC: 2 MG/DL (ref 1.6–2.4)
MCH RBC QN AUTO: 33.1 PG (ref 27–31)
MCHC RBC AUTO-ENTMCNC: 33.1 G/DL (ref 33–37)
MCV RBC AUTO: 100 FL (ref 81–99)
MONOCYTES # BLD: 0.1 K/UL (ref 0–0.9)
MONOCYTES NFR BLD: 5.4 % (ref 1–10)
NEUTROPHILS # BLD: 1.14 K/UL (ref 1.5–7.5)
NEUTS SEG NFR BLD: 61.3 % (ref 50–65)
PLATELET # BLD AUTO: 428 K/UL (ref 130–400)
PMV BLD AUTO: 9 FL (ref 9.4–12.3)
POTASSIUM SERPL-SCNC: 4.5 MMOL/L (ref 3.5–5.1)
PROT SERPL-MCNC: 6.5 G/DL (ref 6.4–8.3)
RBC # BLD AUTO: 3.35 M/UL (ref 4.2–5.4)
SODIUM SERPL-SCNC: 140 MMOL/L (ref 136–145)
WBC # BLD AUTO: 1.86 K/UL (ref 4.8–10.8)

## 2025-01-28 PROCEDURE — 83735 ASSAY OF MAGNESIUM: CPT

## 2025-01-28 PROCEDURE — 85025 COMPLETE CBC W/AUTO DIFF WBC: CPT

## 2025-01-28 PROCEDURE — 80053 COMPREHEN METABOLIC PANEL: CPT

## 2025-01-28 PROCEDURE — 36415 COLL VENOUS BLD VENIPUNCTURE: CPT

## 2025-01-31 ENCOUNTER — HOSPITAL ENCOUNTER (OUTPATIENT)
Dept: INFUSION THERAPY | Age: 70
Discharge: HOME | End: 2025-01-31
Payer: MEDICARE

## 2025-01-31 ENCOUNTER — OFFICE VISIT (OUTPATIENT)
Age: 70
End: 2025-01-31

## 2025-01-31 VITALS — BODY MASS INDEX: 33.43 KG/M2 | WEIGHT: 213 LBS | HEIGHT: 67 IN

## 2025-01-31 DIAGNOSIS — M17.12 PRIMARY OSTEOARTHRITIS OF LEFT KNEE: Primary | ICD-10-CM

## 2025-01-31 DIAGNOSIS — Z17.421 TRIPLE NEGATIVE BREAST CANCER (HCC): ICD-10-CM

## 2025-01-31 DIAGNOSIS — C92.00 ACUTE MYELOID LEUKEMIA NOT HAVING ACHIEVED REMISSION (HCC): ICD-10-CM

## 2025-01-31 DIAGNOSIS — C50.919 TRIPLE NEGATIVE BREAST CANCER (HCC): ICD-10-CM

## 2025-01-31 LAB
ALBUMIN SERPL-MCNC: 3.7 G/DL (ref 3.5–5.2)
ALP SERPL-CCNC: 121 U/L (ref 35–104)
ALT SERPL-CCNC: 14 U/L (ref 5–33)
ANION GAP SERPL CALCULATED.3IONS-SCNC: 13 MMOL/L (ref 7–19)
AST SERPL-CCNC: 18 U/L (ref 5–32)
BASOPHILS # BLD: 0.07 K/UL (ref 0–0.2)
BASOPHILS NFR BLD: 5.1 % (ref 0–1)
BILIRUB SERPL-MCNC: 0.4 MG/DL (ref 0–1.2)
BUN SERPL-MCNC: 11 MG/DL (ref 8–23)
CALCIUM SERPL-MCNC: 9.3 MG/DL (ref 8.8–10.2)
CHLORIDE SERPL-SCNC: 104 MMOL/L (ref 98–107)
CO2 SERPL-SCNC: 21 MMOL/L (ref 22–29)
CREAT SERPL-MCNC: 0.9 MG/DL (ref 0.5–0.9)
EOSINOPHIL # BLD: 0.08 K/UL (ref 0–0.6)
EOSINOPHIL NFR BLD: 5.9 % (ref 0–5)
ERYTHROCYTE [DISTWIDTH] IN BLOOD BY AUTOMATED COUNT: 19.8 % (ref 11.5–14.5)
GLUCOSE SERPL-MCNC: 86 MG/DL (ref 70–99)
HCT VFR BLD AUTO: 35.4 % (ref 37–47)
HGB BLD-MCNC: 11.4 G/DL (ref 12–16)
LYMPHOCYTES # BLD: 0.45 K/UL (ref 1.1–4.5)
LYMPHOCYTES NFR BLD: 33.1 % (ref 20–40)
MCH RBC QN AUTO: 32.1 PG (ref 27–31)
MCHC RBC AUTO-ENTMCNC: 32.2 G/DL (ref 33–37)
MCV RBC AUTO: 99.7 FL (ref 81–99)
MONOCYTES # BLD: 0.24 K/UL (ref 0–0.9)
MONOCYTES NFR BLD: 17.6 % (ref 1–10)
NEUTROPHILS # BLD: 0.52 K/UL (ref 1.5–7.5)
NEUTS SEG NFR BLD: 38.3 % (ref 50–65)
PLATELET # BLD AUTO: 388 K/UL (ref 130–400)
PMV BLD AUTO: 8.9 FL (ref 9.4–12.3)
POTASSIUM SERPL-SCNC: 4.4 MMOL/L (ref 3.5–5.1)
PROT SERPL-MCNC: 6.7 G/DL (ref 6.4–8.3)
RBC # BLD AUTO: 3.55 M/UL (ref 4.2–5.4)
SODIUM SERPL-SCNC: 138 MMOL/L (ref 136–145)
WBC # BLD AUTO: 1.36 K/UL (ref 4.8–10.8)

## 2025-01-31 PROCEDURE — 80053 COMPREHEN METABOLIC PANEL: CPT

## 2025-01-31 PROCEDURE — 36415 COLL VENOUS BLD VENIPUNCTURE: CPT

## 2025-01-31 PROCEDURE — 85025 COMPLETE CBC W/AUTO DIFF WBC: CPT

## 2025-01-31 RX ORDER — FLUCONAZOLE 200 MG/1
2 TABLET ORAL DAILY
COMMUNITY

## 2025-01-31 RX ORDER — ACYCLOVIR 800 MG/1
1 TABLET ORAL 2 TIMES DAILY
COMMUNITY

## 2025-01-31 NOTE — PROGRESS NOTES
Orthopaedic Clinic Note - Established Patient    NAME:  Vivienne Mcdonald   : 1955  MRN: [id number]      2025      CHIEF COMPLAINT:  follow up knee pain, repeat injection      HISTORY OF PRESENT ILLNESS:   Patient is a 69 y.o. female who returns today for follow up of left knee pain, requesting repeat injection. It has been at least 3 months since last injection was completed. Patient denies any recent trauma, fall, or other other injury. Pain is rated 7 today.  They would like to continue with conservative care with repeat injection today.    Past Medical History:        Diagnosis Date    Acute bilateral low back pain 2019    Acute leukemia (HCC)     Arthritis     Breast cancer (HCC) 2023    right    Hand numbness     right hand s/p arm surgery    Hx of blood clots     Hypercholesterolemia     Hyperlipidemia     Hypertension     Malignant neoplasm of overlapping sites of right breast (HCC) 2023    Obesity, unspecified obesity severity, unspecified obesity type 2018    Post-menopausal        Past Surgical History:        Procedure Laterality Date    ARM SURGERY Right     \"nerve release\", dt rocío, with 2nd surgery, debridement    JOINT REPLACEMENT Right 2010    knee    MASTECTOMY Bilateral 2024    BILATERAL SIMPLE MASTECTOMY WITH RIGHT SENTINEL NODE, BILATERAL PEC BLOCK, ULTRASOUND GUIDED NEEDLE LOCALIZATION X 2 performed by Hadley Hutchinson MD at [facility] OR    PORT SURGERY N/A 2023    PORT INSERTION WITH FLUOROSCOPY performed by Hadley Hutchinson MD at [facility] OR    PORT SURGERY N/A 2024    PORT REMOVAL performed by Hadley Hutchinson MD at [facility] OR    TOTAL KNEE ARTHROPLASTY Right     US BREAST BIOPSY W LOC DEVICE 1ST LESION RIGHT Right 2023    US BREAST BIOPSY W LOC DEVICE 1ST LESION RIGHT 2023 [facility]    US PLACE BREAST LOC DEVICE 1ST LESION RIGHT Right 2024    US GUIDED NEEDLE LOC OF RIGHT BREAST 2024 [facility] ULTRASOUND    US PLACE BREAST LOC DEVICE  Report given to kathleen marr

## 2025-02-06 ENCOUNTER — TELEPHONE (OUTPATIENT)
Dept: HEMATOLOGY | Age: 70
End: 2025-02-06

## 2025-02-06 NOTE — TELEPHONE ENCOUNTER

## 2025-02-10 NOTE — PROGRESS NOTES
Patient allergic to absorbable sutures-\"infection from vicryl sutures\"     Subjective     REVIEW OF SYSTEMS:   CONSTITUTIONAL: no fever, no night sweats,  fatigue;  HEENT: no blurring of vision, no double vision, no hearing difficulty, no tinnitus, no ulceration, no dysplasia, no epistaxis;   LUNGS: no cough, no hemoptysis, no wheeze,  no shortness of breath;  CARDIOVASCULAR: no palpitation, no chest pain, no shortness of breath;  GI: no abdominal pain, no nausea, no vomiting, no diarrhea, no constipation;  HARPREET: no dysuria, no hematuria, no frequency or urgency, no nephrolithiasis;  MUSCULOSKELETAL: no joint pain, no swelling, no stiffness;  ENDOCRINE: no polyuria, no polydipsia, no cold or heat intolerance;  HEMATOLOGY: no easy bruising or bleeding, no history of clotting disorder;  DERMATOLOGY: no skin rash, no eczema, no pruritus;  NEUROLOGY: no syncope, no seizures, no numbness or tingling of hands, no numbness or tingling of feet, no paresis;       Objective   /82   Pulse 93   Temp 98 °F (36.7 °C)   Ht 1.702 m (5' 7\")   Wt 98.7 kg (217 lb 9.6 oz)   SpO2 99%   BMI 34.08 kg/m²   Wt Readings from Last 3 Encounters:   02/11/25 98.7 kg (217 lb 9.6 oz)   01/31/25 96.6 kg (213 lb)   01/24/25 96.8 kg (213 lb 4.8 oz)     PHYSICAL EXAM:  CONSTITUTIONAL: Alert, appropriate, no acute distress  EYES: Non icteric, EOM intact, pupils equal round   ENT: Mucus membranes moist, no oral pharyngeal lesions, external inspection of ears and nose are normal.  NECK: Supple, no masses.  No palpable thyroid mass  CHEST/LUNGS: CTA bilaterally, normal respiratory effort   CARDIOVASCULAR: RRR, no murmurs.  No lower extremity edema  ABDOMEN: soft non-tender, active bowel sounds, no HSM.  No palpable masses  EXTREMITIES: warm, full ROM in all 4 extremities, no focal weakness.  SKIN: warm, dry with no rashes or lesions  LYMPH: No cervical, clavicular, axillary, or inguinal lymphadenopathy    LABORATORY RESULTS

## 2025-02-11 ENCOUNTER — OFFICE VISIT (OUTPATIENT)
Dept: HEMATOLOGY | Age: 70
End: 2025-02-11
Payer: MEDICARE

## 2025-02-11 ENCOUNTER — HOSPITAL ENCOUNTER (OUTPATIENT)
Dept: INFUSION THERAPY | Age: 70
Discharge: HOME | End: 2025-02-11
Payer: MEDICARE

## 2025-02-11 VITALS
BODY MASS INDEX: 34.15 KG/M2 | SYSTOLIC BLOOD PRESSURE: 128 MMHG | HEIGHT: 67 IN | OXYGEN SATURATION: 99 % | DIASTOLIC BLOOD PRESSURE: 82 MMHG | HEART RATE: 93 BPM | TEMPERATURE: 98 F | WEIGHT: 217.6 LBS

## 2025-02-11 DIAGNOSIS — C92.00 ACUTE MYELOID LEUKEMIA NOT HAVING ACHIEVED REMISSION (HCC): ICD-10-CM

## 2025-02-11 DIAGNOSIS — Z17.421 TRIPLE NEGATIVE BREAST CANCER (HCC): Primary | ICD-10-CM

## 2025-02-11 DIAGNOSIS — Z71.89 CARE PLAN DISCUSSED WITH PATIENT: ICD-10-CM

## 2025-02-11 DIAGNOSIS — Z71.89 COORDINATION OF COMPLEX CARE: ICD-10-CM

## 2025-02-11 DIAGNOSIS — D64.81 ANTINEOPLASTIC CHEMOTHERAPY INDUCED ANEMIA: ICD-10-CM

## 2025-02-11 DIAGNOSIS — C50.919 TRIPLE NEGATIVE BREAST CANCER (HCC): Primary | ICD-10-CM

## 2025-02-11 DIAGNOSIS — Z17.421 TRIPLE NEGATIVE BREAST CANCER (HCC): ICD-10-CM

## 2025-02-11 DIAGNOSIS — C50.919 TRIPLE NEGATIVE BREAST CANCER (HCC): ICD-10-CM

## 2025-02-11 DIAGNOSIS — T45.1X5A ANTINEOPLASTIC CHEMOTHERAPY INDUCED ANEMIA: ICD-10-CM

## 2025-02-11 LAB
ALBUMIN SERPL-MCNC: 3.6 G/DL (ref 3.5–5.2)
ALP SERPL-CCNC: 112 U/L (ref 35–104)
ALT SERPL-CCNC: 16 U/L (ref 5–33)
ANION GAP SERPL CALCULATED.3IONS-SCNC: 12 MMOL/L (ref 7–19)
AST SERPL-CCNC: 19 U/L (ref 5–32)
BASOPHILS # BLD: 0.02 K/UL (ref 0–0.2)
BASOPHILS NFR BLD: 0.3 % (ref 0–1)
BILIRUB SERPL-MCNC: 0.5 MG/DL (ref 0–1.2)
BUN SERPL-MCNC: 13 MG/DL (ref 8–23)
CALCIUM SERPL-MCNC: 9.2 MG/DL (ref 8.8–10.2)
CHLORIDE SERPL-SCNC: 106 MMOL/L (ref 98–107)
CO2 SERPL-SCNC: 23 MMOL/L (ref 22–29)
CREAT SERPL-MCNC: 0.8 MG/DL (ref 0.5–0.9)
EOSINOPHIL # BLD: 0.27 K/UL (ref 0–0.6)
EOSINOPHIL NFR BLD: 4.1 % (ref 0–5)
ERYTHROCYTE [DISTWIDTH] IN BLOOD BY AUTOMATED COUNT: 18.6 % (ref 11.5–14.5)
GLUCOSE SERPL-MCNC: 97 MG/DL (ref 70–99)
HCT VFR BLD AUTO: 35.3 % (ref 37–47)
HGB BLD-MCNC: 11.5 G/DL (ref 12–16)
LYMPHOCYTES # BLD: 0.64 K/UL (ref 1.1–4.5)
LYMPHOCYTES NFR BLD: 9.6 % (ref 20–40)
MCH RBC QN AUTO: 33 PG (ref 27–31)
MCHC RBC AUTO-ENTMCNC: 32.6 G/DL (ref 33–37)
MCV RBC AUTO: 101.1 FL (ref 81–99)
MONOCYTES # BLD: 0.54 K/UL (ref 0–0.9)
MONOCYTES NFR BLD: 8.1 % (ref 1–10)
NEUTROPHILS # BLD: 5.14 K/UL (ref 1.5–7.5)
NEUTS SEG NFR BLD: 77.4 % (ref 50–65)
PLATELET # BLD AUTO: 172 K/UL (ref 130–400)
PMV BLD AUTO: 9.9 FL (ref 9.4–12.3)
POTASSIUM SERPL-SCNC: 3.9 MMOL/L (ref 3.5–5.1)
PROT SERPL-MCNC: 6.4 G/DL (ref 6.4–8.3)
RBC # BLD AUTO: 3.49 M/UL (ref 4.2–5.4)
SODIUM SERPL-SCNC: 141 MMOL/L (ref 136–145)
WBC # BLD AUTO: 6.64 K/UL (ref 4.8–10.8)

## 2025-02-11 PROCEDURE — 1125F AMNT PAIN NOTED PAIN PRSNT: CPT | Performed by: INTERNAL MEDICINE

## 2025-02-11 PROCEDURE — 3079F DIAST BP 80-89 MM HG: CPT | Performed by: INTERNAL MEDICINE

## 2025-02-11 PROCEDURE — 36415 COLL VENOUS BLD VENIPUNCTURE: CPT

## 2025-02-11 PROCEDURE — G8427 DOCREV CUR MEDS BY ELIG CLIN: HCPCS | Performed by: INTERNAL MEDICINE

## 2025-02-11 PROCEDURE — 1123F ACP DISCUSS/DSCN MKR DOCD: CPT | Performed by: INTERNAL MEDICINE

## 2025-02-11 PROCEDURE — 1090F PRES/ABSN URINE INCON ASSESS: CPT | Performed by: INTERNAL MEDICINE

## 2025-02-11 PROCEDURE — 3074F SYST BP LT 130 MM HG: CPT | Performed by: INTERNAL MEDICINE

## 2025-02-11 PROCEDURE — 1036F TOBACCO NON-USER: CPT | Performed by: INTERNAL MEDICINE

## 2025-02-11 PROCEDURE — G8400 PT W/DXA NO RESULTS DOC: HCPCS | Performed by: INTERNAL MEDICINE

## 2025-02-11 PROCEDURE — G8417 CALC BMI ABV UP PARAM F/U: HCPCS | Performed by: INTERNAL MEDICINE

## 2025-02-11 PROCEDURE — 99214 OFFICE O/P EST MOD 30 MIN: CPT | Performed by: INTERNAL MEDICINE

## 2025-02-11 PROCEDURE — 3017F COLORECTAL CA SCREEN DOC REV: CPT | Performed by: INTERNAL MEDICINE

## 2025-02-11 PROCEDURE — G2211 COMPLEX E/M VISIT ADD ON: HCPCS | Performed by: INTERNAL MEDICINE

## 2025-02-11 PROCEDURE — 80053 COMPREHEN METABOLIC PANEL: CPT

## 2025-02-11 PROCEDURE — 1159F MED LIST DOCD IN RCRD: CPT | Performed by: INTERNAL MEDICINE

## 2025-02-11 PROCEDURE — 85025 COMPLETE CBC W/AUTO DIFF WBC: CPT

## 2025-02-11 PROCEDURE — 99212 OFFICE O/P EST SF 10 MIN: CPT

## 2025-02-18 DIAGNOSIS — C92.00 ACUTE MYELOID LEUKEMIA NOT HAVING ACHIEVED REMISSION (HCC): ICD-10-CM

## 2025-02-18 DIAGNOSIS — C50.919 TRIPLE NEGATIVE BREAST CANCER (HCC): Primary | ICD-10-CM

## 2025-02-18 DIAGNOSIS — Z17.421 TRIPLE NEGATIVE BREAST CANCER (HCC): Primary | ICD-10-CM

## 2025-02-21 ENCOUNTER — HOSPITAL ENCOUNTER (OUTPATIENT)
Dept: INFUSION THERAPY | Age: 70
Discharge: HOME | End: 2025-02-21
Payer: MEDICARE

## 2025-02-21 DIAGNOSIS — C92.00 ACUTE MYELOID LEUKEMIA NOT HAVING ACHIEVED REMISSION (HCC): ICD-10-CM

## 2025-02-21 LAB
ALBUMIN SERPL-MCNC: 3.6 G/DL (ref 3.5–5.2)
ALP SERPL-CCNC: 125 U/L (ref 35–104)
ALT SERPL-CCNC: 20 U/L (ref 5–33)
ANION GAP SERPL CALCULATED.3IONS-SCNC: 10 MMOL/L (ref 7–19)
AST SERPL-CCNC: 23 U/L (ref 5–32)
BASOPHILS # BLD: 0.01 K/UL (ref 0–0.2)
BASOPHILS NFR BLD: 0.2 % (ref 0–1)
BILIRUB SERPL-MCNC: 0.3 MG/DL (ref 0–1.2)
BUN SERPL-MCNC: 18 MG/DL (ref 8–23)
CALCIUM SERPL-MCNC: 9.2 MG/DL (ref 8.8–10.2)
CHLORIDE SERPL-SCNC: 104 MMOL/L (ref 98–107)
CO2 SERPL-SCNC: 24 MMOL/L (ref 22–29)
CREAT SERPL-MCNC: 0.8 MG/DL (ref 0.5–0.9)
EOSINOPHIL # BLD: 0.14 K/UL (ref 0–0.6)
EOSINOPHIL NFR BLD: 3.1 % (ref 0–5)
ERYTHROCYTE [DISTWIDTH] IN BLOOD BY AUTOMATED COUNT: 17.9 % (ref 11.5–14.5)
GLUCOSE SERPL-MCNC: 102 MG/DL (ref 70–99)
HCT VFR BLD AUTO: 35.2 % (ref 37–47)
HGB BLD-MCNC: 11.6 G/DL (ref 12–16)
LYMPHOCYTES # BLD: 0.56 K/UL (ref 1.1–4.5)
LYMPHOCYTES NFR BLD: 12.4 % (ref 20–40)
MCH RBC QN AUTO: 33.1 PG (ref 27–31)
MCHC RBC AUTO-ENTMCNC: 33 G/DL (ref 33–37)
MCV RBC AUTO: 100.6 FL (ref 81–99)
MONOCYTES # BLD: 0.47 K/UL (ref 0–0.9)
MONOCYTES NFR BLD: 10.4 % (ref 1–10)
NEUTROPHILS # BLD: 3.34 K/UL (ref 1.5–7.5)
NEUTS SEG NFR BLD: 73.7 % (ref 50–65)
PLATELET # BLD AUTO: 219 K/UL (ref 130–400)
PMV BLD AUTO: 9.1 FL (ref 9.4–12.3)
POTASSIUM SERPL-SCNC: 4.5 MMOL/L (ref 3.5–5.1)
PROT SERPL-MCNC: 6.6 G/DL (ref 6.4–8.3)
RBC # BLD AUTO: 3.5 M/UL (ref 4.2–5.4)
SODIUM SERPL-SCNC: 138 MMOL/L (ref 136–145)
WBC # BLD AUTO: 4.53 K/UL (ref 4.8–10.8)

## 2025-02-21 PROCEDURE — 80053 COMPREHEN METABOLIC PANEL: CPT

## 2025-02-21 PROCEDURE — 85025 COMPLETE CBC W/AUTO DIFF WBC: CPT

## 2025-02-21 PROCEDURE — 36415 COLL VENOUS BLD VENIPUNCTURE: CPT

## 2025-02-25 ENCOUNTER — HOSPITAL ENCOUNTER (OUTPATIENT)
Dept: INFUSION THERAPY | Age: 70
Discharge: HOME | End: 2025-02-25
Payer: MEDICARE

## 2025-02-25 DIAGNOSIS — C92.00 ACUTE MYELOID LEUKEMIA NOT HAVING ACHIEVED REMISSION (HCC): ICD-10-CM

## 2025-02-25 LAB
ALBUMIN SERPL-MCNC: 3.6 G/DL (ref 3.5–5.2)
ALP SERPL-CCNC: 121 U/L (ref 35–104)
ALT SERPL-CCNC: 18 U/L (ref 5–33)
ANION GAP SERPL CALCULATED.3IONS-SCNC: 10 MMOL/L (ref 7–19)
AST SERPL-CCNC: 20 U/L (ref 5–32)
BASOPHILS # BLD: 0.03 K/UL (ref 0–0.2)
BASOPHILS NFR BLD: 0.7 % (ref 0–1)
BILIRUB SERPL-MCNC: 0.4 MG/DL (ref 0–1.2)
BUN SERPL-MCNC: 12 MG/DL (ref 8–23)
CALCIUM SERPL-MCNC: 9 MG/DL (ref 8.8–10.2)
CHLORIDE SERPL-SCNC: 105 MMOL/L (ref 98–107)
CO2 SERPL-SCNC: 24 MMOL/L (ref 22–29)
CREAT SERPL-MCNC: 0.7 MG/DL (ref 0.5–0.9)
EOSINOPHIL # BLD: 0.08 K/UL (ref 0–0.6)
EOSINOPHIL NFR BLD: 1.8 % (ref 0–5)
ERYTHROCYTE [DISTWIDTH] IN BLOOD BY AUTOMATED COUNT: 17.8 % (ref 11.5–14.5)
GLUCOSE SERPL-MCNC: 99 MG/DL (ref 70–99)
HCT VFR BLD AUTO: 34.4 % (ref 37–47)
HGB BLD-MCNC: 11.5 G/DL (ref 12–16)
LYMPHOCYTES # BLD: 0.56 K/UL (ref 1.1–4.5)
LYMPHOCYTES NFR BLD: 12.6 % (ref 20–40)
MCH RBC QN AUTO: 33.8 PG (ref 27–31)
MCHC RBC AUTO-ENTMCNC: 33.4 G/DL (ref 33–37)
MCV RBC AUTO: 101.2 FL (ref 81–99)
MONOCYTES # BLD: 0.69 K/UL (ref 0–0.9)
MONOCYTES NFR BLD: 15.6 % (ref 1–10)
NEUTROPHILS # BLD: 3.05 K/UL (ref 1.5–7.5)
NEUTS SEG NFR BLD: 68.8 % (ref 50–65)
PLATELET # BLD AUTO: 231 K/UL (ref 130–400)
PMV BLD AUTO: 9 FL (ref 9.4–12.3)
POTASSIUM SERPL-SCNC: 3.7 MMOL/L (ref 3.5–5.1)
PROT SERPL-MCNC: 6.4 G/DL (ref 6.4–8.3)
RBC # BLD AUTO: 3.4 M/UL (ref 4.2–5.4)
SODIUM SERPL-SCNC: 139 MMOL/L (ref 136–145)
WBC # BLD AUTO: 4.43 K/UL (ref 4.8–10.8)

## 2025-02-25 PROCEDURE — 36415 COLL VENOUS BLD VENIPUNCTURE: CPT

## 2025-02-25 PROCEDURE — 85025 COMPLETE CBC W/AUTO DIFF WBC: CPT

## 2025-02-25 PROCEDURE — 80053 COMPREHEN METABOLIC PANEL: CPT

## 2025-02-27 ENCOUNTER — TELEPHONE (OUTPATIENT)
Dept: HEMATOLOGY | Age: 70
End: 2025-02-27

## 2025-02-27 NOTE — TELEPHONE ENCOUNTER

## 2025-02-28 ENCOUNTER — HOSPITAL ENCOUNTER (OUTPATIENT)
Dept: INFUSION THERAPY | Age: 70
Discharge: HOME | End: 2025-02-28
Payer: MEDICARE

## 2025-02-28 DIAGNOSIS — C92.00 ACUTE MYELOID LEUKEMIA NOT HAVING ACHIEVED REMISSION (HCC): ICD-10-CM

## 2025-02-28 LAB
ALBUMIN SERPL-MCNC: 3.6 G/DL (ref 3.5–5.2)
ALP SERPL-CCNC: 127 U/L (ref 35–104)
ALT SERPL-CCNC: 20 U/L (ref 5–33)
ANION GAP SERPL CALCULATED.3IONS-SCNC: 11 MMOL/L (ref 7–19)
AST SERPL-CCNC: 23 U/L (ref 5–32)
BASOPHILS # BLD: 0.03 K/UL (ref 0–0.2)
BASOPHILS NFR BLD: 0.5 % (ref 0–1)
BILIRUB SERPL-MCNC: 0.4 MG/DL (ref 0–1.2)
BUN SERPL-MCNC: 9 MG/DL (ref 8–23)
CALCIUM SERPL-MCNC: 9.1 MG/DL (ref 8.8–10.2)
CHLORIDE SERPL-SCNC: 100 MMOL/L (ref 98–107)
CO2 SERPL-SCNC: 25 MMOL/L (ref 22–29)
CREAT SERPL-MCNC: 0.8 MG/DL (ref 0.5–0.9)
EOSINOPHIL # BLD: 0.07 K/UL (ref 0–0.6)
EOSINOPHIL NFR BLD: 1.3 % (ref 0–5)
ERYTHROCYTE [DISTWIDTH] IN BLOOD BY AUTOMATED COUNT: 17.3 % (ref 11.5–14.5)
GLUCOSE SERPL-MCNC: 94 MG/DL (ref 70–99)
HCT VFR BLD AUTO: 35.9 % (ref 37–47)
HGB BLD-MCNC: 11.9 G/DL (ref 12–16)
LYMPHOCYTES # BLD: 0.57 K/UL (ref 1.1–4.5)
LYMPHOCYTES NFR BLD: 10.4 % (ref 20–40)
MCH RBC QN AUTO: 33.9 PG (ref 27–31)
MCHC RBC AUTO-ENTMCNC: 33.1 G/DL (ref 33–37)
MCV RBC AUTO: 102.3 FL (ref 81–99)
MONOCYTES # BLD: 0.57 K/UL (ref 0–0.9)
MONOCYTES NFR BLD: 10.4 % (ref 1–10)
NEUTROPHILS # BLD: 4.23 K/UL (ref 1.5–7.5)
NEUTS SEG NFR BLD: 77 % (ref 50–65)
PLATELET # BLD AUTO: 259 K/UL (ref 130–400)
PMV BLD AUTO: 9.3 FL (ref 9.4–12.3)
POTASSIUM SERPL-SCNC: 4.1 MMOL/L (ref 3.5–5.1)
PROT SERPL-MCNC: 6.5 G/DL (ref 6.4–8.3)
RBC # BLD AUTO: 3.51 M/UL (ref 4.2–5.4)
SODIUM SERPL-SCNC: 136 MMOL/L (ref 136–145)
WBC # BLD AUTO: 5.49 K/UL (ref 4.8–10.8)

## 2025-02-28 PROCEDURE — 85025 COMPLETE CBC W/AUTO DIFF WBC: CPT

## 2025-02-28 PROCEDURE — 80053 COMPREHEN METABOLIC PANEL: CPT

## 2025-02-28 PROCEDURE — 36415 COLL VENOUS BLD VENIPUNCTURE: CPT

## 2025-03-04 ENCOUNTER — OFFICE VISIT (OUTPATIENT)
Dept: HEMATOLOGY | Age: 70
End: 2025-03-04
Payer: MEDICARE

## 2025-03-04 ENCOUNTER — HOSPITAL ENCOUNTER (OUTPATIENT)
Dept: INFUSION THERAPY | Age: 70
Discharge: HOME | End: 2025-03-04
Payer: MEDICARE

## 2025-03-04 VITALS
BODY MASS INDEX: 34.84 KG/M2 | SYSTOLIC BLOOD PRESSURE: 190 MMHG | HEART RATE: 91 BPM | HEIGHT: 67 IN | DIASTOLIC BLOOD PRESSURE: 120 MMHG | WEIGHT: 222 LBS | OXYGEN SATURATION: 98 % | TEMPERATURE: 98 F

## 2025-03-04 DIAGNOSIS — C50.919 TRIPLE NEGATIVE BREAST CANCER (HCC): Primary | ICD-10-CM

## 2025-03-04 DIAGNOSIS — Z51.11 CHEMOTHERAPY MANAGEMENT, ENCOUNTER FOR: ICD-10-CM

## 2025-03-04 DIAGNOSIS — Z17.421 TRIPLE NEGATIVE BREAST CANCER (HCC): Primary | ICD-10-CM

## 2025-03-04 DIAGNOSIS — C92.00 ACUTE MYELOID LEUKEMIA NOT HAVING ACHIEVED REMISSION (HCC): ICD-10-CM

## 2025-03-04 DIAGNOSIS — Z71.89 CARE PLAN DISCUSSED WITH PATIENT: ICD-10-CM

## 2025-03-04 DIAGNOSIS — D64.9 NORMOCHROMIC ANEMIA: ICD-10-CM

## 2025-03-04 DIAGNOSIS — T45.1X5D ADVERSE EFFECT OF CHEMOTHERAPY, SUBSEQUENT ENCOUNTER: ICD-10-CM

## 2025-03-04 LAB
ALBUMIN SERPL-MCNC: 3.5 G/DL (ref 3.5–5.2)
ALP SERPL-CCNC: 116 U/L (ref 35–104)
ALT SERPL-CCNC: 14 U/L (ref 5–33)
ANION GAP SERPL CALCULATED.3IONS-SCNC: 12 MMOL/L (ref 7–19)
AST SERPL-CCNC: 19 U/L (ref 5–32)
BASOPHILS # BLD: 0.05 K/UL (ref 0–0.2)
BASOPHILS NFR BLD: 0.9 % (ref 0–1)
BILIRUB SERPL-MCNC: 0.3 MG/DL (ref 0–1.2)
BUN SERPL-MCNC: 10 MG/DL (ref 8–23)
CALCIUM SERPL-MCNC: 9 MG/DL (ref 8.8–10.2)
CHLORIDE SERPL-SCNC: 104 MMOL/L (ref 98–107)
CO2 SERPL-SCNC: 24 MMOL/L (ref 22–29)
CREAT SERPL-MCNC: 0.7 MG/DL (ref 0.5–0.9)
EOSINOPHIL # BLD: 0.16 K/UL (ref 0–0.6)
EOSINOPHIL NFR BLD: 3 % (ref 0–5)
ERYTHROCYTE [DISTWIDTH] IN BLOOD BY AUTOMATED COUNT: 17 % (ref 11.5–14.5)
GLUCOSE SERPL-MCNC: 94 MG/DL (ref 70–99)
HCT VFR BLD AUTO: 34.9 % (ref 37–47)
HGB BLD-MCNC: 11.4 G/DL (ref 12–16)
LYMPHOCYTES # BLD: 0.59 K/UL (ref 1.1–4.5)
LYMPHOCYTES NFR BLD: 10.9 % (ref 20–40)
MCH RBC QN AUTO: 33.6 PG (ref 27–31)
MCHC RBC AUTO-ENTMCNC: 32.7 G/DL (ref 33–37)
MCV RBC AUTO: 102.9 FL (ref 81–99)
MONOCYTES # BLD: 0.37 K/UL (ref 0–0.9)
MONOCYTES NFR BLD: 6.8 % (ref 1–10)
NEUTROPHILS # BLD: 4.23 K/UL (ref 1.5–7.5)
NEUTS SEG NFR BLD: 78 % (ref 50–65)
PLATELET # BLD AUTO: 274 K/UL (ref 130–400)
PMV BLD AUTO: 9.4 FL (ref 9.4–12.3)
POTASSIUM SERPL-SCNC: 3.9 MMOL/L (ref 3.5–5.1)
PROT SERPL-MCNC: 6.2 G/DL (ref 6.4–8.3)
RBC # BLD AUTO: 3.39 M/UL (ref 4.2–5.4)
SODIUM SERPL-SCNC: 140 MMOL/L (ref 136–145)
WBC # BLD AUTO: 5.42 K/UL (ref 4.8–10.8)

## 2025-03-04 PROCEDURE — 36415 COLL VENOUS BLD VENIPUNCTURE: CPT

## 2025-03-04 PROCEDURE — G8417 CALC BMI ABV UP PARAM F/U: HCPCS | Performed by: INTERNAL MEDICINE

## 2025-03-04 PROCEDURE — 99214 OFFICE O/P EST MOD 30 MIN: CPT | Performed by: INTERNAL MEDICINE

## 2025-03-04 PROCEDURE — 1036F TOBACCO NON-USER: CPT | Performed by: INTERNAL MEDICINE

## 2025-03-04 PROCEDURE — 3077F SYST BP >= 140 MM HG: CPT | Performed by: INTERNAL MEDICINE

## 2025-03-04 PROCEDURE — 1126F AMNT PAIN NOTED NONE PRSNT: CPT | Performed by: INTERNAL MEDICINE

## 2025-03-04 PROCEDURE — 3017F COLORECTAL CA SCREEN DOC REV: CPT | Performed by: INTERNAL MEDICINE

## 2025-03-04 PROCEDURE — G8400 PT W/DXA NO RESULTS DOC: HCPCS | Performed by: INTERNAL MEDICINE

## 2025-03-04 PROCEDURE — 1123F ACP DISCUSS/DSCN MKR DOCD: CPT | Performed by: INTERNAL MEDICINE

## 2025-03-04 PROCEDURE — 80053 COMPREHEN METABOLIC PANEL: CPT

## 2025-03-04 PROCEDURE — 1159F MED LIST DOCD IN RCRD: CPT | Performed by: INTERNAL MEDICINE

## 2025-03-04 PROCEDURE — 85025 COMPLETE CBC W/AUTO DIFF WBC: CPT

## 2025-03-04 PROCEDURE — 3080F DIAST BP >= 90 MM HG: CPT | Performed by: INTERNAL MEDICINE

## 2025-03-04 PROCEDURE — 99212 OFFICE O/P EST SF 10 MIN: CPT

## 2025-03-04 PROCEDURE — G2211 COMPLEX E/M VISIT ADD ON: HCPCS | Performed by: INTERNAL MEDICINE

## 2025-03-04 PROCEDURE — G8427 DOCREV CUR MEDS BY ELIG CLIN: HCPCS | Performed by: INTERNAL MEDICINE

## 2025-03-04 PROCEDURE — 1090F PRES/ABSN URINE INCON ASSESS: CPT | Performed by: INTERNAL MEDICINE

## 2025-03-04 NOTE — PROGRESS NOTES
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    Problem #1 AML  She received induction chemotherapy with azacitidine, venetoclax and ziftomenib KMT2A mutated AML.     She is currently receiving consolidation hypomethylating agent/venetoclax and Menin inhibitor ziftomenib.  She is status post cycle #3.    Bone marrow biopsy showed no evidence of AML.    Problem #2  She has a history of right lower extremity DVT in September 2023. She has received anticoagulation with apixaban in the past.     Problem# 3  She has a history of psoriatic arthritis. She is currently followed by rheumatology with Dr. Fernando Schofield.    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 Both she and her family members have been tested.    Problem #5-triple negative breast cancer  She is status post completion neoadjuvant chemotherapy followed by bilateral mastectomy with sentinel lymph node biopsy.  She had a complete pathologic response.  Status post completion adjuvant therapy.  She is currently in remission.  She denies any new breast complaints.    MEDICATIONS  Current: Azacitidine, venetoclax, ziftomenib.      Diagnosis  Invasive ductal carcinoma, right breast 9:00 & 2:00, May 2023  Grade 3  ER 0.2%. DE 0, HER-2 0/negative, Ki67 84%  MammaPrint: Basal type  Right axillary/retropectoral lymphadenopathy  [id number], stage IIIC->ypT0N0  [facility] Genetic Testing TP53

## 2025-03-07 ENCOUNTER — APPOINTMENT (OUTPATIENT)
Dept: INFUSION THERAPY | Age: 70
End: 2025-03-07
Payer: MEDICARE

## 2025-03-11 ENCOUNTER — APPOINTMENT (OUTPATIENT)
Dept: INFUSION THERAPY | Age: 70
End: 2025-03-11
Payer: MEDICARE

## 2025-03-21 ENCOUNTER — HOSPITAL ENCOUNTER (OUTPATIENT)
Dept: INFUSION THERAPY | Age: 70
Discharge: HOME | End: 2025-03-21
Payer: MEDICARE

## 2025-03-21 DIAGNOSIS — C92.00 ACUTE MYELOID LEUKEMIA NOT HAVING ACHIEVED REMISSION (HCC): ICD-10-CM

## 2025-03-21 LAB
ALBUMIN SERPL-MCNC: 3.6 G/DL (ref 3.5–5.2)
ALP SERPL-CCNC: 123 U/L (ref 35–104)
ALT SERPL-CCNC: 21 U/L (ref 5–33)
ANION GAP SERPL CALCULATED.3IONS-SCNC: 12 MMOL/L (ref 7–19)
AST SERPL-CCNC: 22 U/L (ref 5–32)
BASOPHILS # BLD: 0.01 K/UL (ref 0–0.2)
BASOPHILS NFR BLD: 0.2 % (ref 0–1)
BILIRUB SERPL-MCNC: 0.3 MG/DL (ref 0–1.2)
BUN SERPL-MCNC: 11 MG/DL (ref 8–23)
CALCIUM SERPL-MCNC: 8.8 MG/DL (ref 8.8–10.2)
CHLORIDE SERPL-SCNC: 103 MMOL/L (ref 98–107)
CO2 SERPL-SCNC: 24 MMOL/L (ref 22–29)
CREAT SERPL-MCNC: 0.8 MG/DL (ref 0.5–0.9)
EOSINOPHIL # BLD: 0.12 K/UL (ref 0–0.6)
EOSINOPHIL NFR BLD: 2.3 % (ref 0–5)
ERYTHROCYTE [DISTWIDTH] IN BLOOD BY AUTOMATED COUNT: 15.4 % (ref 11.5–14.5)
GLUCOSE SERPL-MCNC: 100 MG/DL (ref 70–99)
HCT VFR BLD AUTO: 36.4 % (ref 37–47)
HGB BLD-MCNC: 12.1 G/DL (ref 12–16)
LYMPHOCYTES # BLD: 0.53 K/UL (ref 1.1–4.5)
LYMPHOCYTES NFR BLD: 10.1 % (ref 20–40)
MCH RBC QN AUTO: 33.9 PG (ref 27–31)
MCHC RBC AUTO-ENTMCNC: 33.2 G/DL (ref 33–37)
MCV RBC AUTO: 102 FL (ref 81–99)
MONOCYTES # BLD: 0.62 K/UL (ref 0–0.9)
MONOCYTES NFR BLD: 11.9 % (ref 1–10)
NEUTROPHILS # BLD: 3.94 K/UL (ref 1.5–7.5)
NEUTS SEG NFR BLD: 75.3 % (ref 50–65)
PLATELET # BLD AUTO: 194 K/UL (ref 130–400)
PMV BLD AUTO: 9.8 FL (ref 9.4–12.3)
POTASSIUM SERPL-SCNC: 3.9 MMOL/L (ref 3.5–5.1)
PROT SERPL-MCNC: 6.5 G/DL (ref 6.4–8.3)
RBC # BLD AUTO: 3.57 M/UL (ref 4.2–5.4)
SODIUM SERPL-SCNC: 139 MMOL/L (ref 136–145)
WBC # BLD AUTO: 5.23 K/UL (ref 4.8–10.8)

## 2025-03-21 PROCEDURE — 36415 COLL VENOUS BLD VENIPUNCTURE: CPT

## 2025-03-21 PROCEDURE — 80053 COMPREHEN METABOLIC PANEL: CPT

## 2025-03-21 PROCEDURE — 85025 COMPLETE CBC W/AUTO DIFF WBC: CPT

## 2025-03-28 ENCOUNTER — HOSPITAL ENCOUNTER (OUTPATIENT)
Dept: INFUSION THERAPY | Age: 70
Discharge: HOME | End: 2025-03-28
Payer: MEDICARE

## 2025-03-28 DIAGNOSIS — C92.00 ACUTE MYELOID LEUKEMIA NOT HAVING ACHIEVED REMISSION (HCC): ICD-10-CM

## 2025-03-28 LAB
ALBUMIN SERPL-MCNC: 3.6 G/DL (ref 3.5–5.2)
ALP SERPL-CCNC: 117 U/L (ref 35–104)
ALT SERPL-CCNC: 17 U/L (ref 5–33)
ANION GAP SERPL CALCULATED.3IONS-SCNC: 12 MMOL/L (ref 7–19)
AST SERPL-CCNC: 24 U/L (ref 5–32)
BASOPHILS # BLD: 0.03 K/UL (ref 0–0.2)
BASOPHILS NFR BLD: 0.5 % (ref 0–1)
BILIRUB SERPL-MCNC: 0.3 MG/DL (ref 0–1.2)
BUN SERPL-MCNC: 15 MG/DL (ref 8–23)
CALCIUM SERPL-MCNC: 8.8 MG/DL (ref 8.8–10.2)
CHLORIDE SERPL-SCNC: 102 MMOL/L (ref 98–107)
CO2 SERPL-SCNC: 25 MMOL/L (ref 22–29)
CREAT SERPL-MCNC: 0.7 MG/DL (ref 0.5–0.9)
EOSINOPHIL # BLD: 0.05 K/UL (ref 0–0.6)
EOSINOPHIL NFR BLD: 0.8 % (ref 0–5)
ERYTHROCYTE [DISTWIDTH] IN BLOOD BY AUTOMATED COUNT: 15.5 % (ref 11.5–14.5)
GLUCOSE SERPL-MCNC: 106 MG/DL (ref 70–99)
HCT VFR BLD AUTO: 36.2 % (ref 37–47)
HGB BLD-MCNC: 11.9 G/DL (ref 12–16)
LYMPHOCYTES # BLD: 0.59 K/UL (ref 1.1–4.5)
LYMPHOCYTES NFR BLD: 9 % (ref 20–40)
MCH RBC QN AUTO: 34.1 PG (ref 27–31)
MCHC RBC AUTO-ENTMCNC: 32.9 G/DL (ref 33–37)
MCV RBC AUTO: 103.7 FL (ref 81–99)
MONOCYTES # BLD: 0.54 K/UL (ref 0–0.9)
MONOCYTES NFR BLD: 8.3 % (ref 1–10)
NEUTROPHILS # BLD: 5.28 K/UL (ref 1.5–7.5)
NEUTS SEG NFR BLD: 80.9 % (ref 50–65)
PLATELET # BLD AUTO: 252 K/UL (ref 130–400)
PMV BLD AUTO: 9.3 FL (ref 9.4–12.3)
POTASSIUM SERPL-SCNC: 4.4 MMOL/L (ref 3.5–5.1)
PROT SERPL-MCNC: 6.5 G/DL (ref 6.4–8.3)
RBC # BLD AUTO: 3.49 M/UL (ref 4.2–5.4)
SODIUM SERPL-SCNC: 139 MMOL/L (ref 136–145)
WBC # BLD AUTO: 6.52 K/UL (ref 4.8–10.8)

## 2025-03-28 PROCEDURE — 80053 COMPREHEN METABOLIC PANEL: CPT

## 2025-03-28 PROCEDURE — 36415 COLL VENOUS BLD VENIPUNCTURE: CPT

## 2025-03-28 PROCEDURE — 85025 COMPLETE CBC W/AUTO DIFF WBC: CPT

## 2025-03-31 ENCOUNTER — TELEPHONE (OUTPATIENT)
Dept: HEMATOLOGY | Age: 70
End: 2025-03-31

## 2025-03-31 NOTE — TELEPHONE ENCOUNTER

## 2025-04-03 NOTE — PROGRESS NOTES
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    Problem #1 AML  She received induction chemotherapy with azacitidine, venetoclax and ziftomenib KMT2A mutated AML.  This is a stable.  No change.  She is scheduled for cycle #5+ [employer]    She is currently receiving consolidation hypomethylating agent/venetoclax and Menin inhibitor ziftomenib.  She is status post cycle #4    Most recent bone marrow biopsy showed no evidence of AML.    Problem #2  She has a history of right lower extremity DVT in September 2023. She has received anticoagulation with apixaban in the past.  She denies any bleeding.  She is compliant with her Eliquis.    Problem# 3-not at goal  She has a history of psoriatic arthritis. She is currently followed by rheumatology with Dr. Fernando Schofield.    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 Both she and her family members have been tested.    Problem #5-triple negative breast cancer  She is status post completion neoadjuvant chemotherapy followed by bilateral mastectomy with sentinel lymph node biopsy.  She had a complete pathologic response.  Status post completion adjuvant therapy.  She is currently in remission.  She denies any new breast complaints.    MEDICATIONS  Current: Azacitidine, venetoclax, ziftomenib.      A positive response to the chemotherapy regimen, which includes Vidaza

## 2025-04-04 ENCOUNTER — HOSPITAL ENCOUNTER (OUTPATIENT)
Dept: INFUSION THERAPY | Age: 70
Discharge: HOME | End: 2025-04-04
Payer: MEDICARE

## 2025-04-04 ENCOUNTER — OFFICE VISIT (OUTPATIENT)
Dept: HEMATOLOGY | Age: 70
End: 2025-04-04
Payer: MEDICARE

## 2025-04-04 VITALS
HEART RATE: 89 BPM | TEMPERATURE: 97 F | RESPIRATION RATE: 20 BRPM | DIASTOLIC BLOOD PRESSURE: 82 MMHG | HEIGHT: 67 IN | OXYGEN SATURATION: 98 % | BODY MASS INDEX: 35.35 KG/M2 | WEIGHT: 225.2 LBS | SYSTOLIC BLOOD PRESSURE: 130 MMHG

## 2025-04-04 DIAGNOSIS — Z51.11 CHEMOTHERAPY MANAGEMENT, ENCOUNTER FOR: ICD-10-CM

## 2025-04-04 DIAGNOSIS — C92.00 ACUTE MYELOID LEUKEMIA NOT HAVING ACHIEVED REMISSION (HCC): ICD-10-CM

## 2025-04-04 DIAGNOSIS — T45.1X5D ADVERSE EFFECT OF CHEMOTHERAPY, SUBSEQUENT ENCOUNTER: ICD-10-CM

## 2025-04-04 DIAGNOSIS — C92.01 ACUTE MYELOID LEUKEMIA IN REMISSION (HCC): Primary | ICD-10-CM

## 2025-04-04 DIAGNOSIS — Z71.89 CARE PLAN DISCUSSED WITH PATIENT: ICD-10-CM

## 2025-04-04 LAB
ALBUMIN SERPL-MCNC: 3.6 G/DL (ref 3.5–5.2)
ALP SERPL-CCNC: 127 U/L (ref 35–104)
ALT SERPL-CCNC: 17 U/L (ref 5–33)
ANION GAP SERPL CALCULATED.3IONS-SCNC: 11 MMOL/L (ref 7–19)
AST SERPL-CCNC: 22 U/L (ref 5–32)
BASOPHILS # BLD: 0.07 K/UL (ref 0–0.2)
BASOPHILS NFR BLD: 1.4 % (ref 0–1)
BILIRUB SERPL-MCNC: 0.3 MG/DL (ref 0–1.2)
BUN SERPL-MCNC: 11 MG/DL (ref 8–23)
CALCIUM SERPL-MCNC: 8.8 MG/DL (ref 8.8–10.2)
CHLORIDE SERPL-SCNC: 105 MMOL/L (ref 98–107)
CO2 SERPL-SCNC: 24 MMOL/L (ref 22–29)
CREAT SERPL-MCNC: 0.7 MG/DL (ref 0.5–0.9)
EOSINOPHIL # BLD: 0.09 K/UL (ref 0–0.6)
EOSINOPHIL NFR BLD: 1.8 % (ref 0–5)
ERYTHROCYTE [DISTWIDTH] IN BLOOD BY AUTOMATED COUNT: 15.2 % (ref 11.5–14.5)
GLUCOSE SERPL-MCNC: 94 MG/DL (ref 70–99)
HCT VFR BLD AUTO: 38.2 % (ref 37–47)
HGB BLD-MCNC: 12.6 G/DL (ref 12–16)
LYMPHOCYTES # BLD: 0.52 K/UL (ref 1.1–4.5)
LYMPHOCYTES NFR BLD: 10.2 % (ref 20–40)
MCH RBC QN AUTO: 33.6 PG (ref 27–31)
MCHC RBC AUTO-ENTMCNC: 33 G/DL (ref 33–37)
MCV RBC AUTO: 101.9 FL (ref 81–99)
MONOCYTES # BLD: 0.33 K/UL (ref 0–0.9)
MONOCYTES NFR BLD: 6.5 % (ref 1–10)
NEUTROPHILS # BLD: 4.09 K/UL (ref 1.5–7.5)
NEUTS SEG NFR BLD: 79.9 % (ref 50–65)
PLATELET # BLD AUTO: 272 K/UL (ref 130–400)
PMV BLD AUTO: 9.6 FL (ref 9.4–12.3)
POTASSIUM SERPL-SCNC: 4.3 MMOL/L (ref 3.5–5.1)
PROT SERPL-MCNC: 6.3 G/DL (ref 6.4–8.3)
RBC # BLD AUTO: 3.75 M/UL (ref 4.2–5.4)
SODIUM SERPL-SCNC: 140 MMOL/L (ref 136–145)
WBC # BLD AUTO: 5.11 K/UL (ref 4.8–10.8)

## 2025-04-04 PROCEDURE — 99212 OFFICE O/P EST SF 10 MIN: CPT

## 2025-04-04 PROCEDURE — 80053 COMPREHEN METABOLIC PANEL: CPT

## 2025-04-04 PROCEDURE — 85025 COMPLETE CBC W/AUTO DIFF WBC: CPT

## 2025-04-04 PROCEDURE — 36415 COLL VENOUS BLD VENIPUNCTURE: CPT

## 2025-04-18 ENCOUNTER — HOSPITAL ENCOUNTER (OUTPATIENT)
Dept: INFUSION THERAPY | Age: 70
Discharge: HOME | End: 2025-04-18
Payer: MEDICARE

## 2025-04-18 DIAGNOSIS — C92.00 ACUTE MYELOID LEUKEMIA NOT HAVING ACHIEVED REMISSION (HCC): ICD-10-CM

## 2025-04-18 LAB
ALBUMIN SERPL-MCNC: 3.6 G/DL (ref 3.5–5.2)
ALP SERPL-CCNC: 120 U/L (ref 35–104)
ALT SERPL-CCNC: 21 U/L (ref 5–33)
ANION GAP SERPL CALCULATED.3IONS-SCNC: 12 MMOL/L (ref 7–19)
AST SERPL-CCNC: 21 U/L (ref 5–32)
BASOPHILS # BLD: 0.01 K/UL (ref 0–0.2)
BASOPHILS NFR BLD: 0.3 % (ref 0–1)
BILIRUB SERPL-MCNC: 0.4 MG/DL (ref 0–1.2)
BUN SERPL-MCNC: 9 MG/DL (ref 8–23)
CALCIUM SERPL-MCNC: 8.8 MG/DL (ref 8.8–10.2)
CHLORIDE SERPL-SCNC: 103 MMOL/L (ref 98–107)
CO2 SERPL-SCNC: 25 MMOL/L (ref 22–29)
CREAT SERPL-MCNC: 0.6 MG/DL (ref 0.5–0.9)
EOSINOPHIL # BLD: 0.08 K/UL (ref 0–0.6)
EOSINOPHIL NFR BLD: 2.2 % (ref 0–5)
ERYTHROCYTE [DISTWIDTH] IN BLOOD BY AUTOMATED COUNT: 15.2 % (ref 11.5–14.5)
GLUCOSE SERPL-MCNC: 101 MG/DL (ref 70–99)
HCT VFR BLD AUTO: 35.8 % (ref 37–47)
HGB BLD-MCNC: 11.9 G/DL (ref 12–16)
LYMPHOCYTES # BLD: 0.54 K/UL (ref 1.1–4.5)
LYMPHOCYTES NFR BLD: 14.6 % (ref 20–40)
MCH RBC QN AUTO: 33.5 PG (ref 27–31)
MCHC RBC AUTO-ENTMCNC: 33.2 G/DL (ref 33–37)
MCV RBC AUTO: 100.8 FL (ref 81–99)
MONOCYTES # BLD: 0.3 K/UL (ref 0–0.9)
MONOCYTES NFR BLD: 8.1 % (ref 1–10)
NEUTROPHILS # BLD: 2.74 K/UL (ref 1.5–7.5)
NEUTS SEG NFR BLD: 74.3 % (ref 50–65)
PLATELET # BLD AUTO: 155 K/UL (ref 130–400)
PMV BLD AUTO: 9.4 FL (ref 9.4–12.3)
POTASSIUM SERPL-SCNC: 4.2 MMOL/L (ref 3.5–5.1)
PROT SERPL-MCNC: 6.3 G/DL (ref 6.4–8.3)
RBC # BLD AUTO: 3.55 M/UL (ref 4.2–5.4)
SODIUM SERPL-SCNC: 140 MMOL/L (ref 136–145)
WBC # BLD AUTO: 3.69 K/UL (ref 4.8–10.8)

## 2025-04-18 PROCEDURE — 80053 COMPREHEN METABOLIC PANEL: CPT

## 2025-04-18 PROCEDURE — 36415 COLL VENOUS BLD VENIPUNCTURE: CPT

## 2025-04-18 PROCEDURE — 85025 COMPLETE CBC W/AUTO DIFF WBC: CPT

## 2025-04-25 ENCOUNTER — HOSPITAL ENCOUNTER (OUTPATIENT)
Dept: INFUSION THERAPY | Age: 70
Discharge: HOME | End: 2025-04-25
Payer: MEDICARE

## 2025-04-25 DIAGNOSIS — C92.00 ACUTE MYELOID LEUKEMIA NOT HAVING ACHIEVED REMISSION (HCC): ICD-10-CM

## 2025-04-25 LAB
ALBUMIN SERPL-MCNC: 3.7 G/DL (ref 3.5–5.2)
ALP SERPL-CCNC: 127 U/L (ref 35–104)
ALT SERPL-CCNC: 17 U/L (ref 5–33)
ANION GAP SERPL CALCULATED.3IONS-SCNC: 12 MMOL/L (ref 7–19)
AST SERPL-CCNC: 19 U/L (ref 5–32)
BASOPHILS # BLD: 0.02 K/UL (ref 0–0.2)
BASOPHILS NFR BLD: 0.5 % (ref 0–1)
BILIRUB SERPL-MCNC: 0.4 MG/DL (ref 0–1.2)
BUN SERPL-MCNC: 13 MG/DL (ref 8–23)
CALCIUM SERPL-MCNC: 8.7 MG/DL (ref 8.8–10.2)
CHLORIDE SERPL-SCNC: 104 MMOL/L (ref 98–107)
CO2 SERPL-SCNC: 24 MMOL/L (ref 22–29)
CREAT SERPL-MCNC: 0.7 MG/DL (ref 0.5–0.9)
EOSINOPHIL # BLD: 0.05 K/UL (ref 0–0.6)
EOSINOPHIL NFR BLD: 1.2 % (ref 0–5)
ERYTHROCYTE [DISTWIDTH] IN BLOOD BY AUTOMATED COUNT: 15.9 % (ref 11.5–14.5)
GLUCOSE SERPL-MCNC: 91 MG/DL (ref 70–99)
HCT VFR BLD AUTO: 35.2 % (ref 37–47)
HGB BLD-MCNC: 11.7 G/DL (ref 12–16)
LYMPHOCYTES # BLD: 0.52 K/UL (ref 1.1–4.5)
LYMPHOCYTES NFR BLD: 12.7 % (ref 20–40)
MCH RBC QN AUTO: 33.5 PG (ref 27–31)
MCHC RBC AUTO-ENTMCNC: 33.2 G/DL (ref 33–37)
MCV RBC AUTO: 100.9 FL (ref 81–99)
MONOCYTES # BLD: 0.23 K/UL (ref 0–0.9)
MONOCYTES NFR BLD: 5.6 % (ref 1–10)
NEUTROPHILS # BLD: 3.25 K/UL (ref 1.5–7.5)
NEUTS SEG NFR BLD: 79.8 % (ref 50–65)
PLATELET # BLD AUTO: 220 K/UL (ref 130–400)
PMV BLD AUTO: 9.5 FL (ref 9.4–12.3)
POTASSIUM SERPL-SCNC: 4.2 MMOL/L (ref 3.5–5.1)
PROT SERPL-MCNC: 6.4 G/DL (ref 6.4–8.3)
RBC # BLD AUTO: 3.49 M/UL (ref 4.2–5.4)
SODIUM SERPL-SCNC: 140 MMOL/L (ref 136–145)
WBC # BLD AUTO: 4.08 K/UL (ref 4.8–10.8)

## 2025-04-25 PROCEDURE — 80053 COMPREHEN METABOLIC PANEL: CPT

## 2025-04-25 PROCEDURE — 36415 COLL VENOUS BLD VENIPUNCTURE: CPT

## 2025-04-25 PROCEDURE — 85025 COMPLETE CBC W/AUTO DIFF WBC: CPT

## 2025-04-28 ENCOUNTER — OFFICE VISIT (OUTPATIENT)
Age: 70
End: 2025-04-28
Payer: MEDICARE

## 2025-04-28 VITALS — HEIGHT: 67 IN | BODY MASS INDEX: 35.31 KG/M2 | WEIGHT: 225 LBS

## 2025-04-28 DIAGNOSIS — M17.12 PRIMARY OSTEOARTHRITIS OF LEFT KNEE: Primary | ICD-10-CM

## 2025-04-28 DIAGNOSIS — G89.29 CHRONIC KNEE PAIN AFTER TOTAL REPLACEMENT OF LEFT KNEE JOINT: ICD-10-CM

## 2025-04-28 DIAGNOSIS — Z96.652 CHRONIC KNEE PAIN AFTER TOTAL REPLACEMENT OF LEFT KNEE JOINT: ICD-10-CM

## 2025-04-28 DIAGNOSIS — M25.562 CHRONIC KNEE PAIN AFTER TOTAL REPLACEMENT OF LEFT KNEE JOINT: ICD-10-CM

## 2025-04-28 PROCEDURE — 20610 DRAIN/INJ JOINT/BURSA W/O US: CPT | Performed by: PHYSICIAN ASSISTANT

## 2025-04-28 RX ORDER — BUPIVACAINE HYDROCHLORIDE 5 MG/ML
5 INJECTION, SOLUTION PERINEURAL ONCE
Status: COMPLETED | OUTPATIENT
Start: 2025-04-28 | End: 2025-04-28

## 2025-04-28 RX ORDER — BETAMETHASONE SODIUM PHOSPHATE AND BETAMETHASONE ACETATE 3; 3 MG/ML; MG/ML
12 INJECTION, SUSPENSION INTRA-ARTICULAR; INTRALESIONAL; INTRAMUSCULAR; SOFT TISSUE ONCE
Status: COMPLETED | OUTPATIENT
Start: 2025-04-28 | End: 2025-04-28

## 2025-04-28 RX ORDER — LIDOCAINE HYDROCHLORIDE 10 MG/ML
2.5 INJECTION, SOLUTION INFILTRATION; PERINEURAL ONCE
Status: COMPLETED | OUTPATIENT
Start: 2025-04-28 | End: 2025-04-28

## 2025-04-28 RX ORDER — OXYCODONE HYDROCHLORIDE 5 MG/1
5 TABLET ORAL EVERY 6 HOURS PRN
COMMUNITY
Start: 2025-04-23

## 2025-04-28 RX ADMIN — BUPIVACAINE HYDROCHLORIDE 25 MG: 5 INJECTION, SOLUTION PERINEURAL at 11:16

## 2025-04-28 RX ADMIN — LIDOCAINE HYDROCHLORIDE 2.5 ML: 10 INJECTION, SOLUTION INFILTRATION; PERINEURAL at 11:16

## 2025-04-28 RX ADMIN — BETAMETHASONE SODIUM PHOSPHATE AND BETAMETHASONE ACETATE 12 MG: 3; 3 INJECTION, SUSPENSION INTRA-ARTICULAR; INTRALESIONAL; INTRAMUSCULAR; SOFT TISSUE at 11:15

## 2025-04-28 NOTE — PROGRESS NOTES
Orthopaedic Clinic Note - Established Patient    NAME:  Vivienne Mcdonald   : 1955  MRN: [id number]      2025      CHIEF COMPLAINT:  follow up knee pain, repeat injection      HISTORY OF PRESENT ILLNESS:   Patient is a 69 y.o. female who returns today for follow up of left knee pain, requesting repeat injection. It has been at least 3 months since last injection was completed. Patient denies any recent trauma, fall, or other other injury. Pain is rated 6 today.  They would like to continue with conservative care with repeat injection today.    Past Medical History:        Diagnosis Date    Acute bilateral low back pain 2019    Acute leukemia (HCC)     Arthritis     Breast cancer (HCC) 2023    right    Hand numbness     right hand s/p arm surgery    Hx of blood clots     Hypercholesterolemia     Hyperlipidemia     Hypertension     Malignant neoplasm of overlapping sites of right breast (HCC) 2023    Obesity, unspecified obesity severity, unspecified obesity type 2018    Post-menopausal        Past Surgical History:        Procedure Laterality Date    ARM SURGERY Right     \"nerve release\", dt rocío, with 2nd surgery, debridement    COLONOSCOPY      [facility]    JOINT REPLACEMENT Right 2010    knee    MASTECTOMY Bilateral 2024    BILATERAL SIMPLE MASTECTOMY WITH RIGHT SENTINEL NODE, BILATERAL PEC BLOCK, ULTRASOUND GUIDED NEEDLE LOCALIZATION X 2 performed by Hadley Hutchinson MD at [facility] OR    PORT SURGERY N/A 2023    PORT INSERTION WITH FLUOROSCOPY performed by Hadley Hutchinson MD at [facility] OR    PORT SURGERY N/A 2024    PORT REMOVAL performed by Hadley Hutchinson MD at [facility] OR    TOTAL KNEE ARTHROPLASTY Right     US BREAST BIOPSY W LOC DEVICE 1ST LESION RIGHT Right 2023    US BREAST BIOPSY W LOC DEVICE 1ST LESION RIGHT 2023 [facility]    US PLACE BREAST LOC DEVICE 1ST LESION RIGHT Right 2024    US GUIDED NEEDLE LOC OF RIGHT

## 2025-04-29 ENCOUNTER — TELEPHONE (OUTPATIENT)
Dept: HEMATOLOGY | Age: 70
End: 2025-04-29

## 2025-04-29 NOTE — TELEPHONE ENCOUNTER
I called patient and left detailed voicemail about their appointment on 05/02/2025. I made patient aware not to arrive any earlier than the appointment time and to come at the time of the follow up not the time of the lab appointment if it is different than the follow up appt time. I also made patient aware to eat and drink plenty of water to hydrate properly before coming to these appointments because this will make their lab draw much easier. Made patient aware that we are now located at the [facility] at [address] [place] [address]. Located between [facility] and the [facility].

## 2025-05-01 DIAGNOSIS — C92.01 ACUTE MYELOID LEUKEMIA IN REMISSION (HCC): Primary | ICD-10-CM

## 2025-05-01 PROCEDURE — 36415 COLL VENOUS BLD VENIPUNCTURE: CPT | Performed by: INTERNAL MEDICINE

## 2025-05-01 NOTE — PROGRESS NOTES
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    A bone marrow biopsy performed in 03/2025 revealed minimal residual disease. Consequently, the azacitidine dosage was increased for a duration of 3 days. The fifth cycle of treatment has been successfully completed, and the sixth cycle is scheduled to start next week. Venetoclax is taken for 7 days, and azacitidine is administered daily. A follow-up biopsy is planned for 06/2025 to assess the response to treatment. No adverse effects from the increased dose of azacitidine are reported.    Eliquis is also being taken, with no reported instances of bleeding. Initially, there was a plan to discontinue Eliquis, which was implemented for 2 days. However, she was subsequently advised to continue the medication for an additional 3 months. Overall good health is reported.    Problem #1 AML  She received induction chemotherapy with azacitidine, venetoclax and ziftomenib KMT2A mutated AML.  This is a stable.  No change.  She is s/p 5 cycles azacytadine. Ziftometinib and venclexta daily cx 7 days    Most recent bone marrow biopsy showed no evidence of AM but MRD positive.    Problem #2-at goal  She has a history of right lower extremity DVT in September 2023. She has received anticoagulation with apixaban in the past.  She denies any bleeding.  She is compliant with her Eliquis.    Problem# 3-not at goal  She has a history of psoriatic arthritis.

## 2025-05-02 ENCOUNTER — HOSPITAL ENCOUNTER (OUTPATIENT)
Dept: INFUSION THERAPY | Age: 70
Discharge: HOME | End: 2025-05-02
Payer: MEDICARE

## 2025-05-02 ENCOUNTER — OFFICE VISIT (OUTPATIENT)
Dept: HEMATOLOGY | Age: 70
End: 2025-05-02
Payer: MEDICARE

## 2025-05-02 VITALS
SYSTOLIC BLOOD PRESSURE: 136 MMHG | HEIGHT: 67 IN | OXYGEN SATURATION: 99 % | WEIGHT: 227 LBS | BODY MASS INDEX: 35.63 KG/M2 | TEMPERATURE: 97.5 F | HEART RATE: 73 BPM | DIASTOLIC BLOOD PRESSURE: 84 MMHG

## 2025-05-02 DIAGNOSIS — C92.01 ACUTE MYELOID LEUKEMIA IN REMISSION (HCC): ICD-10-CM

## 2025-05-02 DIAGNOSIS — C92.01 ACUTE MYELOID LEUKEMIA IN REMISSION (HCC): Primary | ICD-10-CM

## 2025-05-02 DIAGNOSIS — C50.919 TRIPLE NEGATIVE BREAST CANCER (HCC): ICD-10-CM

## 2025-05-02 DIAGNOSIS — Z17.421 TRIPLE NEGATIVE BREAST CANCER (HCC): ICD-10-CM

## 2025-05-02 DIAGNOSIS — C92.00 ACUTE MYELOID LEUKEMIA NOT HAVING ACHIEVED REMISSION (HCC): ICD-10-CM

## 2025-05-02 DIAGNOSIS — Z51.81 ANTICOAGULATION MANAGEMENT ENCOUNTER: ICD-10-CM

## 2025-05-02 DIAGNOSIS — Z71.89 CARE PLAN DISCUSSED WITH PATIENT: ICD-10-CM

## 2025-05-02 DIAGNOSIS — Z86.718 HISTORY OF DVT (DEEP VEIN THROMBOSIS): ICD-10-CM

## 2025-05-02 DIAGNOSIS — C50.911 INVASIVE DUCTAL CARCINOMA OF BREAST, FEMALE, RIGHT (HCC): ICD-10-CM

## 2025-05-02 DIAGNOSIS — Z79.01 ANTICOAGULATION MANAGEMENT ENCOUNTER: ICD-10-CM

## 2025-05-02 LAB
ALBUMIN SERPL-MCNC: 3.7 G/DL (ref 3.5–5.2)
ALP SERPL-CCNC: 131 U/L (ref 35–104)
ALT SERPL-CCNC: 25 U/L (ref 5–33)
ANION GAP SERPL CALCULATED.3IONS-SCNC: 10 MMOL/L (ref 7–19)
AST SERPL-CCNC: 21 U/L (ref 5–32)
BASOPHILS # BLD: 0.05 K/UL (ref 0–0.2)
BASOPHILS NFR BLD: 1.6 % (ref 0–1)
BILIRUB SERPL-MCNC: 0.4 MG/DL (ref 0–1.2)
BUN SERPL-MCNC: 13 MG/DL (ref 8–23)
CALCIUM SERPL-MCNC: 8.6 MG/DL (ref 8.8–10.2)
CHLORIDE SERPL-SCNC: 106 MMOL/L (ref 98–107)
CO2 SERPL-SCNC: 24 MMOL/L (ref 22–29)
CREAT SERPL-MCNC: 0.7 MG/DL (ref 0.5–0.9)
EOSINOPHIL # BLD: 0.09 K/UL (ref 0–0.6)
EOSINOPHIL NFR BLD: 2.8 % (ref 0–5)
ERYTHROCYTE [DISTWIDTH] IN BLOOD BY AUTOMATED COUNT: 15.9 % (ref 11.5–14.5)
GLUCOSE SERPL-MCNC: 88 MG/DL (ref 70–99)
HCT VFR BLD AUTO: 37 % (ref 37–47)
HGB BLD-MCNC: 12.4 G/DL (ref 12–16)
LYMPHOCYTES # BLD: 0.59 K/UL (ref 1.1–4.5)
LYMPHOCYTES NFR BLD: 18.4 % (ref 20–40)
MCH RBC QN AUTO: 34.2 PG (ref 27–31)
MCHC RBC AUTO-ENTMCNC: 33.5 G/DL (ref 33–37)
MCV RBC AUTO: 101.9 FL (ref 81–99)
MONOCYTES # BLD: 0.06 K/UL (ref 0–0.9)
MONOCYTES NFR BLD: 1.9 % (ref 1–10)
NEUTROPHILS # BLD: 2.4 K/UL (ref 1.5–7.5)
NEUTS SEG NFR BLD: 75 % (ref 50–65)
PLATELET # BLD AUTO: 280 K/UL (ref 130–400)
PMV BLD AUTO: 9.2 FL (ref 9.4–12.3)
POTASSIUM SERPL-SCNC: 3.9 MMOL/L (ref 3.5–5.1)
PROT SERPL-MCNC: 6.4 G/DL (ref 6.4–8.3)
RBC # BLD AUTO: 3.63 M/UL (ref 4.2–5.4)
SODIUM SERPL-SCNC: 140 MMOL/L (ref 136–145)
WBC # BLD AUTO: 3.2 K/UL (ref 4.8–10.8)

## 2025-05-02 PROCEDURE — 99212 OFFICE O/P EST SF 10 MIN: CPT

## 2025-05-02 PROCEDURE — 80053 COMPREHEN METABOLIC PANEL: CPT

## 2025-05-02 PROCEDURE — 85025 COMPLETE CBC W/AUTO DIFF WBC: CPT

## 2025-05-02 PROCEDURE — 36415 COLL VENOUS BLD VENIPUNCTURE: CPT

## 2025-05-20 ENCOUNTER — TELEPHONE (OUTPATIENT)
Dept: HEMATOLOGY | Age: 70
End: 2025-05-20

## 2025-05-20 NOTE — TELEPHONE ENCOUNTER

## 2025-05-22 DIAGNOSIS — C92.01 ACUTE MYELOID LEUKEMIA IN REMISSION (HCC): Primary | ICD-10-CM

## 2025-05-22 DIAGNOSIS — C50.911 INVASIVE DUCTAL CARCINOMA OF BREAST, FEMALE, RIGHT (HCC): ICD-10-CM

## 2025-05-22 NOTE — PROGRESS NOTES
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    She reports no new health issues or the presence of fever. She is scheduled to complete her seventh cycle of chemotherapy in 06/2025, with a biopsy planned for 06/02/2025. Chemotherapy will be administered on 06/03/2025, 06/04/2025, and 06/05/2025. Her chemotherapy regimen includes azacitidine for 3 days, venetoclax for 7 days, and daily zaminib. She underwent blood work today and has been advised by Dr. Bañuelos to reduce the frequency of her visits from weekly to monthly due to stable condition. She continues her treatment with Eliquis.    Problem #1 AML  She received induction chemotherapy with azacitidine, venetoclax and ziftomenib KMT2A mutated AML.  This is a stable.  No change.  She is s/p 5 cycles azacytadine. Ziftometinib and venclexta daily cx 7 days.    Problem #2-at goal  She has a history of right lower extremity DVT in September 2023. She has received anticoagulation with apixaban in the past.  She denies any bleeding.  She is compliant with her Eliquis.    Problem# 3-not at goal  She has a history of psoriatic arthritis. She is currently followed by rheumatology with Dr. Fernando Schofield.    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 Both she and her family members have been tested.    Problem #5-triple negative breast cancer  She is status post completion neoadjuvant

## 2025-05-23 ENCOUNTER — HOSPITAL ENCOUNTER (OUTPATIENT)
Dept: INFUSION THERAPY | Age: 70
Discharge: HOME | End: 2025-05-23
Payer: MEDICARE

## 2025-05-23 ENCOUNTER — OFFICE VISIT (OUTPATIENT)
Dept: HEMATOLOGY | Age: 70
End: 2025-05-23

## 2025-05-23 VITALS
OXYGEN SATURATION: 100 % | HEIGHT: 67 IN | HEART RATE: 81 BPM | TEMPERATURE: 97.5 F | SYSTOLIC BLOOD PRESSURE: 132 MMHG | DIASTOLIC BLOOD PRESSURE: 82 MMHG | BODY MASS INDEX: 36 KG/M2 | WEIGHT: 229.4 LBS

## 2025-05-23 DIAGNOSIS — Z51.11 CHEMOTHERAPY MANAGEMENT, ENCOUNTER FOR: ICD-10-CM

## 2025-05-23 DIAGNOSIS — C92.01 ACUTE MYELOID LEUKEMIA IN REMISSION (HCC): ICD-10-CM

## 2025-05-23 DIAGNOSIS — Z71.89 CARE PLAN DISCUSSED WITH PATIENT: ICD-10-CM

## 2025-05-23 DIAGNOSIS — C92.01 ACUTE MYELOID LEUKEMIA IN REMISSION (HCC): Primary | ICD-10-CM

## 2025-05-23 DIAGNOSIS — C50.911 INVASIVE DUCTAL CARCINOMA OF BREAST, FEMALE, RIGHT (HCC): ICD-10-CM

## 2025-05-23 DIAGNOSIS — D75.89 BICYTOPENIA: ICD-10-CM

## 2025-05-23 DIAGNOSIS — Z71.89 COORDINATION OF COMPLEX CARE: ICD-10-CM

## 2025-05-23 LAB
ALBUMIN SERPL-MCNC: 3.6 G/DL (ref 3.5–5.2)
ALP SERPL-CCNC: 139 U/L (ref 35–104)
ALT SERPL-CCNC: 20 U/L (ref 5–33)
ANION GAP SERPL CALCULATED.3IONS-SCNC: 10 MMOL/L (ref 7–19)
AST SERPL-CCNC: 17 U/L (ref 5–32)
BASOPHILS # BLD: 0 K/UL (ref 0–0.2)
BASOPHILS NFR BLD: 0 % (ref 0–1)
BILIRUB SERPL-MCNC: 0.4 MG/DL (ref 0–1.2)
BUN SERPL-MCNC: 16 MG/DL (ref 8–23)
CALCIUM SERPL-MCNC: 8.5 MG/DL (ref 8.8–10.2)
CHLORIDE SERPL-SCNC: 105 MMOL/L (ref 98–107)
CO2 SERPL-SCNC: 24 MMOL/L (ref 22–29)
CREAT SERPL-MCNC: 0.7 MG/DL (ref 0.5–0.9)
EOSINOPHIL # BLD: 0.02 K/UL (ref 0–0.6)
EOSINOPHIL NFR BLD: 1.5 % (ref 0–5)
ERYTHROCYTE [DISTWIDTH] IN BLOOD BY AUTOMATED COUNT: 16.2 % (ref 11.5–14.5)
GLUCOSE SERPL-MCNC: 99 MG/DL (ref 70–99)
HCT VFR BLD AUTO: 31.2 % (ref 37–47)
HGB BLD-MCNC: 10.7 G/DL (ref 12–16)
LYMPHOCYTES # BLD: 0.52 K/UL (ref 1.1–4.5)
LYMPHOCYTES NFR BLD: 38.8 % (ref 20–40)
MAGNESIUM SERPL-MCNC: 2 MG/DL (ref 1.6–2.4)
MCH RBC QN AUTO: 34.4 PG (ref 27–31)
MCHC RBC AUTO-ENTMCNC: 34.3 G/DL (ref 33–37)
MCV RBC AUTO: 100.3 FL (ref 81–99)
MONOCYTES # BLD: 0.05 K/UL (ref 0–0.9)
MONOCYTES NFR BLD: 3.7 % (ref 1–10)
NEUTROPHILS # BLD: 0.75 K/UL (ref 1.5–7.5)
NEUTS SEG NFR BLD: 56 % (ref 50–65)
PLATELET # BLD AUTO: 158 K/UL (ref 130–400)
PMV BLD AUTO: 9.9 FL (ref 9.4–12.3)
POTASSIUM SERPL-SCNC: 4.2 MMOL/L (ref 3.5–5.1)
PROT SERPL-MCNC: 6.4 G/DL (ref 6.4–8.3)
RBC # BLD AUTO: 3.11 M/UL (ref 4.2–5.4)
SODIUM SERPL-SCNC: 139 MMOL/L (ref 136–145)
WBC # BLD AUTO: 1.34 K/UL (ref 4.8–10.8)

## 2025-05-23 PROCEDURE — 83735 ASSAY OF MAGNESIUM: CPT

## 2025-05-23 PROCEDURE — 85025 COMPLETE CBC W/AUTO DIFF WBC: CPT

## 2025-05-23 PROCEDURE — 99212 OFFICE O/P EST SF 10 MIN: CPT

## 2025-05-23 PROCEDURE — 36415 COLL VENOUS BLD VENIPUNCTURE: CPT

## 2025-05-23 PROCEDURE — 80053 COMPREHEN METABOLIC PANEL: CPT

## 2025-05-30 ENCOUNTER — HOSPITAL ENCOUNTER (OUTPATIENT)
Dept: INFUSION THERAPY | Age: 70
Discharge: HOME | End: 2025-05-30
Payer: MEDICARE

## 2025-05-30 DIAGNOSIS — C92.01 ACUTE MYELOID LEUKEMIA IN REMISSION (HCC): ICD-10-CM

## 2025-05-30 DIAGNOSIS — C92.00 ACUTE MYELOID LEUKEMIA NOT HAVING ACHIEVED REMISSION (HCC): ICD-10-CM

## 2025-05-30 LAB
ALBUMIN SERPL-MCNC: 3.7 G/DL (ref 3.5–5.2)
ALP SERPL-CCNC: 142 U/L (ref 35–104)
ALT SERPL-CCNC: 19 U/L (ref 5–33)
ANION GAP SERPL CALCULATED.3IONS-SCNC: 13 MMOL/L (ref 7–19)
AST SERPL-CCNC: 19 U/L (ref 5–32)
BASOPHILS # BLD: 0.01 K/UL (ref 0–0.2)
BASOPHILS NFR BLD: 0.9 % (ref 0–1)
BILIRUB SERPL-MCNC: 0.4 MG/DL (ref 0–1.2)
BUN SERPL-MCNC: 12 MG/DL (ref 8–23)
CALCIUM SERPL-MCNC: 8.6 MG/DL (ref 8.8–10.2)
CHLORIDE SERPL-SCNC: 103 MMOL/L (ref 98–107)
CO2 SERPL-SCNC: 23 MMOL/L (ref 22–29)
CREAT SERPL-MCNC: 0.7 MG/DL (ref 0.5–0.9)
EOSINOPHIL # BLD: 0.02 K/UL (ref 0–0.6)
EOSINOPHIL NFR BLD: 1.9 % (ref 0–5)
ERYTHROCYTE [DISTWIDTH] IN BLOOD BY AUTOMATED COUNT: 16.8 % (ref 11.5–14.5)
GLUCOSE SERPL-MCNC: 102 MG/DL (ref 70–99)
HCT VFR BLD AUTO: 29.8 % (ref 37–47)
HGB BLD-MCNC: 10.3 G/DL (ref 12–16)
LYMPHOCYTES # BLD: 0.55 K/UL (ref 1.1–4.5)
LYMPHOCYTES NFR BLD: 50.9 % (ref 20–40)
MCH RBC QN AUTO: 35.3 PG (ref 27–31)
MCHC RBC AUTO-ENTMCNC: 34.6 G/DL (ref 33–37)
MCV RBC AUTO: 102.1 FL (ref 81–99)
MONOCYTES # BLD: 0.05 K/UL (ref 0–0.9)
MONOCYTES NFR BLD: 4.6 % (ref 1–10)
NEUTROPHILS # BLD: 0.45 K/UL (ref 1.5–7.5)
NEUTS SEG NFR BLD: 41.7 % (ref 50–65)
PLATELET # BLD AUTO: 188 K/UL (ref 130–400)
PMV BLD AUTO: 9.6 FL (ref 9.4–12.3)
POTASSIUM SERPL-SCNC: 4.3 MMOL/L (ref 3.5–5.1)
PROT SERPL-MCNC: 6.5 G/DL (ref 6.4–8.3)
RBC # BLD AUTO: 2.92 M/UL (ref 4.2–5.4)
SODIUM SERPL-SCNC: 139 MMOL/L (ref 136–145)
WBC # BLD AUTO: 1.08 K/UL (ref 4.8–10.8)

## 2025-05-30 PROCEDURE — 85025 COMPLETE CBC W/AUTO DIFF WBC: CPT

## 2025-05-30 PROCEDURE — 80053 COMPREHEN METABOLIC PANEL: CPT

## 2025-05-30 PROCEDURE — 36415 COLL VENOUS BLD VENIPUNCTURE: CPT

## 2025-06-03 NOTE — PROGRESS NOTES
Call received from Chantelle at [facility]. Patients treatment was held last week due to neutropenia. They are requesting labs be done locally on Friday and faxed to them at [phone]. I made patient appt, I called and notified patient of new appt. Patient verbalizes understanding. Electronically signed by Ruth Jc RN on 6/3/2025 at 4:24 PM

## 2025-06-06 ENCOUNTER — HOSPITAL ENCOUNTER (OUTPATIENT)
Dept: INFUSION THERAPY | Age: 70
Discharge: HOME | End: 2025-06-06
Payer: MEDICARE

## 2025-06-06 DIAGNOSIS — C92.00 ACUTE MYELOID LEUKEMIA NOT HAVING ACHIEVED REMISSION (HCC): ICD-10-CM

## 2025-06-06 DIAGNOSIS — C50.919 TRIPLE NEGATIVE BREAST CANCER (HCC): ICD-10-CM

## 2025-06-06 DIAGNOSIS — Z17.421 TRIPLE NEGATIVE BREAST CANCER (HCC): ICD-10-CM

## 2025-06-06 DIAGNOSIS — C92.01 ACUTE MYELOID LEUKEMIA IN REMISSION (HCC): ICD-10-CM

## 2025-06-06 LAB
ALBUMIN SERPL-MCNC: 3.8 G/DL (ref 3.5–5.2)
ALP SERPL-CCNC: 136 U/L (ref 35–104)
ALT SERPL-CCNC: 15 U/L (ref 5–33)
ANION GAP SERPL CALCULATED.3IONS-SCNC: 14 MMOL/L (ref 7–19)
AST SERPL-CCNC: 18 U/L (ref 5–32)
BASOPHILS # BLD: 0.01 K/UL (ref 0–0.2)
BASOPHILS NFR BLD: 1 % (ref 0–1)
BILIRUB SERPL-MCNC: 0.4 MG/DL (ref 0–1.2)
BUN SERPL-MCNC: 14 MG/DL (ref 8–23)
CALCIUM SERPL-MCNC: 8.8 MG/DL (ref 8.8–10.2)
CHLORIDE SERPL-SCNC: 102 MMOL/L (ref 98–107)
CO2 SERPL-SCNC: 22 MMOL/L (ref 22–29)
CREAT SERPL-MCNC: 0.8 MG/DL (ref 0.5–0.9)
EOSINOPHIL # BLD: 0.04 K/UL (ref 0–0.6)
EOSINOPHIL NFR BLD: 4.2 % (ref 0–5)
ERYTHROCYTE [DISTWIDTH] IN BLOOD BY AUTOMATED COUNT: 17.4 % (ref 11.5–14.5)
GLUCOSE SERPL-MCNC: 101 MG/DL (ref 70–99)
HCT VFR BLD AUTO: 29.9 % (ref 37–47)
HGB BLD-MCNC: 10.2 G/DL (ref 12–16)
LYMPHOCYTES # BLD: 0.59 K/UL (ref 1.1–4.5)
LYMPHOCYTES NFR BLD: 61.5 % (ref 20–40)
MCH RBC QN AUTO: 34.9 PG (ref 27–31)
MCHC RBC AUTO-ENTMCNC: 34.1 G/DL (ref 33–37)
MCV RBC AUTO: 102.4 FL (ref 81–99)
MONOCYTES # BLD: 0.05 K/UL (ref 0–0.9)
MONOCYTES NFR BLD: 5.2 % (ref 1–10)
NEUTROPHILS # BLD: 0.27 K/UL (ref 1.5–7.5)
NEUTS SEG NFR BLD: 28.1 % (ref 50–65)
PLATELET # BLD AUTO: 151 K/UL (ref 130–400)
PMV BLD AUTO: 9.3 FL (ref 9.4–12.3)
POTASSIUM SERPL-SCNC: 4.1 MMOL/L (ref 3.5–5.1)
PROT SERPL-MCNC: 6.7 G/DL (ref 6.4–8.3)
RBC # BLD AUTO: 2.92 M/UL (ref 4.2–5.4)
SODIUM SERPL-SCNC: 138 MMOL/L (ref 136–145)
WBC # BLD AUTO: 0.96 K/UL (ref 4.8–10.8)

## 2025-06-06 PROCEDURE — 80053 COMPREHEN METABOLIC PANEL: CPT

## 2025-06-06 PROCEDURE — 36415 COLL VENOUS BLD VENIPUNCTURE: CPT

## 2025-06-06 PROCEDURE — 85025 COMPLETE CBC W/AUTO DIFF WBC: CPT

## 2025-06-13 ENCOUNTER — HOSPITAL ENCOUNTER (OUTPATIENT)
Dept: INFUSION THERAPY | Age: 70
Discharge: HOME | End: 2025-06-13
Payer: MEDICARE

## 2025-06-13 DIAGNOSIS — C92.00 ACUTE MYELOID LEUKEMIA NOT HAVING ACHIEVED REMISSION (HCC): ICD-10-CM

## 2025-06-13 LAB
ALBUMIN SERPL-MCNC: 3.7 G/DL (ref 3.5–5.2)
ALP SERPL-CCNC: 117 U/L (ref 35–104)
ALT SERPL-CCNC: 13 U/L (ref 5–33)
ANION GAP SERPL CALCULATED.3IONS-SCNC: 15 MMOL/L (ref 7–19)
AST SERPL-CCNC: 18 U/L (ref 5–32)
BASOPHILS # BLD: 0 K/UL (ref 0–0.2)
BASOPHILS NFR BLD: 0 % (ref 0–1)
BILIRUB SERPL-MCNC: 0.3 MG/DL (ref 0–1.2)
BUN SERPL-MCNC: 14 MG/DL (ref 8–23)
CALCIUM SERPL-MCNC: 8.7 MG/DL (ref 8.8–10.2)
CHLORIDE SERPL-SCNC: 103 MMOL/L (ref 98–107)
CO2 SERPL-SCNC: 21 MMOL/L (ref 22–29)
CREAT SERPL-MCNC: 0.8 MG/DL (ref 0.5–0.9)
EOSINOPHIL # BLD: 0.08 K/UL (ref 0–0.6)
EOSINOPHIL NFR BLD: 6.5 % (ref 0–5)
ERYTHROCYTE [DISTWIDTH] IN BLOOD BY AUTOMATED COUNT: 17.9 % (ref 11.5–14.5)
GLUCOSE SERPL-MCNC: 102 MG/DL (ref 70–99)
HCT VFR BLD AUTO: 28.7 % (ref 37–47)
HGB BLD-MCNC: 10 G/DL (ref 12–16)
LYMPHOCYTES # BLD: 0.73 K/UL (ref 1.1–4.5)
LYMPHOCYTES NFR BLD: 59.3 % (ref 20–40)
MCH RBC QN AUTO: 35.3 PG (ref 27–31)
MCHC RBC AUTO-ENTMCNC: 34.8 G/DL (ref 33–37)
MCV RBC AUTO: 101.4 FL (ref 81–99)
MONOCYTES # BLD: 0.14 K/UL (ref 0–0.9)
MONOCYTES NFR BLD: 11.4 % (ref 1–10)
NEUTROPHILS # BLD: 0.27 K/UL (ref 1.5–7.5)
NEUTS SEG NFR BLD: 22 % (ref 50–65)
PLATELET # BLD AUTO: 104 K/UL (ref 130–400)
PMV BLD AUTO: 9.6 FL (ref 9.4–12.3)
POTASSIUM SERPL-SCNC: 4.1 MMOL/L (ref 3.5–5.1)
PROT SERPL-MCNC: 6.5 G/DL (ref 6.4–8.3)
RBC # BLD AUTO: 2.83 M/UL (ref 4.2–5.4)
SODIUM SERPL-SCNC: 139 MMOL/L (ref 136–145)
WBC # BLD AUTO: 1.23 K/UL (ref 4.8–10.8)

## 2025-06-13 PROCEDURE — 36415 COLL VENOUS BLD VENIPUNCTURE: CPT

## 2025-06-13 PROCEDURE — 85025 COMPLETE CBC W/AUTO DIFF WBC: CPT

## 2025-06-13 PROCEDURE — 80053 COMPREHEN METABOLIC PANEL: CPT

## 2025-06-17 ENCOUNTER — TELEPHONE (OUTPATIENT)
Dept: HEMATOLOGY | Age: 70
End: 2025-06-17

## 2025-06-19 DIAGNOSIS — C92.01 ACUTE MYELOID LEUKEMIA IN REMISSION (HCC): Primary | ICD-10-CM

## 2025-06-19 NOTE — PROGRESS NOTES
MEDICAL ONCOLOGY PROGRESS NOTE                                                          Vivienne Mcdonald   1955 6/20/2025     Chief Complaint   Patient presents with    Follow-up     Follow up-patient stated no issues or concerns  Acute myeloid leukemia in remission (HCC)        HISTORY OF PRESENT ILLNESS:    Reason for MD visit-toxicity assessment/disease management  History of Present Illness  The patient is a 69-year-old female with a recent history of triple-negative breast cancer, now diagnosed with acute myeloid leukemia. She also has a history of DVT of the lower extremity in 09/2023 and is currently on Eliquis.    She received induction, low-intensity therapy with azacitidine, venetoclax, and ziftometinib. Initially, she responded well to the treatment, but there is now evidence of disease progression. A bone marrow biopsy was performed. Her current medication was discontinued due to its ineffectiveness after a few months. She is being considered for another clinical trial, which may require hospitalization for chemotherapy administration. She has been informed of the potential need for a transplant if remission is achieved. An oral medication was offered as an alternative, but it is not covered by her insurance. She expresses concern about returning to her previous state of health and is willing to explore all options to prevent this. She recalls qualifying for free Venclexta and believes she may qualify for other expensive medications through assistance programs. Stronger chemotherapy doses are being considered, as previous oral medications were ineffective. She is aware that chemotherapy cannot be administered when blood levels are low. She reports no bone pain or fever but does report feeling weaker. She is currently taking Levaquin and other antiviral and antibacterial medications.    She is currently on Eliquis 5 mg twice daily.    Diagnosis  Invasive ductal carcinoma, right breast 9:00 &

## 2025-06-20 ENCOUNTER — OFFICE VISIT (OUTPATIENT)
Dept: HEMATOLOGY | Age: 70
End: 2025-06-20
Payer: MEDICARE

## 2025-06-20 ENCOUNTER — HOSPITAL ENCOUNTER (OUTPATIENT)
Dept: INFUSION THERAPY | Age: 70
Discharge: HOME | End: 2025-06-20
Payer: MEDICARE

## 2025-06-20 VITALS
SYSTOLIC BLOOD PRESSURE: 134 MMHG | DIASTOLIC BLOOD PRESSURE: 68 MMHG | OXYGEN SATURATION: 100 % | HEIGHT: 66 IN | TEMPERATURE: 97.7 F | HEART RATE: 89 BPM | BODY MASS INDEX: 36.16 KG/M2 | WEIGHT: 225 LBS

## 2025-06-20 DIAGNOSIS — C92.01 ACUTE MYELOID LEUKEMIA IN REMISSION (HCC): ICD-10-CM

## 2025-06-20 DIAGNOSIS — C50.911 INVASIVE DUCTAL CARCINOMA OF BREAST, FEMALE, RIGHT (HCC): ICD-10-CM

## 2025-06-20 DIAGNOSIS — Z71.89 CARE PLAN DISCUSSED WITH PATIENT: ICD-10-CM

## 2025-06-20 DIAGNOSIS — C92.00 ACUTE MYELOID LEUKEMIA NOT HAVING ACHIEVED REMISSION (HCC): Primary | ICD-10-CM

## 2025-06-20 DIAGNOSIS — D61.818 PANCYTOPENIA (HCC): ICD-10-CM

## 2025-06-20 LAB
ALBUMIN SERPL-MCNC: 3.7 G/DL (ref 3.5–5.2)
ALP SERPL-CCNC: 105 U/L (ref 35–104)
ALT SERPL-CCNC: 12 U/L (ref 5–33)
ANION GAP SERPL CALCULATED.3IONS-SCNC: 13 MMOL/L (ref 7–19)
AST SERPL-CCNC: 19 U/L (ref 5–32)
BASOPHILS # BLD: 0 K/UL (ref 0–0.2)
BASOPHILS NFR BLD: 0 % (ref 0–1)
BILIRUB SERPL-MCNC: <0.2 MG/DL (ref 0–1.2)
BUN SERPL-MCNC: 14 MG/DL (ref 8–23)
CALCIUM SERPL-MCNC: 8.6 MG/DL (ref 8.8–10.2)
CHLORIDE SERPL-SCNC: 105 MMOL/L (ref 98–107)
CO2 SERPL-SCNC: 21 MMOL/L (ref 22–29)
CREAT SERPL-MCNC: 0.9 MG/DL (ref 0.5–0.9)
EOSINOPHIL # BLD: 0.02 K/UL (ref 0–0.6)
EOSINOPHIL NFR BLD: 1.7 % (ref 0–5)
ERYTHROCYTE [DISTWIDTH] IN BLOOD BY AUTOMATED COUNT: 18.2 % (ref 11.5–14.5)
GLUCOSE SERPL-MCNC: 103 MG/DL (ref 70–99)
HCT VFR BLD AUTO: 27.3 % (ref 37–47)
HGB BLD-MCNC: 9.6 G/DL (ref 12–16)
LYMPHOCYTES # BLD: 0.65 K/UL (ref 1.1–4.5)
LYMPHOCYTES NFR BLD: 55.6 % (ref 20–40)
MAGNESIUM SERPL-MCNC: 1.9 MG/DL (ref 1.6–2.4)
MCH RBC QN AUTO: 35.8 PG (ref 27–31)
MCHC RBC AUTO-ENTMCNC: 35.2 G/DL (ref 33–37)
MCV RBC AUTO: 101.9 FL (ref 81–99)
MONOCYTES # BLD: 0.36 K/UL (ref 0–0.9)
MONOCYTES NFR BLD: 30.8 % (ref 1–10)
NEUTROPHILS # BLD: 0.13 K/UL (ref 1.5–7.5)
NEUTS SEG NFR BLD: 11 % (ref 50–65)
PLATELET # BLD AUTO: 74 K/UL (ref 130–400)
PMV BLD AUTO: 10.7 FL (ref 9.4–12.3)
POTASSIUM SERPL-SCNC: 3.8 MMOL/L (ref 3.5–5.1)
PROT SERPL-MCNC: 6.3 G/DL (ref 6.4–8.3)
RBC # BLD AUTO: 2.68 M/UL (ref 4.2–5.4)
SODIUM SERPL-SCNC: 139 MMOL/L (ref 136–145)
WBC # BLD AUTO: 1.17 K/UL (ref 4.8–10.8)

## 2025-06-20 PROCEDURE — 80053 COMPREHEN METABOLIC PANEL: CPT

## 2025-06-20 PROCEDURE — 1126F AMNT PAIN NOTED NONE PRSNT: CPT | Performed by: INTERNAL MEDICINE

## 2025-06-20 PROCEDURE — 99212 OFFICE O/P EST SF 10 MIN: CPT

## 2025-06-20 PROCEDURE — 1090F PRES/ABSN URINE INCON ASSESS: CPT | Performed by: INTERNAL MEDICINE

## 2025-06-20 PROCEDURE — 1123F ACP DISCUSS/DSCN MKR DOCD: CPT | Performed by: INTERNAL MEDICINE

## 2025-06-20 PROCEDURE — G8400 PT W/DXA NO RESULTS DOC: HCPCS | Performed by: INTERNAL MEDICINE

## 2025-06-20 PROCEDURE — 3017F COLORECTAL CA SCREEN DOC REV: CPT | Performed by: INTERNAL MEDICINE

## 2025-06-20 PROCEDURE — G8417 CALC BMI ABV UP PARAM F/U: HCPCS | Performed by: INTERNAL MEDICINE

## 2025-06-20 PROCEDURE — 1159F MED LIST DOCD IN RCRD: CPT | Performed by: INTERNAL MEDICINE

## 2025-06-20 PROCEDURE — 99215 OFFICE O/P EST HI 40 MIN: CPT | Performed by: INTERNAL MEDICINE

## 2025-06-20 PROCEDURE — 83735 ASSAY OF MAGNESIUM: CPT

## 2025-06-20 PROCEDURE — 1036F TOBACCO NON-USER: CPT | Performed by: INTERNAL MEDICINE

## 2025-06-20 PROCEDURE — 85025 COMPLETE CBC W/AUTO DIFF WBC: CPT

## 2025-06-20 PROCEDURE — 3078F DIAST BP <80 MM HG: CPT | Performed by: INTERNAL MEDICINE

## 2025-06-20 PROCEDURE — 36415 COLL VENOUS BLD VENIPUNCTURE: CPT

## 2025-06-20 PROCEDURE — G2211 COMPLEX E/M VISIT ADD ON: HCPCS | Performed by: INTERNAL MEDICINE

## 2025-06-20 PROCEDURE — G8427 DOCREV CUR MEDS BY ELIG CLIN: HCPCS | Performed by: INTERNAL MEDICINE

## 2025-06-20 PROCEDURE — 3075F SYST BP GE 130 - 139MM HG: CPT | Performed by: INTERNAL MEDICINE

## 2025-06-20 RX ORDER — LEVOFLOXACIN 250 MG/1
500 TABLET, FILM COATED ORAL DAILY
COMMUNITY

## 2025-07-02 DIAGNOSIS — C50.911 INVASIVE DUCTAL CARCINOMA OF BREAST, FEMALE, RIGHT (HCC): ICD-10-CM

## 2025-07-02 DIAGNOSIS — C92.00 ACUTE MYELOID LEUKEMIA NOT HAVING ACHIEVED REMISSION (HCC): Primary | ICD-10-CM

## 2025-07-03 ENCOUNTER — TELEPHONE (OUTPATIENT)
Dept: HEMATOLOGY | Age: 70
End: 2025-07-03

## 2025-07-03 NOTE — TELEPHONE ENCOUNTER
I called patient and left detailed voicemail about their appointment on 07/09/2025. I made patient aware not to arrive any earlier than the appointment time and to come at the time of the follow up not the time of the lab appointment if it is different than the follow up appt time. I also made patient aware to eat and drink plenty of water to hydrate properly before coming to these appointments because this will make their lab draw much easier. Made patient aware that we are now located at the [facility] at [address] [place] [address]. Located between [facility] and the [facility].

## 2025-07-07 ENCOUNTER — HOSPITAL ENCOUNTER (OUTPATIENT)
Dept: INFUSION THERAPY | Age: 70
Discharge: HOME | End: 2025-07-07
Payer: MEDICARE

## 2025-07-07 DIAGNOSIS — C50.911 INVASIVE DUCTAL CARCINOMA OF BREAST, FEMALE, RIGHT (HCC): ICD-10-CM

## 2025-07-07 DIAGNOSIS — C92.00 ACUTE MYELOID LEUKEMIA NOT HAVING ACHIEVED REMISSION (HCC): ICD-10-CM

## 2025-07-07 LAB
ALBUMIN SERPL-MCNC: 3.6 G/DL (ref 3.5–5.2)
ALP SERPL-CCNC: 118 U/L (ref 35–104)
ALT SERPL-CCNC: 11 U/L (ref 5–33)
ANION GAP SERPL CALCULATED.3IONS-SCNC: 11 MMOL/L (ref 7–19)
AST SERPL-CCNC: 21 U/L (ref 5–32)
BILIRUB SERPL-MCNC: 0.7 MG/DL (ref 0–1.2)
BUN SERPL-MCNC: 12 MG/DL (ref 8–23)
CALCIUM SERPL-MCNC: 8.5 MG/DL (ref 8.8–10.2)
CHLORIDE SERPL-SCNC: 106 MMOL/L (ref 98–107)
CO2 SERPL-SCNC: 21 MMOL/L (ref 22–29)
CREAT SERPL-MCNC: 0.8 MG/DL (ref 0.5–0.9)
ERYTHROCYTE [DISTWIDTH] IN BLOOD BY AUTOMATED COUNT: 21.4 % (ref 11.5–14.5)
GLUCOSE SERPL-MCNC: 86 MG/DL (ref 70–99)
HCT VFR BLD AUTO: 24.5 % (ref 37–47)
HGB BLD-MCNC: 8.6 G/DL (ref 12–16)
LDH SERPL-CCNC: 263 U/L (ref 135–214)
MCH RBC QN AUTO: 30.2 PG (ref 27–31)
MCHC RBC AUTO-ENTMCNC: 35.1 G/DL (ref 33–37)
MCV RBC AUTO: 86 FL (ref 81–99)
PLATELET # BLD AUTO: 22 K/UL (ref 130–400)
PMV BLD AUTO: 9.8 FL (ref 9.4–12.3)
POTASSIUM SERPL-SCNC: 4.1 MMOL/L (ref 3.5–5.1)
PROT SERPL-MCNC: 5.7 G/DL (ref 6.4–8.3)
RBC # BLD AUTO: 2.85 M/UL (ref 4.2–5.4)
SODIUM SERPL-SCNC: 138 MMOL/L (ref 136–145)
URATE SERPL-MCNC: 4 MG/DL (ref 2.4–5.7)
WBC # BLD AUTO: 0.14 K/UL (ref 4.8–10.8)

## 2025-07-07 PROCEDURE — 80053 COMPREHEN METABOLIC PANEL: CPT

## 2025-07-07 PROCEDURE — 85025 COMPLETE CBC W/AUTO DIFF WBC: CPT

## 2025-07-07 PROCEDURE — 84550 ASSAY OF BLOOD/URIC ACID: CPT

## 2025-07-07 PROCEDURE — 83615 LACTATE (LD) (LDH) ENZYME: CPT

## 2025-07-07 PROCEDURE — 36415 COLL VENOUS BLD VENIPUNCTURE: CPT

## 2025-07-08 DIAGNOSIS — C50.911 INVASIVE DUCTAL CARCINOMA OF BREAST, FEMALE, RIGHT (HCC): ICD-10-CM

## 2025-07-08 DIAGNOSIS — C92.00 ACUTE MYELOID LEUKEMIA NOT HAVING ACHIEVED REMISSION (HCC): Primary | ICD-10-CM

## 2025-07-08 NOTE — PROGRESS NOTES
signed  6/8/23 [facility] Genetic Testing TP53 Positive  6/12/23 Initiated Keytruda, Carboplatin Taxol weekly followed by DD AC every 2 weeks X4 cycles with Keytruda  7/26/23 US Breast Limited Right Neoadjuvant chemotherapy and imaging demonstrates a response to therapy. Some residual does persist. BI-RADS CATEGORY 6: KNOWN BIOPSY WITH PROVEN MALIGNANCY.  8/14/23 Labs: Cortisol AM 17.8, TSH 2.530, Magnesium 1.4  9/5/23 Labs: Cortisol AM 13.3, TSH 2.820, T4-8.2   9/19/23  VL DUP Lower Extremity There is evidence of subacute deep vein thrombosis in the right lower extremity involving the femoral, popliteal, anterior tibial, peroneal, and posterior tibial veins. There is no evidence of superficial thrombophlebitis of the right lower extremity.  9/27/23 US Breast Limited Right Continued decrease in size of two sites of biopsy-proven malignancy in the right breast located at 9 o'clock 10 cm from the nipple and 2 o'clock 10 cm from the nipple, compatible with partial treatment response. Hypoechoic mass measuring 1.4 x 1.6 x 0.7 cm, previously measuring 1.8 x 1.9 x 1.0 cm.At 2 o'clock 10 cm from the nipple at the site of biopsy-proven malignancy there is continued interval decrease in size of an irregular hypoechoic mass measuring 1.4 x 2.1 x 0.5 cm, previously measuring 1.9 x 2.3 x 0.8 cm. In the right axilla at the site of prior axillary node biopsy there is redemonstration of and an enlarged abnormal morphology lymph node measuring 2.1 x 1.3 x 1.0 cm, previously measuring 2.1 x 1.7 x 1.6 cm. A biopsy marker clip is located within the lymph node.  The lymph node previously measured 2.3 x 1.7 x 2.4 cm on 05/01/2023. Stable enlargement of the biopsied right axillary lymph node since July 2023. Previously seen mass located at 10 o'clock 2 cm from the nipple is not visualized by ultrasound and complete treatment response can be assumed at this area. BI-RADS 6 - known malignancy  11/14/23 CTA Chest W WO Contrast No evidence of

## 2025-07-09 ENCOUNTER — HOSPITAL ENCOUNTER (OUTPATIENT)
Dept: INFUSION THERAPY | Age: 70
Discharge: HOME | End: 2025-07-09
Payer: MEDICARE

## 2025-07-09 ENCOUNTER — OFFICE VISIT (OUTPATIENT)
Dept: HEMATOLOGY | Age: 70
End: 2025-07-09
Payer: MEDICARE

## 2025-07-09 VITALS
HEART RATE: 118 BPM | BODY MASS INDEX: 36.37 KG/M2 | SYSTOLIC BLOOD PRESSURE: 130 MMHG | OXYGEN SATURATION: 98 % | HEIGHT: 66 IN | WEIGHT: 226.3 LBS | DIASTOLIC BLOOD PRESSURE: 64 MMHG | TEMPERATURE: 97.5 F

## 2025-07-09 DIAGNOSIS — C92.00 ACUTE MYELOID LEUKEMIA NOT HAVING ACHIEVED REMISSION (HCC): ICD-10-CM

## 2025-07-09 DIAGNOSIS — Z71.89 CARE PLAN DISCUSSED WITH PATIENT: ICD-10-CM

## 2025-07-09 DIAGNOSIS — D61.810 ANTINEOPLASTIC CHEMOTHERAPY INDUCED PANCYTOPENIA (CODE): ICD-10-CM

## 2025-07-09 DIAGNOSIS — Z71.89 COORDINATION OF COMPLEX CARE: ICD-10-CM

## 2025-07-09 DIAGNOSIS — C50.911 INVASIVE DUCTAL CARCINOMA OF BREAST, FEMALE, RIGHT (HCC): ICD-10-CM

## 2025-07-09 DIAGNOSIS — D75.89 BICYTOPENIA: ICD-10-CM

## 2025-07-09 DIAGNOSIS — T45.1X5D ADVERSE EFFECT OF CHEMOTHERAPY, SUBSEQUENT ENCOUNTER: ICD-10-CM

## 2025-07-09 DIAGNOSIS — D61.818 PANCYTOPENIA (HCC): ICD-10-CM

## 2025-07-09 DIAGNOSIS — C92.00 ACUTE MYELOID LEUKEMIA NOT HAVING ACHIEVED REMISSION (HCC): Primary | ICD-10-CM

## 2025-07-09 LAB
ALBUMIN SERPL-MCNC: 3.7 G/DL (ref 3.5–5.2)
ALP SERPL-CCNC: 132 U/L (ref 35–104)
ALT SERPL-CCNC: 13 U/L (ref 5–33)
ANION GAP SERPL CALCULATED.3IONS-SCNC: 13 MMOL/L (ref 7–19)
AST SERPL-CCNC: 21 U/L (ref 5–32)
BILIRUB SERPL-MCNC: 0.5 MG/DL (ref 0–1.2)
BUN SERPL-MCNC: 16 MG/DL (ref 8–23)
CALCIUM SERPL-MCNC: 8.5 MG/DL (ref 8.8–10.2)
CHLORIDE SERPL-SCNC: 106 MMOL/L (ref 98–107)
CO2 SERPL-SCNC: 19 MMOL/L (ref 22–29)
CREAT SERPL-MCNC: 0.8 MG/DL (ref 0.5–0.9)
ERYTHROCYTE [DISTWIDTH] IN BLOOD BY AUTOMATED COUNT: 19.8 % (ref 11.5–14.5)
GLUCOSE SERPL-MCNC: 97 MG/DL (ref 70–99)
HCT VFR BLD AUTO: 25 % (ref 37–47)
HGB BLD-MCNC: 8.5 G/DL (ref 12–16)
MCH RBC QN AUTO: 29.6 PG (ref 27–31)
MCHC RBC AUTO-ENTMCNC: 34 G/DL (ref 33–37)
MCV RBC AUTO: 87.1 FL (ref 81–99)
PLATELET # BLD AUTO: 14 K/UL (ref 130–400)
PMV BLD AUTO: 9.6 FL (ref 9.4–12.3)
POTASSIUM SERPL-SCNC: 4.3 MMOL/L (ref 3.5–5.1)
PROT SERPL-MCNC: 5.9 G/DL (ref 6.4–8.3)
RBC # BLD AUTO: 2.87 M/UL (ref 4.2–5.4)
SODIUM SERPL-SCNC: 138 MMOL/L (ref 136–145)
WBC # BLD AUTO: 0.18 K/UL (ref 4.8–10.8)

## 2025-07-09 PROCEDURE — 3078F DIAST BP <80 MM HG: CPT | Performed by: INTERNAL MEDICINE

## 2025-07-09 PROCEDURE — 3017F COLORECTAL CA SCREEN DOC REV: CPT | Performed by: INTERNAL MEDICINE

## 2025-07-09 PROCEDURE — 1090F PRES/ABSN URINE INCON ASSESS: CPT | Performed by: INTERNAL MEDICINE

## 2025-07-09 PROCEDURE — 1123F ACP DISCUSS/DSCN MKR DOCD: CPT | Performed by: INTERNAL MEDICINE

## 2025-07-09 PROCEDURE — 1125F AMNT PAIN NOTED PAIN PRSNT: CPT | Performed by: INTERNAL MEDICINE

## 2025-07-09 PROCEDURE — 99214 OFFICE O/P EST MOD 30 MIN: CPT | Performed by: INTERNAL MEDICINE

## 2025-07-09 PROCEDURE — 1036F TOBACCO NON-USER: CPT | Performed by: INTERNAL MEDICINE

## 2025-07-09 PROCEDURE — G8417 CALC BMI ABV UP PARAM F/U: HCPCS | Performed by: INTERNAL MEDICINE

## 2025-07-09 PROCEDURE — G8428 CUR MEDS NOT DOCUMENT: HCPCS | Performed by: INTERNAL MEDICINE

## 2025-07-09 PROCEDURE — 99212 OFFICE O/P EST SF 10 MIN: CPT

## 2025-07-09 PROCEDURE — G2211 COMPLEX E/M VISIT ADD ON: HCPCS | Performed by: INTERNAL MEDICINE

## 2025-07-09 PROCEDURE — 80053 COMPREHEN METABOLIC PANEL: CPT

## 2025-07-09 PROCEDURE — G8400 PT W/DXA NO RESULTS DOC: HCPCS | Performed by: INTERNAL MEDICINE

## 2025-07-09 PROCEDURE — 85025 COMPLETE CBC W/AUTO DIFF WBC: CPT

## 2025-07-09 PROCEDURE — 3075F SYST BP GE 130 - 139MM HG: CPT | Performed by: INTERNAL MEDICINE

## 2025-07-09 PROCEDURE — 36415 COLL VENOUS BLD VENIPUNCTURE: CPT

## 2025-07-09 RX ORDER — POSACONAZOLE 100 MG/1
300 TABLET, DELAYED RELEASE ORAL DAILY
COMMUNITY
Start: 2025-07-03

## 2025-07-09 RX ORDER — SULFAMETHOXAZOLE AND TRIMETHOPRIM 400; 80 MG/1; MG/1
1 TABLET ORAL DAILY
COMMUNITY
Start: 2025-07-03

## 2025-07-11 ENCOUNTER — HOSPITAL ENCOUNTER (OUTPATIENT)
Dept: INFUSION THERAPY | Age: 70
Setting detail: INFUSION SERIES
Discharge: HOME | End: 2025-07-11
Payer: MEDICARE

## 2025-07-11 ENCOUNTER — CLINICAL DOCUMENTATION (OUTPATIENT)
Dept: HEMATOLOGY | Age: 70
End: 2025-07-11

## 2025-07-11 ENCOUNTER — HOSPITAL ENCOUNTER (OUTPATIENT)
Dept: INFUSION THERAPY | Age: 70
Discharge: HOME | End: 2025-07-11
Payer: MEDICARE

## 2025-07-11 VITALS
SYSTOLIC BLOOD PRESSURE: 110 MMHG | TEMPERATURE: 98.3 F | OXYGEN SATURATION: 100 % | DIASTOLIC BLOOD PRESSURE: 70 MMHG | RESPIRATION RATE: 18 BRPM | HEART RATE: 101 BPM

## 2025-07-11 DIAGNOSIS — D69.6 THROMBOCYTOPENIA: Primary | ICD-10-CM

## 2025-07-11 DIAGNOSIS — D61.818 PANCYTOPENIA (HCC): ICD-10-CM

## 2025-07-11 DIAGNOSIS — C50.919 TRIPLE NEGATIVE BREAST CANCER (HCC): ICD-10-CM

## 2025-07-11 DIAGNOSIS — Z17.421 TRIPLE NEGATIVE BREAST CANCER (HCC): ICD-10-CM

## 2025-07-11 DIAGNOSIS — C92.00 ACUTE MYELOID LEUKEMIA NOT HAVING ACHIEVED REMISSION (HCC): ICD-10-CM

## 2025-07-11 DIAGNOSIS — D75.89 BICYTOPENIA: ICD-10-CM

## 2025-07-11 LAB
ABO/RH: NORMAL
ALBUMIN SERPL-MCNC: 3.7 G/DL (ref 3.5–5.2)
ALP SERPL-CCNC: 146 U/L (ref 35–104)
ALT SERPL-CCNC: 18 U/L (ref 5–33)
ANION GAP SERPL CALCULATED.3IONS-SCNC: 13 MMOL/L (ref 7–19)
ANTIBODY SCREEN: NORMAL
AST SERPL-CCNC: 25 U/L (ref 5–32)
BILIRUB SERPL-MCNC: 0.5 MG/DL (ref 0–1.2)
BLOOD BANK DISPENSE STATUS: NORMAL
BLOOD BANK PRODUCT CODE: NORMAL
BPU ID: NORMAL
BUN SERPL-MCNC: 13 MG/DL (ref 8–23)
CALCIUM SERPL-MCNC: 8.5 MG/DL (ref 8.8–10.2)
CHLORIDE SERPL-SCNC: 104 MMOL/L (ref 98–107)
CO2 SERPL-SCNC: 20 MMOL/L (ref 22–29)
CREAT SERPL-MCNC: 0.8 MG/DL (ref 0.5–0.9)
DESCRIPTION BLOOD BANK: NORMAL
ERYTHROCYTE [DISTWIDTH] IN BLOOD BY AUTOMATED COUNT: 19.2 % (ref 11.5–14.5)
GLUCOSE SERPL-MCNC: 103 MG/DL (ref 70–99)
HCT VFR BLD AUTO: 23.4 % (ref 37–47)
HGB BLD-MCNC: 8.2 G/DL (ref 12–16)
MCH RBC QN AUTO: 29.9 PG (ref 27–31)
MCHC RBC AUTO-ENTMCNC: 35 G/DL (ref 33–37)
MCV RBC AUTO: 85.4 FL (ref 81–99)
PLATELET # BLD AUTO: 12 K/UL (ref 130–400)
POTASSIUM SERPL-SCNC: 4.2 MMOL/L (ref 3.5–5.1)
PROT SERPL-MCNC: 6 G/DL (ref 6.4–8.3)
RBC # BLD AUTO: 2.74 M/UL (ref 4.2–5.4)
SODIUM SERPL-SCNC: 137 MMOL/L (ref 136–145)
WBC # BLD AUTO: 0.15 K/UL (ref 4.8–10.8)

## 2025-07-11 PROCEDURE — 86901 BLOOD TYPING SEROLOGIC RH(D): CPT

## 2025-07-11 PROCEDURE — 36415 COLL VENOUS BLD VENIPUNCTURE: CPT

## 2025-07-11 PROCEDURE — 36430 TRANSFUSION BLD/BLD COMPNT: CPT

## 2025-07-11 PROCEDURE — P9073 PLATELETS PHERESIS PATH REDU: HCPCS

## 2025-07-11 PROCEDURE — 2580000003 HC RX 258: Performed by: INTERNAL MEDICINE

## 2025-07-11 PROCEDURE — 85025 COMPLETE CBC W/AUTO DIFF WBC: CPT

## 2025-07-11 PROCEDURE — 86850 RBC ANTIBODY SCREEN: CPT

## 2025-07-11 PROCEDURE — 80053 COMPREHEN METABOLIC PANEL: CPT

## 2025-07-11 PROCEDURE — 86900 BLOOD TYPING SEROLOGIC ABO: CPT

## 2025-07-11 RX ORDER — SODIUM CHLORIDE 0.9 % (FLUSH) 0.9 %
5-40 SYRINGE (ML) INJECTION PRN
OUTPATIENT
Start: 2025-07-11

## 2025-07-11 RX ORDER — EPINEPHRINE 1 MG/ML
0.3 INJECTION, SOLUTION, CONCENTRATE INTRAVENOUS PRN
OUTPATIENT
Start: 2025-07-11

## 2025-07-11 RX ORDER — SODIUM CHLORIDE 9 MG/ML
INJECTION, SOLUTION INTRAVENOUS PRN
OUTPATIENT
Start: 2025-07-11

## 2025-07-11 RX ORDER — SODIUM CHLORIDE 0.9 % (FLUSH) 0.9 %
5-40 SYRINGE (ML) INJECTION PRN
Status: DISCONTINUED | OUTPATIENT
Start: 2025-07-11 | End: 2025-07-12 | Stop reason: HOSPADM

## 2025-07-11 RX ORDER — ONDANSETRON 2 MG/ML
8 INJECTION INTRAMUSCULAR; INTRAVENOUS
OUTPATIENT
Start: 2025-07-11

## 2025-07-11 RX ORDER — ACETAMINOPHEN 325 MG/1
650 TABLET ORAL
OUTPATIENT
Start: 2025-07-11

## 2025-07-11 RX ORDER — SODIUM CHLORIDE 9 MG/ML
20 INJECTION, SOLUTION INTRAVENOUS CONTINUOUS
OUTPATIENT
Start: 2025-07-11

## 2025-07-11 RX ORDER — ALBUTEROL SULFATE 90 UG/1
4 INHALANT RESPIRATORY (INHALATION) PRN
OUTPATIENT
Start: 2025-07-11

## 2025-07-11 RX ORDER — HYDROCORTISONE SODIUM SUCCINATE 100 MG/2ML
100 INJECTION INTRAMUSCULAR; INTRAVENOUS
OUTPATIENT
Start: 2025-07-11

## 2025-07-11 RX ORDER — SODIUM CHLORIDE 9 MG/ML
20 INJECTION, SOLUTION INTRAVENOUS CONTINUOUS
Status: CANCELLED | OUTPATIENT
Start: 2025-07-11

## 2025-07-11 RX ORDER — FUROSEMIDE 10 MG/ML
20 INJECTION INTRAMUSCULAR; INTRAVENOUS ONCE
OUTPATIENT
Start: 2025-07-11 | End: 2025-07-11

## 2025-07-11 RX ORDER — SODIUM CHLORIDE 0.9 % (FLUSH) 0.9 %
5-40 SYRINGE (ML) INJECTION PRN
Status: CANCELLED | OUTPATIENT
Start: 2025-07-11

## 2025-07-11 RX ORDER — DIPHENHYDRAMINE HYDROCHLORIDE 50 MG/ML
50 INJECTION, SOLUTION INTRAMUSCULAR; INTRAVENOUS
OUTPATIENT
Start: 2025-07-11

## 2025-07-11 RX ORDER — SODIUM CHLORIDE 9 MG/ML
20 INJECTION, SOLUTION INTRAVENOUS CONTINUOUS
Status: DISCONTINUED | OUTPATIENT
Start: 2025-07-11 | End: 2025-07-13 | Stop reason: HOSPADM

## 2025-07-11 RX ADMIN — SODIUM CHLORIDE 20 ML/HR: 0.9 INJECTION, SOLUTION INTRAVENOUS at 13:59

## 2025-07-11 NOTE — PROGRESS NOTES
Patient here for labs this date. Patient is having bleeding gums and pink tinge to toliet paper while voiding this morning. Patients platelets 12 at this time. I spoke with MD who orders 1 unit platelets. I Called and arranged patient transfusion for 1 unit of platelets with [facility]. Patient informed of MD orders and to report to [facility] for platelet transfusion. Patient verbalized understanding. Electronically signed by Ruth Jc RN on 7/11/2025 at 4:43 PM

## 2025-07-14 ENCOUNTER — HOSPITAL ENCOUNTER (OUTPATIENT)
Dept: INFUSION THERAPY | Age: 70
Discharge: HOME | End: 2025-07-14
Payer: MEDICARE

## 2025-07-14 DIAGNOSIS — C92.00 ACUTE MYELOID LEUKEMIA NOT HAVING ACHIEVED REMISSION (HCC): ICD-10-CM

## 2025-07-14 DIAGNOSIS — D75.89 BICYTOPENIA: ICD-10-CM

## 2025-07-14 DIAGNOSIS — D61.818 PANCYTOPENIA (HCC): ICD-10-CM

## 2025-07-14 DIAGNOSIS — Z17.421 TRIPLE NEGATIVE BREAST CANCER (HCC): ICD-10-CM

## 2025-07-14 DIAGNOSIS — C50.919 TRIPLE NEGATIVE BREAST CANCER (HCC): ICD-10-CM

## 2025-07-14 LAB
ALBUMIN SERPL-MCNC: 3.6 G/DL (ref 3.5–5.2)
ALP SERPL-CCNC: 155 U/L (ref 35–104)
ALT SERPL-CCNC: 21 U/L (ref 5–33)
ANION GAP SERPL CALCULATED.3IONS-SCNC: 15 MMOL/L (ref 7–19)
AST SERPL-CCNC: 25 U/L (ref 5–32)
BILIRUB SERPL-MCNC: 0.5 MG/DL (ref 0–1.2)
BUN SERPL-MCNC: 13 MG/DL (ref 8–23)
CALCIUM SERPL-MCNC: 8.8 MG/DL (ref 8.8–10.2)
CHLORIDE SERPL-SCNC: 106 MMOL/L (ref 98–107)
CO2 SERPL-SCNC: 19 MMOL/L (ref 22–29)
CREAT SERPL-MCNC: 0.7 MG/DL (ref 0.5–0.9)
ERYTHROCYTE [DISTWIDTH] IN BLOOD BY AUTOMATED COUNT: 18.9 % (ref 11.5–14.5)
GLUCOSE SERPL-MCNC: 105 MG/DL (ref 70–99)
HCT VFR BLD AUTO: 21.2 % (ref 37–47)
HGB BLD-MCNC: 7.4 G/DL (ref 12–16)
MCH RBC QN AUTO: 29.6 PG (ref 27–31)
MCHC RBC AUTO-ENTMCNC: 34.9 G/DL (ref 33–37)
MCV RBC AUTO: 84.8 FL (ref 81–99)
PLATELET # BLD AUTO: 19 K/UL (ref 130–400)
PMV BLD AUTO: 10.7 FL (ref 9.4–12.3)
POTASSIUM SERPL-SCNC: 4 MMOL/L (ref 3.5–5.1)
PROT SERPL-MCNC: 6 G/DL (ref 6.4–8.3)
RBC # BLD AUTO: 2.5 M/UL (ref 4.2–5.4)
SODIUM SERPL-SCNC: 140 MMOL/L (ref 136–145)
WBC # BLD AUTO: 0.11 K/UL (ref 4.8–10.8)

## 2025-07-14 PROCEDURE — 85025 COMPLETE CBC W/AUTO DIFF WBC: CPT

## 2025-07-14 PROCEDURE — 36415 COLL VENOUS BLD VENIPUNCTURE: CPT

## 2025-07-14 PROCEDURE — 80053 COMPREHEN METABOLIC PANEL: CPT

## 2025-07-16 ENCOUNTER — HOSPITAL ENCOUNTER (OUTPATIENT)
Dept: INFUSION THERAPY | Age: 70
Discharge: HOME | End: 2025-07-16
Payer: MEDICARE

## 2025-07-16 DIAGNOSIS — D61.818 PANCYTOPENIA (HCC): ICD-10-CM

## 2025-07-16 DIAGNOSIS — C50.919 TRIPLE NEGATIVE BREAST CANCER (HCC): ICD-10-CM

## 2025-07-16 DIAGNOSIS — D75.89 BICYTOPENIA: ICD-10-CM

## 2025-07-16 DIAGNOSIS — Z17.421 TRIPLE NEGATIVE BREAST CANCER (HCC): ICD-10-CM

## 2025-07-16 DIAGNOSIS — C92.00 ACUTE MYELOID LEUKEMIA NOT HAVING ACHIEVED REMISSION (HCC): ICD-10-CM

## 2025-07-16 LAB
ALBUMIN SERPL-MCNC: 3.6 G/DL (ref 3.5–5.2)
ALP SERPL-CCNC: 160 U/L (ref 35–104)
ALT SERPL-CCNC: 21 U/L (ref 5–33)
ANION GAP SERPL CALCULATED.3IONS-SCNC: 13 MMOL/L (ref 7–19)
AST SERPL-CCNC: 23 U/L (ref 5–32)
BILIRUB SERPL-MCNC: 0.4 MG/DL (ref 0–1.2)
BUN SERPL-MCNC: 13 MG/DL (ref 8–23)
CALCIUM SERPL-MCNC: 8.8 MG/DL (ref 8.8–10.2)
CHLORIDE SERPL-SCNC: 106 MMOL/L (ref 98–107)
CO2 SERPL-SCNC: 19 MMOL/L (ref 22–29)
CREAT SERPL-MCNC: 0.8 MG/DL (ref 0.5–0.9)
ERYTHROCYTE [DISTWIDTH] IN BLOOD BY AUTOMATED COUNT: 18.9 % (ref 11.5–14.5)
GLUCOSE SERPL-MCNC: 109 MG/DL (ref 70–99)
HCT VFR BLD AUTO: 20.2 % (ref 37–47)
HGB BLD-MCNC: 7 G/DL (ref 12–16)
MCH RBC QN AUTO: 29.4 PG (ref 27–31)
MCHC RBC AUTO-ENTMCNC: 34.7 G/DL (ref 33–37)
MCV RBC AUTO: 84.9 FL (ref 81–99)
PLATELET # BLD AUTO: 22 K/UL (ref 130–400)
PMV BLD AUTO: 11.9 FL (ref 9.4–12.3)
POTASSIUM SERPL-SCNC: 3.7 MMOL/L (ref 3.5–5.1)
PROT SERPL-MCNC: 5.9 G/DL (ref 6.4–8.3)
RBC # BLD AUTO: 2.38 M/UL (ref 4.2–5.4)
SODIUM SERPL-SCNC: 138 MMOL/L (ref 136–145)
WBC # BLD AUTO: 0.19 K/UL (ref 4.8–10.8)

## 2025-07-16 PROCEDURE — 85025 COMPLETE CBC W/AUTO DIFF WBC: CPT

## 2025-07-16 PROCEDURE — 80053 COMPREHEN METABOLIC PANEL: CPT

## 2025-07-16 PROCEDURE — 36415 COLL VENOUS BLD VENIPUNCTURE: CPT

## 2025-07-16 PROCEDURE — 99211 OFF/OP EST MAY X REQ PHY/QHP: CPT

## 2025-07-16 NOTE — PROGRESS NOTES
Patient here today for repeat blood work. Patients Hgb 7.0, Platelets 22. Patient is not experiencing bleeding at this time, no fever. MD notified and no new orders. Patient was placed on schedule at [facility] for preliminary infusion for Friday. Patient notified that labs were okay today, call with any fever, and return on Friday for repeat lab work. Electronically signed by Ruth Jc RN on 7/16/2025 at 11:21 AM

## 2025-07-18 ENCOUNTER — HOSPITAL ENCOUNTER (OUTPATIENT)
Dept: INFUSION THERAPY | Age: 70
Discharge: HOME | End: 2025-07-18
Payer: MEDICARE

## 2025-07-18 DIAGNOSIS — D75.89 BICYTOPENIA: ICD-10-CM

## 2025-07-18 DIAGNOSIS — Z17.421 TRIPLE NEGATIVE BREAST CANCER (HCC): ICD-10-CM

## 2025-07-18 DIAGNOSIS — C92.00 ACUTE MYELOID LEUKEMIA NOT HAVING ACHIEVED REMISSION (HCC): ICD-10-CM

## 2025-07-18 DIAGNOSIS — C50.919 TRIPLE NEGATIVE BREAST CANCER (HCC): ICD-10-CM

## 2025-07-18 DIAGNOSIS — D61.818 PANCYTOPENIA (HCC): ICD-10-CM

## 2025-07-18 LAB
ALBUMIN SERPL-MCNC: 3.6 G/DL (ref 3.5–5.2)
ALP SERPL-CCNC: 163 U/L (ref 35–104)
ALT SERPL-CCNC: 22 U/L (ref 5–33)
ANION GAP SERPL CALCULATED.3IONS-SCNC: 14 MMOL/L (ref 7–19)
AST SERPL-CCNC: 25 U/L (ref 5–32)
BILIRUB SERPL-MCNC: 0.4 MG/DL (ref 0–1.2)
BUN SERPL-MCNC: 11 MG/DL (ref 8–23)
CALCIUM SERPL-MCNC: 8.7 MG/DL (ref 8.8–10.2)
CHLORIDE SERPL-SCNC: 106 MMOL/L (ref 98–107)
CO2 SERPL-SCNC: 19 MMOL/L (ref 22–29)
CREAT SERPL-MCNC: 0.8 MG/DL (ref 0.5–0.9)
ERYTHROCYTE [DISTWIDTH] IN BLOOD BY AUTOMATED COUNT: 18.8 % (ref 11.5–14.5)
GLUCOSE SERPL-MCNC: 106 MG/DL (ref 70–99)
HCT VFR BLD AUTO: 20.3 % (ref 37–47)
HGB BLD-MCNC: 7 G/DL (ref 12–16)
MCH RBC QN AUTO: 28.9 PG (ref 27–31)
MCHC RBC AUTO-ENTMCNC: 34.5 G/DL (ref 33–37)
MCV RBC AUTO: 83.9 FL (ref 81–99)
PLATELET # BLD AUTO: 23 K/UL (ref 130–400)
PMV BLD AUTO: 12.3 FL (ref 9.4–12.3)
POTASSIUM SERPL-SCNC: 3.6 MMOL/L (ref 3.5–5.1)
PROT SERPL-MCNC: 5.9 G/DL (ref 6.4–8.3)
RBC # BLD AUTO: 2.42 M/UL (ref 4.2–5.4)
SODIUM SERPL-SCNC: 139 MMOL/L (ref 136–145)
WBC # BLD AUTO: 0.46 K/UL (ref 4.8–10.8)

## 2025-07-18 PROCEDURE — 85025 COMPLETE CBC W/AUTO DIFF WBC: CPT

## 2025-07-18 PROCEDURE — 36415 COLL VENOUS BLD VENIPUNCTURE: CPT

## 2025-07-18 PROCEDURE — 80053 COMPREHEN METABOLIC PANEL: CPT

## 2025-07-21 ENCOUNTER — HOSPITAL ENCOUNTER (OUTPATIENT)
Dept: INFUSION THERAPY | Age: 70
Discharge: HOME | End: 2025-07-21
Payer: MEDICARE

## 2025-07-21 DIAGNOSIS — C50.919 TRIPLE NEGATIVE BREAST CANCER (HCC): ICD-10-CM

## 2025-07-21 DIAGNOSIS — D75.89 BICYTOPENIA: ICD-10-CM

## 2025-07-21 DIAGNOSIS — D61.818 PANCYTOPENIA (HCC): ICD-10-CM

## 2025-07-21 DIAGNOSIS — Z17.421 TRIPLE NEGATIVE BREAST CANCER (HCC): ICD-10-CM

## 2025-07-21 DIAGNOSIS — C92.00 ACUTE MYELOID LEUKEMIA NOT HAVING ACHIEVED REMISSION (HCC): ICD-10-CM

## 2025-07-21 LAB
ALBUMIN SERPL-MCNC: 3.6 G/DL (ref 3.5–5.2)
ALP SERPL-CCNC: 161 U/L (ref 35–104)
ALT SERPL-CCNC: 20 U/L (ref 5–33)
ANION GAP SERPL CALCULATED.3IONS-SCNC: 15 MMOL/L (ref 7–19)
AST SERPL-CCNC: 26 U/L (ref 5–32)
BASOPHILS # BLD: 0 K/UL (ref 0–0.2)
BASOPHILS NFR BLD: 0 % (ref 0–1)
BILIRUB SERPL-MCNC: 0.3 MG/DL (ref 0–1.2)
BUN SERPL-MCNC: 10 MG/DL (ref 8–23)
CALCIUM SERPL-MCNC: 8.5 MG/DL (ref 8.8–10.2)
CHLORIDE SERPL-SCNC: 103 MMOL/L (ref 98–107)
CO2 SERPL-SCNC: 19 MMOL/L (ref 22–29)
CREAT SERPL-MCNC: 0.8 MG/DL (ref 0.5–0.9)
EOSINOPHIL # BLD: 0.02 K/UL (ref 0–0.6)
EOSINOPHIL NFR BLD: 0.1 % (ref 0–5)
ERYTHROCYTE [DISTWIDTH] IN BLOOD BY AUTOMATED COUNT: 19.6 % (ref 11.5–14.5)
GLUCOSE SERPL-MCNC: 110 MG/DL (ref 70–99)
HCT VFR BLD AUTO: 20.3 % (ref 37–47)
HGB BLD-MCNC: 7.1 G/DL (ref 12–16)
LYMPHOCYTES # BLD: 6.52 K/UL (ref 1.1–4.5)
LYMPHOCYTES NFR BLD: 47.8 % (ref 20–40)
MCH RBC QN AUTO: 29.1 PG (ref 27–31)
MCHC RBC AUTO-ENTMCNC: 35 G/DL (ref 33–37)
MCV RBC AUTO: 83.2 FL (ref 81–99)
MONOCYTES # BLD: 7.03 K/UL (ref 0–0.9)
MONOCYTES NFR BLD: 51.5 % (ref 1–10)
NEUTROPHILS # BLD: 0.07 K/UL (ref 1.5–7.5)
NEUTS SEG NFR BLD: 0.6 % (ref 50–65)
PLATELET # BLD AUTO: 27 K/UL (ref 130–400)
PMV BLD AUTO: 11 FL (ref 9.4–12.3)
POTASSIUM SERPL-SCNC: 3.7 MMOL/L (ref 3.5–5.1)
PROT SERPL-MCNC: 5.9 G/DL (ref 6.4–8.3)
RBC # BLD AUTO: 2.44 M/UL (ref 4.2–5.4)
SODIUM SERPL-SCNC: 137 MMOL/L (ref 136–145)
WBC # BLD AUTO: 13.64 K/UL (ref 4.8–10.8)

## 2025-07-21 PROCEDURE — 36415 COLL VENOUS BLD VENIPUNCTURE: CPT

## 2025-07-21 PROCEDURE — 85025 COMPLETE CBC W/AUTO DIFF WBC: CPT

## 2025-07-21 PROCEDURE — 80053 COMPREHEN METABOLIC PANEL: CPT

## 2025-07-23 ENCOUNTER — HOSPITAL ENCOUNTER (OUTPATIENT)
Dept: INFUSION THERAPY | Age: 70
Discharge: HOME | End: 2025-07-23
Payer: MEDICARE

## 2025-07-23 ENCOUNTER — OFFICE VISIT (OUTPATIENT)
Dept: HEMATOLOGY | Age: 70
End: 2025-07-23
Payer: MEDICARE

## 2025-07-23 VITALS
SYSTOLIC BLOOD PRESSURE: 102 MMHG | OXYGEN SATURATION: 96 % | TEMPERATURE: 99 F | HEIGHT: 66 IN | WEIGHT: 232.6 LBS | HEART RATE: 100 BPM | DIASTOLIC BLOOD PRESSURE: 68 MMHG | BODY MASS INDEX: 37.38 KG/M2

## 2025-07-23 DIAGNOSIS — Z71.89 COORDINATION OF COMPLEX CARE: ICD-10-CM

## 2025-07-23 DIAGNOSIS — C92.00 ACUTE MYELOID LEUKEMIA NOT HAVING ACHIEVED REMISSION (HCC): ICD-10-CM

## 2025-07-23 DIAGNOSIS — Z71.89 CARE PLAN DISCUSSED WITH PATIENT: ICD-10-CM

## 2025-07-23 DIAGNOSIS — Z91.89 AT RISK FOR INFECTION DUE TO IMMUNOSUPPRESSION: ICD-10-CM

## 2025-07-23 DIAGNOSIS — D75.89 BICYTOPENIA: ICD-10-CM

## 2025-07-23 DIAGNOSIS — T45.1X5D ADVERSE EFFECT OF CHEMOTHERAPY, SUBSEQUENT ENCOUNTER: ICD-10-CM

## 2025-07-23 DIAGNOSIS — Z17.421 TRIPLE NEGATIVE BREAST CANCER (HCC): ICD-10-CM

## 2025-07-23 DIAGNOSIS — D61.818 PANCYTOPENIA (HCC): ICD-10-CM

## 2025-07-23 DIAGNOSIS — R69 LIFE THREATENING MEDICAL ILLNESS: ICD-10-CM

## 2025-07-23 DIAGNOSIS — Z78.9 MEDICALLY COMPLEX PATIENT: ICD-10-CM

## 2025-07-23 DIAGNOSIS — C50.919 TRIPLE NEGATIVE BREAST CANCER (HCC): ICD-10-CM

## 2025-07-23 DIAGNOSIS — C92.00 ACUTE MYELOID LEUKEMIA NOT HAVING ACHIEVED REMISSION (HCC): Primary | ICD-10-CM

## 2025-07-23 LAB
ALBUMIN SERPL-MCNC: 3.4 G/DL (ref 3.5–5.2)
ALP SERPL-CCNC: 151 U/L (ref 35–104)
ALT SERPL-CCNC: 21 U/L (ref 5–33)
ANION GAP SERPL CALCULATED.3IONS-SCNC: 18 MMOL/L (ref 7–19)
AST SERPL-CCNC: 35 U/L (ref 5–32)
BASOPHILS # BLD: 0.02 K/UL (ref 0–0.2)
BASOPHILS NFR BLD: 0 % (ref 0–1)
BILIRUB SERPL-MCNC: <0.2 MG/DL (ref 0–1.2)
BUN SERPL-MCNC: 10 MG/DL (ref 8–23)
CALCIUM SERPL-MCNC: 8 MG/DL (ref 8.8–10.2)
CHLORIDE SERPL-SCNC: 100 MMOL/L (ref 98–107)
CO2 SERPL-SCNC: 16 MMOL/L (ref 22–29)
CREAT SERPL-MCNC: 0.9 MG/DL (ref 0.5–0.9)
EOSINOPHIL # BLD: 0 K/UL (ref 0–0.6)
EOSINOPHIL NFR BLD: 0 % (ref 0–5)
ERYTHROCYTE [DISTWIDTH] IN BLOOD BY AUTOMATED COUNT: 20.1 % (ref 11.5–14.5)
GLUCOSE SERPL-MCNC: 123 MG/DL (ref 70–99)
HCT VFR BLD AUTO: 18.2 % (ref 37–47)
HGB BLD-MCNC: 6.2 G/DL (ref 12–16)
LYMPHOCYTES # BLD: 3.85 K/UL (ref 1.1–4.5)
LYMPHOCYTES NFR BLD: 6.1 % (ref 20–40)
MCH RBC QN AUTO: 28.4 PG (ref 27–31)
MCHC RBC AUTO-ENTMCNC: 34.1 G/DL (ref 33–37)
MCV RBC AUTO: 83.5 FL (ref 81–99)
MONOCYTES # BLD: 57.81 K/UL (ref 0–0.9)
MONOCYTES NFR BLD: 92.3 % (ref 1–10)
NEUTROPHILS # BLD: 0.96 K/UL (ref 1.5–7.5)
NEUTS SEG NFR BLD: 1.6 % (ref 50–65)
PLATELET # BLD AUTO: 21 K/UL (ref 130–400)
PMV BLD AUTO: 10.5 FL (ref 9.4–12.3)
POTASSIUM SERPL-SCNC: 3.5 MMOL/L (ref 3.5–5.1)
PROT SERPL-MCNC: 5.9 G/DL (ref 6.4–8.3)
RBC # BLD AUTO: 2.18 M/UL (ref 4.2–5.4)
SODIUM SERPL-SCNC: 134 MMOL/L (ref 136–145)
WBC # BLD AUTO: 62.64 K/UL (ref 4.8–10.8)

## 2025-07-23 PROCEDURE — 1159F MED LIST DOCD IN RCRD: CPT | Performed by: INTERNAL MEDICINE

## 2025-07-23 PROCEDURE — 1123F ACP DISCUSS/DSCN MKR DOCD: CPT | Performed by: INTERNAL MEDICINE

## 2025-07-23 PROCEDURE — G8417 CALC BMI ABV UP PARAM F/U: HCPCS | Performed by: INTERNAL MEDICINE

## 2025-07-23 PROCEDURE — 1036F TOBACCO NON-USER: CPT | Performed by: INTERNAL MEDICINE

## 2025-07-23 PROCEDURE — 36415 COLL VENOUS BLD VENIPUNCTURE: CPT

## 2025-07-23 PROCEDURE — 99212 OFFICE O/P EST SF 10 MIN: CPT

## 2025-07-23 PROCEDURE — G2211 COMPLEX E/M VISIT ADD ON: HCPCS | Performed by: INTERNAL MEDICINE

## 2025-07-23 PROCEDURE — 3017F COLORECTAL CA SCREEN DOC REV: CPT | Performed by: INTERNAL MEDICINE

## 2025-07-23 PROCEDURE — 99215 OFFICE O/P EST HI 40 MIN: CPT | Performed by: INTERNAL MEDICINE

## 2025-07-23 PROCEDURE — 85025 COMPLETE CBC W/AUTO DIFF WBC: CPT

## 2025-07-23 PROCEDURE — 3074F SYST BP LT 130 MM HG: CPT | Performed by: INTERNAL MEDICINE

## 2025-07-23 PROCEDURE — 3078F DIAST BP <80 MM HG: CPT | Performed by: INTERNAL MEDICINE

## 2025-07-23 PROCEDURE — G8427 DOCREV CUR MEDS BY ELIG CLIN: HCPCS | Performed by: INTERNAL MEDICINE

## 2025-07-23 PROCEDURE — 80053 COMPREHEN METABOLIC PANEL: CPT

## 2025-07-23 PROCEDURE — G8400 PT W/DXA NO RESULTS DOC: HCPCS | Performed by: INTERNAL MEDICINE

## 2025-07-23 PROCEDURE — 1090F PRES/ABSN URINE INCON ASSESS: CPT | Performed by: INTERNAL MEDICINE

## 2025-07-23 PROCEDURE — 1125F AMNT PAIN NOTED PAIN PRSNT: CPT | Performed by: INTERNAL MEDICINE

## 2025-07-23 NOTE — PROGRESS NOTES
represent a 1.1 c 0.9 cm hypoechoic mass at the 2 o'clock position, 8cm from the nipple no definite internal blood flow. A second mass located at the 2 o'clock position in the right breast. 8 cm from the nipple represents a hypoechoic 2.6 x 2.0 cm mass. This mass is lobulated and demonstrates acoustic shadowing. The third mass is located in the 9 o'clock position, 9 cm form the nipple and measures 2.2 x 1.9cm. No acoustic shadowing but internal blood flow is present to this mass.   5/1/23 Mass, lateral right breast at 9:00, ultrasound-guided needle biopsies:   Invasive carcinoma of no special type (ductal). Histologic grade 3. Maximum tumor diameter is at least 2.5 cm. Invasive tumor is present in all cores of block A1 and A2. Mass, medial right breast at 2:00, ultrasound-guided needle biopsies: Invasive carcinoma no special type (ductal). Histologic grade 3. Maximum tumor diameter is at least 1.8 cm. Invasive tumor present in all cores of block B1 and B2. CYTOLOGY: Right lateral breast mass, fine-needle aspiration (smears and ThinPrep): Breast carcinoma. Blacks modified nuclear grade 3 (poorly differentiated). Skin, right breast, punch biopsy: Benign skin with minimal superficial perivascular chronic inflammation. No evidence of Paget's disease, lymph-vascular space invasion, in situ carcinoma, or invasive carcinoma. ER 0.2%. NJ 0, HER-2 0/negative, Ki67 84%. MammaPrint: Basal type.   5/5/23 CT CHEST W CONTRAST Right breast lesions, as described above, with right axillary and right retropectoral lymphadenopathy. No evidence for metastatic disease within the abdomen or pelvis.  5/5/23 CT ABDOMEN PELVIS W IV CONTRAST Right breast lesions, as described above, with right axillary and right retropectoral lymphadenopathy. No evidence for metastatic disease within the abdomen or pelvis.  5/6/23 NM BONE SCAN WHOLE BODY No evidence of osseous metastatic disease. Increased soft tissue uptake in the right breast compatible

## (undated) DEVICE — TBG SMPL FLTR LINE NASL 02/C02 A/ BX/100

## (undated) DEVICE — SUTURE STRATAFIX SPRL MCRYL + SZ 4-0 L12IN ABSRB UD PS-2 SXMP1B117

## (undated) DEVICE — 3M™ IOBAN™ 2 ANTIMICROBIAL INCISE DRAPE 6650EZ: Brand: IOBAN™ 2

## (undated) DEVICE — Device: Brand: DEFENDO AIR/WATER/SUCTION AND BIOPSY VALVE

## (undated) DEVICE — ADHESIVE SKIN CLOSURE WND 8.661X1.5 IN 22 CM LIQUIBAND SECUR

## (undated) DEVICE — GOWN,PREVENTION PLUS,XL,ST,24/CS: Brand: MEDLINE

## (undated) DEVICE — CUFF,BP,DISP,1 TUBE,ADULT,HP: Brand: MEDLINE

## (undated) DEVICE — DECANTER FLD 9IN ST BG FOR ASEP TRNSF OF FLD

## (undated) DEVICE — SYRINGE MED 10ML TRNSLUC BRL PLUNG BLK MRK POLYPR CTRL

## (undated) DEVICE — MAJOR CDS

## (undated) DEVICE — PACK,UNIVERSAL,NO GOWNS: Brand: MEDLINE

## (undated) DEVICE — LIQUIBAND RAPID ADHESIVE 36/CS 0.8ML: Brand: MEDLINE

## (undated) DEVICE — ENDOGATOR AUXILIARY WATER JET CONNECTOR: Brand: ENDOGATOR

## (undated) DEVICE — MINOR CDS: Brand: MEDLINE INDUSTRIES, INC.

## (undated) DEVICE — TOWEL,OR,DSP,ST,BLUE,DLX,4/PK,20PK/CS: Brand: MEDLINE

## (undated) DEVICE — GLOVE SURG SZ 75 CRM LTX FREE POLYISOPRENE POLYMER BEAD ANTI

## (undated) DEVICE — SUTURE PERMAHAND SZ 2-0 L18IN NONABSORBABLE BLK L26MM SH C012D

## (undated) DEVICE — CONMED SCOPE SAVER BITE BLOCK, 20X27 MM: Brand: SCOPE SAVER

## (undated) DEVICE — SUTURE MNCRYL STRATAFIX PS 4-0 30CM

## (undated) DEVICE — SYRINGE, LUER LOCK, 10ML: Brand: MEDLINE

## (undated) DEVICE — SENSR O2 OXIMAX FNGR A/ 18IN NONSTR

## (undated) DEVICE — ADHESIVE SKIN CLSR 0.7ML TOP DERMBND ADV

## (undated) DEVICE — SOLUTION IV IRRIG POUR BRL 0.9% SODIUM CHL 2F7124

## (undated) DEVICE — SUTURE VCRL SZ 2-0 L36IN ABSRB UD L36MM CT-1 1/2 CIR J945H

## (undated) DEVICE — NEEDLE SPNL 20GA L3.5IN YEL HUB S STL REG WALL FIT STYL W/

## (undated) DEVICE — THE CHANNEL CLEANING BRUSH IS A NYLON FLEXI BRUSH ATTACHED TO A FLEXIBLE PLASTIC SHEATH DESIGNED TO SAFELY REMOVE DEBRIS FROM FLEXIBLE ENDOSCOPES.

## (undated) DEVICE — YANKAUER,BULB TIP WITH VENT: Brand: ARGYLE

## (undated) DEVICE — SUTURE PERMAHAND SZ 2-0 L18IN NONABSORBABLE BLK L26MM FS 685G

## (undated) DEVICE — BLADE SURG NO11 C STL RETRCT DISPOSABLE

## (undated) DEVICE — GOWN,PREVENTION PLUS,2XL,ST,22/CS: Brand: MEDLINE

## (undated) DEVICE — PLASMABLADE X PS210-030S-LIGHT 3.0SL: Brand: PLASMABLADE™ X

## (undated) DEVICE — SNAR POLYP SENSATION MICRO OVL 13 240X40

## (undated) DEVICE — SUTURE MONOCRYL + SZ 3-0 L27IN ABSRB UD L26MM SH 1/2 CIR MCP416H

## (undated) DEVICE — BANDAGE,GAUZE,4.5"X4.1YD,STERILE,LF: Brand: MEDLINE

## (undated) DEVICE — SUTURE PDS II SZ 2-0 L18IN ABSRB VLT SH L26MM 1/2 CIR TAPR Z775D

## (undated) DEVICE — SEALER TISS L20CM DIA13MM ADV BPLR L CRV JAW OPN APPRCH

## (undated) DEVICE — GLOVE SURG SZ 85 L12IN FNGR ORTHO 126MIL CRM LTX FREE

## (undated) DEVICE — GOWN, ORBIS, XLARGE, STERILE: Brand: MEDLINE

## (undated) DEVICE — 6 ML SYRINGE LUER-LOCK TIP: Brand: MONOJECT

## (undated) DEVICE — MASK,OXYGEN,MED CONC,ADLT,7' TUB, UC: Brand: PENDING

## (undated) DEVICE — SYRINGE MED 10ML POLYPR LUERSLIP TIP FLAT TOP W/O SFTY DISP

## (undated) DEVICE — THE SINGLE USE ETRAP – POLYP TRAP IS USED FOR SUCTION RETRIEVAL OF ENDOSCOPICALLY REMOVED POLYPS.: Brand: ETRAP

## (undated) DEVICE — C-ARM: Brand: UNBRANDED

## (undated) DEVICE — GOWN, ORBIS, XXLARGE, STERILE: Brand: MEDLINE

## (undated) DEVICE — SUTURE VICRYL + SZ 3-0 L27IN ABSRB UD L26MM SH 1/2 CIR VCP416H

## (undated) DEVICE — SUTURE VCRL SZ 3-0 L27IN ABSRB UD L26MM SH 1/2 CIR J416H

## (undated) DEVICE — AGENT HEMSTAT W4XL8IN OXIDIZED REGENERATED CELOS ABSRB

## (undated) DEVICE — KIT STD TEARWY INTRO PTFE 10 FR X 14 CM L PEELPRO